# Patient Record
Sex: FEMALE | Race: WHITE | NOT HISPANIC OR LATINO | Employment: OTHER | URBAN - METROPOLITAN AREA
[De-identification: names, ages, dates, MRNs, and addresses within clinical notes are randomized per-mention and may not be internally consistent; named-entity substitution may affect disease eponyms.]

---

## 2017-01-10 ENCOUNTER — TELEPHONE (OUTPATIENT)
Dept: GASTROENTEROLOGY | Facility: CLINIC | Age: 77
End: 2017-01-10

## 2017-01-10 NOTE — TELEPHONE ENCOUNTER
----- Message from Misty Schmitt MA sent at 1/5/2017  4:35 PM CST -----  Contact: Pt# [phone]      ----- Message -----     From: Miladis Ledezma     Sent: 1/5/2017   3:53 PM       To: Jarek Barajas Staff    Pt states she was calling for test results

## 2017-01-11 ENCOUNTER — OFFICE VISIT (OUTPATIENT)
Dept: PSYCHIATRY | Facility: CLINIC | Age: 77
End: 2017-01-11
Payer: MEDICARE

## 2017-01-11 DIAGNOSIS — F43.29 ADJUSTMENT DISORDER WITH MIXED EMOTIONAL FEATURES: Primary | ICD-10-CM

## 2017-01-11 PROCEDURE — 90834 PSYTX W PT 45 MINUTES: CPT | Mod: S$GLB,,, | Performed by: SOCIAL WORKER
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
## 2017-01-13 ENCOUNTER — TELEPHONE (OUTPATIENT)
Dept: GASTROENTEROLOGY | Facility: CLINIC | Age: 77
End: 2017-01-13

## 2017-01-17 ENCOUNTER — PATIENT MESSAGE (OUTPATIENT)
Dept: GASTROENTEROLOGY | Facility: CLINIC | Age: 77
End: 2017-01-17

## 2017-01-18 NOTE — TELEPHONE ENCOUNTER
She needs to be sent a certified letter informing her of results, RX etc (see previous message from me in 12/2016). She needs a clinic visit with me first available. Please schedule and mail to her as well. Also mail her the info I previously sent to her about microscopic colitis..     Thanks,  Tiff, NP

## 2017-01-25 ENCOUNTER — OFFICE VISIT (OUTPATIENT)
Dept: GASTROENTEROLOGY | Facility: CLINIC | Age: 77
End: 2017-01-25
Payer: MEDICARE

## 2017-01-25 ENCOUNTER — PATIENT MESSAGE (OUTPATIENT)
Dept: GASTROENTEROLOGY | Facility: CLINIC | Age: 77
End: 2017-01-25

## 2017-01-25 VITALS
DIASTOLIC BLOOD PRESSURE: 79 MMHG | BODY MASS INDEX: 26.93 KG/M2 | HEIGHT: 66 IN | HEART RATE: 104 BPM | WEIGHT: 167.56 LBS | SYSTOLIC BLOOD PRESSURE: 141 MMHG

## 2017-01-25 DIAGNOSIS — K52.832 LYMPHOCYTIC COLITIS: Primary | ICD-10-CM

## 2017-01-25 PROCEDURE — 3077F SYST BP >= 140 MM HG: CPT | Mod: S$GLB,,, | Performed by: NURSE PRACTITIONER

## 2017-01-25 PROCEDURE — 1160F RVW MEDS BY RX/DR IN RCRD: CPT | Mod: S$GLB,,, | Performed by: NURSE PRACTITIONER

## 2017-01-25 PROCEDURE — 1157F ADVNC CARE PLAN IN RCRD: CPT | Mod: S$GLB,,, | Performed by: NURSE PRACTITIONER

## 2017-01-25 PROCEDURE — 1126F AMNT PAIN NOTED NONE PRSNT: CPT | Mod: S$GLB,,, | Performed by: NURSE PRACTITIONER

## 2017-01-25 PROCEDURE — 99214 OFFICE O/P EST MOD 30 MIN: CPT | Mod: S$GLB,,, | Performed by: NURSE PRACTITIONER

## 2017-01-25 PROCEDURE — 3078F DIAST BP <80 MM HG: CPT | Mod: S$GLB,,, | Performed by: NURSE PRACTITIONER

## 2017-01-25 PROCEDURE — 1159F MED LIST DOCD IN RCRD: CPT | Mod: S$GLB,,, | Performed by: NURSE PRACTITIONER

## 2017-01-25 PROCEDURE — 99999 PR PBB SHADOW E&M-EST. PATIENT-LVL III: CPT | Mod: PBBFAC,,, | Performed by: NURSE PRACTITIONER

## 2017-01-25 RX ORDER — BUDESONIDE 3 MG/1
9 CAPSULE, COATED PELLETS ORAL DAILY
Qty: 90 CAPSULE | Refills: 1 | Status: SHIPPED | OUTPATIENT
Start: 2017-01-25 | End: 2018-05-11

## 2017-01-25 NOTE — PROGRESS NOTES
[facility] Note    Reason for Visit:  The encounter diagnosis was Lymphocytic colitis.    PCP: Moshe ORIELLY MD: No referring provider defined for this encounter.    HPI:  This is a 76 y.o. female here for f/u evaluation of diarrhea and GERD.  I saw Ms. Haile on 8/17/2016 and on 10/28/2016 for a hx of hx of intermittent diarrhea made worse with anxiety/stress over the years. Colonoscopy w/ random bx in 12/2016 revealed lymphocytic colitis. Attempts were made to contact the pt regarding the results, but apparently pt did not receive any of the messages. A letter was also sent out to her home with the information, but pt has yet to receive it. She states she was having phone problems for a while. Budesonide was sent to her pharmacy for treatment, but pt did not pick it up d/t she was not aware. She is currently having 3-4 loose to watery BM/day. There are some episodes of nocturnal diarrhea and some episodes of incontinence. She reports taking metamucil wafers once daily. She has not stopped drinking diet sodas as previously recomended. She drinks about 2 diet sodas per day. She reports being treated for depression/anxiety per psych and feels some improvement in her mood.     Abdominal pain - denies  Reflux + hx GERD. Has been well controled with pantoprazole once daily.  Dysphagia/odynophagia - denies  GI bleeding -denies hematochezia, hematemesis, melena, BRBPR, black/tarry stools, and coffee ground emesis  NSAID usage - 81 mg ASA daily; hx of daily Celebrex use years ago.    ROS:  Constitutional: No fevers, no chills, No unintentional weight loss, + fatigue,   ENT: No allergies  CV: No chest pain, no palpitations, no perif. edema, no sob on exertion  Pulm: No cough, No shortness of breath, no wheezes, no sputum  Ophtho: No vision changes  GI: see HPI; also no nausea, no vomiting, no change in appetite  Derm: No rash  Heme: No lymphadenopathy, No bruising  MSK: + arthritis, no muscle  pain, no muscle weakness  : No dysuria, No hematuria  Endo: No hot or cold intolerance  Neuro: No syncope, No seizure,     Medical History:  has a past medical history of Allergy; Anemia; Anxiety; Arthritis; Breast disorder; Cataract; CKD (chronic kidney disease) stage 3, GFR 30-59 ml/min; Clotting disorder; Degenerative disc disease; Depression; Fibromyalgia; GERD (gastroesophageal reflux disease); Hyperlipidemia; Hypertension; TRU (obstructive sleep apnea); RLS (restless legs syndrome); and S/P knee replacement (1/13/2014).    Surgical History:  has a past surgical history that includes Tonsillectomy; Appendectomy; Rectal surgery; Cholecystectomy; Knee surgery (Bilateral); Hysterectomy (1977); Breast surgery; Breast lumpectomy; Eye surgery (Bilateral); Joint replacement; and Colonoscopy (N/A, 12/2/2016).    Family History: family history includes Alcohol abuse in her son; Breast cancer in her paternal aunt and paternal grandmother; Cancer in her paternal grandmother; Cancer (age of onset: 40) in her cousin; Dementia in her mother; Heart disease in her father; Hypertension in her son; No Known Problems in her daughter; Ovarian cancer in her cousin and paternal grandmother. There is no history of Skin cancer, Melanoma, Colon cancer, Esophageal cancer, Stomach cancer, Irritable bowel syndrome, Crohn's disease, Ulcerative colitis, or Celiac disease..     Social History:  reports that she has never smoked. She has never used smokeless tobacco. She reports that she drinks alcohol. She reports that she does not use illicit drugs.    Review of patient's allergies indicates:   Allergen Reactions    Demerol [meperidine]        Current Outpatient Prescriptions   Medication Sig    aspirin (ECOTRIN) 81 MG EC tablet Take 81 mg by mouth every evening.     buPROPion (WELLBUTRIN SR) 150 MG TBSR 12 hr tablet Take 1 tablet (150 mg total) by mouth 2 (two) times daily. Please contact PCP for refills    clonazePAM (KLONOPIN) 1 MG  "tablet Take 1 tablet (1 mg total) by mouth 3 (three) times daily.    duloxetine (CYMBALTA) 60 MG capsule Take 1 capsule (60 mg total) by mouth 2 (two) times daily.    HORIZANT 600 mg TbSR Take 600 mg by mouth every evening.    hydrochlorothiazide (HYDRODIURIL) 25 MG tablet Take 1 tablet (25 mg total) by mouth once daily.    losartan (COZAAR) 50 MG tablet Take 1 tablet (50 mg total) by mouth before breakfast.    melatonin 1 mg Tab Take 5 mg by mouth.     MV-MN/FA/VIT K/LYCOP/LUT/ZEAXA (OCUVITE EYE + MULTI ORAL) Take by mouth.    pantoprazole (PROTONIX) 40 MG tablet Take 1 tablet (40 mg total) by mouth once daily.    pravastatin (PRAVACHOL) 40 MG tablet Take 1 tablet (40 mg total) by mouth every evening.    vitamin D 1000 units Tab Take 185 mg by mouth once daily.    budesonide (ENTOCORT EC) 3 mg capsule Take 3 capsules (9 mg total) by mouth once daily.     No current facility-administered medications for this visit.        Objective Findings:    Vital Signs:  Visit Vitals    BP (!) 141/79    Pulse 104    Ht 5' 6" (1.676 m)    Wt 76 kg (167 lb 8.8 oz)    BMI 27.04 kg/m2     Body mass index is 27.04 kg/(m^2).    Physical Exam:  General Appearance: Well appearing in no acute distress  Head:   Normocephalic, without obvious abnormality  Eyes:    No scleral icterus, EOMI  ENT: Neck supple, Lips, mucosa, and tongue normal; teeth and gums normal  Extremities: 2+ radial pulses, no clubbing, cyanosis or edema  Skin: No rash to exposed areas  Neurologic: A&Ox4      Labs:  Lab Results   Component Value Date    WBC 8.84 10/28/2016    HGB 12.3 10/28/2016    HCT 37.9 10/28/2016     10/28/2016    CHOL 210 (H) 07/27/2016    TRIG 123 07/27/2016    HDL 63 07/27/2016    ALT 11 06/28/2016    AST 19 06/28/2016     10/28/2016    K 3.2 (L) 10/28/2016     10/28/2016    CREATININE 1.1 10/28/2016    BUN 23 10/28/2016    CO2 27 10/28/2016    TSH 1.243 06/28/2016    INR 1.8 (A) 01/30/2014    HGBA1C 5.5 " 08/21/2014         Endoscopy:    12/2016 colonoscopy Dr. Cope- normal appearance. Path- lymphocytic colitis.  2/25/2014 EGD Dr. Cope-WNL  1/29/2010 Colonoscopy Dr. Engel - 6 mm hyperplastic polyp.    Assessment:  1. Lymphocytic colitis           Recommendations:  1. Lymphocytic colitis-results/diagnosis discussed. Written information provided. RX 9 mg budesonide once daily x 4 weeks. Will adjust therapy at f/u pending pt status.    F/u in one month    Visit time: 30 minutes with greater than 50% of the time spent in face to face pt  discussing the aforementioned diagnoses, recommendations, test results, and answering pt questions.    Order summary:  Orders Placed This Encounter    budesonide (ENTOCORT EC) 3 mg capsule         Thank you so much for allowing me to participate in the care of Tere Moreno, APRN, FNP-C

## 2017-01-25 NOTE — PATIENT INSTRUCTIONS
Microscopic colitis  Definition:  Collagenous ([id number]) colitis and lymphocytic colitis are inflammatory conditions of the colon that cause persistent watery diarrhea. Some researchers believe that collagenous colitis and lymphocytic colitis are different phases of the same condition rather than separate conditions.    Both collagenous colitis and lymphocytic colitis are sometimes referred to collectively as microscopic colitis -- because the diagnosis is confirmed by examining a sample of tissue using a microscope.    Treatment for collagenous colitis and lymphocytic colitis often begins with lifestyle changes. In many cases, adjusting your diet will be enough to resolve your symptoms. If not, your doctor can suggest a number of effective medications for collagenous colitis and lymphocytic colitis. In rare cases, surgery is necessary.    Signs and symptoms of collagenous colitis and lymphocytic colitis include:  Chronic, watery diarrhea  Abdominal pain or cramps  Weight loss  Nausea  Fecal incontinence  When to see a doctor   If you have watery diarrhea that lasts more than a few days, contact your doctor so that the condition can be diagnosed and properly treated.    Causes:  * If you have never been checked for celiac disease it is important to be checked  It's not clear what causes collagenous colitis and lymphocytic colitis. The inflammation affects the colon differently in the two types of colitis:    Collagenous colitis causes the layer of connective tissue (collagen) in the colon to thicken.  Lymphocytic colitis causes increased levels of a specialized type of white blood cell (lymphocyte) in the colon.  Theories about causes:   Doctors aren't sure what causes collagenous colitis and lymphocytic colitis, but researchers theorize that causes could include:  Bacteria that produce toxins that irritate the lining of the colon.  Viruses that trigger inflammation.  Immune system problems, such as  autoimmune disorders that occur when the body's immune system attacks healthy tissues. People with collagenous colitis or lymphocytic colitis sometimes also have an autoimmune disorder, such as celiac disease, rheumatoid arthritis or scleroderma.    Medications linked to these conditions include:  Clozapine (Clozaril)  Entacapone (Comtan)  Esomeprazole (Nexium)  Flutamide  Lansoprazole (Prevacid)  Nonsteroidal anti-inflammatory drugs (NSAIDs), such as aspirin and ibuprofen (Advil, Motrin, others)  Omeprazole (Prilosec)  Ranitidine (Zantac)  Sertraline (Zoloft)  Simvastatin (Zocor)  Ticlopidine (Ticlid)  It's not clear why some people who use these medications develop collagenous colitis or lymphocytic colitis, while others don't.    What you can do in the meantime:  While you're waiting for your appointment, you may find some relief from persistent diarrhea by making changes to your diet. Try to:  Eat low fat foods  Avoid dairy products  Not drink caffeine or alcohol  Choose bland foods and avoid spicy foods    Treatment:  If your signs and symptoms persist, your doctor may recommend medications, such as:    Anti-diarrhea drugs. Medications that control diarrhea include loperamide (Imodium A-D), bismuth subsalicylate (Pepto-Bismol) and a combination of the drugs diphenoxylate and atropine (Lomotil).  Drugs that block bile acids. The drug cholestyramine (Questran) may help treat lymphocytic colitis by absorbing bile acids that can contribute to diarrhea.  Steroid medications to block inflammation. A corticosteroid drug, such as budesonide (Entocort) or prednisone, can help control inflammation in your colon.  Anti-inflammatory drugs. Other drugs used to control colon inflammation include mesalamine (Asacol, Pentasa, others) and sulfasalazine (Azulfidine).  Drugs that suppress the immune system. Immunosuppressive drugs that can help reduce inflammation in the colon include methotrexate (Rheumatrex) and azathioprine  (Azasan, Imuran).  Surgery to remove a portion of your colon   When the symptoms of collagenous colitis and lymphocytic colitis are severe, and medications aren't effective, your doctor may recommend surgery to remove all or part of your colon in a procedure called a colectomy. Surgery is rare for these conditions.    Changes to your diet may help relieve diarrhea that you experience with collagenous colitis and lymphocytic colitis. Try to:    Drink plenty of fluids. Water is best, but fluids with added sodium and potassium (electrolytes) may be beneficial as well. Try drinking broth or watered-down fruit juice. Avoid beverages that are high in sugar or contain alcohol or caffeine, such as coffee, tea and leona, which may aggravate your symptoms.  Choose soft, easy-to-digest foods. These include applesauce, bananas and rice. Avoid high-fiber foods such as beans, nuts and vegetables. If you feel like your symptoms are improving, slowly add high-fiber foods back to your diet.  Try eating several small meals, rather than a few large meals. Space meals throughout the day instead of eating two or three large ones.  Avoid irritating foods. Stay away from spicy, fatty or fried foods and any other foods that make your symptoms worse.

## 2017-01-26 DIAGNOSIS — K52.832 LYMPHOCYTIC COLITIS: Primary | ICD-10-CM

## 2017-02-08 ENCOUNTER — OFFICE VISIT (OUTPATIENT)
Dept: PSYCHIATRY | Facility: CLINIC | Age: 77
End: 2017-02-08
Payer: MEDICARE

## 2017-02-08 DIAGNOSIS — F43.23 ADJUSTMENT DISORDER WITH MIXED ANXIETY AND DEPRESSED MOOD: Primary | ICD-10-CM

## 2017-02-08 PROCEDURE — 90834 PSYTX W PT 45 MINUTES: CPT | Mod: S$GLB,,, | Performed by: SOCIAL WORKER

## 2017-02-08 PROCEDURE — 99499 UNLISTED E&M SERVICE: CPT | Mod: S$GLB,,, | Performed by: SOCIAL WORKER

## 2017-02-08 NOTE — PROGRESS NOTES
Individual Psychotherapy (PhD/LCSW)    2/8/2017    Site:  [facility]         Therapeutic Intervention: Met with patient.  Outpatient - Insight oriented psychotherapy 45 min - CPT code 99694 and Outpatient - Behavior modifying psychotherapy 45 min - CPT code 08127    Chief complaint/reason for encounter: depression and anxiety     Interval history and content of current session:    Marni sent her a letter (response to pt letter).  Pt could not find it to bring it with her.  Daughter changed about 2 years ago.  Last argument triggered by pt asking during visit how are you doing?   The interaction seems plagued with distortions.   Pt is now concern about her relation with granddaughter as daughter requested pt keep her distance from her.   Pt has decided to respect daughter's wishes and not push the relation and let her daughter make the moves.     Pt has more motivation.  Started [employer] classes.          Treatment plan:  · Target symptoms: depression, anxiety   · Why chosen therapy is appropriate versus another modality: relevant to diagnosis  · Outcome monitoring methods: self-report, observation  · Therapeutic intervention type: insight oriented psychotherapy, behavior modifying psychotherapy    Risk parameters:  Patient reports no suicidal ideation  Patient reports no homicidal ideation  Patient reports no self-injurious behavior  Patient reports no violent behavior    Verbal deficits: None    Patient's response to intervention:  The patient's response to intervention is accepting.    Progress toward goals and other mental status changes:  The patient's progress toward goals is good.    Diagnosis:     ICD-10-CM ICD-9-CM   1. Adjustment disorder with mixed anxiety and depressed mood F43.23 309.28       Plan:  individual psychotherapy    Return to clinic: 1 month    Length of Service (minutes): 45

## 2017-03-15 DIAGNOSIS — K21.00 GASTROESOPHAGEAL REFLUX DISEASE WITH ESOPHAGITIS: ICD-10-CM

## 2017-03-15 DIAGNOSIS — I15.0 RENOVASCULAR HYPERTENSION: ICD-10-CM

## 2017-03-15 RX ORDER — HYDROCHLOROTHIAZIDE 25 MG/1
25 TABLET ORAL DAILY
Qty: 90 TABLET | Refills: 3 | Status: SHIPPED | OUTPATIENT
Start: 2017-03-15 | End: 2017-09-06 | Stop reason: SDUPTHER

## 2017-03-15 RX ORDER — PRAVASTATIN SODIUM 40 MG/1
40 TABLET ORAL NIGHTLY
Qty: 90 TABLET | Refills: 3 | Status: SHIPPED | OUTPATIENT
Start: 2017-03-15 | End: 2017-11-13 | Stop reason: SDUPTHER

## 2017-03-15 RX ORDER — PANTOPRAZOLE SODIUM 40 MG/1
40 TABLET, DELAYED RELEASE ORAL DAILY
Qty: 90 TABLET | Refills: 3 | Status: SHIPPED | OUTPATIENT
Start: 2017-03-15 | End: 2017-10-09 | Stop reason: SDUPTHER

## 2017-03-15 RX ORDER — LOSARTAN POTASSIUM 50 MG/1
50 TABLET ORAL
Qty: 90 TABLET | Refills: 3 | Status: SHIPPED | OUTPATIENT
Start: 2017-03-15 | End: 2017-10-10 | Stop reason: SDUPTHER

## 2017-03-15 NOTE — TELEPHONE ENCOUNTER
----- Message from Shannon Rosales sent at 3/15/2017 11:48 AM CDT -----  Contact: Self/ [phone]   Pt want to speak with someone in the office about a refill on her medication. Pt stated she need to speak with someone in the office about her appt for today. Please call and advise       Thank you

## 2017-03-24 ENCOUNTER — OFFICE VISIT (OUTPATIENT)
Dept: INTERNAL MEDICINE | Facility: CLINIC | Age: 77
End: 2017-03-24
Payer: MEDICARE

## 2017-03-24 VITALS
WEIGHT: 169.06 LBS | DIASTOLIC BLOOD PRESSURE: 72 MMHG | SYSTOLIC BLOOD PRESSURE: 130 MMHG | HEART RATE: 62 BPM | HEIGHT: 66 IN | BODY MASS INDEX: 27.17 KG/M2

## 2017-03-24 DIAGNOSIS — E78.5 HYPERLIPIDEMIA, UNSPECIFIED HYPERLIPIDEMIA TYPE: ICD-10-CM

## 2017-03-24 DIAGNOSIS — K52.832 LYMPHOCYTIC COLITIS: Primary | ICD-10-CM

## 2017-03-24 DIAGNOSIS — G47.33 OSA ON CPAP: ICD-10-CM

## 2017-03-24 DIAGNOSIS — I10 ESSENTIAL HYPERTENSION: ICD-10-CM

## 2017-03-24 DIAGNOSIS — E21.3 HYPERPARATHYROIDISM: ICD-10-CM

## 2017-03-24 DIAGNOSIS — G47.00 INSOMNIA, UNSPECIFIED TYPE: ICD-10-CM

## 2017-03-24 PROCEDURE — 1160F RVW MEDS BY RX/DR IN RCRD: CPT | Mod: S$GLB,,, | Performed by: INTERNAL MEDICINE

## 2017-03-24 PROCEDURE — 1126F AMNT PAIN NOTED NONE PRSNT: CPT | Mod: S$GLB,,, | Performed by: INTERNAL MEDICINE

## 2017-03-24 PROCEDURE — 3078F DIAST BP <80 MM HG: CPT | Mod: S$GLB,,, | Performed by: INTERNAL MEDICINE

## 2017-03-24 PROCEDURE — 1159F MED LIST DOCD IN RCRD: CPT | Mod: S$GLB,,, | Performed by: INTERNAL MEDICINE

## 2017-03-24 PROCEDURE — 3075F SYST BP GE 130 - 139MM HG: CPT | Mod: S$GLB,,, | Performed by: INTERNAL MEDICINE

## 2017-03-24 PROCEDURE — 99999 PR PBB SHADOW E&M-EST. PATIENT-LVL III: CPT | Mod: PBBFAC,,, | Performed by: INTERNAL MEDICINE

## 2017-03-24 PROCEDURE — 1157F ADVNC CARE PLAN IN RCRD: CPT | Mod: S$GLB,,, | Performed by: INTERNAL MEDICINE

## 2017-03-24 PROCEDURE — 99213 OFFICE O/P EST LOW 20 MIN: CPT | Mod: S$GLB,,, | Performed by: INTERNAL MEDICINE

## 2017-03-24 NOTE — PATIENT INSTRUCTIONS
Okay to stop clonazepam at night. If there are any problems after stopping please notify the office.

## 2017-03-24 NOTE — PROGRESS NOTES
"Subjective:       Patient ID: Tere Haile is a 77 y.o. female.    Chief Complaint: Follow-up    HPI    Last visit with me 10/2016. Since then seen by Gastroenterology, Nephrology, Internal Medicine, Psychiatry. Had endoscopy. Upcoming appointment with lab and Nephrology.    Pt did not start any of the meds from Gastroenterology due to expense. Didn't see Gastroenterology about this since. Reports prep from colonoscopy much better this time.    No recent CPAP titration. Reports "I'm having an awful time with sleeping". Some days will sleep all day. Using Klonopin 1/2 tab every other day at night.    Wellbutrin is helping with her mood a lot.    Review of Systems    As per HPI    Objective:      Physical Exam   Constitutional: No distress.    woman whose Body mass index is 27.29 kg/(m^2).    Skin: Skin is warm and dry.   No nodules along right ribcage and flank.   Psychiatric: She has a normal mood and affect. Her behavior is normal.   Nursing note and vitals reviewed.      Vitals:    03/24/17 0911   BP: 130/72   BP Location: Right arm   Patient Position: Sitting   BP Method: Manual   Pulse: 62   Weight: 76.7 kg (169 lb 1.5 oz)   Height: 5' 6" (1.676 m)     Body mass index is 27.29 kg/(m^2).    RESULTS: Reviewed labs from last 12 months    Assessment:       1. Lymphocytic colitis    2. Hyperparathyroidism    3. TRU on CPAP    4. Insomnia, unspecified type    5. Hyperlipidemia, unspecified hyperlipidemia type    6. Essential hypertension        Plan:   Tere was seen today for follow-up.    Diagnoses and all orders for this visit:    Lymphocytic colitis:  Didn't start meds from Gastroenterology due to expense; follow up with Gastroenterology for further input and determination if other treatment is required.    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    TRU on CPAP:  Needs titration, refer to Sleep clinic.  -     " Ambulatory consult for Sleep Study    Insomnia, unspecified type:  improved generally but still with some sleep disturbances occasionally, refer to Sleep Med, try stopping Klonopin now that she has weaned to 1/2 tab every other day for 3 weeks.  -     Ambulatory consult for Sleep Study    Hyperlipidemia, unspecified hyperlipidemia type:  Prior diagnosis, well controlled on current management. No changes at this time, will continue to monitor.   -     Lipid panel; Future    Essential hypertension:  Prior diagnosis, well controlled on current management. No changes at this time, will continue to monitor.   -     Basic metabolic panel; Future    Return in about 7 months (around 10/24/2017) for EPP annual exam, fasting labs 1 week prior.  Moshe Sandra MD  Internal Medicine    Portions of this note were completed using [employer] dictation software. Please excuse typographical or syntax errors.

## 2017-03-24 NOTE — MR AVS SNAPSHOT
[facility]  [address]  [facility] [address]  Phone: [phone]  Fax: [phone]                  Tere Haile   3/24/2017 9:20 AM   Office Visit    Description:  Female : 1940   Provider:  Moshe Sandra MD   Department:  [facility]           Reason for Visit     Follow-up           Diagnoses this Visit        Comments    Lymphocytic colitis    -  Primary     Hyperparathyroidism         TRU on CPAP         Insomnia, unspecified type         Hyperlipidemia, unspecified hyperlipidemia type         Essential hypertension                To Do List           Future Appointments        Provider Department Dept Phone    3/28/2017 1:30 PM Katherine Moreno NP [facility] - Gastroenterology [phone]    2017 9:00 AM LAB, APPOINTMENT [facility] [phone]    2017 9:10 AM LAB, SPECIMEN [facility] [phone]    2017 1:00 PM Kassandra Silva MD [facility] - Nephrology [phone]    2017 3:00 PM Julissa Lindsay NP [facility] [phone]      Goals (5 Years of Data)     None      Follow-Up and Disposition     Return in about 7 months (around 10/24/2017) for EPP annual exam, fasting labs 1 week prior.    Follow-up and Disposition History      [facility]     [facility] -  Assistance  Registered nurses in the [facility] provide clinical advisement, health education, appointment booking, and other advisory services.  Call for this free service at [phone].             Medications           Message regarding Medications     Verify the changes and/or additions to your medication regime listed below are the same as discussed with your clinician today.  If any of these changes or additions are incorrect, please notify your healthcare provider.             Verify that the below list of medications is an  "accurate representation of the medications you are currently taking.  If none reported, the list may be blank. If incorrect, please contact your healthcare provider. Carry this list with you in case of emergency.           Current Medications     aspirin (ECOTRIN) 81 MG EC tablet Take 81 mg by mouth every evening.     buPROPion (WELLBUTRIN SR) 150 MG TBSR 12 hr tablet Take 1 tablet (150 mg total) by mouth 2 (two) times daily. Please contact PCP for refills    clonazePAM (KLONOPIN) 1 MG tablet Take 1 tablet (1 mg total) by mouth 3 (three) times daily.    duloxetine (CYMBALTA) 60 MG capsule Take 1 capsule (60 mg total) by mouth 2 (two) times daily.    HORIZANT 600 mg TbSR Take 600 mg by mouth every evening.    hydrochlorothiazide (HYDRODIURIL) 25 MG tablet Take 1 tablet (25 mg total) by mouth once daily.    losartan (COZAAR) 50 MG tablet Take 1 tablet (50 mg total) by mouth before breakfast.    melatonin 1 mg Tab Take 10 mg by mouth.     MV-MN/FA/VIT K/LYCOP/LUT/ZEAXA (OCUVITE EYE + MULTI ORAL) Take by mouth.    pantoprazole (PROTONIX) 40 MG tablet Take 1 tablet (40 mg total) by mouth once daily.    pravastatin (PRAVACHOL) 40 MG tablet Take 1 tablet (40 mg total) by mouth every evening.    vitamin D 1000 units Tab Take 185 mg by mouth once daily.    bismuth subsalicylate (BISMUTH) 262 mg Tab Take 3 tablets by mouth 3 (three) times daily.    budesonide (ENTOCORT EC) 3 mg capsule Take 3 capsules (9 mg total) by mouth once daily.           Clinical Reference Information           Your Vitals Were     BP Pulse Height Weight BMI    130/72 (BP Location: Right arm, Patient Position: Sitting, BP Method: Manual) 62 5' 6" (1.676 m) 76.7 kg (169 lb 1.5 oz) 27.29 kg/m2      Blood Pressure          Most Recent Value    BP  130/72      Allergies as of 3/24/2017     Demerol [Meperidine]      Immunizations Administered on Date of Encounter - 3/24/2017     None      Orders Placed During Today's Visit      Normal Orders This Visit    " Ambulatory consult for Sleep Study     Future Labs/Procedures Expected by Expires    Basic metabolic panel  3/24/2017 3/24/2018    Lipid panel  3/24/2017 5/23/2018      Instructions    Okay to stop clonazepam at night. If there are any problems after stopping please notify the office.       Language Assistance Services     ATTENTION: Language assistance services are available, free of charge. Please call [phone].      ATENCIÓN: Si habla español, tiene a robins disposición servicios gratuitos de asistencia lingüística. Llame al [phone].     CHÚ Ý: N?u b?n nói Ti?ng Vi?t, có các d?ch v? h? tr? ngôn ng? mi?n phí dành cho b?n. G?i s? [phone].         [facility] complies with applicable Federal civil rights laws and does not discriminate on the basis of race, color, national origin, age, disability, or sex.

## 2017-04-05 DIAGNOSIS — Z13.31 POSITIVE DEPRESSION SCREENING: ICD-10-CM

## 2017-04-05 DIAGNOSIS — F41.9 ANXIETY: ICD-10-CM

## 2017-04-05 RX ORDER — BUPROPION HYDROCHLORIDE 150 MG/1
TABLET, EXTENDED RELEASE ORAL
Qty: 60 TABLET | Refills: 3 | Status: CANCELLED | OUTPATIENT
Start: 2017-04-05

## 2017-04-11 DIAGNOSIS — F41.9 ANXIETY: ICD-10-CM

## 2017-04-11 DIAGNOSIS — Z13.31 POSITIVE DEPRESSION SCREENING: ICD-10-CM

## 2017-04-11 NOTE — TELEPHONE ENCOUNTER
----- Message from Haylie Colunga MA sent at 4/11/2017  2:57 PM CDT -----  Contact: self - [phone]   Type: Rx    Name of medication(s): buPROPion (WELLBUTRIN SR) 150 MG TBSR 12 hr tablet    Is this a refill? New rx? Refill     Who prescribed medication?    Pharmacy Name, Phone, & Location: [facility] [address] [facility] [place], [address]    Comments: Please call. Thanks!

## 2017-04-12 RX ORDER — BUPROPION HYDROCHLORIDE 150 MG/1
150 TABLET, EXTENDED RELEASE ORAL 2 TIMES DAILY
Qty: 180 TABLET | Refills: 3 | Status: SHIPPED | OUTPATIENT
Start: 2017-04-12 | End: 2017-11-13 | Stop reason: SDUPTHER

## 2017-05-11 ENCOUNTER — OFFICE VISIT (OUTPATIENT)
Dept: SLEEP MEDICINE | Facility: CLINIC | Age: 77
End: 2017-05-11
Payer: MEDICARE

## 2017-05-11 VITALS
BODY MASS INDEX: 26.93 KG/M2 | HEART RATE: 88 BPM | HEIGHT: 66 IN | WEIGHT: 167.56 LBS | SYSTOLIC BLOOD PRESSURE: 120 MMHG | DIASTOLIC BLOOD PRESSURE: 70 MMHG

## 2017-05-11 DIAGNOSIS — G47.00 INSOMNIA, UNSPECIFIED TYPE: Primary | ICD-10-CM

## 2017-05-11 DIAGNOSIS — G25.81 RLS (RESTLESS LEGS SYNDROME): ICD-10-CM

## 2017-05-11 DIAGNOSIS — G47.33 OBSTRUCTIVE SLEEP APNEA: ICD-10-CM

## 2017-05-11 PROCEDURE — 99999 PR PBB SHADOW E&M-EST. PATIENT-LVL IV: CPT | Mod: PBBFAC,,, | Performed by: NURSE PRACTITIONER

## 2017-05-11 PROCEDURE — 1126F AMNT PAIN NOTED NONE PRSNT: CPT | Mod: S$GLB,,, | Performed by: NURSE PRACTITIONER

## 2017-05-11 PROCEDURE — 3074F SYST BP LT 130 MM HG: CPT | Mod: S$GLB,,, | Performed by: NURSE PRACTITIONER

## 2017-05-11 PROCEDURE — 1159F MED LIST DOCD IN RCRD: CPT | Mod: S$GLB,,, | Performed by: NURSE PRACTITIONER

## 2017-05-11 PROCEDURE — 1160F RVW MEDS BY RX/DR IN RCRD: CPT | Mod: S$GLB,,, | Performed by: NURSE PRACTITIONER

## 2017-05-11 PROCEDURE — 99205 OFFICE O/P NEW HI 60 MIN: CPT | Mod: S$GLB,,, | Performed by: NURSE PRACTITIONER

## 2017-05-11 PROCEDURE — 3078F DIAST BP <80 MM HG: CPT | Mod: S$GLB,,, | Performed by: NURSE PRACTITIONER

## 2017-05-11 NOTE — MR AVS SNAPSHOT
[facility]  [address]  [facility] [address]  Phone: [phone]                  Tere Haile   2017 3:00 PM   Office Visit    Description:  Female : 1940   Provider:  Julissa Lindsay NP   Department:  [facility]           Reason for Visit     Sleep Apnea     Insomnia           Diagnoses this Visit        Comments    Insomnia, unspecified type    -  Primary     Obstructive sleep apnea         RLS (restless legs syndrome)                To Do List           Goals (5 Years of Data)     None      Follow-Up and Disposition     Return in about 2 months (around 2017) for 2p TRU/insomnia.      [facility]     [facility] On [facility] Nurse Care Line -  Assistance  Unless otherwise directed by your provider, please contact [facility], our nurse care line that is available for  assistance.     Registered nurses in the [facility] Center provide: appointment scheduling, clinical advisement, health education, and other advisory services.  Call: [phone] (toll free)               Medications           Message regarding Medications     Verify the changes and/or additions to your medication regime listed below are the same as discussed with your clinician today.  If any of these changes or additions are incorrect, please notify your healthcare provider.        STOP taking these medications     clonazePAM (KLONOPIN) 1 MG tablet Take 1 tablet (1 mg total) by mouth 3 (three) times daily.           Verify that the below list of medications is an accurate representation of the medications you are currently taking.  If none reported, the list may be blank. If incorrect, please contact your healthcare provider. Carry this list with you in case of emergency.           Current Medications     aspirin (ECOTRIN) 81 MG EC tablet Take 81 mg by mouth every evening.     bismuth subsalicylate (BISMUTH) 262 mg Tab Take 3 tablets by mouth 3  "(three) times daily.    budesonide (ENTOCORT EC) 3 mg capsule Take 3 capsules (9 mg total) by mouth once daily.    buPROPion (WELLBUTRIN SR) 150 MG TBSR 12 hr tablet Take 1 tablet (150 mg total) by mouth 2 (two) times daily.    duloxetine (CYMBALTA) 60 MG capsule Take 1 capsule (60 mg total) by mouth 2 (two) times daily.    HORIZANT 600 mg TbSR Take 600 mg by mouth every evening.    hydrochlorothiazide (HYDRODIURIL) 25 MG tablet Take 1 tablet (25 mg total) by mouth once daily.    losartan (COZAAR) 50 MG tablet Take 1 tablet (50 mg total) by mouth before breakfast.    melatonin 1 mg Tab Take 10 mg by mouth.     MV-MN/FA/VIT K/LYCOP/LUT/ZEAXA (OCUVITE EYE + MULTI ORAL) Take by mouth.    pantoprazole (PROTONIX) 40 MG tablet Take 1 tablet (40 mg total) by mouth once daily.    pravastatin (PRAVACHOL) 40 MG tablet Take 1 tablet (40 mg total) by mouth every evening.    vitamin D 1000 units Tab Take 185 mg by mouth once daily.           Clinical Reference Information           Your Vitals Were     BP Pulse Height Weight BMI    120/70 88 5' 6" (1.676 m) 76 kg (167 lb 8.8 oz) 27.04 kg/m2      Blood Pressure          Most Recent Value    BP  120/70      Allergies as of 5/11/2017     Demerol [Meperidine]      Immunizations Administered on Date of Encounter - 5/11/2017     None      Language Assistance Services     ATTENTION: Language assistance services are available, free of charge. Please call [phone].      ATENCIÓN: Si habla avelinaañol, tiene a robins disposición servicios gratuitos de asistencia lingüística. Llame al [phone].     [facility] Ý: N?u b?n nói Ti?ng Vi?t, có các d?ch v? h? tr? ngôn ng? mi?n phí dành cho b?n. G?i s? [phone].         [facility] complies with applicable Federal civil rights laws and does not discriminate on the basis of race, color, national origin, age, disability, or sex.        "

## 2017-05-11 NOTE — LETTER
May 12, 2017      Moshe Sandra MD  [address]  [facility] [address]           [facility]  [address]  [facility] [address]  Phone: [phone]          Patient: Tere Haile   MR Number: [id number]   YOB: 1940   Date of Visit: 5/11/2017       Dear Dr. Moshe Sandra:    Thank you for referring Tere Haile to me for evaluation. Attached you will find relevant portions of my assessment and plan of care.    If you have questions, please do not hesitate to call me. I look forward to following Tere Haile along with you.    Sincerely,    Julissa Lindsay, NP    Enclosure  CC:  No Recipients    If you would like to receive this communication electronically, please contact [email or web address] or [phone] to request more information on [facility] access.    For providers and/or their staff who would like to refer a patient to [facility], please contact us through our one-stop-shop provider referral line, [facility], at [phone].    If you feel you have received this communication in error or would no longer like to receive these types of communications, please e-mail [email or web address]

## 2017-05-11 NOTE — PROGRESS NOTES
"Tere Haile  was seen as a new patient, referred by Dr. Moshe Sandra , for the evaluation of obstructive sleep apnea and insomnia.     CHIEF COMPLAINT: Can't sleep at night and sleep all day    HISTORY OF PRESENT ILLNESS: Tere Haile a 77 y.o. female presents for the management of obstructive sleep apnea and evaluation insomnia. She has always had a sleep problem "since I was a baby my mom told me I had my days and nights mixed up". She has used [facility] aide in past and would sleep 11p-0730. Since retiring ~ 9 yrs ago, no set schedule. She and  will watch tv in den until 2-3am at times. Once asleep, she wakes up once to use bathroom and can easily go back to bed. She can sleep for hours in bed, wake up late afternoon. Can't sleep at night. Symptoms worse fall '16 when she wasn't speaking to her daughter but that has improved. Has recently resumed using cpap therapy. Avoids caffeiene. Sometimes can take 2-3h to fall asleep, otherwise she will be up for the day and go watch tv. Feels tired during those next days but can't sleep.     + restless legs   Hx MERA/Fe intake  Tried ineffective gabapentin and requip. Current taking Horizant 600mg but in am, symptoms qhs well controlled  Tried: benadryl, melatonin 10mg    PSG: No outside records available but her DME will fax them  Interrogatoin- forgot machine in car, using [employer] travis. Resumed consistent use recently/nightly. Sleeps mostly uninterrupted with mask on, no snoring, unknown pressure or severity of her TRU. She is using So[employer] machine.     Tapered off Klonopin 1mg qhs ~ 4mos ago due to feeling drunk in am    On todays [place] Sleepiness Scale the patient scores a 0/24.     BT:varies  SL: "sometimes I don't fall asleep"  Disruptions: 1x  WT: varies, out of bed late afternoon  Sleep quality: 0/5 quality,  unrefreshing     FAMILY HISTORY: No known sleep disorders.     SOCIAL HISTORY: . Retired 8yo ago. No " "CAFFEINE    REVIEW OF SYSTEMS:  Sleep related symptoms as per HPI; denies sinus congestion; +oral drying, denies dyspnea; Denies palpitations; + acid reflux; +arthritic pain. Denies polyuria; Denies headaches;Denies mood disturbance.  Otherwise, a balance of 10 systems reviewed is negative        PHYSICAL EXAM:   /70  Pulse 88  Ht 5' 6" (1.676 m)  Wt 76 kg (167 lb 8.8 oz)  BMI 27.04 kg/m2  GENERAL: W/N, W/D body habitus, well groomed   HEENT: Conjunctivae are non-erythematous; Pupils equal, round, and reactive to light; Nose is symmetrical  SKIN: On face and neck: No abrasions, no rashes, no lesions.   RESPIRATORY: Normal chest expansion and non-labored breathing at rest.   CARDIOVASCULAR: Normal S1, S2.  EXTREMITIES: No edema. No clubbing. No cyanosis. Station normal. Gait normal.   NEURO/PSYCH: Oriented to time, place and person. Normal attention span and concentration. Affect is full. Mood is normal.       ASSESSMENT:   1. Insomnia NEC. Multi-factorial - excess time in bed, poor sleep hygiene, pain syndrome, and likely paradoxical insomnia play a role.    2. TRU, previously diagnosed ~ 2008. Remains adherent with cpap ? Pressure qhs with less disrupted sleep and snoring. No outside studies available for review today. She has medical co-morbidities of hypertension, fibromyalgia,  which can be worsened by TRU. This warrants further investigation for possible obstructive sleep apnea.    3. RLS, stable on Horizant           PLAN:    Discussed Behavioral Modification:  1. Implement stimulus control: [place] bedroom for sleep only. Leave bedroom when frustrated from not sleeping. Engage in relaxation before returning.Engage in activities during the day. Avoid time in bed >8-9hrs. Establish some sleep-wake routine.   2. Avoid clock watching  3. Avoid thinking/worrying about sleep when trying to fall asleep  4. Continue to limit caffeinated products  5. Make sure the bedroom is dark, quiet and " cool  6. Attitudes toward sleep were discussed. Merits/components of CBT-I discussed  7. Sleep Hygiene handout was given and discussed    PLAN:   1. Request outiside sleep studies from DME. Continue prescribed cpap. Bring machine for interrogation next visit, have DME fax 30-d compliance report   2. Discussed etiology of TRU and potential ramifications of untreated TRU, including stroke, heart disease, HTN.    3. The patient was advised to abstain from driving should she feel sleepy or drowsy.   4. Long discussion about cbt-i and sleep routine importance. Trial natural Alteril and consider 0.3-1mg melatonin 4-5hr before anticipated bedtime. Consider 12-8-9am. Continue Horizant but take as prescribed 5-6pm. Consider avoiding cymbalta and wellbutrin doses in evening time, rather full dose am or second dose 5-6pm.

## 2017-05-12 NOTE — PATIENT INSTRUCTIONS
Treating Insomnia  Good sleeping habits are a key part of treatment. If needed, some medications may help you sleep better at first. Making healthy lifestyle changes and learning to relax can improve your sleep. Treating insomnia takes commitment, but trust that your efforts will pay off. Talk to your health care provider before taking any medication.    Healthy lifestyle  Your lifestyle affects your health and your sleep. Here are some healthy habits:  · Keep a regular sleep schedule. Go to bed and get up at the same time each day.  · Exercise regularly. It may help you reduce stress. Avoid strenuous exercise for 2 to 4 hours before bedtime.  · Avoid or limit naps, especially in the late afternoon.  · Use your bed only for sleep and sex.  · Dont spend too much time in bed trying to fall asleep. If you cant fall asleep, get up and do something (no electronics) until you become tired and drowsy.  · Avoid or limit caffeine and nicotine for up to 6 hours before bedtime. They can keep you awake at night.   · Also avoid alcohol for at least 4 to 6 hours before bedtime. It may help you fall asleep at first, but you will have more awakenings throughout the night, and your sleep will not be restful.  Before bedtime  To sleep better every night, try these tips:  · Have a bedtime routine to let your body and mind know when its time to sleep.  · Think of going to bed as relaxing and enjoyable. Sleep will come sooner.  · If your worries dont let you sleep, write them down in a diary. Then close it, and go to bed.  · Make sure the room is not too hot or too cold. If its not dark enough, an eye mask can help. If its noisy, try using earplugs.  Learn to relax  Stress, anxiety, and body tension may keep you awake at night. To unwind before bedtime, try a warm bath, meditation, or yoga. Also, try the following:  · Deep breathing. Sit or lie back in a chair. Take a slow, deep breath. Hold it for 5 counts. Then breathe out  slowly through your mouth. Keep doing this until you feel relaxed.  · Progressive muscle relaxation. Tense and then relax the muscles in your body as you breathe deeply. Start with your feet and work up your body to your neck and face.  Date Last Reviewed: 7/18/2015  © 6722-8731 [employer]. [address] [place] [address], [place] [address]. All rights reserved. This information is not intended as a substitute for professional medical care. Always follow your healthcare professional's instructions.

## 2017-05-16 ENCOUNTER — TELEPHONE (OUTPATIENT)
Dept: SLEEP MEDICINE | Facility: CLINIC | Age: 77
End: 2017-05-16

## 2017-05-16 NOTE — TELEPHONE ENCOUNTER
rec'd 30d and 90d compliance report revealing 30d 33% and 90d 13% days>4h. CPAP 8.5cm. AHI 1.9 with use. avg use when adherent 5:07hrs.

## 2017-07-11 ENCOUNTER — OFFICE VISIT (OUTPATIENT)
Dept: NEUROLOGY | Facility: CLINIC | Age: 77
End: 2017-07-11
Payer: MEDICARE

## 2017-07-11 VITALS
DIASTOLIC BLOOD PRESSURE: 60 MMHG | HEART RATE: 68 BPM | HEIGHT: 66 IN | SYSTOLIC BLOOD PRESSURE: 110 MMHG | BODY MASS INDEX: 25.26 KG/M2 | WEIGHT: 157.19 LBS

## 2017-07-11 DIAGNOSIS — G47.33 OBSTRUCTIVE SLEEP APNEA: ICD-10-CM

## 2017-07-11 DIAGNOSIS — G25.81 RLS (RESTLESS LEGS SYNDROME): ICD-10-CM

## 2017-07-11 DIAGNOSIS — G47.00 INSOMNIA, UNSPECIFIED TYPE: Primary | ICD-10-CM

## 2017-07-11 PROCEDURE — 1159F MED LIST DOCD IN RCRD: CPT | Mod: S$GLB,,, | Performed by: NURSE PRACTITIONER

## 2017-07-11 PROCEDURE — 99214 OFFICE O/P EST MOD 30 MIN: CPT | Mod: S$GLB,,, | Performed by: NURSE PRACTITIONER

## 2017-07-11 PROCEDURE — 99999 PR PBB SHADOW E&M-EST. PATIENT-LVL IV: CPT | Mod: PBBFAC,,, | Performed by: NURSE PRACTITIONER

## 2017-07-11 PROCEDURE — 99499 UNLISTED E&M SERVICE: CPT | Mod: S$GLB,,, | Performed by: NURSE PRACTITIONER

## 2017-07-11 PROCEDURE — 1126F AMNT PAIN NOTED NONE PRSNT: CPT | Mod: S$GLB,,, | Performed by: NURSE PRACTITIONER

## 2017-07-11 RX ORDER — DULOXETIN HYDROCHLORIDE 20 MG/1
20 CAPSULE, DELAYED RELEASE ORAL 2 TIMES DAILY
COMMUNITY
End: 2017-10-10 | Stop reason: SDUPTHER

## 2017-07-11 RX ORDER — TRAZODONE HYDROCHLORIDE 100 MG/1
100 TABLET ORAL NIGHTLY
Qty: 30 TABLET | Refills: 2 | Status: SHIPPED | OUTPATIENT
Start: 2017-07-11 | End: 2017-08-10

## 2017-07-11 NOTE — PATIENT INSTRUCTIONS
Blue light blocker glasses at [facility]  Bring machine there and have them send me 30d compliance report and have them check manometer, ensure blowing correct pressure    Communicate [facility] if need be, otherwise 3-mos f/u

## 2017-07-11 NOTE — PROGRESS NOTES
"Tere Haile  was seen as a f/u today for the mgt of obstructive sleep apnea and insomnia.     Since last seen, she continues to use cpap. Did not bring machine today. Uses nightly, except when unable to sleep entirely, which may happen every 10d or so. Typically going to bed now 11p and if unable to sleep will read outside of bedroom. No tv on anymore! Alteril was ineffective, also taking 10mg melatonin ~ 10p not helpful. [facility] is her DME. ESS=1. Taking horizant now afternoon which completely stops any RLS symptoms at bedtime "dont' have anymore". 2-3x/week hard to fall asleep w/i 30min. She has lost 10#.     HISTORY:  5/11/17  CHIEF COMPLAINT: Can't sleep at night and sleep all day    HISTORY OF PRESENT ILLNESS: Tere Haile a 77 y.o. female presents for the management of obstructive sleep apnea and evaluation insomnia. She has always had a sleep problem "since I was a baby my mom told me I had my days and nights mixed up". She has used [facility] aide in past and would sleep 11p-0730. Since retiring ~ 9 yrs ago, no set schedule. She and  will watch tv in den until 2-3am at times. Once asleep, she wakes up once to use bathroom and can easily go back to bed. She can sleep for hours in bed, wake up late afternoon. Can't sleep at night. Symptoms worse fall '16 when she wasn't speaking to her daughter but that has improved. Has recently resumed using cpap therapy. Avoids caffeiene. Sometimes can take 2-3h to fall asleep, otherwise she will be up for the day and go watch tv. Feels tired during those next days but can't sleep. ESS=0    + restless legs   Hx MERA/Fe intake  Tried ineffective gabapentin and requip. Current taking Horizant 600mg but in am, symptoms qhs well controlled  Tried: benadryl, melatonin 10mg    PSG: No outside records available but her DME will fax them  Interrogatoin- forgot machine in car, using [employer] travis. Resumed consistent use recently/nightly. Sleeps mostly " "uninterrupted with mask on, no snoring, unknown pressure or severity of her TRU. She is using [employer] machine.     Tapered off Klonopin 1mg qhs ~ 4mos ago due to feeling drunk in am    BT:varies  SL: "sometimes I don't fall asleep"  Disruptions: 1x  WT: varies, out of bed late afternoon  Sleep quality: 0/5 quality,  unrefreshing     FAMILY HISTORY: No known sleep disorders.   SOCIAL HISTORY: . Retired 10yo ago. No CAFFEINE    REVIEW OF SYSTEMS:  Sleep related symptoms as per HPI; denies sinus congestion;  + acid reflux; +arthritic pain. Otherwise, a balance of 10 systems reviewed is negative        PHYSICAL EXAM:   /60   Pulse 68   Ht 5' 6" (1.676 m)   Wt 71.3 kg (157 lb 3 oz)   BMI 25.37 kg/m²   GENERAL: W/N, W/D body habitus, well groomed       ASSESSMENT:   1. Insomnia NEC. Multi-factorial - excess time in bed, poor sleep hygiene, pain syndrome, and likely paradoxical insomnia play a role.    2. TRU, previously diagnosed ~ 2008. Remains adherent with cpap ? Pressure qhs with less disrupted sleep and snoring. No outside studies available for review today. She has medical co-morbidities of hypertension, fibromyalgia,  which can be worsened by TRU. This warrants continued treatment.    3. RLS, stable on Horizant           PLAN:    Discussed continued Behavioral Modification:  1. Implement stimulus control: [place] bedroom for sleep only. Leave bedroom when frustrated from not sleeping. Engage in relaxation before returning.Engage in activities during the day. Avoid time in bed >8-9hrs. Continue regular sleep and wake time 11-8-9am  2. Avoid clock watching  3. Avoid thinking/worrying about sleep when trying to fall asleep  4. Continue to limit caffeinated products  5. Make sure the bedroom is dark, quiet and cool  6. Attitudes toward sleep were discussed. Merits/components of CBT-I discussed      PLAN:   1. Request outiside sleep studies, she will obtain and fax.  Continue prescribed cpap. Bring " machine for interrogation to DME and have them check pressure and fax 30-d compliance report   2. Discussed etiology of TRU and potential ramifications of untreated TRU, including stroke, heart disease, HTN.    3. The patient was advised to abstain from driving should she feel sleepy or drowsy.   4. Begin  0.3-1mg melatonin 4-5hr before anticipated bedtime. Trazadone 100mg qhs. Continue Horizant as prescribed. Consider avoiding cymbalta in evening time, rather full dose am  5. RTC 3-mos

## 2017-07-12 ENCOUNTER — OFFICE VISIT (OUTPATIENT)
Dept: INTERNAL MEDICINE | Facility: CLINIC | Age: 77
End: 2017-07-12
Payer: MEDICARE

## 2017-07-12 VITALS
BODY MASS INDEX: 25.5 KG/M2 | HEIGHT: 66 IN | SYSTOLIC BLOOD PRESSURE: 115 MMHG | HEART RATE: 65 BPM | WEIGHT: 158.69 LBS | DIASTOLIC BLOOD PRESSURE: 80 MMHG

## 2017-07-12 DIAGNOSIS — N18.30 CHRONIC KIDNEY DISEASE, STAGE III (MODERATE): ICD-10-CM

## 2017-07-12 DIAGNOSIS — R22.9 SWELLING OF SKIN: Primary | ICD-10-CM

## 2017-07-12 DIAGNOSIS — Z12.31 ENCOUNTER FOR SCREENING MAMMOGRAM FOR BREAST CANCER: ICD-10-CM

## 2017-07-12 PROCEDURE — 99999 PR PBB SHADOW E&M-EST. PATIENT-LVL III: CPT | Mod: PBBFAC,,, | Performed by: INTERNAL MEDICINE

## 2017-07-12 PROCEDURE — 1125F AMNT PAIN NOTED PAIN PRSNT: CPT | Mod: S$GLB,,, | Performed by: INTERNAL MEDICINE

## 2017-07-12 PROCEDURE — 99499 UNLISTED E&M SERVICE: CPT | Mod: S$GLB,,, | Performed by: INTERNAL MEDICINE

## 2017-07-12 PROCEDURE — 99213 OFFICE O/P EST LOW 20 MIN: CPT | Mod: S$GLB,,, | Performed by: INTERNAL MEDICINE

## 2017-07-12 PROCEDURE — 1159F MED LIST DOCD IN RCRD: CPT | Mod: S$GLB,,, | Performed by: INTERNAL MEDICINE

## 2017-07-12 NOTE — PROGRESS NOTES
"Subjective:       Patient ID: Tere Haile is a 77 y.o. female.    Chief Complaint: Follow-up (pt states she may have blood clot on side of left eye/ stiffness.)    [facility]    Last visit with me 03/2017. Since then seen by Sleep Med and Neurology.  About 4 weeks ago the patient was hit in the left side of the head with a falling shower door.  There was significant ecchymoses and swelling, but this has started to resolve.  She has noted however a small nodule remaining in the left temple area.  She denies any pain, but there is some mild soreness to palpation when she is pressing on the area.  Her vision is normal, and she has no increase in pain or problem with chewing.  She denies any rash or any drainage from the area.    Review of Systems    As per [facility]    Objective:      Physical Exam   Constitutional: She is oriented to person, place, and time. No distress.    woman whose Body mass index is 25.61 kg/m².    Neurological: She is alert and oriented to person, place, and time.   Skin: Skin is warm and dry. No rash noted.   There is movable soft tissue nodule in L temple near eye approx 1 cm in size without overlying skin change or tenderness to palpation    Nursing note and vitals reviewed.      Vitals:    07/12/17 1607   BP: 115/80   BP Location: Right arm   Patient Position: Sitting   BP Method: Manual   Pulse: 65   Weight: 72 kg (158 lb 11.2 oz)   Height: 5' 6" (1.676 m)     Body mass index is 25.61 kg/m².    RESULTS: Reviewed labs from last 12 months    Assessment:       1. Swelling of skin    2. Chronic kidney disease, stage III (moderate)    3. Encounter for screening mammogram for breast cancer        Plan:   Tere was seen today for follow-up.    Diagnoses and all orders for this visit:    Swelling of skin:  Most likely residual inflammation from bruise.  Slowly improving, try warm compresses 2 times a day to see if this facilitates resolution.    Chronic kidney disease, stage III " (moderate):  Patient interested in making dietary changes that would limit progression of kidney disease.  I will refer her to our health  for nutrition counseling.  Patient may need to pay out of pocket for nutritionist if she needs further details.    Encounter for screening mammogram for breast cancer: Patient elects to continue with continued screening mammography.    -     Mammo Digital Screening Bilat with CAD; Future    Return in about 4 months (around 11/12/2017) for EPP annual exam, fasting labs 1 week prior.  Moshe Sandra MD  Internal Medicine    Portions of this note were completed using Dragon medical dictation software. Please excuse typographical or syntax errors.

## 2017-07-21 ENCOUNTER — TELEPHONE (OUTPATIENT)
Dept: INTERNAL MEDICINE | Facility: CLINIC | Age: 77
End: 2017-07-21

## 2017-07-21 NOTE — TELEPHONE ENCOUNTER
..Called patient left message, intro self, referred by Dr. Sandra.   Request call back at direct number [phone].  Elif Xie RN HC

## 2017-07-21 NOTE — TELEPHONE ENCOUNTER
----- Message from Moshe Sandra MD sent at 7/12/2017  4:43 PM CDT -----  Regarding: Please contact for nutrition counseling  Candido Asencio,    Please contact this patient to set up an appointment to meet for Health Coaching.     The patient was actually interested in meeting with a nutritionist for any recommendations to help limit progression of kidney disease.  I informed her that since the insurance doesn't pay for it, I recommend she sit with you first to see if there are any other general nutrition recommendations.  If you do know of any diet changes that help limit kidney disease, please relay to the patient.    Please let me know if there are other questions. Thanks!    Moshe

## 2017-07-26 ENCOUNTER — TELEPHONE (OUTPATIENT)
Dept: SLEEP MEDICINE | Facility: CLINIC | Age: 77
End: 2017-07-26

## 2017-07-26 DIAGNOSIS — G47.33 OBSTRUCTIVE SLEEP APNEA: Primary | ICD-10-CM

## 2017-07-26 NOTE — TELEPHONE ENCOUNTER
----- Message from Noemi Sanders sent at 7/25/2017  4:08 PM CDT -----  Contact: MARTINEZ CAMPBELL [[id number]]  x_  1st Request  _  2nd Request  _  3rd Request        Who: MARTINEZ CAMPBELL [[id number]]    Why: Patient states she would like a prescription for a portable CPAP machine [facility]ed to [facility] at [phone] contact [phone] . Patient states she is at the facility now.    What Number to Call Back: [phone]    When to Expect a call back: (Before the end of the day)   -- if call after 3:00 call back will be tomorrow.

## 2017-07-27 ENCOUNTER — OFFICE VISIT (OUTPATIENT)
Dept: URGENT CARE | Facility: CLINIC | Age: 77
End: 2017-07-27
Payer: MEDICARE

## 2017-07-27 VITALS
RESPIRATION RATE: 12 BRPM | HEART RATE: 97 BPM | BODY MASS INDEX: 26.36 KG/M2 | DIASTOLIC BLOOD PRESSURE: 70 MMHG | TEMPERATURE: 99 F | SYSTOLIC BLOOD PRESSURE: 111 MMHG | WEIGHT: 164 LBS | OXYGEN SATURATION: 96 % | HEIGHT: 66 IN

## 2017-07-27 DIAGNOSIS — S00.12XA CONTUSION OF LEFT PERIOCULAR REGION, INITIAL ENCOUNTER: Primary | ICD-10-CM

## 2017-07-27 PROCEDURE — 1159F MED LIST DOCD IN RCRD: CPT | Mod: S$GLB,,, | Performed by: NURSE PRACTITIONER

## 2017-07-27 PROCEDURE — 99202 OFFICE O/P NEW SF 15 MIN: CPT | Mod: S$GLB,,, | Performed by: NURSE PRACTITIONER

## 2017-07-27 NOTE — PATIENT INSTRUCTIONS
Head Injury (Adult)    You have a head injury. It does not appear serious at this time. But symptoms of a more serious problem, such as a mild brain injury (concussion) or bruising or bleeding in the brain, may appear later. For this reason, you or someone caring for you will need to watch for the symptoms listed below. Once youre home, also be sure to follow any care instructions youre given.  Home care  Watch for the following symptoms  Seek emergency medical care if you have any of these symptoms over the next hours to days:   · Headache  · Nausea or vomiting  · Dizziness  · Sensitivity to light or noise  · Unusual sleepiness or grogginess  · Trouble falling asleep  · Personality changes  · Vision changes  · Memory loss  · Confusion  · Trouble walking or clumsiness  · Loss of consciousness (even for a short time)  · Inability to be awakened  · Stiff neck  · Weakness or numbness in any part of the body  · Seizures  General care  · If you were prescribed medicines for pain, use them as directed. Note: Dont take other medicines for pain without talking to your provider first.  · To help reduce swelling and pain, apply a cold source to the injured area for up to 20 minutes at a time. Do this as often as directed. Use a cold pack or bag of ice wrapped in a thin towel. Never apply a cold source directly to the skin.  · If you have cuts or scrapes as a result of your head injury, care for them as directed.  · For the next 24 hours (or longer, if instructed):  ¨ Dont drink alcohol or use sedatives or other medicines that make you sleepy.  ¨ Dont drive or operate machinery.  ¨ Dont do anything strenuous, such as heavy lifting or straining.  ¨ Limit tasks that require concentration. This includes reading, using a smartphone or computer, watching TV, and playing video games.  ¨ Dont return to sports or other activities that could result in another head injury.  Follow-up care  Follow up with your healthcare  provider, or as directed. If imaging tests were done, they will be reviewed by a doctor. You will be told the results and any new findings that may affect your care.  When to seek medical advice  Call your healthcare provider right away if any of these occur:  · Pain doesnt get better or worsens  · New or increased swelling or bruising  · Fever of 100.4°F (38°C) or higher, or as directed by your provider  · Increased redness, warmth, drainage, or bleeding from the injured area  · Fluid drainage or bleeding from the nose or ears  · Any depression or bony abnormality in the injured area  Date Last Reviewed: 9/26/2015 © 2000-2016 [employer]. [address] [place] [address], [place] [address]. All rights reserved. This information is not intended as a substitute for professional medical care. Always follow your healthcare professional's instructions.      Please return here or go to the Emergency Department for any concerns or worsening of condition.  If you were prescribed antibiotics, please take them to completion.  If you were prescribed a narcotic medication, do not drive or operate heavy equipment or machinery while taking these medications.  Please follow up with your primary care doctor or specialist as needed.    If you  smoke, please stop smoking.

## 2017-09-05 ENCOUNTER — OFFICE VISIT (OUTPATIENT)
Dept: URGENT CARE | Facility: CLINIC | Age: 77
End: 2017-09-05
Payer: MEDICARE

## 2017-09-05 VITALS
TEMPERATURE: 99 F | RESPIRATION RATE: 16 BRPM | OXYGEN SATURATION: 96 % | DIASTOLIC BLOOD PRESSURE: 66 MMHG | SYSTOLIC BLOOD PRESSURE: 101 MMHG | HEART RATE: 79 BPM | BODY MASS INDEX: 24.17 KG/M2 | WEIGHT: 154 LBS | HEIGHT: 67 IN

## 2017-09-05 DIAGNOSIS — M46.1 SACROILIITIS: Primary | ICD-10-CM

## 2017-09-05 PROCEDURE — 99213 OFFICE O/P EST LOW 20 MIN: CPT | Mod: S$GLB,,, | Performed by: INTERNAL MEDICINE

## 2017-09-05 PROCEDURE — 99499 UNLISTED E&M SERVICE: CPT | Mod: S$GLB,,, | Performed by: INTERNAL MEDICINE

## 2017-09-05 PROCEDURE — 3078F DIAST BP <80 MM HG: CPT | Mod: S$GLB,,, | Performed by: INTERNAL MEDICINE

## 2017-09-05 PROCEDURE — 3074F SYST BP LT 130 MM HG: CPT | Mod: S$GLB,,, | Performed by: INTERNAL MEDICINE

## 2017-09-05 PROCEDURE — 3008F BODY MASS INDEX DOCD: CPT | Mod: S$GLB,,, | Performed by: INTERNAL MEDICINE

## 2017-09-05 PROCEDURE — 1159F MED LIST DOCD IN RCRD: CPT | Mod: S$GLB,,, | Performed by: INTERNAL MEDICINE

## 2017-09-05 RX ORDER — METHYLPREDNISOLONE 4 MG/1
TABLET ORAL
Qty: 1 PACKAGE | Refills: 0 | Status: SHIPPED | OUTPATIENT
Start: 2017-09-05 | End: 2017-11-13

## 2017-09-05 NOTE — PROGRESS NOTES
"Subjective:       Patient ID: Tere Haile is a 77 y.o. female.    Vitals:  height is 5' 7" (1.702 m) and weight is 69.9 kg (154 lb). Her temperature is 98.6 °F (37 °C). Her blood pressure is 101/66 and her pulse is 79. Her respiration is 16 and oxygen saturation is 96%.     Chief Complaint: Back Pain (lower back pain)    Pt states that her lower right back/hip is hurting. Pt states that she thinks it is the sciatic nerve      Back Pain   This is a new problem. The current episode started in the past 7 days. The problem occurs constantly. The problem has been gradually worsening since onset. The pain is present in the sacro-iliac. The quality of the pain is described as shooting. The pain is at a severity of 8/10. The pain is moderate. The symptoms are aggravated by bending and twisting. Pertinent negatives include no abdominal pain, chest pain, fever or headaches. She has tried nothing for the symptoms.     Review of Systems   Constitution: Negative for chills and fever.   HENT: Negative for sore throat.    Eyes: Negative for blurred vision.   Cardiovascular: Negative for chest pain.   Respiratory: Negative for shortness of breath.    Skin: Negative for rash.   Musculoskeletal: Positive for back pain and joint pain.   Gastrointestinal: Negative for abdominal pain, diarrhea, nausea and vomiting.   Neurological: Negative for headaches.   Psychiatric/Behavioral: The patient is not nervous/anxious.        Objective:      Physical Exam   Musculoskeletal:   R sacroiliac tenderness       Assessment:       1. Sacroiliitis        Plan:         Sacroiliitis  -     methylPREDNISolone (MEDROL DOSEPACK) 4 mg tablet; use as directed  Dispense: 1 Package; Refill: 0          "

## 2017-09-05 NOTE — PROGRESS NOTES
"Subjective:       Patient ID: Tere Haile is a 77 y.o. female.    Vitals:  height is 5' 7" (1.702 m) and weight is 69.9 kg (154 lb). Her temperature is 98.6 °F (37 °C). Her blood pressure is 101/66 and her pulse is 79. Her respiration is 16 and oxygen saturation is 96%.     Chief Complaint: Back Pain (lower back pain)    HPI  ROS    Objective:      Physical Exam    Assessment:       No diagnosis found.    Plan:         There are no diagnoses linked to this encounter.  ***    "

## 2017-09-06 ENCOUNTER — OFFICE VISIT (OUTPATIENT)
Dept: NEPHROLOGY | Facility: CLINIC | Age: 77
End: 2017-09-06
Payer: MEDICARE

## 2017-09-06 ENCOUNTER — LAB VISIT (OUTPATIENT)
Dept: LAB | Facility: HOSPITAL | Age: 77
End: 2017-09-06
Payer: MEDICARE

## 2017-09-06 VITALS
HEART RATE: 78 BPM | OXYGEN SATURATION: 98 % | SYSTOLIC BLOOD PRESSURE: 120 MMHG | BODY MASS INDEX: 24.88 KG/M2 | WEIGHT: 158.5 LBS | HEIGHT: 67 IN | DIASTOLIC BLOOD PRESSURE: 60 MMHG

## 2017-09-06 DIAGNOSIS — I10 ESSENTIAL HYPERTENSION: ICD-10-CM

## 2017-09-06 DIAGNOSIS — Z86.2 H/O: IRON DEFICIENCY ANEMIA: ICD-10-CM

## 2017-09-06 DIAGNOSIS — N18.31 CKD STAGE G3A/A1, GFR 45-59 AND ALBUMIN CREATININE RATIO <30 MG/G: Primary | ICD-10-CM

## 2017-09-06 DIAGNOSIS — N18.31 CKD STAGE G3A/A1, GFR 45-59 AND ALBUMIN CREATININE RATIO <30 MG/G: ICD-10-CM

## 2017-09-06 LAB
BACTERIA #/AREA URNS AUTO: ABNORMAL /HPF
BILIRUB UR QL STRIP: NEGATIVE
CLARITY UR REFRACT.AUTO: CLEAR
COLOR UR AUTO: YELLOW
CREAT UR-MCNC: 67 MG/DL
GLUCOSE UR QL STRIP: NEGATIVE
HGB UR QL STRIP: NEGATIVE
HYALINE CASTS UR QL AUTO: 3 /LPF
KETONES UR QL STRIP: NEGATIVE
LEUKOCYTE ESTERASE UR QL STRIP: ABNORMAL
MICROSCOPIC COMMENT: ABNORMAL
NITRITE UR QL STRIP: NEGATIVE
NON-SQ EPI CELLS #/AREA URNS AUTO: <1 /HPF
PH UR STRIP: 6 [PH] (ref 5–8)
PROT UR QL STRIP: NEGATIVE
PROT UR-MCNC: <7 MG/DL
PROT/CREAT RATIO, UR: NORMAL
RBC #/AREA URNS AUTO: 1 /HPF (ref 0–4)
SP GR UR STRIP: 1.01 (ref 1–1.03)
SQUAMOUS #/AREA URNS AUTO: 2 /HPF
URN SPEC COLLECT METH UR: ABNORMAL
UROBILINOGEN UR STRIP-ACNC: NEGATIVE EU/DL
WBC #/AREA URNS AUTO: 2 /HPF (ref 0–5)

## 2017-09-06 PROCEDURE — 3074F SYST BP LT 130 MM HG: CPT | Mod: GC,S$GLB,, | Performed by: INTERNAL MEDICINE

## 2017-09-06 PROCEDURE — 1159F MED LIST DOCD IN RCRD: CPT | Mod: GC,S$GLB,, | Performed by: INTERNAL MEDICINE

## 2017-09-06 PROCEDURE — 99999 PR PBB SHADOW E&M-EST. PATIENT-LVL IV: CPT | Mod: PBBFAC,GC,, | Performed by: INTERNAL MEDICINE

## 2017-09-06 PROCEDURE — 1126F AMNT PAIN NOTED NONE PRSNT: CPT | Mod: GC,S$GLB,, | Performed by: INTERNAL MEDICINE

## 2017-09-06 PROCEDURE — 81001 URINALYSIS AUTO W/SCOPE: CPT

## 2017-09-06 PROCEDURE — 3078F DIAST BP <80 MM HG: CPT | Mod: GC,S$GLB,, | Performed by: INTERNAL MEDICINE

## 2017-09-06 PROCEDURE — 99213 OFFICE O/P EST LOW 20 MIN: CPT | Mod: GC,S$GLB,, | Performed by: INTERNAL MEDICINE

## 2017-09-06 PROCEDURE — 82570 ASSAY OF URINE CREATININE: CPT

## 2017-09-06 PROCEDURE — 3008F BODY MASS INDEX DOCD: CPT | Mod: GC,S$GLB,, | Performed by: INTERNAL MEDICINE

## 2017-09-06 RX ORDER — HYDROCHLOROTHIAZIDE 12.5 MG/1
12.5 TABLET ORAL DAILY
Qty: 90 TABLET | Refills: 3 | Status: SHIPPED | OUTPATIENT
Start: 2017-09-06 | End: 2017-11-20 | Stop reason: SDUPTHER

## 2017-09-06 RX ORDER — TRAZODONE HYDROCHLORIDE 100 MG/1
TABLET ORAL
COMMUNITY
Start: 2017-08-13 | End: 2017-10-11 | Stop reason: SDUPTHER

## 2017-09-06 NOTE — PROGRESS NOTES
Subjective:       Patient ID: Tere Haile is a 77 y.o. White female who presents for follow-up evaluation of Chronic Kidney Disease    HPI   Ms. Haile is a 77 y.o. WF with renovascular hypertension since 2005, TRU on CPAP, OA s/p bilateral knee replacements, DLD, and CKD stage 3 who presents today for follow-up. CKD is suspected to be 2/2 HTN and chronic NSAID use. No history of kidney stones, no family history of kidney disease. She also has a h/o IBS. She was  10/1/16.     Interval Hx:  Doing well today. Had a few falls since our last visit. No major injuries. Also dealing with sciatic nerve pain.   HTN: reports good control at home. /66. Asked if this was too low. Reports intermittent dizziness that occurs when standing/ambulating but not immediately upon standing.   My Vitals 9/6/2017 9/5/2017 7/27/2017 7/12/2017 7/11/2017   Blood Pressure 120/60 101/66 111/70 115/80 110/60   CKD: recently started on a course of steroids for sciatica. Asked about safety of eating beans and diet cola.         Current Outpatient Prescriptions:     aspirin (ECOTRIN) 81 MG EC tablet, Take 81 mg by mouth every evening. , Disp: , Rfl:     budesonide (ENTOCORT EC) 3 mg capsule, Take 3 capsules (9 mg total) by mouth once daily., Disp: 90 capsule, Rfl: 1    buPROPion (WELLBUTRIN SR) 150 MG TBSR 12 hr tablet, Take 1 tablet (150 mg total) by mouth 2 (two) times daily., Disp: 180 tablet, Rfl: 3    duloxetine (CYMBALTA) 20 MG capsule, Take 20 mg by mouth 2 (two) times daily., Disp: , Rfl:     HORIZANT 600 mg TbSR, Take 600 mg by mouth every evening., Disp: 90 tablet, Rfl: 3    hydrochlorothiazide (HYDRODIURIL) 25 MG tablet, Take 1 tablet (25 mg total) by mouth once daily., Disp: 90 tablet, Rfl: 3    losartan (COZAAR) 50 MG tablet, Take 1 tablet (50 mg total) by mouth before breakfast., Disp: 90 tablet, Rfl: 3    melatonin 1 mg Tab, Take 10 mg by mouth. , Disp: , Rfl:     methylPREDNISolone (MEDROL  "DOSEPACK) 4 mg tablet, use as directed, Disp: 1 Package, Rfl: 0    MV-MN/FA/VIT K/LYCOP/LUT/ZEAXA (OCUVITE EYE + MULTI ORAL), Take by mouth., Disp: , Rfl:     pantoprazole (PROTONIX) 40 MG tablet, Take 1 tablet (40 mg total) by mouth once daily., Disp: 90 tablet, Rfl: 3    pravastatin (PRAVACHOL) 40 MG tablet, Take 1 tablet (40 mg total) by mouth every evening., Disp: 90 tablet, Rfl: 3    trazodone (DESYREL) 100 MG tablet, , Disp: , Rfl:     vitamin D 1000 units Tab, Take 185 mg by mouth once daily., Disp: , Rfl:       Review of Systems    Const: no fevers or chills. See HPI  CV: no chest pain, no LE edema  Resp: no SOB or cough  : no dysuria or hematuria  GI: Colonoscopy revealed lymphocytic colitis; was prescribed medication to treat but too expensive. IBS symptoms improved.   Skin: no new skin rashes or lesions  Msk: see HPI      Objective:      Physical Exam    Blood pressure 120/60, pulse 78, height 5' 7" (1.702 m), weight 71.9 kg (158 lb 8.2 oz), SpO2 98 %.  LV 11/3/16: Blood pressure 120/80, height 5' 6" (1.676 m), weight 77.5 kg (170 lb 13.7 oz).  Gen: pleasant elderly female in NAD  Eyes: EOMI, clear conjunctivae  HENT: NC/AT. MMM  CV: RRR. No peripheral edema  Resp: CTAB, no crackles. Normal effort  Skin: warm, normal turgor      Data:  Renal US 8/11/16: normal except for subcentimeter upper pole cyst in right kidney.       Assessment:       1. CKD stage G3a/A1, GFR 45-59 and albumin creatinine ratio <30 mg/g    2. H/O: iron deficiency anemia    3. Essential hypertension        Plan:     1. CKD: stage III 2/2 longstanding HTN/nephrosclerosis and prior chronic NSAID use. JAYA/SPEP 2009 normal. Baseline sCr 1.0-1.1. Renal US 8/2016 unremarkable except for subcentimeter cyst in R kidney.   Repeat labs today.   Informed patient if labs remain stable, she does not need to follow a renal diet at this time.      Lab Results   Component Value Date    CREATININE 1.1 10/28/2016     Protein Creatinine Ratios: " negative; no proteinuria. Repeat today.     Prot/Creat Ratio, Ur   Date Value Ref Range Status   10/28/2016 0.08 0.00 - 0.20 Final   07/27/2016 Unable to calculate 0.00 - 0.20 Final   02/24/2016 Unable to calculate 0.00 - 0.20 Final       Acid-Base: repeat today.   Lab Results   Component Value Date     10/28/2016    K 3.2 (L) 10/28/2016    CO2 27 10/28/2016     2. HTN: Blood pressures at goal. Borderline low. Some dizziness and frequent falls. Will decrease HCTZ to 12.5mg daily.     3. Renal osteodystrophy: last PTH normal. Calcium, phos, and vitamin D normal. Continue vitamin D supplementation. Repeating labs today.     Lab Results   Component Value Date    PTH 60.0 07/27/2016    CALCIUM 9.2 10/28/2016    PHOS 3.4 10/28/2016   vitamin D 7/2016: 31    4. Anemia: hgb at goal. Last iron and Tsat normal. Discontinued iron supplementation at last visit. Will repeat iron studies today.   Lab Results   Component Value Date    HGB 12.3 10/28/2016        5. DM:  Last HbA1C normal. No h/o DM.   Lab Results   Component Value Date    HGBA1C 5.5 08/21/2014       6. Lipid management: continue pravastatin 40mg daily.   Lab Results   Component Value Date    LDLCALC 122.4 07/27/2016       Labs today.  If stable, RTC 1 year.       Kassandra Smith, PGY-5  Nephrology Fellow  [facility]  Pager: [phone]

## 2017-09-07 ENCOUNTER — LAB VISIT (OUTPATIENT)
Dept: LAB | Facility: HOSPITAL | Age: 77
End: 2017-09-07
Payer: MEDICARE

## 2017-09-07 DIAGNOSIS — N18.31 CKD STAGE G3A/A1, GFR 45-59 AND ALBUMIN CREATININE RATIO <30 MG/G: ICD-10-CM

## 2017-09-07 DIAGNOSIS — Z86.2 H/O: IRON DEFICIENCY ANEMIA: ICD-10-CM

## 2017-09-07 LAB
25(OH)D3+25(OH)D2 SERPL-MCNC: 33 NG/ML
ALBUMIN SERPL BCP-MCNC: 3.2 G/DL
ANION GAP SERPL CALC-SCNC: 6 MMOL/L
BASOPHILS # BLD AUTO: 0.03 K/UL
BASOPHILS NFR BLD: 0.5 %
BUN SERPL-MCNC: 11 MG/DL
CALCIUM SERPL-MCNC: 9.5 MG/DL
CHLORIDE SERPL-SCNC: 102 MMOL/L
CO2 SERPL-SCNC: 33 MMOL/L
CREAT SERPL-MCNC: 1 MG/DL
DIFFERENTIAL METHOD: ABNORMAL
EOSINOPHIL # BLD AUTO: 0.2 K/UL
EOSINOPHIL NFR BLD: 2.6 %
ERYTHROCYTE [DISTWIDTH] IN BLOOD BY AUTOMATED COUNT: 13.7 %
EST. GFR  (AFRICAN AMERICAN): >60 ML/MIN/1.73 M^2
EST. GFR  (NON AFRICAN AMERICAN): 53.7 ML/MIN/1.73 M^2
FERRITIN SERPL-MCNC: 55 NG/ML
GLUCOSE SERPL-MCNC: 78 MG/DL
HCT VFR BLD AUTO: 34.1 %
HGB BLD-MCNC: 11.1 G/DL
IRON SERPL-MCNC: 59 UG/DL
LYMPHOCYTES # BLD AUTO: 1.3 K/UL
LYMPHOCYTES NFR BLD: 21.7 %
MCH RBC QN AUTO: 29.6 PG
MCHC RBC AUTO-ENTMCNC: 32.6 G/DL
MCV RBC AUTO: 91 FL
MONOCYTES # BLD AUTO: 0.7 K/UL
MONOCYTES NFR BLD: 10.8 %
NEUTROPHILS # BLD AUTO: 3.9 K/UL
NEUTROPHILS NFR BLD: 64.2 %
PHOSPHATE SERPL-MCNC: 2.7 MG/DL
PLATELET # BLD AUTO: 270 K/UL
PMV BLD AUTO: 9.7 FL
POTASSIUM SERPL-SCNC: 4 MMOL/L
RBC # BLD AUTO: 3.75 M/UL
SATURATED IRON: 18 %
SODIUM SERPL-SCNC: 141 MMOL/L
TOTAL IRON BINDING CAPACITY: 323 UG/DL
TRANSFERRIN SERPL-MCNC: 218 MG/DL
WBC # BLD AUTO: 6.12 K/UL

## 2017-09-07 PROCEDURE — 82728 ASSAY OF FERRITIN: CPT

## 2017-09-07 PROCEDURE — 85025 COMPLETE CBC W/AUTO DIFF WBC: CPT

## 2017-09-07 PROCEDURE — 36415 COLL VENOUS BLD VENIPUNCTURE: CPT

## 2017-09-07 PROCEDURE — 80069 RENAL FUNCTION PANEL: CPT

## 2017-09-07 PROCEDURE — 82306 VITAMIN D 25 HYDROXY: CPT

## 2017-09-07 PROCEDURE — 83540 ASSAY OF IRON: CPT

## 2017-09-08 ENCOUNTER — PATIENT MESSAGE (OUTPATIENT)
Dept: NEPHROLOGY | Facility: HOSPITAL | Age: 77
End: 2017-09-08

## 2017-09-11 NOTE — PROGRESS NOTES
[facility] [place] [facility]    The note from the fellow/resident was reviewed. I have personally interviewed and examined the patient. There were no additional findings with regards to the history or physical exam.    I agree with the assessment and plan of  Dr. Kassandra Smith

## 2017-10-09 DIAGNOSIS — K21.00 GASTROESOPHAGEAL REFLUX DISEASE WITH ESOPHAGITIS: ICD-10-CM

## 2017-10-09 RX ORDER — PANTOPRAZOLE SODIUM 40 MG/1
40 TABLET, DELAYED RELEASE ORAL DAILY
Qty: 90 TABLET | Refills: 3 | Status: SHIPPED | OUTPATIENT
Start: 2017-10-09 | End: 2017-11-13 | Stop reason: SDUPTHER

## 2017-10-09 NOTE — TELEPHONE ENCOUNTER
----- Message from Katie Brewer sent at 10/6/2017  3:45 PM CDT -----  Contact: self/[phone]  Pt called in regards to getting a Rx refill for duloxetine (CYMBALTA) 20 MG capsule/ pantoprazole (PROTONIX) 40 MG tablet added to the fist message with the other Rx.      [facility]  Please advise

## 2017-10-09 NOTE — TELEPHONE ENCOUNTER
----- Message from Sheryl Peters sent at 10/6/2017  3:27 PM CDT -----  Contact: self/[phone]  Patient called in regards needing a refill to go to [facility].  # [phone]  losartan (COZAAR) 50 MG tablet, pantoprazole (PROTONIX) 40 MG tablet. Please call and advise.       Thank you!!!

## 2017-10-09 NOTE — TELEPHONE ENCOUNTER
----- Message from Sheryl Peters sent at 10/6/2017  3:27 PM CDT -----  Contact: self/[phone]  Patient called in regards needing a refill to go to [facility].  # [phone]  losartan (COZAAR) 50 MG tablet, pantoprazole (PROTONIX) 40 MG tablet. Please call and advise.       Thank you!!!

## 2017-10-10 DIAGNOSIS — I15.0 RENOVASCULAR HYPERTENSION: ICD-10-CM

## 2017-10-10 RX ORDER — DULOXETIN HYDROCHLORIDE 20 MG/1
20 CAPSULE, DELAYED RELEASE ORAL 2 TIMES DAILY
Qty: 180 CAPSULE | Refills: 3 | Status: SHIPPED | OUTPATIENT
Start: 2017-10-10 | End: 2017-11-13 | Stop reason: SDUPTHER

## 2017-10-10 RX ORDER — LOSARTAN POTASSIUM 50 MG/1
50 TABLET ORAL
Qty: 90 TABLET | Refills: 3 | Status: SHIPPED | OUTPATIENT
Start: 2017-10-10 | End: 2017-11-13 | Stop reason: SDUPTHER

## 2017-10-10 NOTE — TELEPHONE ENCOUNTER
----- Message from Rochelle Emerson sent at 10/10/2017  1:51 PM CDT -----  Contact: Patient [phone]  Prescription Request:     Name of medication:   duloxetine (CYMBALTA) 20 MG capsule  losartan (COZAAR) 50 MG tablet    Reason for request: Refill    Pharmacy: [place] [id number] [facility] [address]    Please advise.    Thank You

## 2017-10-11 RX ORDER — TRAZODONE HYDROCHLORIDE 100 MG/1
TABLET ORAL
Qty: 30 TABLET | Refills: 3 | Status: SHIPPED | OUTPATIENT
Start: 2017-10-11 | End: 2017-11-13 | Stop reason: SDUPTHER

## 2017-10-20 ENCOUNTER — OFFICE VISIT (OUTPATIENT)
Dept: URGENT CARE | Facility: CLINIC | Age: 77
End: 2017-10-20
Payer: MEDICARE

## 2017-10-20 VITALS
SYSTOLIC BLOOD PRESSURE: 126 MMHG | TEMPERATURE: 99 F | OXYGEN SATURATION: 98 % | HEART RATE: 86 BPM | DIASTOLIC BLOOD PRESSURE: 67 MMHG

## 2017-10-20 DIAGNOSIS — M53.87 SCIATICA ASSOCIATED WITH DISORDER OF LUMBOSACRAL SPINE: Primary | ICD-10-CM

## 2017-10-20 PROCEDURE — 99214 OFFICE O/P EST MOD 30 MIN: CPT | Mod: S$GLB,,, | Performed by: EMERGENCY MEDICINE

## 2017-10-20 NOTE — PROGRESS NOTES
Subjective:       Patient ID: Tere Haile is a 77 y.o. female.    Vitals:  temperature is 98.9 °F (37.2 °C). Her blood pressure is 126/67 and her pulse is 86. Her oxygen saturation is 98%.     Chief Complaint: Back Pain    Pt reports she was here a few weeks ago and was prescribed steroids.  Pt reports her pain is better than her last visit, but thought she should follow up for continued pain.      Back Pain   This is a new problem. The current episode started more than 1 month ago. The problem occurs intermittently. The problem has been gradually improving since onset. The pain is present in the lumbar spine. The pain is at a severity of 6/10. The pain is moderate. Exacerbated by: movement. Associated symptoms include numbness. Pertinent negatives include no abdominal pain, bladder incontinence, bowel incontinence or dysuria. Risk factors include menopause. She has tried nothing for the symptoms. The treatment provided no relief.     Review of Systems   Constitution: Positive for malaise/fatigue.   Skin: Negative for rash.   Musculoskeletal: Positive for back pain, joint pain and muscle cramps. Negative for muscle weakness and stiffness.   Gastrointestinal: Negative for abdominal pain and bowel incontinence.   Genitourinary: Negative for bladder incontinence, dysuria, hematuria and urgency.   Neurological: Positive for numbness. Negative for disturbances in coordination.       Objective:      Physical Exam   Constitutional: She is oriented to person, place, and time. Vital signs are normal. She appears well-developed and well-nourished. She is cooperative.  Non-toxic appearance. She does not have a sickly appearance. She does not appear ill. No distress.   HENT:   Head: Normocephalic and atraumatic.   Right Ear: External ear normal.   Left Ear: External ear normal.   Nose: Nose normal.   Eyes: Conjunctivae and lids are normal.   Neck: Normal range of motion and full passive range of motion without pain.  Neck supple.   Cardiovascular: Normal rate, regular rhythm and normal heart sounds.    Pulmonary/Chest: Effort normal and breath sounds normal. No respiratory distress. She has no decreased breath sounds. She has no wheezes. She has no rhonchi. She has no rales. She exhibits no tenderness.   Abdominal: Normal appearance.   Musculoskeletal: Normal range of motion. She exhibits no edema or tenderness.   Neurological: She is alert and oriented to person, place, and time. She has normal strength. She displays no atrophy and no tremor. She exhibits normal muscle tone. She displays no seizure activity. Coordination and gait normal.   Pt ambulating in clinic without assistance. Pt has steady gait.   Skin: Skin is warm. Capillary refill takes less than 2 seconds.   Psychiatric: She has a normal mood and affect. Her speech is normal and behavior is normal.   Nursing note and vitals reviewed.      Assessment:       1. Sciatica associated with disorder of lumbosacral spine        Plan:         Sciatica associated with disorder of lumbosacral spine  -     Ambulatory referral to Orthopedics      Pt instructed to follow up with ortho.  Ortho referral put in the system.

## 2017-10-20 NOTE — PATIENT INSTRUCTIONS
A referral has been made for you to [facility]. You will be contacted in the next couple of weeks for an appointment.    Please drink plenty of fluids.  Please get plenty of rest.    Please go to orthopedist or go to the Emergency Department for any concerns or worsening of condition.    Rest, ice, compression and elevation to the affected joint or limb as needed.  Please follow up with your primary care doctor or specialist as needed.    If you  smoke, please stop smoking.    Sciatica    Sciatica is a condition that causes pain in the lower back that spreads down into the buttock, hip, and leg. Sometimes the leg pain can happen without any back pain. Sciatica happens when a spinal nerve is irritated or has pressure put on it as comes out of the spinal canal in the lower back. This most often happens when a bulge or rupture of a nearby spinal disk presses on the nerve. Sciatica can also be caused by a narrowing of the spinal canal (spinal stenosis) or spasm of the muscle in the buttocks that the sciatic nerve passes through (pyriform muscle). Sciatica is also called lumbar radiculopathy.  Sciatica may begin after a sudden twisting or bending force, such as in a car accident. Or it can happen after a simple awkward movement. In either case, muscle spasm often also happens. Muscle spasm makes the pain worse.  A healthcare provider makes a diagnosis of sciatica from your symptoms and a physical exam. Unless you had an injury from a car accident or fall, you usually wont have X-rays taken at this time. This is because the nerves and disks in your back cant be seen on an X-ray. If the provider sees signs of a compressed nerve, you will need to schedule an MRI scan as an outpatient. Signs of a compressed nerve include loss of strength in a leg.  Most sciatica gets better with medicine, exercise, and physical therapy. If your symptoms continue after at least 3 months of medical treatment, you may need surgery or  injections to your lower back.  Home care  Follow these tips when caring for yourself at home:  · You may need to stay in bed the first few days. But as soon as possible, begin sitting up or walking. This will help you avoid problems that come from staying in bed for long periods.  · When in bed, try to find a position that is comfortable. A firm mattress is best. Try lying flat on your back with pillows under your knees. You can also try lying on your side with your knees bent up toward your chest and a pillow between your knees.  · Avoid sitting for long periods. This puts more stress on your lower back than standing or walking.  · Use heat from a hot shower, hot bath, or heating pad to help ease pain. Massage can also help. You can also try using an ice pack. You can make your own ice pack by putting ice cubes in a plastic bag. Wrap the bag in a thin towel. Try both heat and cold to see which works best. Use the method that feels best for 20 minutes several times a day.  · You may use acetaminophen or ibuprofen to ease pain, unless another pain medicine was prescribed. Note: If you have chronic liver or kidney disease, talk with your healthcare provider before taking these medicines. Also talk with your provider if youve had a stomach ulcer or gastrointestinal bleeding.  · Use safe lifting methods. Dont lift anything heavier than 15 pounds until all of the pain is gone.  Follow-up care  Follow up with your healthcare provider, or as advised. You may need physical therapy or additional tests.  If X-rays were taken, a radiologist will look at them. You will be told of any new findings that may affect your care.  When to seek medical advice  Call your healthcare provider right away if any of these occur:  · Pain gets worse even after taking prescribed medicine  · Weakness or numbness in 1 or both legs or hips  · Numbness in your groin or genital area  · You cant control your bowel or bladder  · Fever  · Redness  or swelling over your back or spine   Date Last Reviewed: 8/1/2016  © 2165-0000 [employer]. [address] [place] [address], [place] [address]. All rights reserved. This information is not intended as a substitute for professional medical care. Always follow your healthcare professional's instructions.

## 2017-11-06 DIAGNOSIS — M54.50 LUMBAR SPINE PAIN: Primary | ICD-10-CM

## 2017-11-08 ENCOUNTER — LAB VISIT (OUTPATIENT)
Dept: LAB | Facility: HOSPITAL | Age: 77
End: 2017-11-08
Attending: INTERNAL MEDICINE
Payer: MEDICARE

## 2017-11-08 DIAGNOSIS — E78.5 HYPERLIPIDEMIA, UNSPECIFIED HYPERLIPIDEMIA TYPE: ICD-10-CM

## 2017-11-08 DIAGNOSIS — I10 ESSENTIAL HYPERTENSION: ICD-10-CM

## 2017-11-08 LAB
ANION GAP SERPL CALC-SCNC: 8 MMOL/L
BUN SERPL-MCNC: 17 MG/DL
CALCIUM SERPL-MCNC: 9.6 MG/DL
CHLORIDE SERPL-SCNC: 103 MMOL/L
CHOLEST SERPL-MCNC: 210 MG/DL
CHOLEST/HDLC SERPL: 3 {RATIO}
CO2 SERPL-SCNC: 31 MMOL/L
CREAT SERPL-MCNC: 1 MG/DL
EST. GFR  (AFRICAN AMERICAN): >60 ML/MIN/1.73 M^2
EST. GFR  (NON AFRICAN AMERICAN): 54 ML/MIN/1.73 M^2
GLUCOSE SERPL-MCNC: 105 MG/DL
HDLC SERPL-MCNC: 71 MG/DL
HDLC SERPL: 33.8 %
LDLC SERPL CALC-MCNC: 116.6 MG/DL
NONHDLC SERPL-MCNC: 139 MG/DL
POTASSIUM SERPL-SCNC: 3.7 MMOL/L
SODIUM SERPL-SCNC: 142 MMOL/L
TRIGL SERPL-MCNC: 112 MG/DL

## 2017-11-08 PROCEDURE — 36415 COLL VENOUS BLD VENIPUNCTURE: CPT

## 2017-11-08 PROCEDURE — 80061 LIPID PANEL: CPT

## 2017-11-08 PROCEDURE — 80048 BASIC METABOLIC PNL TOTAL CA: CPT

## 2017-11-13 ENCOUNTER — OFFICE VISIT (OUTPATIENT)
Dept: INTERNAL MEDICINE | Facility: CLINIC | Age: 77
End: 2017-11-13
Payer: MEDICARE

## 2017-11-13 VITALS
WEIGHT: 149.94 LBS | SYSTOLIC BLOOD PRESSURE: 123 MMHG | OXYGEN SATURATION: 98 % | HEART RATE: 84 BPM | HEIGHT: 67 IN | DIASTOLIC BLOOD PRESSURE: 84 MMHG | BODY MASS INDEX: 23.53 KG/M2

## 2017-11-13 VITALS
OXYGEN SATURATION: 95 % | WEIGHT: 149.69 LBS | DIASTOLIC BLOOD PRESSURE: 78 MMHG | TEMPERATURE: 98 F | BODY MASS INDEX: 24.06 KG/M2 | HEIGHT: 66 IN | HEART RATE: 75 BPM | SYSTOLIC BLOOD PRESSURE: 130 MMHG

## 2017-11-13 DIAGNOSIS — K21.00 GASTROESOPHAGEAL REFLUX DISEASE WITH ESOPHAGITIS: ICD-10-CM

## 2017-11-13 DIAGNOSIS — N18.30 CKD (CHRONIC KIDNEY DISEASE) STAGE 3, GFR 30-59 ML/MIN: ICD-10-CM

## 2017-11-13 DIAGNOSIS — E78.5 HYPERLIPIDEMIA, UNSPECIFIED HYPERLIPIDEMIA TYPE: ICD-10-CM

## 2017-11-13 DIAGNOSIS — F41.9 ANXIETY: ICD-10-CM

## 2017-11-13 DIAGNOSIS — M51.37 DEGENERATION OF LUMBAR OR LUMBOSACRAL INTERVERTEBRAL DISC: ICD-10-CM

## 2017-11-13 DIAGNOSIS — M79.7 FIBROMYALGIA: ICD-10-CM

## 2017-11-13 DIAGNOSIS — I10 ESSENTIAL HYPERTENSION: ICD-10-CM

## 2017-11-13 DIAGNOSIS — Z00.00 ANNUAL PHYSICAL EXAM: Primary | ICD-10-CM

## 2017-11-13 DIAGNOSIS — I15.0 RENOVASCULAR HYPERTENSION: ICD-10-CM

## 2017-11-13 DIAGNOSIS — R26.9 ABNORMALITY OF GAIT AND MOBILITY: ICD-10-CM

## 2017-11-13 DIAGNOSIS — G47.33 OSA ON CPAP: ICD-10-CM

## 2017-11-13 DIAGNOSIS — Z00.00 ENCOUNTER FOR PREVENTIVE HEALTH EXAMINATION: Primary | ICD-10-CM

## 2017-11-13 DIAGNOSIS — M15.9 PRIMARY OSTEOARTHRITIS INVOLVING MULTIPLE JOINTS: ICD-10-CM

## 2017-11-13 DIAGNOSIS — F33.0 MILD EPISODE OF RECURRENT MAJOR DEPRESSIVE DISORDER: ICD-10-CM

## 2017-11-13 DIAGNOSIS — D64.9 NORMOCHROMIC ANEMIA: ICD-10-CM

## 2017-11-13 PROCEDURE — 99499 UNLISTED E&M SERVICE: CPT | Mod: S$GLB,,, | Performed by: NURSE PRACTITIONER

## 2017-11-13 PROCEDURE — G0439 PPPS, SUBSEQ VISIT: HCPCS | Mod: S$GLB,,, | Performed by: NURSE PRACTITIONER

## 2017-11-13 PROCEDURE — 99999 PR PBB SHADOW E&M-EST. PATIENT-LVL V: CPT | Mod: PBBFAC,,, | Performed by: NURSE PRACTITIONER

## 2017-11-13 PROCEDURE — 99499 UNLISTED E&M SERVICE: CPT | Mod: S$GLB,,, | Performed by: INTERNAL MEDICINE

## 2017-11-13 PROCEDURE — 99397 PER PM REEVAL EST PAT 65+ YR: CPT | Mod: S$GLB,,, | Performed by: INTERNAL MEDICINE

## 2017-11-13 PROCEDURE — 99999 PR PBB SHADOW E&M-EST. PATIENT-LVL III: CPT | Mod: PBBFAC,,, | Performed by: INTERNAL MEDICINE

## 2017-11-13 RX ORDER — PANTOPRAZOLE SODIUM 40 MG/1
40 TABLET, DELAYED RELEASE ORAL DAILY
Qty: 90 TABLET | Refills: 3 | Status: SHIPPED | OUTPATIENT
Start: 2017-11-13 | End: 2017-11-17 | Stop reason: SDUPTHER

## 2017-11-13 RX ORDER — ACETAMINOPHEN 500 MG
1 TABLET ORAL DAILY
COMMUNITY
End: 2020-04-09 | Stop reason: SDUPTHER

## 2017-11-13 RX ORDER — PRAVASTATIN SODIUM 40 MG/1
40 TABLET ORAL NIGHTLY
Qty: 90 TABLET | Refills: 3 | Status: SHIPPED | OUTPATIENT
Start: 2017-11-13 | End: 2017-11-20 | Stop reason: SDUPTHER

## 2017-11-13 RX ORDER — TRAZODONE HYDROCHLORIDE 100 MG/1
TABLET ORAL
Qty: 90 TABLET | Refills: 3 | Status: SHIPPED | OUTPATIENT
Start: 2017-11-13 | End: 2017-11-20 | Stop reason: SDUPTHER

## 2017-11-13 RX ORDER — DULOXETIN HYDROCHLORIDE 20 MG/1
20 CAPSULE, DELAYED RELEASE ORAL 2 TIMES DAILY
Qty: 180 CAPSULE | Refills: 3 | Status: SHIPPED | OUTPATIENT
Start: 2017-11-13 | End: 2017-11-20 | Stop reason: SDUPTHER

## 2017-11-13 RX ORDER — BUPROPION HYDROCHLORIDE 150 MG/1
150 TABLET, EXTENDED RELEASE ORAL 2 TIMES DAILY
Qty: 180 TABLET | Refills: 3 | Status: SHIPPED | OUTPATIENT
Start: 2017-11-13 | End: 2017-11-20 | Stop reason: SDUPTHER

## 2017-11-13 RX ORDER — LOSARTAN POTASSIUM 50 MG/1
50 TABLET ORAL
Qty: 90 TABLET | Refills: 3 | Status: SHIPPED | OUTPATIENT
Start: 2017-11-13 | End: 2017-11-20 | Stop reason: SDUPTHER

## 2017-11-13 NOTE — PATIENT INSTRUCTIONS
Counseling and Referral of Other Preventative  (Italic type indicates deductible and co-insurance are waived)    Patient Name: Tere Haile  Today's Date: 11/13/2017      SERVICE LIMITATIONS RECOMMENDATION    Vaccines    · Pneumococcal (once after 65)    · Influenza (annually)    · Hepatitis B (if medium/high risk)    · Prevnar 13      Hepatitis B medium/high risk factors:       - End-stage renal disease       - Hemophiliacs who received Factor VII or         IX concentrates       - Clients of institutions for the mentally             retarded       - Persons who live in the same house as          a HepB carrier       - Homosexual men       - Illicit injectable drug abusers     Pneumococcal: Done, no repeat necessary     Influenza: Done, repeat in one year     Hepatitis B: N/A     Prevnar 13: Done, no repeat necessary    Mammogram (biennial age 50-74)  Annually (age 40 or over)  N/A    Pap (up to age 70 and after 70 if unknown history or abnormal study last 10 years)    N/A     The USPSTF recommends against screening for cervical cancer in women who have had a hysterectomy with removal of the cervix and who do not have a history of a high-grade precancerous lesion (cervical intraepithelial neoplasia [SHIMA] grade 2 or 3) or cervical cancer.     Colorectal cancer screening (to age 75)    · Fecal occult blood test (annual)  · Flexible sigmoidoscopy (5y)  · Screening colonoscopy (10y)  · Barium enema   N/A    Diabetes self-management training (no USPSTF recommendations)  Requires referral by treating physician for patient with diabetes or renal disease. 10 hours of initial DSMT sessions of no less than 30 minutes each in a continuous 12-month period. 2 hours of follow-up DSMT in subsequent years.  Recommended to patient, declined    Bone mass measurements (age 65 & older, biennial)  Requires diagnosis related to osteoporosis or estrogen deficiency. Biennial benefit unless patient has history of long-term  glucocorticoid  Last done 6/8/15, recommend to repeat every 3  years    Glaucoma screening (no USPSTF recommendation)  Diabetes mellitus, family history   , age 50 or over    American, age 65 or over  Recommend follow up with eye care professional regularly    Medical nutrition therapy for diabetes or renal disease (no recommended schedule)  Requires referral by treating physician for patient with diabetes or renal disease or kidney transplant within the past 3 years.  Can be provided in same year as diabetes self-management training (DSMT), and CMS recommends medical nutrition therapy take place after DSMT. Up to 3 hours for initial year and 2 hours in subsequent years.  Ordered, please schedule    Cardiovascular screening blood tests (every 5 years)  · Fasting lipid panel  Order as a panel if possible  Done this year, repeat every year    Diabetes screening tests (at least every 3 years, Medicare covers annually or at 6-month intervals for prediabetic patients)  · Fasting blood sugar (FBS) or glucose tolerance test (GTT)  Patient must be diagnosed with one of the following:       - Hypertension       - Dyslipidemia       - Obesity (BMI 30kg/m2)       - Previous elevated impaired FBS or GTT       ... or any two of the following:       - Overweight (BMI 25 but <30)       - Family history of diabetes       - Age 65 or older       - History of gestational diabetes or birth of baby weighing more than 9 pounds  Last done 8/21/14, recommend to repeat every 3-5  years    HIV screening (annually for increased risk patients)  · HIV-1 and HIV-2 by EIA, or ARMANDO, rapid antibody test or oral mucosa transudate  Patients must be at increased risk for HIV infection per USPSTF guidelines or pregnant. Tests covered annually for patient at increased risk or as requested by the patient. Pregnant patients may receive up to 3 tests during pregnancy.  Risks discussed, screening is not recommended    Smoking  cessation counseling (up to 8 sessions per year)  Patients must be asymptomatic of tobacco-related conditions to receive as a preventative service.  Non-smoker    Subsequent annual wellness visit  At least 12 months since last AWV  Return in one year     The following information is provided to all patients.  This information is to help you find resources for any of the problems found today that may be affecting your health:                Living healthy guide: www.[facility].[place].[facility]      Understanding Diabetes: www.diabetes.org      Eating healthy: www.cdc.gov/healthyweight      [facility] home safety checklist: www.cdc.gov/steadi/patient.html      Agency on Aging: www.goea.[place].[facility]      Alcoholics anonymous (AA): www.aa.org      Physical Activity: www.wes.nih.gov/to1cqik      Tobacco use: www.quitwithusla.org

## 2017-11-13 NOTE — PROGRESS NOTES
"Tere Haile presented for a  Medicare AWV and comprehensive Health Risk Assessment today. The following components were reviewed and updated:    · Medical history  · Family History  · Social history  · Allergies and Current Medications  · Health Risk Assessment  · Health Maintenance  · Care Team     ** See Completed Assessments for Annual Wellness Visit within the encounter summary.**       The following assessments were completed:  · Living Situation  · CAGE  · Depression Screening  · Timed Get Up and Go  · Whisper Test  · Cognitive Function Screening  · Nutrition Screening  · ADL Screening  · PAQ Screening    Vitals:    11/13/17 1047   BP: 130/78   Pulse: 75   Temp: 98.3 °F (36.8 °C)   SpO2: 95%   Weight: 67.9 kg (149 lb 11.1 oz)   Height: 5' 6" (1.676 m)     Body mass index is 24.16 kg/m².  Physical Exam   Constitutional: She is oriented to person, place, and time. She appears well-developed and well-nourished. No distress.   HENT:   Head: Atraumatic.   Right Ear: Tympanic membrane normal.   Left Ear: Tympanic membrane normal.   Eyes: No scleral icterus.   Neck: Normal range of motion.   Cardiovascular: Normal rate, regular rhythm and normal heart sounds.    Pulmonary/Chest: Effort normal and breath sounds normal. No respiratory distress.   Abdominal: Soft. Bowel sounds are normal. She exhibits no distension. There is no tenderness.   Musculoskeletal: She exhibits no edema.   Neurological: She is alert and oriented to person, place, and time.   Skin: Skin is warm and dry. No rash noted. She is not diaphoretic.   Psychiatric: She has a normal mood and affect. Her behavior is normal.   Nursing note and vitals reviewed.        Diagnoses and health risks identified today and associated recommendations/orders:    1. Encounter for preventive health examination  - Screenings performed, as noted above. Personal preventative testing needs reviewed.     2. CKD (chronic kidney disease) stage 3, GFR 30-59 ml/min  - " Stable, followed by nephrology  - Ambulatory consult to Nutrition Services    3. Mild episode of recurrent major depressive disorder  - Controlled on wellbutrin and cymbalta, evaluated in psych and followed by PCP    4. Anxiety  - Controlled on wellbutrin and cymbalta, followed by PCP    5. Essential hypertension  - Controlled, followed by PCP    6. Hyperlipidemia, unspecified hyperlipidemia type  - Controlled, followed by PCP    7. Fibromyalgia  - Controlled on cymbalta, followed by PCP    8. Primary osteoarthritis involving multiple joints  - Controlled with intermittent bouts of pain, followed by PCP    9. TRU on CPAP  - Controlled, followed by sleep medicine      Provided Tree with a 5-10 year written screening schedule and personal prevention plan. Recommendations were developed using the USPSTF age appropriate recommendations. Education, counseling, and referrals were provided as needed. After Visit Summary printed and given to patient which includes a list of additional screenings\tests needed.    Return in about 1 year (around 11/13/2018) for your next annual wellness visit.    Yelena Mcclellan NP

## 2017-11-13 NOTE — PROGRESS NOTES
Subjective:       Patient ID: Tere Haile is a 77 y.o. female.    Chief Complaint: Annual Exam    HPI    Last visit with me 07/2017. Since then seen by Urgent Care, Nephrology. Upcoming appointment with Orthopedics. Still with problems getting to sleep, when does fall asleep sleeps 13-14 hrs and then has trouble getting of bad. Mood has been stable. Trazodone seems to be helping but the patient isn't sure.    Every so often has pain in left arm in shoulder and upper arm. Feels like muscle soreness. Has upcoming appointment with Orthopedics for sciatica. Has dx of osteoarthritis in shoulder, been to Physical Therapy in past. Hurting prior to back pain, steroids didn't seem to help.    Cymbalta inadvertently decreased from 60 mg BID down to 20 mg BID.    occasionally with loss of balance, only a few seconds. Not very frequent, maybe less than once a month.    Reviewed PMH, PSH, SH, FH, allergies, and medications.     Review of Systems   All other systems reviewed and are negative.      Objective:      Physical Exam   Constitutional: She is oriented to person, place, and time. No distress.    woman whose Body mass index is 23.48 kg/m².    HENT:   Head: Atraumatic.   Right Ear: Tympanic membrane normal.   Left Ear: Tympanic membrane normal.   Mouth/Throat: Oropharynx is clear and moist. No oropharyngeal exudate.   Eyes: Pupils are equal, round, and reactive to light. Right eye exhibits no discharge. Left eye exhibits no discharge.   Neck: Normal range of motion. No thyromegaly present.   Cardiovascular: Normal rate, regular rhythm and normal heart sounds.    Pulmonary/Chest: Effort normal and breath sounds normal. No stridor. She has no wheezes. She has no rales.   Abdominal: Soft. She exhibits no distension and no mass. There is no tenderness. There is no guarding.   Musculoskeletal: She exhibits no edema or tenderness.   Lymphadenopathy:     She has no cervical adenopathy.   Neurological: She is  "alert and oriented to person, place, and time.   Skin: Skin is warm and dry. No rash noted.   Psychiatric: She has a normal mood and affect. Her behavior is normal.   Nursing note and vitals reviewed.      Vitals:    11/13/17 0904   BP: 123/84   BP Location: Right arm   Patient Position: Sitting   Pulse: 84   SpO2: 98%   Weight: 68 kg (149 lb 14.6 oz)   Height: 5' 7" (1.702 m)     Body mass index is 23.48 kg/m².    RESULTS: Reviewed labs from last 12 months    Assessment:       1. Annual physical exam    2. Anxiety    3. Degeneration of lumbar or lumbosacral intervertebral disc    4. Fibromyalgia    5. TRU on CPAP    6. Normochromic anemia    7. Abnormality of gait and mobility     8. Hyperlipidemia, unspecified hyperlipidemia type    9. Positive depression screening    10. Renovascular hypertension    11. Gastroesophageal reflux disease with esophagitis        Plan:   Tere was seen today for annual exam.    Diagnoses and all orders for this visit:    Annual physical exam:  Age-appropriate health screening reviewed, indicated tests ordered. Discussed mammogram, after discussion the patient defers further screening mammogram at this time, if symptoms develop involving the breast please notify the office. Pt told to start over-the-counter vitamin D since postmenopausal, and start exercise regularly with walking for overall health.    Anxiety:  Mood is doing well on current regimen, no changes.  -     buPROPion (WELLBUTRIN SR) 150 MG TBSR 12 hr tablet; Take 1 tablet (150 mg total) by mouth 2 (two) times daily.    Degeneration of lumbar or lumbosacral intervertebral disc:  Upcoming appointment with Orthopedics for evaluation.    Fibromyalgia:  Cymbalta dose inadvertently decreased, the patient hasn't noticed any change in symptoms. Continue 20 mg two times a day for now.    TRU on CPAP:  continue follow up with Sleep medicine.    Normochromic anemia:  Iron levels within normal limits, will continue to monitor with " blood work.  -     CBC Without Differential; Future  -     Iron and TIBC; Future  -     Ferritin; Future    Abnormality of gait and mobility:  occasionally feels off balance, very sporadic. possibly due to sleeping problems. Check b12 levels as well.  -     Vitamin B12; Future    Hyperlipidemia, unspecified hyperlipidemia type:  Stable, continue regular meds.    Renovascular hypertension  -     losartan (COZAAR) 50 MG tablet; Take 1 tablet (50 mg total) by mouth before breakfast.    Gastroesophageal reflux disease with esophagitis  -     pantoprazole (PROTONIX) 40 MG tablet; Take 1 tablet (40 mg total) by mouth once daily.    Other orders  -     DULoxetine (CYMBALTA) 20 MG capsule; Take 1 capsule (20 mg total) by mouth 2 (two) times daily.  -     pravastatin (PRAVACHOL) 40 MG tablet; Take 1 tablet (40 mg total) by mouth every evening.  -     traZODone (DESYREL) 100 MG tablet; TAKE 1 TABLET (100 MG TOTAL) BY MOUTH EVERY EVENING.      Return in about 4 months (around 3/13/2018) for fasting labs 1 week prior.  Moshe Sandra MD  Internal Medicine    Portions of this note were completed using Dragon medical dictation software. Please excuse typographical or syntax errors.

## 2017-11-13 NOTE — PATIENT INSTRUCTIONS
Please start a daily Vitamin D3 supplement called cholecalciferol, taking 2000 (two thousand) units (IU) once a day. This can be found over-the-counter.

## 2017-11-17 DIAGNOSIS — K21.00 GASTROESOPHAGEAL REFLUX DISEASE WITH ESOPHAGITIS: ICD-10-CM

## 2017-11-17 NOTE — TELEPHONE ENCOUNTER
"----- Message from Yordy Rodrigez sent at 11/16/2017  3:33 PM CST -----  Contact: self   Patient would like to get medical advice.  Symptoms (please be specific):  Shoulder pain   How long has patient had these symptoms:  Couple of weeks   Pharmacy name and phone #: [facility] [id number]  [phone]  Any drug allergies:  Demerol  Comments: Pt would like to speak to nurse regarding symptoms   Patient would like [facility] be her primary     RX request - refill or new RX.  Is this a refill or new RX:  Refill   RX name and strength: pantoprazole (PROTONIX) 40 MG tablet  Directions: Take 1 tablet (40 mg total) by mouth once daily.  Is this a 30 day or 90 day RX:  90  Pharmacy name and phone # (DON'T enter "on file" or "in chart"): [facility]   Comments:          "

## 2017-11-19 RX ORDER — PANTOPRAZOLE SODIUM 40 MG/1
40 TABLET, DELAYED RELEASE ORAL DAILY
Qty: 90 TABLET | Refills: 3 | Status: SHIPPED | OUTPATIENT
Start: 2017-11-19 | End: 2018-12-17 | Stop reason: SDUPTHER

## 2017-11-20 DIAGNOSIS — M79.7 FIBROMYALGIA: ICD-10-CM

## 2017-11-20 DIAGNOSIS — G25.81 RESTLESS LEG SYNDROME: ICD-10-CM

## 2017-11-20 DIAGNOSIS — I15.0 RENOVASCULAR HYPERTENSION: ICD-10-CM

## 2017-11-20 DIAGNOSIS — I10 ESSENTIAL HYPERTENSION: ICD-10-CM

## 2017-11-20 DIAGNOSIS — N18.31 CKD STAGE G3A/A1, GFR 45-59 AND ALBUMIN CREATININE RATIO <30 MG/G: ICD-10-CM

## 2017-11-20 DIAGNOSIS — E78.5 HYPERLIPIDEMIA, UNSPECIFIED HYPERLIPIDEMIA TYPE: ICD-10-CM

## 2017-11-20 DIAGNOSIS — F41.9 ANXIETY: ICD-10-CM

## 2017-11-20 NOTE — TELEPHONE ENCOUNTER
----- Message from Surjit Rhodes sent at 11/20/2017 12:03 PM CST -----  Contact: self/ [phone] cell  The pt is calling to speak with someone in the office to discuss her request to have all the medications that were sent in to [facility] re-sent to the [facility] pharmacy along with a few others that were at the [facility].  The medications are hydrochlorothiazide (HYDRODIURIL) 12.5 MG Tab, budesonide (ENTOCORT EC) 3 mg capsule, traZODone (DESYREL) 100 MG tablet, pravastatin (PRAVACHOL) 40 MG tablet, DULoxetine (CYMBALTA) 20 MG capsule, buPROPion (WELLBUTRIN SR) 150 MG TBSR 12 hr tablet, losartan (COZAAR) 50 MG tablet and HORIZANT 600 mg TbSR.   She would like to speak with someone in the office when the medications have been sent.  Please call and advise.    Thank you

## 2017-11-21 ENCOUNTER — PATIENT MESSAGE (OUTPATIENT)
Dept: INTERNAL MEDICINE | Facility: CLINIC | Age: 77
End: 2017-11-21

## 2017-11-22 ENCOUNTER — TELEPHONE (OUTPATIENT)
Dept: INTERNAL MEDICINE | Facility: CLINIC | Age: 77
End: 2017-11-22

## 2017-11-22 RX ORDER — TRAZODONE HYDROCHLORIDE 100 MG/1
TABLET ORAL
Qty: 90 TABLET | Refills: 3 | Status: SHIPPED | OUTPATIENT
Start: 2017-11-22 | End: 2018-05-11

## 2017-11-22 RX ORDER — BUPROPION HYDROCHLORIDE 150 MG/1
150 TABLET, EXTENDED RELEASE ORAL 2 TIMES DAILY
Qty: 180 TABLET | Refills: 3 | Status: SHIPPED | OUTPATIENT
Start: 2017-11-22 | End: 2018-12-17 | Stop reason: SDUPTHER

## 2017-11-22 RX ORDER — PRAVASTATIN SODIUM 40 MG/1
40 TABLET ORAL NIGHTLY
Qty: 90 TABLET | Refills: 3 | Status: SHIPPED | OUTPATIENT
Start: 2017-11-22 | End: 2018-12-17 | Stop reason: SDUPTHER

## 2017-11-22 RX ORDER — DULOXETIN HYDROCHLORIDE 20 MG/1
20 CAPSULE, DELAYED RELEASE ORAL 2 TIMES DAILY
Qty: 180 CAPSULE | Refills: 3 | Status: SHIPPED | OUTPATIENT
Start: 2017-11-22 | End: 2018-12-17 | Stop reason: SDUPTHER

## 2017-11-22 RX ORDER — GABAPENTIN ENACARBIL 600 MG/1
600 TABLET, EXTENDED RELEASE ORAL NIGHTLY
Qty: 90 TABLET | Refills: 3 | Status: SHIPPED | OUTPATIENT
Start: 2017-11-22 | End: 2018-12-17 | Stop reason: SDUPTHER

## 2017-11-22 RX ORDER — HYDROCHLOROTHIAZIDE 12.5 MG/1
12.5 TABLET ORAL DAILY
Qty: 90 TABLET | Refills: 3 | Status: SHIPPED | OUTPATIENT
Start: 2017-11-22 | End: 2018-12-17 | Stop reason: SDUPTHER

## 2017-11-22 RX ORDER — LOSARTAN POTASSIUM 50 MG/1
50 TABLET ORAL
Qty: 90 TABLET | Refills: 3 | Status: SHIPPED | OUTPATIENT
Start: 2017-11-22 | End: 2018-12-17 | Stop reason: SDUPTHER

## 2017-11-22 NOTE — TELEPHONE ENCOUNTER
----- Message from Shannon Rosales sent at 11/22/2017  9:15 AM CST -----  Contact: Self/ [phone]   Pt want to know if the doctor will send the Rx to the pharmacy. Please call and advise     Thank you

## 2018-03-14 ENCOUNTER — TELEPHONE (OUTPATIENT)
Dept: INTERNAL MEDICINE | Facility: CLINIC | Age: 78
End: 2018-03-14

## 2018-03-14 NOTE — TELEPHONE ENCOUNTER
tammy and spoke to pt advised her to keep schd appt and that if something comes sooner that we would call her  Put her on the wait list also

## 2018-03-14 NOTE — TELEPHONE ENCOUNTER
----- Message from Marniifeanyi Almaguer sent at 3/13/2018  3:56 PM CDT -----  Contact: Patient [phone]  Sooner appointment than the  can schedule.  Did you offer to schedule the next available appointment and put the patient on the wait list?:    When is the first available appointment: 05/11/2018  What is the nature of the appointment: follow up    Please call and advise.    Thank You

## 2018-04-16 ENCOUNTER — NUTRITION (OUTPATIENT)
Dept: NUTRITION | Facility: CLINIC | Age: 78
End: 2018-04-16
Payer: MEDICARE

## 2018-04-16 VITALS — WEIGHT: 146.81 LBS | HEIGHT: 66 IN | BODY MASS INDEX: 23.59 KG/M2

## 2018-04-16 DIAGNOSIS — Z71.3 DIETARY COUNSELING: ICD-10-CM

## 2018-04-16 DIAGNOSIS — N18.30 STAGE 3 CHRONIC KIDNEY DISEASE: Primary | ICD-10-CM

## 2018-04-16 PROCEDURE — 99999 PR PBB SHADOW E&M-EST. PATIENT-LVL III: CPT | Mod: PBBFAC,,,

## 2018-04-16 PROCEDURE — 97802 MEDICAL NUTRITION INDIV IN: CPT | Mod: S$GLB,,, | Performed by: DIETITIAN, REGISTERED

## 2018-04-16 NOTE — PROGRESS NOTES
"Referring Physician:Yelena Mcclellan NP     Reason for visit:  Chief Complaint   Patient presents with    Chronic Kidney Disease    Nutrition Counseling        :1940     Allergies Reviewed  Meds Reviewed    Anthropometrics  Weight:66.6 kg (146 lb 13.2 oz)  Height:5' 6" (1.676 m)  BMI:Body mass index is 23.7 kg/m².   IBW:   59.0 kg    Meds:  Outpatient Medications Prior to Visit   Medication Sig Dispense Refill    aspirin (ECOTRIN) 81 MG EC tablet Take 81 mg by mouth every evening.       budesonide (ENTOCORT EC) 3 mg capsule Take 3 capsules (9 mg total) by mouth once daily. 90 capsule 1    buPROPion (WELLBUTRIN SR) 150 MG TBSR 12 hr tablet Take 1 tablet (150 mg total) by mouth 2 (two) times daily. 180 tablet 3    cholecalciferol, vitamin D3, 2,000 unit Cap Take 1 capsule by mouth once daily.      DULoxetine (CYMBALTA) 20 MG capsule Take 1 capsule (20 mg total) by mouth 2 (two) times daily. 180 capsule 3    HORIZANT 600 mg TbSR Take 600 mg by mouth every evening. 90 tablet 3    hydroCHLOROthiazide (HYDRODIURIL) 12.5 MG Tab Take 1 tablet (12.5 mg total) by mouth once daily. 90 tablet 3    losartan (COZAAR) 50 MG tablet Take 1 tablet (50 mg total) by mouth before breakfast. 90 tablet 3    melatonin 1 mg Tab Take 10 mg by mouth.       MV-MN/FA/VIT K/LYCOP/LUT/ZEAXA (OCUVITE EYE + MULTI ORAL) Take by mouth.      pantoprazole (PROTONIX) 40 MG tablet Take 1 tablet (40 mg total) by mouth once daily. 90 tablet 3    pravastatin (PRAVACHOL) 40 MG tablet Take 1 tablet (40 mg total) by mouth every evening. 90 tablet 3    traZODone (DESYREL) 100 MG tablet TAKE 1 TABLET (100 MG TOTAL) BY MOUTH EVERY EVENING. 90 tablet 3     No facility-administered medications prior to visit.          Labs:   17  eGFR  54.0   Cr  1.0    K+  3.7    Estimated Nutrition Needs:   1998 Kcals/day( 30 kcal/kg),    53 g protein( 0.8 g/kg)     Diet Hx:   Pt & her  present for encounter.  Pt has researched renal diet " extensively, and seems familiar with low potassium/high potassium foods ie potatoes, peanut butter, dairy products, whole wheat products.  States she was advised she had CKD III several years ago, but never given any formal instructions/dietary restrictions.  Voiced concern with developing kidney failure requiring dialysis and is asking for renal diet information.  We discussed available labs from 11/08/17, and that it doesn't seem necessary for her to follow strict low potassium renal diet at this time.  I advised her to ask her PCP at scheduled May 11, 2018 appointment for recommendations and to follow-up with me if necessary.  Pt states she has trouble with insomnia, so stays awake all night and sleeps through the morning.  This morning had [employer] biscuit and coffee with 3 creamers for brunch.  [facility], so dinner items vary.  Last night:  Carrot souffle, walnut cake, Diet Coke was what patient remembered eating for dinner.      Assessment:   Pt attentive and had notebook with list of questions and foods she states she has been avoiding.  Discussed low sodium diet for kidney disease, and importance of adequate hydration.  Pt verbalized understanding of information.    Nutrition Diagnosis:   None at this time.    Recommendations:  2 gram sodium diet.  Handouts provided & reviewed:  Low Sodium Nutrition Therapy; Acceptable Salt-free Seasoning Blends; Sodium-free Flavoring Tips; Reading a Food Label.      Consultation Time:35 minutes.    Follow Up:  Pt provided with dietitian contact number and advised to call with questions or make future appointment if further intervention needed after   May 11, 2018 OV with Dr Sandra.

## 2018-04-24 ENCOUNTER — OFFICE VISIT (OUTPATIENT)
Dept: SLEEP MEDICINE | Facility: CLINIC | Age: 78
End: 2018-04-24
Payer: MEDICARE

## 2018-04-24 VITALS
SYSTOLIC BLOOD PRESSURE: 100 MMHG | HEIGHT: 66 IN | BODY MASS INDEX: 23.42 KG/M2 | HEART RATE: 68 BPM | WEIGHT: 145.75 LBS | DIASTOLIC BLOOD PRESSURE: 60 MMHG

## 2018-04-24 DIAGNOSIS — G47.33 OSA ON CPAP: ICD-10-CM

## 2018-04-24 DIAGNOSIS — G25.81 RLS (RESTLESS LEGS SYNDROME): ICD-10-CM

## 2018-04-24 DIAGNOSIS — G47.00 INSOMNIA, UNSPECIFIED TYPE: Primary | ICD-10-CM

## 2018-04-24 PROCEDURE — 99499 UNLISTED E&M SERVICE: CPT | Mod: S$PBB,,, | Performed by: NURSE PRACTITIONER

## 2018-04-24 PROCEDURE — 3074F SYST BP LT 130 MM HG: CPT | Mod: CPTII,S$GLB,, | Performed by: NURSE PRACTITIONER

## 2018-04-24 PROCEDURE — 3078F DIAST BP <80 MM HG: CPT | Mod: CPTII,S$GLB,, | Performed by: NURSE PRACTITIONER

## 2018-04-24 PROCEDURE — 99214 OFFICE O/P EST MOD 30 MIN: CPT | Mod: S$GLB,,, | Performed by: NURSE PRACTITIONER

## 2018-04-24 PROCEDURE — 99999 PR PBB SHADOW E&M-EST. PATIENT-LVL III: CPT | Mod: PBBFAC,,, | Performed by: NURSE PRACTITIONER

## 2018-04-24 RX ORDER — ZALEPLON 10 MG/1
10 CAPSULE ORAL NIGHTLY
Qty: 30 CAPSULE | Refills: 0 | Status: SHIPPED | OUTPATIENT
Start: 2018-04-24 | End: 2018-05-21 | Stop reason: SDUPTHER

## 2018-04-24 NOTE — PROGRESS NOTES
"Tere Haile  was seen as a f/u today for the mgt of obstructive sleep apnea and insomnia.     Since last seen, she continues to use cpap. Did not bring machine today. Uses nightly. Continues to take ER horizant now afternoon which completely stops any RLS symptoms at bedtime "dont' have anymore". Depressed about her sleep. She has lost 12#. Slept all day Easter (missed dinner with fly) b/c had'nt slept well nigght before. +depressed. Daughter takes ambien and has amnesic recall.     HISTORY:  5/11/17  CHIEF COMPLAINT: Can't sleep at night and sleep all day    HISTORY OF PRESENT ILLNESS: Tere Haile a 78 y.o. female presents for the management of obstructive sleep apnea and evaluation insomnia. She has always had a sleep problem "since I was a baby my mom told me I had my days and nights mixed up". She has used [facility] aide in past and would sleep 11p-0730. Since retiring ~ 9 yrs ago, no set schedule. She and  will watch tv in den until 2-3am at times. Once asleep, she wakes up once to use bathroom and can easily go back to bed. She can sleep for hours in bed, wake up late afternoon. Can't sleep at night. Symptoms worse fall '16 when she wasn't speaking to her daughter but that has improved. Has recently resumed using cpap therapy. Avoids caffeiene. Sometimes can take 2-3h to fall asleep, otherwise she will be up for the day and go watch tv. Feels tired during those next days but can't sleep. ESS=0    + restless legs   Hx MERA/Fe intake  Tried ineffective gabapentin and requip. Current taking Horizant 600mg but in am, symptoms qhs well controlled  Tried: benadryl, melatonin 10mg    PSG: No outside records available but her DME will fax them  Interrogatoin- forgot machine in car, using rice fx travis. Resumed consistent use recently/nightly. Sleeps mostly uninterrupted with mask on, no snoring, unknown pressure or severity of her TRU. She is using [employer] machine.     Tapered off " "Klonopin 1mg qhs ~ 4mos ago due to feeling drunk in am    BT:varies  SL: "sometimes I don't fall asleep"  Disruptions: 1x  WT: varies, out of bed late afternoon  Sleep quality: 0/5 quality,  unrefreshing     FAMILY HISTORY: No known sleep disorders.   SOCIAL HISTORY: . Retired 8yo ago. No CAFFEINE    REVIEW OF SYSTEMS:  Sleep related symptoms as per HPI; +acid reflux; +arthritic pain. Otherwise, a balance of 10 systems reviewed is negative        PHYSICAL EXAM:   /60   Pulse 68   Ht 5' 6" (1.676 m)   Wt 66.1 kg (145 lb 11.6 oz)   BMI 23.52 kg/m²   GENERAL: W/N, W/D body habitus, well groomed       ASSESSMENT:   1. Insomnia NEC. Multi-factorial - excess time in bed, poor sleep hygiene, pain syndrome, and likely paradoxical insomnia play a role.    2. TRU, previously diagnosed ~ 2008. Remains adherent with cpap ? Pressure qhs with less disrupted sleep and snoring. No outside studies available for review today. 4/24/18: Remains adherent with cpap 8cm. No machine today for interrogation. Gettting regular supplies from [facility]. Uses nightly.   She has medical co-morbidities of hypertension, fibromyalgia,  which can be worsened by TRU. This warrants continued treatment.    3. RLS, stable on Horizant           PLAN:    Discussed continued Behavioral Modification:  1. Implement stimulus control: [place] bedroom for sleep only. Leave bedroom when frustrated from not sleeping. Engage in relaxation before returning.Engage in activities during the day. Avoid time in bed >8-9hrs. Continue regular sleep and wake time 12-8-9am  2. Avoid clock watching  3. Avoid thinking/worrying about sleep when trying to fall asleep  4. Continue to limit caffeinated products  5. Make sure the bedroom is dark, quiet and cool  6. Attitudes toward sleep were discussed. Merits/components of CBT-I discussed (shit-I or sleepio.com)  7. Preparing for bed, relaxation. Positive thinking, progressive muscle relaxation  8. Trial if approved " Sonata 10mg qhs        1.continue cpap 8cm, bring machine 2-mos f/u visit, for interrogation.    2. Discussed etiology of TRU and potential ramifications of untreated TRU, including stroke, heart disease, HTN.    3. The patient was advised to abstain from driving should she feel sleepy or drowsy.   4. Begin  0.3-1mg melatonin 4-5hr before anticipated bedtime. Stop trazadone if not effective or consider 150mg dose also(mood/sleep). Continue Horizant as prescribed. Consider avoiding cymbalta in evening time, rather full dose am  5. RTC 2-mos

## 2018-05-01 ENCOUNTER — TELEPHONE (OUTPATIENT)
Dept: INTERNAL MEDICINE | Facility: CLINIC | Age: 78
End: 2018-05-01

## 2018-05-01 NOTE — TELEPHONE ENCOUNTER
Cover my meds completed in the computer but after filling out the information, I had to print the [employer] PA Form. This form was placed on the physician's desk.

## 2018-05-01 NOTE — TELEPHONE ENCOUNTER
"----- Message from Ebonie Hunter sent at 5/1/2018  2:37 PM CDT -----      ----- Message -----  From: Rochelle Emerson  Sent: 5/1/2018   2:19 PM  To: Boaz Clay    Prior Authorization Needed    Rx: HORIZANT 600 mg TbSR    To submit the Pa:    1: Go to " key.[email or web address] " and click "Enter a Key"    2. Enter the patient's last name and date of birth and the key.      KEY: [id number]    3. Complete the forms and click "send to Plan"    Please notify pharmacy when prior authorization has been approved.    Thank You    "

## 2018-05-07 ENCOUNTER — PES CALL (OUTPATIENT)
Dept: ADMINISTRATIVE | Facility: CLINIC | Age: 78
End: 2018-05-07

## 2018-05-08 ENCOUNTER — LAB VISIT (OUTPATIENT)
Dept: LAB | Facility: HOSPITAL | Age: 78
End: 2018-05-08
Attending: INTERNAL MEDICINE
Payer: MEDICARE

## 2018-05-08 DIAGNOSIS — R26.9 ABNORMALITY OF GAIT AND MOBILITY: ICD-10-CM

## 2018-05-08 DIAGNOSIS — D64.9 NORMOCHROMIC ANEMIA: ICD-10-CM

## 2018-05-08 LAB
ERYTHROCYTE [DISTWIDTH] IN BLOOD BY AUTOMATED COUNT: 13.9 %
FERRITIN SERPL-MCNC: 49 NG/ML
HCT VFR BLD AUTO: 36.6 %
HGB BLD-MCNC: 11.7 G/DL
IRON SERPL-MCNC: 63 UG/DL
MCH RBC QN AUTO: 29.3 PG
MCHC RBC AUTO-ENTMCNC: 32 G/DL
MCV RBC AUTO: 92 FL
PLATELET # BLD AUTO: 284 K/UL
PMV BLD AUTO: 10.2 FL
RBC # BLD AUTO: 3.99 M/UL
SATURATED IRON: 18 %
TOTAL IRON BINDING CAPACITY: 354 UG/DL
TRANSFERRIN SERPL-MCNC: 239 MG/DL
VIT B12 SERPL-MCNC: 306 PG/ML
WBC # BLD AUTO: 6.67 K/UL

## 2018-05-08 PROCEDURE — 83540 ASSAY OF IRON: CPT

## 2018-05-08 PROCEDURE — 82607 VITAMIN B-12: CPT

## 2018-05-08 PROCEDURE — 85027 COMPLETE CBC AUTOMATED: CPT

## 2018-05-08 PROCEDURE — 36415 COLL VENOUS BLD VENIPUNCTURE: CPT

## 2018-05-08 PROCEDURE — 82728 ASSAY OF FERRITIN: CPT

## 2018-05-11 ENCOUNTER — OFFICE VISIT (OUTPATIENT)
Dept: INTERNAL MEDICINE | Facility: CLINIC | Age: 78
End: 2018-05-11
Payer: MEDICARE

## 2018-05-11 VITALS
HEIGHT: 66 IN | WEIGHT: 145.75 LBS | BODY MASS INDEX: 23.42 KG/M2 | SYSTOLIC BLOOD PRESSURE: 126 MMHG | DIASTOLIC BLOOD PRESSURE: 60 MMHG | HEART RATE: 84 BPM

## 2018-05-11 DIAGNOSIS — E21.3 HYPERPARATHYROIDISM: ICD-10-CM

## 2018-05-11 DIAGNOSIS — F33.2 SEVERE EPISODE OF RECURRENT MAJOR DEPRESSIVE DISORDER, WITHOUT PSYCHOTIC FEATURES: Primary | ICD-10-CM

## 2018-05-11 DIAGNOSIS — Z63.79 STRESS DUE TO ILLNESS OF FAMILY MEMBER: ICD-10-CM

## 2018-05-11 PROCEDURE — 99499 UNLISTED E&M SERVICE: CPT | Mod: S$GLB,,, | Performed by: INTERNAL MEDICINE

## 2018-05-11 PROCEDURE — 3074F SYST BP LT 130 MM HG: CPT | Mod: CPTII,S$GLB,, | Performed by: INTERNAL MEDICINE

## 2018-05-11 PROCEDURE — 3078F DIAST BP <80 MM HG: CPT | Mod: CPTII,S$GLB,, | Performed by: INTERNAL MEDICINE

## 2018-05-11 PROCEDURE — 99214 OFFICE O/P EST MOD 30 MIN: CPT | Mod: S$GLB,,, | Performed by: INTERNAL MEDICINE

## 2018-05-11 PROCEDURE — 99999 PR PBB SHADOW E&M-EST. PATIENT-LVL III: CPT | Mod: PBBFAC,,, | Performed by: INTERNAL MEDICINE

## 2018-05-11 RX ORDER — BUDESONIDE 3 MG/1
9 CAPSULE, COATED PELLETS ORAL DAILY
COMMUNITY
End: 2019-05-06

## 2018-05-11 NOTE — PROGRESS NOTES
"Subjective:       Patient ID: Tere Haile is a 78 y.o. female.    Chief Complaint: Follow-up (6 months (since last visit))    HPI    Last visit with me 11/2017, since then seen by Sleep Med. Upcoming appointment with Internal Medicine and Sleep Med.     Grandson committed suicide recently at age 27. Dtr also not speaking to her much.  also recently dx with dementia. Saw counselor in past but wasn't helpful.    Review of Systems   Constitutional: Negative for activity change and unexpected weight change.   HENT: Negative for hearing loss, rhinorrhea and trouble swallowing.    Eyes: Negative for discharge and visual disturbance.   Respiratory: Negative for chest tightness and wheezing.    Cardiovascular: Negative for chest pain and palpitations.   Gastrointestinal: Negative for blood in stool, constipation, diarrhea and vomiting.   Endocrine: Negative for polydipsia and polyuria.   Genitourinary: Negative for difficulty urinating, dysuria, hematuria and menstrual problem.   Musculoskeletal: Positive for arthralgias. Negative for joint swelling and neck pain.   Neurological: Negative for weakness and headaches.   Psychiatric/Behavioral: Positive for dysphoric mood. Negative for confusion.       Objective:      Physical Exam   Constitutional: She is oriented to person, place, and time. No distress.    woman whose Body mass index is 23.52 kg/m².    Neurological: She is alert and oriented to person, place, and time.   Psychiatric: Her speech is normal and behavior is normal. Her affect is not blunt and not labile. She exhibits a depressed mood (tearful).   Thought content involving "loneliness", is not in regular contact with her children and her  was dx with dementia.   Nursing note and vitals reviewed.      Vitals:    05/11/18 1321   BP: 126/60   BP Location: Right arm   Patient Position: Sitting   BP Method: Small (Manual)   Pulse: 84   Weight: 66.1 kg (145 lb 11.6 oz)   Height: 5' 6" " (1.676 m)     Body mass index is 23.52 kg/m².    RESULTS: Reviewed labs from last 12 months    Assessment:       1. Severe episode of recurrent major depressive disorder, without psychotic features    2. Hyperparathyroidism    3. Stress due to illness of family member        Plan:   Tere was seen today for follow-up.    Diagnoses and all orders for this visit:    Severe episode of recurrent major depressive disorder, without psychotic features:  Prior diagnosis of depression, however symptoms are worse, precipitated by suicide of grandson, less communication from her daughter, and recent diagnosis of dementia in her .  Continue treatment with bupropion and duloxetine.  Follow up with counselor, provided the number for the counseling department at [facility].  Also refer to case management to see about coordinating care for herself and her .    Hyperparathyroidism:  Prior diagnosis, well controlled on current management, continuing follow-up with Nephrology for ongoing evaluation.    Stress due to illness of family member:   was recently diagnosed with dementia, we will refer to case management to see if there are available community resources to help her in caring for him.  -     Ambulatory referral to Outpatient Case Management    follow up with me in 4-6 weeks  Moshe Sandra MD  Internal Medicine    Portions of this note were completed using medical dictation software. Please excuse typographical or syntax errors that were missed on review.      I have spent 25 minutes in the care of this patient with >50% in counseling and coordination of care.

## 2018-05-11 NOTE — PATIENT INSTRUCTIONS
Please call the Mental Health Department at [phone] to schedule an appointment with a counselor. The provider you saw in the past was Mr Lundy.    Please start a daily Vitamin D3 supplement called cholecalciferol, taking 2000 (two thousand) units (IU) once a day. This can be found over-the-counter.

## 2018-05-14 ENCOUNTER — OUTPATIENT CASE MANAGEMENT (OUTPATIENT)
Dept: ADMINISTRATIVE | Facility: OTHER | Age: 78
End: 2018-05-14

## 2018-05-14 NOTE — PROGRESS NOTES
Thank you for the referral.  Patient has been assigned to Munira Ibarra LMSW for low risk screening for Outpatient Case Management.     Reason for referral:  Patient having depression, worsened by recent suicide of grandson, also  was dx with dementia. Please help connect with mental health and counseling resources, as well as any assitance for caregivers for family have dementia.    Please contact [facility] at [phone] with any questions.    Thank you,    Annel Cortes LCSW, CCM

## 2018-05-14 NOTE — PROGRESS NOTES
Attempt #:  1  This LMSW attempted to reach patient/caregiver to provide resource and patient reports she is currently at a doctor's appointment with her . Patient is requesting call back for later this afternoon. LMSW will attempt to contact patient at requested time today.

## 2018-05-14 NOTE — PROGRESS NOTES
Attempt #:  2  This LMSW attempted to reach patient/caregiver again to provide resource and left a message requesting a return call.

## 2018-05-15 ENCOUNTER — OUTPATIENT CASE MANAGEMENT (OUTPATIENT)
Dept: ADMINISTRATIVE | Facility: OTHER | Age: 78
End: 2018-05-15

## 2018-05-15 NOTE — PROGRESS NOTES
"This LMSW received a referral on the above patient.   Reason for referral: "Patient having depression; Please help connect with mental health and counseling resources, as well as any assitance for caregivers for family have dementia"  Name of the community resource that was provided: [employer] Application, [employer], Pharmacy Patient Assistance Team, [facility] Department, Crisis Hotlines, Mental Health Resources, Adult Grief Groups, Caregiver Support Group, Senior Resource Guide  Resource given to: Patient via [employer] Mail    LMSW completed assessment with patient. Patient reports living with her spouse and reports being independent with ADLs. Patient report does not use any assistive devices to ambulate. Patient reports that her  is in early stages of dementia and reports her two children do not live nearby. Patient reports having no support from family at this time. Patient reports that her  does have a son nearby however, she states her 's son is usually busy with work to provide support. Patient reports having difficulty affording her Horizant medication. Patient reports having to pay 650 every three months to refill her medication (referral submitted to Patient Pharmacy Assistance Team). Patient reports possibly needing transportation options if when she is no longer able to drives. LMSW informed patient about [employer] benefit and offered to provide her with application for [employer] and patient accepted resources. LMSW discussed patient's need for support services including mental health counseling and grief support. Patient reports she was suppose to schedule appointment with [facility] de[facility] yesterday however she forgot. [facility] [place] with psych contact [phone] and encouraged her to schedule an appointment. Patient reports that she will call [facility] psych to make appointment after this phone call. LMSW also " provided patient with resources for caregiver support group and resource guide for obtaining in home support. No other needs identified by patient at this time. Referral source notified.

## 2018-05-20 PROBLEM — H35.3131 EARLY DRY STAGE NONEXUDATIVE AGE-RELATED MACULAR DEGENERATION OF BOTH EYES: Status: ACTIVE | Noted: 2018-05-20

## 2018-05-21 DIAGNOSIS — G47.00 INSOMNIA, UNSPECIFIED TYPE: ICD-10-CM

## 2018-05-21 RX ORDER — ZALEPLON 10 MG/1
CAPSULE ORAL
Qty: 30 CAPSULE | Refills: 0 | Status: SHIPPED | OUTPATIENT
Start: 2018-05-21 | End: 2019-02-05

## 2018-05-22 ENCOUNTER — TELEPHONE (OUTPATIENT)
Dept: PHARMACY | Facility: CLINIC | Age: 78
End: 2018-05-22

## 2018-05-24 PROBLEM — F43.23 ADJUSTMENT DISORDER WITH MIXED ANXIETY AND DEPRESSED MOOD: Status: ACTIVE | Noted: 2018-05-24

## 2018-07-09 ENCOUNTER — TELEPHONE (OUTPATIENT)
Dept: INTERNAL MEDICINE | Facility: CLINIC | Age: 78
End: 2018-07-09

## 2018-07-09 DIAGNOSIS — R42 VERTIGO: Primary | ICD-10-CM

## 2018-07-09 RX ORDER — MECLIZINE HYDROCHLORIDE 25 MG/1
25 TABLET ORAL 3 TIMES DAILY PRN
Qty: 30 TABLET | Refills: 0 | Status: SHIPPED | OUTPATIENT
Start: 2018-07-09 | End: 2019-05-06

## 2018-07-09 NOTE — TELEPHONE ENCOUNTER
Pt states she has been experiencing vertigo since Friday. She states that she can hardly get out of the bed. Pt denies any other symptoms. Pt also states that she is unable to come in for a UC appt, She has no way to make it to an appt this week She is requesting something be sent in to pharmacy.     [facility] - [place], [place] - [address] [phone] (Phone)     Please advise

## 2018-07-09 NOTE — TELEPHONE ENCOUNTER
----- Message from Rochelle Emerson sent at 7/9/2018  8:22 AM CDT -----  Contact: pt [phone]      ----- Message -----  From: Marilyn Huston  Sent: 7/9/2018   8:18 AM  To: [facility] Staff    Pt has been expericing bad vetrigo over the weekend and would like to know if something can be called into her pharmacy for her. Please advise.       [facility] [address] [phone] (Phone)  [phone] (Fax)

## 2018-07-10 NOTE — TELEPHONE ENCOUNTER
I have sent a prescription for meclizine to her pharmacy, to be used three times a day as needed. If symptoms persist despite the medication she will need to be evaluated.

## 2018-11-29 ENCOUNTER — OFFICE VISIT (OUTPATIENT)
Dept: NEPHROLOGY | Facility: CLINIC | Age: 78
End: 2018-11-29
Payer: MEDICARE

## 2018-11-29 VITALS
SYSTOLIC BLOOD PRESSURE: 148 MMHG | OXYGEN SATURATION: 98 % | DIASTOLIC BLOOD PRESSURE: 80 MMHG | BODY MASS INDEX: 24.43 KG/M2 | HEART RATE: 81 BPM | HEIGHT: 66 IN | WEIGHT: 152 LBS

## 2018-11-29 DIAGNOSIS — I10 ESSENTIAL HYPERTENSION: ICD-10-CM

## 2018-11-29 DIAGNOSIS — N18.30 ANEMIA DUE TO STAGE 3 CHRONIC KIDNEY DISEASE: ICD-10-CM

## 2018-11-29 DIAGNOSIS — N18.31 CKD STAGE G3A/A1, GFR 45-59 AND ALBUMIN CREATININE RATIO <30 MG/G: Primary | ICD-10-CM

## 2018-11-29 DIAGNOSIS — E78.5 HYPERLIPIDEMIA, UNSPECIFIED HYPERLIPIDEMIA TYPE: ICD-10-CM

## 2018-11-29 DIAGNOSIS — D63.1 ANEMIA DUE TO STAGE 3 CHRONIC KIDNEY DISEASE: ICD-10-CM

## 2018-11-29 PROCEDURE — 99999 PR PBB SHADOW E&M-EST. PATIENT-LVL V: CPT | Mod: PBBFAC,HCNC,GC, | Performed by: GENERAL PRACTICE

## 2018-11-29 PROCEDURE — 3079F DIAST BP 80-89 MM HG: CPT | Mod: CPTII,HCNC,GC,S$GLB | Performed by: GENERAL PRACTICE

## 2018-11-29 PROCEDURE — 1101F PT FALLS ASSESS-DOCD LE1/YR: CPT | Mod: CPTII,HCNC,GC,S$GLB | Performed by: GENERAL PRACTICE

## 2018-11-29 PROCEDURE — 99213 OFFICE O/P EST LOW 20 MIN: CPT | Mod: HCNC,GC,S$GLB, | Performed by: GENERAL PRACTICE

## 2018-11-29 PROCEDURE — 3077F SYST BP >= 140 MM HG: CPT | Mod: CPTII,HCNC,GC,S$GLB | Performed by: GENERAL PRACTICE

## 2018-11-29 RX ORDER — PANTOPRAZOLE SODIUM 40 MG/1
TABLET, DELAYED RELEASE ORAL
COMMUNITY
End: 2018-12-19 | Stop reason: SDUPTHER

## 2018-11-29 RX ORDER — HYDROCHLOROTHIAZIDE 25 MG/1
TABLET ORAL
COMMUNITY
End: 2018-12-19 | Stop reason: SDUPTHER

## 2018-11-29 RX ORDER — ACETAMINOPHEN AND CODEINE PHOSPHATE 300; 30 MG/1; MG/1
TABLET ORAL
COMMUNITY
End: 2019-04-05

## 2018-11-29 RX ORDER — PRAVASTATIN SODIUM 40 MG/1
TABLET ORAL
COMMUNITY
End: 2018-12-19 | Stop reason: SDUPTHER

## 2018-11-29 RX ORDER — LOSARTAN POTASSIUM 50 MG/1
TABLET ORAL
COMMUNITY
End: 2018-12-19 | Stop reason: SDUPTHER

## 2018-11-29 NOTE — PROGRESS NOTES
Nephrology Clinic Progress Note    Patient ID: Tere Haile is a 78 y.o. White female who presents for follow-up visit for her Chronic Kidney Disease stage G3b/A1.    HPI:  Tere Haile is a 78 y.o. White female with renovascular hypertension since 2005, TUR on CPAP, OA s/p bilateral knee replacements, DLD, and CKD stage 3 who presents today for follow-up. CKD is suspected to be 2/2 HTN and chronic NSAID use. No history of kidney stones, no family history of kidney disease. She also has a h/o IBS. She was  10/1/16.      Interval Hx:  Refers voiding well since last visit, no leg swelling, no SOB, no recent falls.  Has had no flank pain or noticed any changes in urine color.  HTN: reports good control.      The patient denies taking NSAIDs or new antibiotics, recreational drugs, recent episode of dehydration, diarrhea, nausea or vomiting, acute illness, hospitalization or exposure to IV radiocontrast.    Past Medical History:   Diagnosis Date    Allergy     Anemia     Anxiety     Arthritis     Back pain     Breast disorder     Tumor in rt breast (benign)    Cataract     removed from both eyes    CKD (chronic kidney disease) stage 3, GFR 30-59 ml/min     Clotting disorder     when knee was replaced    Colitis     Degenerative disc disease     Depression     Fibromyalgia     GERD (gastroesophageal reflux disease)     Hyperlipidemia     Hypertension     TRU (obstructive sleep apnea)     RLS (restless legs syndrome)     S/P knee replacement 1/13/2014       Family History   Problem Relation Age of Onset    Dementia Mother     Heart disease Father     Restless legs syndrome Daughter     Thyroid disease Daughter     Hypertension Son     Alcohol abuse Son     Ovarian cancer Paternal Grandmother     Breast cancer Paternal Grandmother     Cancer Paternal Grandmother         ovarian    Ovarian cancer Cousin     Cancer Cousin 40        colon ca    Breast cancer Paternal  Aunt     Skin cancer Neg Hx     Melanoma Neg Hx     Colon cancer Neg Hx     Esophageal cancer Neg Hx     Stomach cancer Neg Hx     Irritable bowel syndrome Neg Hx     Crohn's disease Neg Hx     Ulcerative colitis Neg Hx     Celiac disease Neg Hx        Past Surgical History:   Procedure Laterality Date    APPENDECTOMY      ARTHROPLASTY, KNEE, TOTAL Left 1/7/2014    Performed by Chaz Valdes MD at [facility] OR 2ND FLR    BREAST LUMPECTOMY      right side    BREAST SURGERY      CHOLECYSTECTOMY      COLONOSCOPY N/A 12/2/2016    Procedure: COLONOSCOPY;  Surgeon: Ori Cope MD;  Location: [facility] (4TH FLR);  Service: Endoscopy;  Laterality: N/A;  PM prep    COLONOSCOPY N/A 12/2/2016    Performed by Ori Cope MD at [facility] (4TH FLR)    EGD (ESOPHAGOGASTRODUODENOSCOPY) N/A 2/25/2014    Performed by Ori [facility]ope MD at [facility] (4TH FLR)    EYE SURGERY Bilateral     cataract    HYSTERECTOMY  1977    uterine pain    JOINT REPLACEMENT      knees replaced bilaterally    KNEE SURGERY Bilateral     RECTAL SURGERY      rectocele    TONSILLECTOMY           Current Outpatient Medications:     acetaminophen-codeine 300-30mg (TYLENOL #3) 300-30 mg Tab, acetaminophen 300 mg-codeine 30 mg tablet, Disp: , Rfl:     aspirin (ECOTRIN) 81 MG EC tablet, Take 81 mg by mouth every evening. , Disp: , Rfl:     budesonide (ENTOCORT EC) 3 mg capsule, Take 9 mg by mouth once daily., Disp: , Rfl:     DULoxetine (CYMBALTA) 20 MG capsule, Take 1 capsule (20 mg total) by mouth 2 (two) times daily., Disp: 180 capsule, Rfl: 3    FLUZONE HIGH-DOSE 2018-19, PF, 180 mcg/0.5 mL vaccine, ADM 0.5ML IM UTD, Disp: , Rfl: 0    HORIZANT 600 mg TbSR, Take 600 mg by mouth every evening., Disp: 90 tablet, Rfl: 3    losartan (COZAAR) 50 MG tablet, Take 1 tablet (50 mg total) by mouth before breakfast., Disp: 90 tablet, Rfl: 3    losartan (COZAAR) 50 MG tablet, losartan 50 mg tablet, Disp: , Rfl:     pantoprazole (PROTONIX) 40 MG tablet,  Take 1 tablet (40 mg total) by mouth once daily., Disp: 90 tablet, Rfl: 3    pantoprazole (PROTONIX) 40 MG tablet, pantoprazole 40 mg tablet,delayed release, Disp: , Rfl:     pravastatin (PRAVACHOL) 40 MG tablet, Take 1 tablet (40 mg total) by mouth every evening., Disp: 90 tablet, Rfl: 3    pravastatin (PRAVACHOL) 40 MG tablet, pravastatin 40 mg tablet, Disp: , Rfl:     buPROPion (WELLBUTRIN SR) 150 MG TBSR 12 hr tablet, Take 1 tablet (150 mg total) by mouth 2 (two) times daily., Disp: 180 tablet, Rfl: 3    cholecalciferol, vitamin D3, 2,000 unit Cap, Take 1 capsule by mouth once daily., Disp: , Rfl:     hydroCHLOROthiazide (HYDRODIURIL) 12.5 MG Tab, Take 1 tablet (12.5 mg total) by mouth once daily., Disp: 90 tablet, Rfl: 3    hydroCHLOROthiazide (HYDRODIURIL) 25 MG tablet, hydrochlorothiazide 25 mg tablet, Disp: , Rfl:     meclizine (ANTIVERT) 25 mg tablet, Take 1 tablet (25 mg total) by mouth 3 (three) times daily as needed., Disp: 30 tablet, Rfl: 0    zaleplon (SONATA) 10 MG capsule, TAKE ONE CAPSULE BY MOUTH EVERY EVENING, Disp: 30 capsule, Rfl: 0    Patient's medical, family, surgical, and medication hx reviewed.    Review of Systems    Constitutional: Negative for chills, diaphoresis, fever and weight loss.   HENT: Negative for nosebleeds and tinnitus.    Eyes: Negative for blurred vision, double vision and photophobia.   Respiratory: Negative for cough and shortness of breath.    Cardiovascular: . Negative for chest pain, palpitations, swelling orthopnea and PND.   Gastrointestinal: Negative for abdominal pain, diarrhea, constipation nausea and vomiting.   Genitourinary: Negative for dysuria, flank pain, frequency, hematuria and urgency.   Musculoskeletal: Negative for back pain, falls, joint pain, myalgias and neck pain.   Skin: Negative.    Neurological: Negative for dizziness, tingling, tremors, sensory change, speech change, focal weakness, seizures, loss of consciousness, weakness and  headaches.   Endo/Heme/Allergies: Negative for environmental allergies and polydipsia. Does not bruise/bleed easily.   Psychiatric/Behavioral: Negative for depression, hallucinations, memory loss, substance abuse and suicidal ideas. The patient is not nervous/anxious and does not have insomnia.        Objective:     Wt Readings from Last 3 Encounters:   11/29/18 68.9 kg (152 lb)   05/11/18 66.1 kg (145 lb 11.6 oz)   04/24/18 66.1 kg (145 lb 11.6 oz)     Temp Readings from Last 3 Encounters:   11/13/17 98.3 °F (36.8 °C)   10/20/17 98.9 °F (37.2 °C)   09/05/17 98.6 °F (37 °C)     BP Readings from Last 3 Encounters:   11/29/18 (!) 148/80   05/11/18 126/60   04/24/18 100/60     Pulse Readings from Last 3 Encounters:   11/29/18 81   05/11/18 84   04/24/18 68       Physical Exam    Constitutional:  well-developed and well-nourished. No distress.   HENT:   Head: Normocephalic and atraumatic.   Neck: Normal range of motion. Neck supple.   Cardiovascular: Normal rate, regular rhythm, normal heart sounds and intact distal pulses.  Exam reveals no gallop and no friction rub.    No murmur heard.  Pulmonary/Chest: Effort normal and breath sounds normal. No respiratory distress. no wheezes, no rales, no tenderness.   Abdominal: Soft. Bowel sounds are normal, no distension. There is no tenderness. There is no rebound and no guarding.   Musculoskeletal: Normal range of motion. No edema or deformity.   Neurological: Awake alert and oriented x 4. Motor strength preserved 5/5. DTR symmetrical.  Skin: Skin is warm and dry. No rash noted. She is not diaphoretic. No erythema. No pallor.       Assessment:         ICD-10-CM ICD-9-CM    1. CKD stage G3a/A1, GFR 45-59 and albumin creatinine ratio <30 mg/g N18.3 585.3 CBC auto differential      Renal function panel      Urinalysis      Protein / creatinine ratio, urine      PTH, intact      Iron and TIBC      Ferritin   2. Essential hypertension I10 401.9    3. Hyperlipidemia, unspecified  hyperlipidemia type E78.5 272.4    4. Anemia due to stage 3 chronic kidney disease N18.3 285.21 Iron and TIBC    D63.1 585.3 Ferritin        Plan:   1. CKD stage G3b/A1 (GFR=54, Creat 1.0)   CKD: stage III 2/2 longstanding HTN/nephrosclerosis and prior chronic NSAID use.   JAYA/SPEP 2009 normal.   Renal US 8/2016 unremarkable except for subcentimeter cyst in R kidney.   Will order labs and follow their results.  If results are as baseline, will continue to follow her yearly.     Baseline Creatine: 1.0-1.1  Lab Results   Component Value Date    CREATININE 1.0 11/08/2017     Urine Protein:   Prot/Creat Ratio, Ur   Date Value Ref Range Status   09/06/2017 Unable to calculate 0.00 - 0.20 Final   10/28/2016 0.08 0.00 - 0.20 Final   07/27/2016 Unable to calculate 0.00 - 0.20 Final     Acid-Base:   Lab Results   Component Value Date     11/08/2017    K 3.7 11/08/2017    CO2 31 (H) 11/08/2017     2. HTN: Blood pressures   Patient's prior to today were adequate and within normal limits.  Patient refers has had a stressed day today.  Will continue to monitor in subsequent visits.    3. CKD-MBD:    Last PTH normal. Calcium, phos, and vitamin D normal. Continue vitamin D supplementation.   Will repeating labs and follow up results.     Lab Results   Component Value Date    PTH 60.0 07/27/2016    CALCIUM 9.6 11/08/2017    PHOS 2.7 09/07/2017     4. Anemia:   Hgb at goal. Last iron and Tsat normal. Discontinued iron supplementation at last visit.   Will continue to monitor with labs and subsequent visits.    Lab Results   Component Value Date    HGB 11.7 (L) 05/08/2018     Lab Results   Component Value Date    IRON 63 05/08/2018    TIBC 354 05/08/2018    FERRITIN 49 05/08/2018     5. Lipid management:   Patient on statin therapy.    Lab Results   Component Value Date    LDLCALC 116.6 11/08/2017     Follow up in with labs and Urine, and if ok RTC in 1 year.

## 2018-11-29 NOTE — PATIENT INSTRUCTIONS
Patient oriented about her chronic kidney status, thus far stable.  Will order labs today and follow the results.  If labs within normal limits, will continue to follow-up yearly.  IF any abnormality observed, will schedule an earlier appointment.    Ordered Renal Ultrasound for follow-up.    Continue to follow a low sodium diet and daily exercise at least 30 mins continuous.

## 2018-12-17 DIAGNOSIS — N18.31 CKD STAGE G3A/A1, GFR 45-59 AND ALBUMIN CREATININE RATIO <30 MG/G: ICD-10-CM

## 2018-12-17 DIAGNOSIS — G25.81 RESTLESS LEG SYNDROME: ICD-10-CM

## 2018-12-17 DIAGNOSIS — M79.7 FIBROMYALGIA: ICD-10-CM

## 2018-12-17 DIAGNOSIS — I10 ESSENTIAL HYPERTENSION: ICD-10-CM

## 2018-12-17 DIAGNOSIS — I15.0 RENOVASCULAR HYPERTENSION: ICD-10-CM

## 2018-12-17 DIAGNOSIS — K21.00 GASTROESOPHAGEAL REFLUX DISEASE WITH ESOPHAGITIS: ICD-10-CM

## 2018-12-17 DIAGNOSIS — E78.5 HYPERLIPIDEMIA, UNSPECIFIED HYPERLIPIDEMIA TYPE: ICD-10-CM

## 2018-12-17 DIAGNOSIS — F41.9 ANXIETY: ICD-10-CM

## 2018-12-19 RX ORDER — PRAVASTATIN SODIUM 40 MG/1
TABLET ORAL
Qty: 90 TABLET | Refills: 3 | Status: SHIPPED | OUTPATIENT
Start: 2018-12-19 | End: 2020-04-09 | Stop reason: SDUPTHER

## 2018-12-19 RX ORDER — LOSARTAN POTASSIUM 50 MG/1
TABLET ORAL
Qty: 90 TABLET | Refills: 3 | Status: SHIPPED | OUTPATIENT
Start: 2018-12-19 | End: 2019-12-26

## 2018-12-19 RX ORDER — HYDROCHLOROTHIAZIDE 12.5 MG/1
CAPSULE ORAL
Qty: 90 CAPSULE | Refills: 3 | Status: SHIPPED | OUTPATIENT
Start: 2018-12-19 | End: 2019-07-09

## 2018-12-19 RX ORDER — GABAPENTIN ENACARBIL 600 MG/1
TABLET, EXTENDED RELEASE ORAL
Qty: 90 TABLET | Refills: 3 | Status: SHIPPED | OUTPATIENT
Start: 2018-12-19 | End: 2020-02-28

## 2018-12-19 RX ORDER — BUPROPION HYDROCHLORIDE 150 MG/1
TABLET, EXTENDED RELEASE ORAL
Qty: 180 TABLET | Refills: 3 | Status: SHIPPED | OUTPATIENT
Start: 2018-12-19 | End: 2019-12-27 | Stop reason: SDUPTHER

## 2018-12-19 RX ORDER — PANTOPRAZOLE SODIUM 40 MG/1
TABLET, DELAYED RELEASE ORAL
Qty: 90 TABLET | Refills: 3 | Status: SHIPPED | OUTPATIENT
Start: 2018-12-19 | End: 2019-02-28

## 2018-12-19 RX ORDER — DULOXETIN HYDROCHLORIDE 20 MG/1
CAPSULE, DELAYED RELEASE ORAL
Qty: 180 CAPSULE | Refills: 3 | Status: SHIPPED | OUTPATIENT
Start: 2018-12-19 | End: 2019-12-26

## 2019-01-02 ENCOUNTER — TELEPHONE (OUTPATIENT)
Dept: INTERNAL MEDICINE | Facility: CLINIC | Age: 79
End: 2019-01-02

## 2019-01-02 DIAGNOSIS — M79.7 FIBROMYALGIA: ICD-10-CM

## 2019-01-02 DIAGNOSIS — G25.81 RESTLESS LEG SYNDROME: ICD-10-CM

## 2019-01-02 RX ORDER — GABAPENTIN ENACARBIL 600 MG/1
TABLET, EXTENDED RELEASE ORAL
Qty: 90 TABLET | Refills: 3 | OUTPATIENT
Start: 2019-01-02

## 2019-01-02 RX ORDER — DULOXETIN HYDROCHLORIDE 20 MG/1
CAPSULE, DELAYED RELEASE ORAL
Qty: 180 CAPSULE | Refills: 3 | OUTPATIENT
Start: 2019-01-02

## 2019-01-02 NOTE — TELEPHONE ENCOUNTER
Refill orders refused, these medications were already refilled 2 weeks ago for 90 days with refills.  please notify the patient. If new medication orders are needed please have the patient recontact the office.

## 2019-01-05 DIAGNOSIS — G25.81 RESTLESS LEG SYNDROME: ICD-10-CM

## 2019-01-07 RX ORDER — GABAPENTIN ENACARBIL 600 MG/1
TABLET, EXTENDED RELEASE ORAL
Qty: 90 TABLET | Refills: 3 | OUTPATIENT
Start: 2019-01-07

## 2019-01-22 ENCOUNTER — PATIENT MESSAGE (OUTPATIENT)
Dept: NEPHROLOGY | Facility: CLINIC | Age: 79
End: 2019-01-22

## 2019-01-22 DIAGNOSIS — M79.7 FIBROMYALGIA: ICD-10-CM

## 2019-01-23 RX ORDER — DULOXETIN HYDROCHLORIDE 20 MG/1
CAPSULE, DELAYED RELEASE ORAL
Qty: 180 CAPSULE | Refills: 3 | OUTPATIENT
Start: 2019-01-23

## 2019-01-28 ENCOUNTER — PES CALL (OUTPATIENT)
Dept: ADMINISTRATIVE | Facility: CLINIC | Age: 79
End: 2019-01-28

## 2019-02-04 PROBLEM — M53.87 SCIATICA ASSOCIATED WITH DISORDER OF LUMBOSACRAL SPINE: Status: RESOLVED | Noted: 2017-10-20 | Resolved: 2019-02-04

## 2019-02-05 ENCOUNTER — OFFICE VISIT (OUTPATIENT)
Dept: INTERNAL MEDICINE | Facility: CLINIC | Age: 79
End: 2019-02-05
Payer: MEDICARE

## 2019-02-05 VITALS
DIASTOLIC BLOOD PRESSURE: 80 MMHG | HEART RATE: 88 BPM | SYSTOLIC BLOOD PRESSURE: 138 MMHG | HEIGHT: 65 IN | OXYGEN SATURATION: 97 % | BODY MASS INDEX: 25.66 KG/M2 | WEIGHT: 154 LBS | RESPIRATION RATE: 15 BRPM

## 2019-02-05 DIAGNOSIS — E21.3 HYPERPARATHYROIDISM: ICD-10-CM

## 2019-02-05 DIAGNOSIS — M51.37 DEGENERATION OF LUMBAR OR LUMBOSACRAL INTERVERTEBRAL DISC: ICD-10-CM

## 2019-02-05 DIAGNOSIS — Z00.00 ENCOUNTER FOR PREVENTIVE HEALTH EXAMINATION: Primary | ICD-10-CM

## 2019-02-05 DIAGNOSIS — Z12.39 SCREENING FOR BREAST CANCER: ICD-10-CM

## 2019-02-05 DIAGNOSIS — M79.7 FIBROMYALGIA: ICD-10-CM

## 2019-02-05 DIAGNOSIS — H02.9: ICD-10-CM

## 2019-02-05 DIAGNOSIS — M46.1 SACROILIITIS: ICD-10-CM

## 2019-02-05 DIAGNOSIS — E78.5 HYPERLIPIDEMIA, UNSPECIFIED HYPERLIPIDEMIA TYPE: ICD-10-CM

## 2019-02-05 DIAGNOSIS — N18.31 CKD STAGE G3A/A1, GFR 45-59 AND ALBUMIN CREATININE RATIO <30 MG/G: ICD-10-CM

## 2019-02-05 DIAGNOSIS — Z13.820 SCREENING FOR OSTEOPOROSIS: ICD-10-CM

## 2019-02-05 DIAGNOSIS — I10 ESSENTIAL HYPERTENSION: ICD-10-CM

## 2019-02-05 DIAGNOSIS — H35.3131 EARLY DRY STAGE NONEXUDATIVE AGE-RELATED MACULAR DEGENERATION OF BOTH EYES: ICD-10-CM

## 2019-02-05 DIAGNOSIS — G25.81 RLS (RESTLESS LEGS SYNDROME): ICD-10-CM

## 2019-02-05 DIAGNOSIS — G47.33 OSA ON CPAP: ICD-10-CM

## 2019-02-05 DIAGNOSIS — Z78.0 MENOPAUSE: ICD-10-CM

## 2019-02-05 DIAGNOSIS — M43.10 ACQUIRED SPONDYLOLISTHESIS: ICD-10-CM

## 2019-02-05 DIAGNOSIS — M15.9 PRIMARY OSTEOARTHRITIS INVOLVING MULTIPLE JOINTS: ICD-10-CM

## 2019-02-05 DIAGNOSIS — M85.9 DISORDER OF BONE DENSITY AND STRUCTURE, UNSPECIFIED: ICD-10-CM

## 2019-02-05 DIAGNOSIS — M47.819 SPONDYLOSIS WITHOUT MYELOPATHY: ICD-10-CM

## 2019-02-05 DIAGNOSIS — F41.9 ANXIETY: ICD-10-CM

## 2019-02-05 DIAGNOSIS — F43.23 ADJUSTMENT DISORDER WITH MIXED ANXIETY AND DEPRESSED MOOD: ICD-10-CM

## 2019-02-05 DIAGNOSIS — F33.2 SEVERE EPISODE OF RECURRENT MAJOR DEPRESSIVE DISORDER, WITHOUT PSYCHOTIC FEATURES: ICD-10-CM

## 2019-02-05 DIAGNOSIS — E55.9 VITAMIN D DEFICIENCY: ICD-10-CM

## 2019-02-05 DIAGNOSIS — Z80.3 FAMILY HISTORY OF BREAST CANCER: ICD-10-CM

## 2019-02-05 DIAGNOSIS — M48.061 SPINAL STENOSIS, LUMBAR REGION, WITHOUT NEUROGENIC CLAUDICATION: ICD-10-CM

## 2019-02-05 DIAGNOSIS — R29.890 LOSS OF HEIGHT: ICD-10-CM

## 2019-02-05 PROCEDURE — G0439 PPPS, SUBSEQ VISIT: HCPCS | Mod: HCNC,S$GLB,, | Performed by: NURSE PRACTITIONER

## 2019-02-05 PROCEDURE — 3075F PR MOST RECENT SYSTOLIC BLOOD PRESS GE 130-139MM HG: ICD-10-PCS | Mod: HCNC,CPTII,S$GLB, | Performed by: NURSE PRACTITIONER

## 2019-02-05 PROCEDURE — G0439 PR MEDICARE ANNUAL WELLNESS SUBSEQUENT VISIT: ICD-10-PCS | Mod: HCNC,S$GLB,, | Performed by: NURSE PRACTITIONER

## 2019-02-05 PROCEDURE — 99499 UNLISTED E&M SERVICE: CPT | Mod: S$GLB,,, | Performed by: NURSE PRACTITIONER

## 2019-02-05 PROCEDURE — 3079F PR MOST RECENT DIASTOLIC BLOOD PRESSURE 80-89 MM HG: ICD-10-PCS | Mod: HCNC,CPTII,S$GLB, | Performed by: NURSE PRACTITIONER

## 2019-02-05 PROCEDURE — 99999 PR PBB SHADOW E&M-EST. PATIENT-LVL V: CPT | Mod: PBBFAC,HCNC,, | Performed by: NURSE PRACTITIONER

## 2019-02-05 PROCEDURE — 3079F DIAST BP 80-89 MM HG: CPT | Mod: HCNC,CPTII,S$GLB, | Performed by: NURSE PRACTITIONER

## 2019-02-05 PROCEDURE — 99999 PR PBB SHADOW E&M-EST. PATIENT-LVL V: ICD-10-PCS | Mod: PBBFAC,HCNC,, | Performed by: NURSE PRACTITIONER

## 2019-02-05 PROCEDURE — 99499 RISK ADDL DX/OHS AUDIT: ICD-10-PCS | Mod: S$GLB,,, | Performed by: NURSE PRACTITIONER

## 2019-02-05 PROCEDURE — 3075F SYST BP GE 130 - 139MM HG: CPT | Mod: HCNC,CPTII,S$GLB, | Performed by: NURSE PRACTITIONER

## 2019-02-05 NOTE — PROGRESS NOTES
"Tere Velasco presented for a  Medicare AWV and comprehensive Health Risk Assessment today. The following components were reviewed and updated:    · Medical history  · Family History  · Social history- - she is a care giver to - alzheimers  · Allergies and Current Medications  · Health Risk Assessment  · Health Maintenance  · Care Team koko de dios- eye    ** See Completed Assessments for Annual Wellness Visit within the encounter summary.**       The following assessments were completed:  · Living Situation  · CAGE  · Depression Screening  · Timed Get Up and Go  · Whisper Test  · Cognitive Function Screening  · Nutrition Screening  · ADL Screening  · PAQ Screening    Vitals:    02/05/19 1415   BP: 138/80   Pulse: 88   Resp: 15   SpO2: 97%   Weight: 69.9 kg (154 lb)   Height: 5' 5" (1.651 m)     Body mass index is 25.63 kg/m².  Physical Exam   Constitutional: She is oriented to person, place, and time. She appears well-developed and well-nourished.   HENT:   Head: Normocephalic and atraumatic.   Right Ear: External ear normal.   Left Ear: External ear normal.   Mouth/Throat: No oropharyngeal exudate.   Eyes:   Right inner canthus with edematous tissue vs lesion, slight erythema   Cardiovascular: Normal rate, regular rhythm and normal heart sounds.   No murmur heard.  Pulmonary/Chest: Effort normal and breath sounds normal. No respiratory distress. She has no wheezes. She has no rales.   Abdominal: Soft. Bowel sounds are normal. She exhibits no distension. There is no tenderness.   Musculoskeletal: Normal range of motion. She exhibits no edema, tenderness or deformity.   Neurological: She is alert and oriented to person, place, and time. No cranial nerve deficit.   Skin: Skin is warm and dry.   Psychiatric: She has a normal mood and affect. Her behavior is normal. Judgment and thought content normal.   Nursing note and vitals reviewed.        Diagnoses and health risks identified today and associated " recommendations/orders:    1. RLS (restless legs syndrome)  Stable-followed by PCP, sleep medicine    2. TRU on CPAP  Stable-followed by PCP, sleep medicine    3. Essential hypertension  Stable-followed by PCP    4. Anxiety  Stable-followed by Psych    5. Severe episode of recurrent major depressive disorder, without psychotic features  Stable-followed by Psych    6. Fibromyalgia  Stable-followed by PCP    7. Spinal stenosis, lumbar region, without neurogenic claudication  Stable-followed by PCP    8. Acquired spondylolisthesis  Stable-followed by PCP    9. Degeneration of lumbar or lumbosacral intervertebral disc  Stable-followed by PCP    10. Spondylosis without myelopathy  Stable-followed by PCP    11. Hyperlipidemia, unspecified hyperlipidemia type  Stable-followed by PCP  - Lipid panel; Future    12. Primary osteoarthritis involving multiple joints  Stable-followed by PCP    13. CKD stage G3a/A1, GFR 45-59 and albumin creatinine ratio <30 mg/g  Stable-followed by nephrology    14. Hyperparathyroidism  Stable-followed by nephrology    15. Vitamin D deficiency  Stable-followed by PCP  - DXA Bone Density Spine And Hip; Future    16. Early dry stage nonexudative age-related macular degeneration of both eyes  Stable-followed by external opth    17. Adjustment disorder with mixed anxiety and depressed mood  Stable-followed by Psych    18. Screening for breast cancer  Stable-followed by PCP  - Mammo Digital Screening Bilat w/ Jose; Future    19. Family history of breast cancer  Stable-followed by PCP  - Mammo Digital Screening Bilat w/ Jose; Future    20. Loss of height  Stable-followed by PCP  - DXA Bone Density Spine And Hip; Future    21. Disorder of bone density and structure, unspecified  Stable-followed by PCP  - DXA Bone Density Spine And Hip; Future    22. Screening for osteoporosis  Stable-followed by PCP  - DXA Bone Density Spine And Hip; Future    23. Menopause  Stable-followed by PCP  - DXA Bone Density  Spine And Hip; Future    24. Sacroiliitis  Stable-followed by PCP    25. Encounter for preventive health examination  Assessments completed. Preventative health recommendations reviewed.       26. Lesion of canthus  Stable-followed by opth- new problem  - Ambulatory Referral to Ophthalmology      Provided Tere with a 5-10 year written screening schedule and personal prevention plan. Recommendations were developed using the USPSTF age appropriate recommendations. Education, counseling, and referrals were provided as needed. After Visit Summary printed and given to patient which includes a list of additional screenings\tests needed.Fall prevention. Discussed care giver stress & effects on care giver-- she is full time care giver to -she will scheduled overdue tests, nephrology F/U. Avoids anti-inflam RX. SHe is seeking new PCP in [place] due to location. DXA, mammogram, lipids.    Follow-up in about 1 year (around 2/5/2020) for HRA.    Princess Carroll NP

## 2019-02-05 NOTE — PATIENT INSTRUCTIONS
Counseling and Referral of Other Preventative  (Italic type indicates deductible and co-insurance are waived)    Patient Name: Tere Velasco  Today's Date: 2/5/2019    Health Maintenance       Date Due Completion Date    Lipid Panel Ordered   11/8/2017    TETANUS VACCINE 09/04/2023 9/4/2013        Orders Placed This Encounter   Procedures    Mammo Digital Screening Bilat w/ Jose    DXA Bone Density Spine And Hip    Lipid panel     The following information is provided to all patients.  This information is to help you find resources for any of the problems found today that may be affecting your health:                Living healthy guide: www.[facility].[place].[facility]      Understanding Diabetes: www.diabetes.org      Eating healthy: www.cdc.gov/healthyweight      [facility] home safety checklist: www.cdc.gov/steadi/patient.html      Agency on Aging: www.goea.[place].[facility]      Alcoholics anonymous (AA): www.aa.org      Physical Activity: www.wes.nih.gov/ta0lcxc      Tobacco use: www.quitwithusla.org     Exercises to Prevent Falls  Certain types of exercises may help make you less likely to fall. Try the ones below. Or do other exercises that your health care provider suggests. Depending on your health, you may need to start slowly. Don't let that stop you. Even small amounts of exercise can help you. Be sure to talk to your health care provider before starting any exercise program.       Improve balance  Many types of exercise can help improve balance. Ronn chi and yoga are good examples. Here's another one to try. You can do it anytime and almost anywhere.  · Stand next to a counter or solid support.  · Push yourself up onto your tiptoes.  · Hold for 5 seconds. If you start to lose your balance, hold on to the counter.  · Rest and repeat 5 times. Work up to holding for 20 to 30 seconds, if you can. Increase flexibility  Being more flexible makes it easier for you to move around safely. Try exercises like the seated hamstring  "stretch.  · Sit in a chair and put one foot on a stool.  · Straighten your leg and reach with both hands down either side of your leg. Reach as far down your leg as you can.  · Hold for about 20 seconds.  · Go back to the starting position. Then repeat 5 times. Switch legs. Build strength  "Resistance" exercises help build strength. You can do them without equipment. Or you can use weights, elastic bands, or special machines. One such exercise is called the biceps curl. You can hold a 1-pound weight or even a can of soup. Do this exercise at least 3 times a week. Strive for every day.  · Sit up straight in a chair.  · Keep your elbow close to your body and your wrist straight.  · Bend your arm, moving your hand up to your shoulder. Then slowly lower your arm.  · Repeat 5 times. Switch to the other arm.   Build your staying power  Aerobic exercises make your heart and lungs stronger so you can keep moving longer. Walking and swimming are two of the best types of exercises you can do. Using a stationary bike is great, too. Find an aerobic exercise that you enjoy. Start slowly and build up. Even 5 minutes is helpful. Aim for a goal of 30 minutes, at least 3 times a week. You don't have to do 30 minutes in 1 session. Break it up and walk a little throughout the day.  More helpful tips  · Start easy. Slowly work up to doing more.  · Talk with your health care provider about the best exercises for you.  · Call senior centers or health clubs about exercise programs.  · If needed, have a family member watch you walk every so often to check your stability.  · Exercise with a friend. Choose an activity you both enjoy.  · Consider michelle chi or yoga to strengthen your balance.  · Try exercises that you can do anytime, anywhere. Here are 2 examples. Have someone with you when you first try these:  ¨ Practice walking by placing 1 foot right in front of the other.  ¨ Stand up and sit down 10 times. Repeat this throughout the day. " "  Date Last Reviewed: 6/13/2015  © 4603-3360 [employer]. [address] [place] [address], [place] [address]. All rights reserved. This information is not intended as a substitute for professional medical care. Always follow your healthcare professional's instructions.        Exercises to Prevent Falls  Certain types of exercises may help make you less likely to fall. Try the ones below. Or do other exercises that your health care provider suggests. Depending on your health, you may need to start slowly. Don't let that stop you. Even small amounts of exercise can help you. Be sure to talk to your health care provider before starting any exercise program.       Improve balance  Many types of exercise can help improve balance. Ronn chi and yoga are good examples. Here's another one to try. You can do it anytime and almost anywhere.  · Stand next to a counter or solid support.  · Push yourself up onto your tiptoes.  · Hold for 5 seconds. If you start to lose your balance, hold on to the counter.  · Rest and repeat 5 times. Work up to holding for 20 to 30 seconds, if you can. Increase flexibility  Being more flexible makes it easier for you to move around safely. Try exercises like the seated hamstring stretch.  · Sit in a chair and put one foot on a stool.  · Straighten your leg and reach with both hands down either side of your leg. Reach as far down your leg as you can.  · Hold for about 20 seconds.  · Go back to the starting position. Then repeat 5 times. Switch legs. Build strength  "Resistance" exercises help build strength. You can do them without equipment. Or you can use weights, elastic bands, or special machines. One such exercise is called the biceps curl. You can hold a 1-pound weight or even a can of soup. Do this exercise at least 3 times a week. Strive for every day.  · Sit up straight in a chair.  · Keep your elbow close to your body and your wrist straight.  · Bend your arm, moving your hand up to " your shoulder. Then slowly lower your arm.  · Repeat 5 times. Switch to the other arm.   Build your staying power  Aerobic exercises make your heart and lungs stronger so you can keep moving longer. Walking and swimming are two of the best types of exercises you can do. Using a stationary bike is great, too. Find an aerobic exercise that you enjoy. Start slowly and build up. Even 5 minutes is helpful. Aim for a goal of 30 minutes, at least 3 times a week. You don't have to do 30 minutes in 1 session. Break it up and walk a little throughout the day.  More helpful tips  · Start easy. Slowly work up to doing more.  · Talk with your health care provider about the best exercises for you.  · Call senior centers or health clubs about exercise programs.  · If needed, have a family member watch you walk every so often to check your stability.  · Exercise with a friend. Choose an activity you both enjoy.  · Consider michelle chi or yoga to strengthen your balance.  · Try exercises that you can do anytime, anywhere. Here are 2 examples. Have someone with you when you first try these:  ¨ Practice walking by placing 1 foot right in front of the other.  ¨ Stand up and sit down 10 times. Repeat this throughout the day.   Date Last Reviewed: 6/13/2015  © 9859-4799 [employer]. [address] [place] [address], [place] [address]. All rights reserved. This information is not intended as a substitute for professional medical care. Always follow your healthcare professional's instructions.        Exercises to Prevent Falls  Certain types of exercises may help make you less likely to fall. Try the ones below. Or do other exercises that your health care provider suggests. Depending on your health, you may need to start slowly. Don't let that stop you. Even small amounts of exercise can help you. Be sure to talk to your health care provider before starting any exercise program.       Improve balance  Many types of exercise can help  "improve balance. Ronn chi and yoga are good examples. Here's another one to try. You can do it anytime and almost anywhere.  · Stand next to a counter or solid support.  · Push yourself up onto your tiptoes.  · Hold for 5 seconds. If you start to lose your balance, hold on to the counter.  · Rest and repeat 5 times. Work up to holding for 20 to 30 seconds, if you can. Increase flexibility  Being more flexible makes it easier for you to move around safely. Try exercises like the seated hamstring stretch.  · Sit in a chair and put one foot on a stool.  · Straighten your leg and reach with both hands down either side of your leg. Reach as far down your leg as you can.  · Hold for about 20 seconds.  · Go back to the starting position. Then repeat 5 times. Switch legs. Build strength  "Resistance" exercises help build strength. You can do them without equipment. Or you can use weights, elastic bands, or special machines. One such exercise is called the biceps curl. You can hold a 1-pound weight or even a can of soup. Do this exercise at least 3 times a week. Strive for every day.  · Sit up straight in a chair.  · Keep your elbow close to your body and your wrist straight.  · Bend your arm, moving your hand up to your shoulder. Then slowly lower your arm.  · Repeat 5 times. Switch to the other arm.   Build your staying power  Aerobic exercises make your heart and lungs stronger so you can keep moving longer. Walking and swimming are two of the best types of exercises you can do. Using a stationary bike is great, too. Find an aerobic exercise that you enjoy. Start slowly and build up. Even 5 minutes is helpful. Aim for a goal of 30 minutes, at least 3 times a week. You don't have to do 30 minutes in 1 session. Break it up and walk a little throughout the day.  More helpful tips  · Start easy. Slowly work up to doing more.  · Talk with your health care provider about the best exercises for you.  · Call senior centers or " health clubs about exercise programs.  · If needed, have a family member watch you walk every so often to check your stability.  · Exercise with a friend. Choose an activity you both enjoy.  · Consider michelle chi or yoga to strengthen your balance.  · Try exercises that you can do anytime, anywhere. Here are 2 examples. Have someone with you when you first try these:  ¨ Practice walking by placing 1 foot right in front of the other.  ¨ Stand up and sit down 10 times. Repeat this throughout the day.   Date Last Reviewed: 6/13/2015  © 0693-0424 [employer]. [address] [place] [address]. All rights reserved. This information is not intended as a substitute for professional medical care. Always follow your healthcare professional's instructions.

## 2019-02-05 NOTE — Clinical Note
Primary Care Providers:Moshe Sandra MD, MD (General)Your patient was seen today for a HRA visit. Gap(s) in care (HEDIS gaps) have been identified during this visit that require additional testing and possible follow up.Orders Placed This Encounter    Mammo Digital Screening Bilat w/ Jose        Standing Status: Future        Standing Expiration Date: 4/5/2020        Order Specific Question: May the Radiologist modify the order per protocol to meet the clinical needs of the patient?        Answer: Yes    DXA Bone Density Spine And Hip        Standing Status: Future        Standing Expiration Date: 2/5/2022        Order Specific Question: May the Radiologist modify the order per protocol to meet the clinical needs of the patient?        Answer: Yes    Lipid panel        Standing Status: Future        Standing Expiration Date: 4/5/2020    Ambulatory Referral to Ophthalmology        Referral Priority:Routine        Referral Type:Consultation        Referral Reason:Spe

## 2019-02-07 ENCOUNTER — HOSPITAL ENCOUNTER (OUTPATIENT)
Dept: RADIOLOGY | Facility: HOSPITAL | Age: 79
Discharge: HOME | End: 2019-02-07
Attending: GENERAL PRACTICE
Payer: MEDICARE

## 2019-02-07 DIAGNOSIS — N18.31 CKD STAGE G3A/A1, GFR 45-59 AND ALBUMIN CREATININE RATIO <30 MG/G: ICD-10-CM

## 2019-02-07 PROCEDURE — 76770 US EXAM ABDO BACK WALL COMP: CPT | Mod: 26,HCNC,, | Performed by: RADIOLOGY

## 2019-02-07 PROCEDURE — 76770 US EXAM ABDO BACK WALL COMP: CPT | Mod: TC,HCNC

## 2019-02-07 PROCEDURE — 76770 US RETROPERITONEAL COMPLETE: ICD-10-PCS | Mod: 26,HCNC,, | Performed by: RADIOLOGY

## 2019-02-21 ENCOUNTER — HOSPITAL ENCOUNTER (OUTPATIENT)
Dept: RADIOLOGY | Facility: HOSPITAL | Age: 79
Discharge: HOME | End: 2019-02-21
Attending: NURSE PRACTITIONER
Payer: MEDICARE

## 2019-02-21 DIAGNOSIS — Z12.39 SCREENING FOR BREAST CANCER: ICD-10-CM

## 2019-02-21 DIAGNOSIS — Z80.3 FAMILY HISTORY OF BREAST CANCER: ICD-10-CM

## 2019-02-21 PROCEDURE — 77063 BREAST TOMOSYNTHESIS BI: CPT | Mod: 26,HCNC,, | Performed by: RADIOLOGY

## 2019-02-21 PROCEDURE — 77067 MAMMO DIGITAL SCREENING BILAT WITH TOMOSYNTHESIS_CAD: ICD-10-PCS | Mod: 26,HCNC,, | Performed by: RADIOLOGY

## 2019-02-21 PROCEDURE — 77067 SCR MAMMO BI INCL CAD: CPT | Mod: TC,HCNC,PO

## 2019-02-21 PROCEDURE — 77063 MAMMO DIGITAL SCREENING BILAT WITH TOMOSYNTHESIS_CAD: ICD-10-PCS | Mod: 26,HCNC,, | Performed by: RADIOLOGY

## 2019-02-21 PROCEDURE — 77067 SCR MAMMO BI INCL CAD: CPT | Mod: 26,HCNC,, | Performed by: RADIOLOGY

## 2019-02-28 ENCOUNTER — OFFICE VISIT (OUTPATIENT)
Dept: INTERNAL MEDICINE | Facility: CLINIC | Age: 79
End: 2019-02-28
Payer: MEDICARE

## 2019-02-28 VITALS
BODY MASS INDEX: 25.22 KG/M2 | WEIGHT: 156.94 LBS | HEIGHT: 66 IN | HEART RATE: 73 BPM | TEMPERATURE: 99 F | OXYGEN SATURATION: 97 % | RESPIRATION RATE: 18 BRPM | DIASTOLIC BLOOD PRESSURE: 68 MMHG | SYSTOLIC BLOOD PRESSURE: 112 MMHG

## 2019-02-28 DIAGNOSIS — M41.9 SCOLIOSIS OF LUMBAR SPINE, UNSPECIFIED SCOLIOSIS TYPE: ICD-10-CM

## 2019-02-28 DIAGNOSIS — G47.00 INSOMNIA, UNSPECIFIED TYPE: ICD-10-CM

## 2019-02-28 DIAGNOSIS — K52.832 LYMPHOCYTIC COLITIS: ICD-10-CM

## 2019-02-28 DIAGNOSIS — K21.9 GASTROESOPHAGEAL REFLUX DISEASE, ESOPHAGITIS PRESENCE NOT SPECIFIED: ICD-10-CM

## 2019-02-28 DIAGNOSIS — Z00.00 ENCOUNTER FOR MEDICAL EXAMINATION TO ESTABLISH CARE: Primary | ICD-10-CM

## 2019-02-28 PROCEDURE — 3078F PR MOST RECENT DIASTOLIC BLOOD PRESSURE < 80 MM HG: ICD-10-PCS | Mod: HCNC,CPTII,S$GLB, | Performed by: HOSPITALIST

## 2019-02-28 PROCEDURE — 3078F DIAST BP <80 MM HG: CPT | Mod: HCNC,CPTII,S$GLB, | Performed by: HOSPITALIST

## 2019-02-28 PROCEDURE — 99999 PR PBB SHADOW E&M-EST. PATIENT-LVL IV: ICD-10-PCS | Mod: PBBFAC,HCNC,, | Performed by: HOSPITALIST

## 2019-02-28 PROCEDURE — 3074F PR MOST RECENT SYSTOLIC BLOOD PRESSURE < 130 MM HG: ICD-10-PCS | Mod: HCNC,CPTII,S$GLB, | Performed by: HOSPITALIST

## 2019-02-28 PROCEDURE — 99214 OFFICE O/P EST MOD 30 MIN: CPT | Mod: HCNC,S$GLB,, | Performed by: HOSPITALIST

## 2019-02-28 PROCEDURE — 1101F PR PT FALLS ASSESS DOC 0-1 FALLS W/OUT INJ PAST YR: ICD-10-PCS | Mod: HCNC,CPTII,S$GLB, | Performed by: HOSPITALIST

## 2019-02-28 PROCEDURE — 1101F PT FALLS ASSESS-DOCD LE1/YR: CPT | Mod: HCNC,CPTII,S$GLB, | Performed by: HOSPITALIST

## 2019-02-28 PROCEDURE — 99214 PR OFFICE/OUTPT VISIT, EST, LEVL IV, 30-39 MIN: ICD-10-PCS | Mod: HCNC,S$GLB,, | Performed by: HOSPITALIST

## 2019-02-28 PROCEDURE — 99999 PR PBB SHADOW E&M-EST. PATIENT-LVL IV: CPT | Mod: PBBFAC,HCNC,, | Performed by: HOSPITALIST

## 2019-02-28 PROCEDURE — 3074F SYST BP LT 130 MM HG: CPT | Mod: HCNC,CPTII,S$GLB, | Performed by: HOSPITALIST

## 2019-02-28 RX ORDER — ESZOPICLONE 1 MG/1
1 TABLET, FILM COATED ORAL NIGHTLY PRN
Qty: 30 TABLET | Refills: 3 | Status: SHIPPED | OUTPATIENT
Start: 2019-02-28 | End: 2019-03-30

## 2019-02-28 RX ORDER — FAMOTIDINE 20 MG/1
20 TABLET, FILM COATED ORAL DAILY
Qty: 90 TABLET | Refills: 3 | Status: SHIPPED | OUTPATIENT
Start: 2019-02-28 | End: 2020-07-08 | Stop reason: SDUPTHER

## 2019-03-01 NOTE — PROGRESS NOTES
"Subjective:     @Patient ID: Tere Velasco is a 79 y.o. female.    Chief Complaint: Establish Care    HPI  80 yo F presents to establish care. Pt is new to me. Reports [place] [facility] She reports that she has been having difficulty falling asleep. Reports she has tried many OTC agents to no relief. Has had insomnia for a long time she reports. Also requests a back brace for scoliosis.     Review of Systems   Constitutional: Negative for chills and fever.   HENT: Negative for congestion and sore throat.    Eyes: Negative for pain and visual disturbance.   Respiratory: Negative for cough and shortness of breath.    Cardiovascular: Negative for chest pain and leg swelling.   Gastrointestinal: Negative for abdominal pain, nausea and vomiting.   Endocrine: Negative for polydipsia and polyuria.   Genitourinary: Negative for difficulty urinating and dysuria.   Musculoskeletal: Negative for arthralgias and back pain.   Skin: Negative for rash and wound.   Neurological: Negative for dizziness, weakness and headaches.   Psychiatric/Behavioral: Positive for sleep disturbance. Negative for agitation and confusion.     Past medical history, surgical history, and family medical history reviewed and updated as appropriate.    Medications and allergies reviewed.     Objective:     Vitals:    02/28/19 1426   BP: 112/68   BP Location: Right arm   Patient Position: Sitting   BP Method: Medium (Manual)   Pulse: 73   Resp: 18   Temp: 98.8 °F (37.1 °C)   SpO2: 97%   Weight: 71.2 kg (156 lb 15.5 oz)   Height: 5' 6" (1.676 m)     Body mass index is 25.34 kg/m².  Physical Exam   Constitutional: She is oriented to person, place, and time. She appears well-developed and well-nourished. No distress.   HENT:   Head: Normocephalic and atraumatic.   Mouth/Throat: Oropharynx is clear and moist. No oropharyngeal exudate.   Eyes: Conjunctivae are normal. Pupils are equal, round, and reactive to light. Right " eye exhibits no discharge. Left eye exhibits no discharge.   Neck: Normal range of motion. Neck supple.   Cardiovascular: Normal rate, regular rhythm and intact distal pulses. Exam reveals no friction rub.   Pulmonary/Chest: Effort normal and breath sounds normal.   Abdominal: Soft. Bowel sounds are normal. She exhibits no distension. There is no tenderness. There is no guarding.   Musculoskeletal: Normal range of motion. She exhibits no edema.   Lymphadenopathy:     She has no cervical adenopathy.   Neurological: She is alert and oriented to person, place, and time.   Skin: Skin is warm and dry.   Psychiatric: She has a normal mood and affect. Her behavior is normal.   Vitals reviewed.      Lab Results   Component Value Date    WBC 7.82 02/21/2019    HGB 10.6 (L) 02/21/2019    HCT 34.1 (L) 02/21/2019     02/21/2019    CHOL 192 02/21/2019    TRIG 84 02/21/2019    HDL 73 02/21/2019    ALT 11 06/28/2016    AST 19 06/28/2016     02/21/2019    K 3.7 02/21/2019     02/21/2019    CREATININE 1.2 02/21/2019    BUN 17 02/21/2019    CO2 25 02/21/2019    TSH 1.243 06/28/2016    INR 1.8 (A) 01/30/2014    HGBA1C 5.5 08/21/2014       Assessment:     1. Encounter for medical examination to establish care    2. Lymphocytic colitis    3. Insomnia, unspecified type    4. Scoliosis of lumbar spine, unspecified scoliosis type    5. Gastroesophageal reflux disease, esophagitis presence not specified      Plan:   Tere was seen today for establish care.    Diagnoses and all orders for this visit:    Encounter for medical examination to establish care      - Reviewed recent labs with pt showing renal function appears stable. Also reviewed xray of spine a few years ago showing lumbar scoliosis     Lymphocytic colitis  - Pt reports intermittent diarrhea. Never follow-up with GI. Not sure if she is to remain on entocort medication as last GI visit showed only to be on it for 4 weeks until seen for f/u. Will refer back to  GI for evaluation  -     Ambulatory Referral to Gastroenterology    Insomnia, unspecified type  -     eszopiclone (LUNESTA) 1 MG Tab; Take 1 tablet (1 mg total) by mouth nightly as needed (insomnia). Pt to notify MD if she has any unusual sx with medication    Scoliosis of lumbar spine, unspecified scoliosis type  -     Back/Cervical Brace For Home Use    Gastroesophageal reflux disease, esophagitis presence not specified  - D/c PPI due to ckd  -     famotidine (PEPCID) 20 MG tablet; Take 1 tablet (20 mg total) by mouth once daily.          Follow-up in about 6 months (around 8/28/2019).    Rosy Santos MD  Internal Medicine    2/28/2019

## 2019-03-07 ENCOUNTER — TELEPHONE (OUTPATIENT)
Dept: INTERNAL MEDICINE | Facility: CLINIC | Age: 79
End: 2019-03-07

## 2019-03-07 NOTE — TELEPHONE ENCOUNTER
Spoke to Felicia and informed her that I only received the forms today. We will try to fax back today or tomorrow.

## 2019-03-07 NOTE — TELEPHONE ENCOUNTER
----- Message from Katie Brewer sent at 3/7/2019  2:34 PM CST -----  Contact: [facility]/bradly/[phone]  Rep called in regards to checking the status of a detail written order was faxed to the office on march 2-19. It was in regards to a back brace for the pt.       Please advise

## 2019-03-13 ENCOUNTER — OFFICE VISIT (OUTPATIENT)
Dept: GASTROENTEROLOGY | Facility: CLINIC | Age: 79
End: 2019-03-13
Payer: MEDICARE

## 2019-03-13 VITALS
HEIGHT: 66 IN | BODY MASS INDEX: 24.75 KG/M2 | WEIGHT: 154 LBS | SYSTOLIC BLOOD PRESSURE: 122 MMHG | DIASTOLIC BLOOD PRESSURE: 75 MMHG | HEART RATE: 81 BPM

## 2019-03-13 DIAGNOSIS — K58.0 IRRITABLE BOWEL SYNDROME WITH DIARRHEA: Primary | ICD-10-CM

## 2019-03-13 DIAGNOSIS — R19.6 HALITOSIS: ICD-10-CM

## 2019-03-13 PROCEDURE — 3074F PR MOST RECENT SYSTOLIC BLOOD PRESSURE < 130 MM HG: ICD-10-PCS | Mod: HCNC,CPTII,S$GLB, | Performed by: PHYSICIAN ASSISTANT

## 2019-03-13 PROCEDURE — 99999 PR PBB SHADOW E&M-EST. PATIENT-LVL IV: CPT | Mod: PBBFAC,HCNC,, | Performed by: PHYSICIAN ASSISTANT

## 2019-03-13 PROCEDURE — 1100F PTFALLS ASSESS-DOCD GE2>/YR: CPT | Mod: HCNC,CPTII,S$GLB, | Performed by: PHYSICIAN ASSISTANT

## 2019-03-13 PROCEDURE — 1100F PR PT FALLS ASSESS DOC 2+ FALLS/FALL W/INJURY/YR: ICD-10-PCS | Mod: HCNC,CPTII,S$GLB, | Performed by: PHYSICIAN ASSISTANT

## 2019-03-13 PROCEDURE — 3288F FALL RISK ASSESSMENT DOCD: CPT | Mod: HCNC,CPTII,S$GLB, | Performed by: PHYSICIAN ASSISTANT

## 2019-03-13 PROCEDURE — 99499 UNLISTED E&M SERVICE: CPT | Mod: HCNC,S$GLB,, | Performed by: PHYSICIAN ASSISTANT

## 2019-03-13 PROCEDURE — 3074F SYST BP LT 130 MM HG: CPT | Mod: HCNC,CPTII,S$GLB, | Performed by: PHYSICIAN ASSISTANT

## 2019-03-13 PROCEDURE — 99213 PR OFFICE/OUTPT VISIT, EST, LEVL III, 20-29 MIN: ICD-10-PCS | Mod: HCNC,S$GLB,, | Performed by: PHYSICIAN ASSISTANT

## 2019-03-13 PROCEDURE — 99999 PR PBB SHADOW E&M-EST. PATIENT-LVL IV: ICD-10-PCS | Mod: PBBFAC,HCNC,, | Performed by: PHYSICIAN ASSISTANT

## 2019-03-13 PROCEDURE — 3288F PR FALLS RISK ASSESSMENT DOCUMENTED: ICD-10-PCS | Mod: HCNC,CPTII,S$GLB, | Performed by: PHYSICIAN ASSISTANT

## 2019-03-13 PROCEDURE — 3078F DIAST BP <80 MM HG: CPT | Mod: HCNC,CPTII,S$GLB, | Performed by: PHYSICIAN ASSISTANT

## 2019-03-13 PROCEDURE — 99499 RISK ADDL DX/OHS AUDIT: ICD-10-PCS | Mod: HCNC,S$GLB,, | Performed by: PHYSICIAN ASSISTANT

## 2019-03-13 PROCEDURE — 3078F PR MOST RECENT DIASTOLIC BLOOD PRESSURE < 80 MM HG: ICD-10-PCS | Mod: HCNC,CPTII,S$GLB, | Performed by: PHYSICIAN ASSISTANT

## 2019-03-13 PROCEDURE — 99213 OFFICE O/P EST LOW 20 MIN: CPT | Mod: HCNC,S$GLB,, | Performed by: PHYSICIAN ASSISTANT

## 2019-03-13 NOTE — PROGRESS NOTES
[facility] Note    Reason for Visit:  The primary encounter diagnosis was Irritable bowel syndrome with diarrhea. A diagnosis of Halitosis was also pertinent to this visit.    PCP: Rosy OREILLY MD: No referring provider defined for this encounter.    HPI:  This is a 79 y.o. female referred by her PCP to follow up on her diarrhea  She has a hx of lymphocyctic colitis seen on a 12/2016 colonoscopy   Budesonide too expensive,  pepto was advised    Today she reports having intermittent diarrhea    will have 1 watery stool 3-4 times a month  Recent incontinece episode  Stress will promote a BM    recently diagnosed with alzhemer's    2017 visit notes   Changed from protonicx to pepcid    I saw Ms. Haile on 8/17/2016 and on 10/28/2016 for a hx of hx of intermittent diarrhea made worse with anxiety/stress over the years. Colonoscopy w/ random bx in 12/2016 revealed lymphocytic colitis. Attempts were made to contact the pt regarding the results, but apparently pt did not receive any of the messages. A letter was also sent out to her home with the information, but pt has yet to receive it. She states she was having phone problems for a while. Budesonide was sent to her pharmacy for treatment, but pt did not pick it up d/t she was not aware. She is currently having 3-4 loose to watery BM/day. There are some episodes of nocturnal diarrhea and some episodes of incontinence. She reports taking metamucil wafers once daily. She has not stopped drinking diet sodas as previously recomended. She drinks about 2 diet sodas per day. She reports being treated for depression/anxiety per psych and feels some improvement in her mood.     Abdominal pain - denies  Reflux + hx GERD. Has been well controled with pantoprazole once daily.  Dysphagia/odynophagia - denies  GI bleeding -denies hematochezia, hematemesis, melena, BRBPR, black/tarry stools, and coffee ground emesis  NSAID usage - 81 mg ASA  daily; hx of daily Celebrex use years ago.    ROS:  Constitutional: No fevers, no chills, No unintentional weight loss, + fatigue,   ENT: No allergies  CV: No chest pain, no palpitations, no perif. edema, no sob on exertion  Pulm: No cough, No shortness of breath, no wheezes, no sputum  Ophtho: No vision changes  GI: see HPI; also no nausea, no vomiting, no change in appetite  Derm: No rash  Heme: No lymphadenopathy, No bruising  MSK: + arthritis, no muscle pain, no muscle weakness  : No dysuria, No hematuria  Endo: No hot or cold intolerance  Neuro: No syncope, No seizure,     Medical History:  has a past medical history of Allergy, Anemia, Anxiety, Arthritis, Back pain, Breast disorder, Cataract, CKD (chronic kidney disease) stage 3, GFR 30-59 ml/min, Clotting disorder, Colitis, Degenerative disc disease, Depression, Fibromyalgia, GERD (gastroesophageal reflux disease), Hyperlipidemia, Hypertension, Irritable bowel syndrome, TRU (obstructive sleep apnea), RLS (restless legs syndrome), and S/P knee replacement (1/13/2014).    Surgical History:  has a past surgical history that includes Tonsillectomy; Appendectomy; Rectal surgery; Cholecystectomy; Knee surgery (Bilateral); Hysterectomy (1977); Breast surgery; Breast lumpectomy; Eye surgery (Bilateral); Joint replacement; Colonoscopy (N/A, 12/2/2016); and Breast biopsy.    Family History: family history includes Alcohol abuse in her son; Breast cancer in her paternal aunt and paternal grandmother; Cancer in her paternal grandmother; Cancer (age of onset: 40) in her cousin; Dementia in her mother; Heart disease in her father; Hypertension in her son; Ovarian cancer in her cousin and paternal grandmother; Restless legs syndrome in her daughter; Thyroid disease in her daughter..     Social History:  reports that  has never smoked. she has never used smokeless tobacco. She reports that she drinks alcohol. She reports that she does not use drugs.    Review of patient's  "allergies indicates:   Allergen Reactions    Demerol [meperidine]        Current Outpatient Medications   Medication Sig    acetaminophen-codeine 300-30mg (TYLENOL #3) 300-30 mg Tab acetaminophen 300 mg-codeine 30 mg tablet    aspirin (ECOTRIN) 81 MG EC tablet Take 81 mg by mouth every evening.     budesonide (ENTOCORT EC) 3 mg capsule Take 9 mg by mouth once daily.    buPROPion (WELLBUTRIN SR) 150 MG TBSR 12 hr tablet TAKE ONE TABLET BY MOUTH TWICE A DAY    cholecalciferol, vitamin D3, 2,000 unit Cap Take 1 capsule by mouth once daily.    DULoxetine (CYMBALTA) 20 MG capsule TAKE ONE CAPSULE BY MOUTH TWICE A DAY    eszopiclone (LUNESTA) 1 MG Tab Take 1 tablet (1 mg total) by mouth nightly as needed (insomnia).    famotidine (PEPCID) 20 MG tablet Take 1 tablet (20 mg total) by mouth once daily.    HORIZANT 600 mg TbSR TAKE ONE TABLET BY MOUTH EVERY EVENING    hydroCHLOROthiazide (MICROZIDE) 12.5 mg capsule TAKE ONE CAPSULE BY MOUTH EVERY DAY    losartan (COZAAR) 50 MG tablet TAKE ONE TABLET BY MOUTH DAILY BEFORE BREAKFAST    meclizine (ANTIVERT) 25 mg tablet Take 1 tablet (25 mg total) by mouth 3 (three) times daily as needed.    pravastatin (PRAVACHOL) 40 MG tablet TAKE ONE TABLET BY MOUTH EVERY EVENING    vit A/vit C/vit E/zinc/copper (ICAPS AREDS ORAL) Take by mouth.     No current facility-administered medications for this visit.        Objective Findings:    Vital Signs:  /75   Pulse 81   Ht 5' 6" (1.676 m)   Wt 69.9 kg (154 lb)   BMI 24.86 kg/m²   Body mass index is 24.86 kg/m².    Physical Exam:  General Appearance: Well appearing in no acute distress  Head:   Normocephalic, without obvious abnormality  Eyes:    No scleral icterus  ENT: Neck supple, Lips, mucosa, and tongue normal  Abdomen: soft, NT, BS x 4  Extremities: no  edema  Skin: No rash to exposed areas  Neurologic: A&Ox4      Labs:  Lab Results   Component Value Date    WBC 7.82 02/21/2019    HGB 10.6 (L) 02/21/2019    HCT " 34.1 (L) 02/21/2019     02/21/2019    CHOL 192 02/21/2019    TRIG 84 02/21/2019    HDL 73 02/21/2019    ALT 11 06/28/2016    AST 19 06/28/2016     02/21/2019    K 3.7 02/21/2019     02/21/2019    CREATININE 1.2 02/21/2019    BUN 17 02/21/2019    CO2 25 02/21/2019    TSH 1.243 06/28/2016    INR 1.8 (A) 01/30/2014    HGBA1C 5.5 08/21/2014         Endoscopy:    12/2016 colonoscopy Dr. Cope- normal appearance. Path- lymphocytic colitis.  2/25/2014 EGD Dr. Cope-WNL  1/29/2010 Colonoscopy Dr. Engel - 6 mm hyperplastic polyp.    Assessment:  1. Irritable bowel syndrome with diarrhea    2. Halitosis       78yo F with hx of lymphocytic colitis presents with 1 watery stool 3-4 times a month. Sx are not suggestive of lymphocytic colitis, which seems to have resolved. Sx are more suggestive of IBS-D    Recommendations:  1. Pt reports halitosis and as told it may be related to tonsil tissue. Will refer to ENT to further eval      2. Avoid IBS food triggers, limit stress.    Order summary:  Orders Placed This Encounter    Ambulatory Referral to ENT         Thank you so much for allowing me to participate in the care of Tere Bustillos PA-C

## 2019-04-05 ENCOUNTER — OFFICE VISIT (OUTPATIENT)
Dept: URGENT CARE | Facility: CLINIC | Age: 79
End: 2019-04-05
Payer: MEDICARE

## 2019-04-05 VITALS
SYSTOLIC BLOOD PRESSURE: 150 MMHG | WEIGHT: 150 LBS | RESPIRATION RATE: 19 BRPM | OXYGEN SATURATION: 98 % | BODY MASS INDEX: 24.11 KG/M2 | HEART RATE: 84 BPM | DIASTOLIC BLOOD PRESSURE: 76 MMHG | HEIGHT: 66 IN | TEMPERATURE: 100 F
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
## 2019-04-05 DIAGNOSIS — M25.511 ACUTE PAIN OF RIGHT SHOULDER: ICD-10-CM

## 2019-04-05 DIAGNOSIS — S46.001A INJURY OF RIGHT ROTATOR CUFF, INITIAL ENCOUNTER: Primary | ICD-10-CM

## 2019-04-05 DIAGNOSIS — M89.8X1 PAIN OF RIGHT SCAPULA: ICD-10-CM

## 2019-04-05 PROCEDURE — 3077F PR MOST RECENT SYSTOLIC BLOOD PRESSURE >= 140 MM HG: ICD-10-PCS | Mod: CPTII,S$GLB,, | Performed by: NURSE PRACTITIONER

## 2019-04-05 PROCEDURE — 3077F SYST BP >= 140 MM HG: CPT | Mod: CPTII,S$GLB,, | Performed by: NURSE PRACTITIONER

## 2019-04-05 PROCEDURE — 73030 X-RAY EXAM OF SHOULDER: CPT | Mod: FY,RT,S$GLB, | Performed by: RADIOLOGY

## 2019-04-05 PROCEDURE — 3078F PR MOST RECENT DIASTOLIC BLOOD PRESSURE < 80 MM HG: ICD-10-PCS | Mod: CPTII,S$GLB,, | Performed by: NURSE PRACTITIONER

## 2019-04-05 PROCEDURE — 99214 OFFICE O/P EST MOD 30 MIN: CPT | Mod: S$GLB,,, | Performed by: NURSE PRACTITIONER

## 2019-04-05 PROCEDURE — 3078F DIAST BP <80 MM HG: CPT | Mod: CPTII,S$GLB,, | Performed by: NURSE PRACTITIONER

## 2019-04-05 PROCEDURE — 1100F PTFALLS ASSESS-DOCD GE2>/YR: CPT | Mod: CPTII,S$GLB,, | Performed by: NURSE PRACTITIONER

## 2019-04-05 PROCEDURE — 99214 PR OFFICE/OUTPT VISIT, EST, LEVL IV, 30-39 MIN: ICD-10-PCS | Mod: S$GLB,,, | Performed by: NURSE PRACTITIONER

## 2019-04-05 PROCEDURE — 1100F PR PT FALLS ASSESS DOC 2+ FALLS/FALL W/INJURY/YR: ICD-10-PCS | Mod: CPTII,S$GLB,, | Performed by: NURSE PRACTITIONER

## 2019-04-05 PROCEDURE — 3288F FALL RISK ASSESSMENT DOCD: CPT | Mod: CPTII,S$GLB,, | Performed by: NURSE PRACTITIONER

## 2019-04-05 PROCEDURE — 73030 XR SHOULDER COMPLETE 2 OR MORE VIEWS RIGHT: ICD-10-PCS | Mod: FY,RT,S$GLB, | Performed by: RADIOLOGY

## 2019-04-05 PROCEDURE — 3288F PR FALLS RISK ASSESSMENT DOCUMENTED: ICD-10-PCS | Mod: CPTII,S$GLB,, | Performed by: NURSE PRACTITIONER

## 2019-04-05 RX ORDER — ACETAMINOPHEN AND CODEINE PHOSPHATE 300; 30 MG/1; MG/1
1 TABLET ORAL EVERY 6 HOURS PRN
Qty: 12 TABLET | Refills: 0 | Status: SHIPPED | OUTPATIENT
Start: 2019-04-05 | End: 2019-04-15

## 2019-04-05 NOTE — PATIENT INSTRUCTIONS
FOLLOW-UP WITH ORTHO -[id number] SCHEDULE APPOINTMENT.    TYLENOL FOR PAIN, RESERVE PAIN MEDICATION UNLESS TYLENOL DOES NOT HELP    If you were prescribed a narcotic medication, do not drive or operate heavy equipment or machinery while taking these medications.  You must understand that you've received an Urgent Care treatment only and that you may be released before all your medical problems are known or treated. You, the patient, will arrange for follow up care as instructed.  If your condition worsens we recommend that you receive another evaluation at the emergency room immediately or contact your primary medical clinics after hours call service to discuss your concerns.  Please return here or go to the Emergency Department for any concerns or worsening of condition.      Understanding a Rotator Cuff Tendon Tear    The rotator cuff is a group of 4 muscles and their tendons in the shoulder. The muscles are located in the front, back, and top of the shoulder joint. They each have a strong band of tissue (tendon) that attaches to the top of the upper arm bone. This helps keep the arm bone firmly in place in the socket of the shoulder joint. The muscles and tendons of the rotator cuff also help the shoulder joint with certain movements. These include reaching the arm over the head and rotating the arm.  Any one of the rotator cuff tendons can fray or tear from causes such as injury and overuse. A tear may be partial, with some of the tendon still intact. Or it may be a complete tear, with the tendon fully torn. Both types can cause pain and weakness, and limit arm and shoulder movement. A rotator cuff tear often needs treatment to heal properly.  Causes of a rotator cuff tendon tear  Causes can include:  · Wear and tear of the tendons from aging or normal use over time  · Overuse of the tendons from sports or work activities, especially those that involve repeated overhead movements  · Injury to the tendons  from a fall or other accident  Symptoms of a rotator cuff tendon tear  Some people with a rotator cuff tendon tear have few or no symptoms. Others may have symptoms that range from mild to severe. Possible symptoms include:  · Pain in your shoulder, which may be worse with overhead movements or at night from lying on the affected side  · Weakness in your arm and shoulder  · Trouble lifting your arm up or rotating your arm  · Clicking or crackling sounds when moving or using your arm and shoulder  Treating a rotator cuff tendon tear  Treatment for a rotator cuff tendon tear depends on several factors. These include the severity of the tear and your symptoms. Options may include:  · Resting your arm and shoulder. This involves limiting certain movements, such as reaching above your head or lifting your arm up. These can slow healing and worsen symptoms. You may also need to avoid certain sports and types of work for a time.  · Cold packs or heat packs. These help reduce pain and swelling.  · Prescription or over-the-counter pain medicines. These help reduce pain and swelling.  · Injections of medicine into your shoulder. These help relieve pain and swelling for a time.  · Physical therapy and exercises.  These help improve strength, flexibility, and range of motion in your arm and shoulder.   · Surgery. You may need surgery if your tendon is completely torn or if other treatments dont relieve your symptoms. Different options are available. In many cases, the damaged tendon is repaired. It is then reattached to your arm bone.  Possible complications  · If a partial tear isnt given time to heal, it may get larger or tear completely. You may then need more treatment.  · Even with treatment, a partial or complete tear may sometimes have trouble healing. The problem may become long-term (chronic). This can cause ongoing pain, weakness, and limited movement of your arm and shoulder.     When to call your healthcare  provider  Call your healthcare provider right away if you have any of these:  · Fever of 100.4°F (38°C) or higher, or as directed  · Symptoms that dont get better with treatment, or get worse  · After surgery, symptoms at the incision sites such as redness, warmth, swelling, bleeding, or drainage  · New symptoms   Date Last Reviewed: 3/10/2016  © 2131-4453 [employer]. [address] [place] [address], [place] [address]. All rights reserved. This information is not intended as a substitute for professional medical care. Always follow your healthcare professional's instructions.

## 2019-04-05 NOTE — PROGRESS NOTES
"Subjective:       Patient ID: Tere Velasco is a 79 y.o. female.    Vitals:  height is 5' 6" (1.676 m) and weight is 68 kg (150 lb). Her oral temperature is 100.1 °F (37.8 °C). Her blood pressure is 150/76 (abnormal) and her pulse is 84. Her respiration is 19 and oxygen saturation is 98%.     Chief Complaint: Fall      79-year-old female presents today with complaints of right shoulder and scapular pain post fall.  She states she fell into a side table.  Denies any hitting head or LOC.  Has tried Voltaren gel and Tylenol without any relief of symptoms.    Fall   The accident occurred 3 to 5 days ago. The fall occurred while walking. She landed on grass (side table). There was no blood loss. The point of impact was the right shoulder and right hip. The pain is present in the right shoulder and right hip. The pain is at a severity of 8/10. The pain is severe. The symptoms are aggravated by standing, sitting and movement. Pertinent negatives include no abdominal pain, bowel incontinence, fever, headaches, hearing loss, hematuria, loss of consciousness, nausea, numbness, tingling, visual change or vomiting. She has tried heat and acetaminophen (voltaren Gel) for the symptoms. The treatment provided mild relief.       Constitution: Negative for fatigue and fever.   HENT: Negative for facial swelling and facial trauma.    Neck: Negative for neck stiffness.   Cardiovascular: Negative for chest trauma.   Eyes: Negative for eye trauma, double vision and blurred vision.   Gastrointestinal: Negative for abdominal trauma, abdominal pain, nausea, vomiting, rectal bleeding and bowel incontinence.   Genitourinary: Negative for hematuria, missed menses, genital trauma and pelvic pain.   Musculoskeletal: Negative for pain, trauma, joint swelling and abnormal ROM of joint.   Skin: Negative for color change, wound, abrasion, laceration and bruising.   Neurological: Negative for dizziness, history of vertigo, light-headedness, " coordination disturbances, headaches, altered mental status, loss of consciousness and numbness.   Hematologic/Lymphatic: Negative for history of bleeding disorder.   Psychiatric/Behavioral: Negative for altered mental status.       Objective:      Physical Exam   Constitutional: She is oriented to person, place, and time. She appears well-developed and well-nourished. She is cooperative.  Non-toxic appearance. She does not appear ill. No distress.   HENT:   Head: Normocephalic and atraumatic. Head is without abrasion, without contusion and without laceration.   Right Ear: Hearing, tympanic membrane, external ear and ear canal normal. No hemotympanum.   Left Ear: Hearing, tympanic membrane, external ear and ear canal normal. No hemotympanum.   Nose: Nose normal. No mucosal edema, rhinorrhea or nasal deformity. No epistaxis. Right sinus exhibits no maxillary sinus tenderness and no frontal sinus tenderness. Left sinus exhibits no maxillary sinus tenderness and no frontal sinus tenderness.   Mouth/Throat: Uvula is midline, oropharynx is clear and moist and mucous membranes are normal. No trismus in the jaw. Normal dentition. No uvula swelling. No posterior oropharyngeal erythema.   Eyes: Pupils are equal, round, and reactive to light. Conjunctivae, EOM and lids are normal. Right eye exhibits no discharge. Left eye exhibits no discharge. No scleral icterus.   Sclera clear bilat   Neck: Trachea normal, normal range of motion, full passive range of motion without pain and phonation normal. Neck supple. No spinous process tenderness and no muscular tenderness present. No neck rigidity. No tracheal deviation present.   Cardiovascular: Normal rate, regular rhythm, normal heart sounds, intact distal pulses and normal pulses.   Pulmonary/Chest: Effort normal and breath sounds normal. No respiratory distress.   Abdominal: Soft. Normal appearance and bowel sounds are normal. She exhibits no distension, no pulsatile midline mass  and no mass. There is no tenderness.   Musculoskeletal: She exhibits no edema or deformity.        Right shoulder: She exhibits decreased range of motion (DISCOMFORT WITH ABDUCTION), tenderness, bony tenderness (JUST OVER SCAPULA) and pain. She exhibits no swelling, no effusion, no crepitus, no deformity, no laceration, no spasm, normal pulse and normal strength.        Right elbow: Normal.       Right wrist: Normal.        Lumbar back: Normal.        Arms:  There is no swelling. NVIT/sensation intact.    Neurological: She is alert and oriented to person, place, and time. She has normal strength. No cranial nerve deficit or sensory deficit. She exhibits normal muscle tone. She displays no seizure activity. Coordination normal. GCS eye subscore is 4. GCS verbal subscore is 5. GCS motor subscore is 6.   Skin: Skin is warm, dry and intact. Capillary refill takes less than 2 seconds. Bruising noted. No abrasion, no burn, no ecchymosis and no laceration noted. She is not diaphoretic. No pallor.        Psychiatric: She has a normal mood and affect. Her speech is normal and behavior is normal. Judgment and thought content normal. Cognition and memory are normal.   Nursing note and vitals reviewed.      X-RAY OF RIGHT SHOULDER: Mild DJD.  The humeral head is slightly superiorly positioned in relationship to the glenoid fossa suggesting rotator cuff injury.  No fracture or dislocation.  No bone destruction identified  MRI would be helpful for further evaluation  Assessment:       1. Injury of right rotator cuff, initial encounter    2. Acute pain of right shoulder    3. Fall, initial encounter    4. Pain of right scapula        Plan:         Injury of right rotator cuff, initial encounter  -     SLING FOR HOME USE  -     Ambulatory referral to Orthopedics    Acute pain of right shoulder  -     Cancel: XR SHOULDER TRAUMA 3 VIEW RIGHT; Future; Expected date: 04/05/2019    Fall, initial encounter    Pain of right scapula  -      Cancel: XR SHOULDER TRAUMA 3 VIEW RIGHT; Future; Expected date: 04/05/2019    Other orders  -     acetaminophen-codeine 300-30mg (TYLENOL #3) 300-30 mg Tab; Take 1 tablet by mouth every 6 (six) hours as needed.  Dispense: 12 tablet; Refill: 0            Patient Instructions       FOLLOW-UP WITH ORTHO -[phone] SCHEDULE APPOINTMENT.    TYLENOL FOR PAIN, RESERVE PAIN MEDICATION UNLESS TYLENOL DOES NOT HELP    If you were prescribed a narcotic medication, do not drive or operate heavy equipment or machinery while taking these medications.  You must understand that you've received an Urgent Care treatment only and that you may be released before all your medical problems are known or treated. You, the patient, will arrange for follow up care as instructed.  If your condition worsens we recommend that you receive another evaluation at the emergency room immediately or contact your primary medical clinics after hours call service to discuss your concerns.  Please return here or go to the Emergency Department for any concerns or worsening of condition.      Understanding a Rotator Cuff Tendon Tear    The rotator cuff is a group of 4 muscles and their tendons in the shoulder. The muscles are located in the front, back, and top of the shoulder joint. They each have a strong band of tissue (tendon) that attaches to the top of the upper arm bone. This helps keep the arm bone firmly in place in the socket of the shoulder joint. The muscles and tendons of the rotator cuff also help the shoulder joint with certain movements. These include reaching the arm over the head and rotating the arm.  Any one of the rotator cuff tendons can fray or tear from causes such as injury and overuse. A tear may be partial, with some of the tendon still intact. Or it may be a complete tear, with the tendon fully torn. Both types can cause pain and weakness, and limit arm and shoulder movement. A rotator cuff tear often needs treatment to heal  properly.  Causes of a rotator cuff tendon tear  Causes can include:  · Wear and tear of the tendons from aging or normal use over time  · Overuse of the tendons from sports or work activities, especially those that involve repeated overhead movements  · Injury to the tendons from a fall or other accident  Symptoms of a rotator cuff tendon tear  Some people with a rotator cuff tendon tear have few or no symptoms. Others may have symptoms that range from mild to severe. Possible symptoms include:  · Pain in your shoulder, which may be worse with overhead movements or at night from lying on the affected side  · Weakness in your arm and shoulder  · Trouble lifting your arm up or rotating your arm  · Clicking or crackling sounds when moving or using your arm and shoulder  Treating a rotator cuff tendon tear  Treatment for a rotator cuff tendon tear depends on several factors. These include the severity of the tear and your symptoms. Options may include:  · Resting your arm and shoulder. This involves limiting certain movements, such as reaching above your head or lifting your arm up. These can slow healing and worsen symptoms. You may also need to avoid certain sports and types of work for a time.  · Cold packs or heat packs. These help reduce pain and swelling.  · Prescription or over-the-counter pain medicines. These help reduce pain and swelling.  · Injections of medicine into your shoulder. These help relieve pain and swelling for a time.  · Physical therapy and exercises.  These help improve strength, flexibility, and range of motion in your arm and shoulder.   · Surgery. You may need surgery if your tendon is completely torn or if other treatments dont relieve your symptoms. Different options are available. In many cases, the damaged tendon is repaired. It is then reattached to your arm bone.  Possible complications  · If a partial tear isnt given time to heal, it may get larger or tear completely. You may then  need more treatment.  · Even with treatment, a partial or complete tear may sometimes have trouble healing. The problem may become long-term (chronic). This can cause ongoing pain, weakness, and limited movement of your arm and shoulder.     When to call your healthcare provider  Call your healthcare provider right away if you have any of these:  · Fever of 100.4°F (38°C) or higher, or as directed  · Symptoms that dont get better with treatment, or get worse  · After surgery, symptoms at the incision sites such as redness, warmth, swelling, bleeding, or drainage  · New symptoms   Date Last Reviewed: 3/10/2016  © 4064-0971 [employer]. [address] [place] [address], [place] [address]. All rights reserved. This information is not intended as a substitute for professional medical care. Always follow your healthcare professional's instructions.

## 2019-05-06 ENCOUNTER — OFFICE VISIT (OUTPATIENT)
Dept: OTOLARYNGOLOGY | Facility: CLINIC | Age: 79
End: 2019-05-06
Payer: MEDICARE

## 2019-05-06 VITALS
SYSTOLIC BLOOD PRESSURE: 128 MMHG | HEART RATE: 79 BPM | WEIGHT: 154.13 LBS | DIASTOLIC BLOOD PRESSURE: 78 MMHG | HEIGHT: 66 IN | BODY MASS INDEX: 24.77 KG/M2

## 2019-05-06 DIAGNOSIS — R19.6 HALITOSIS: Primary | ICD-10-CM

## 2019-05-06 PROCEDURE — 1101F PT FALLS ASSESS-DOCD LE1/YR: CPT | Mod: HCNC,CPTII,S$GLB, | Performed by: OTOLARYNGOLOGY

## 2019-05-06 PROCEDURE — 99999 PR PBB SHADOW E&M-EST. PATIENT-LVL III: CPT | Mod: PBBFAC,HCNC,, | Performed by: OTOLARYNGOLOGY

## 2019-05-06 PROCEDURE — 99203 PR OFFICE/OUTPT VISIT, NEW, LEVL III, 30-44 MIN: ICD-10-PCS | Mod: HCNC,S$GLB,, | Performed by: OTOLARYNGOLOGY

## 2019-05-06 PROCEDURE — 1101F PR PT FALLS ASSESS DOC 0-1 FALLS W/OUT INJ PAST YR: ICD-10-PCS | Mod: HCNC,CPTII,S$GLB, | Performed by: OTOLARYNGOLOGY

## 2019-05-06 PROCEDURE — 3078F PR MOST RECENT DIASTOLIC BLOOD PRESSURE < 80 MM HG: ICD-10-PCS | Mod: HCNC,CPTII,S$GLB, | Performed by: OTOLARYNGOLOGY

## 2019-05-06 PROCEDURE — 99999 PR PBB SHADOW E&M-EST. PATIENT-LVL III: ICD-10-PCS | Mod: PBBFAC,HCNC,, | Performed by: OTOLARYNGOLOGY

## 2019-05-06 PROCEDURE — 3074F SYST BP LT 130 MM HG: CPT | Mod: HCNC,CPTII,S$GLB, | Performed by: OTOLARYNGOLOGY

## 2019-05-06 PROCEDURE — 99203 OFFICE O/P NEW LOW 30 MIN: CPT | Mod: HCNC,S$GLB,, | Performed by: OTOLARYNGOLOGY

## 2019-05-06 PROCEDURE — 3078F DIAST BP <80 MM HG: CPT | Mod: HCNC,CPTII,S$GLB, | Performed by: OTOLARYNGOLOGY

## 2019-05-06 PROCEDURE — 3074F PR MOST RECENT SYSTOLIC BLOOD PRESSURE < 130 MM HG: ICD-10-PCS | Mod: HCNC,CPTII,S$GLB, | Performed by: OTOLARYNGOLOGY

## 2019-05-06 NOTE — LETTER
May 6, 2019      Hipolito Bustillos PA-C  [address]  [facility] [address]           [facility] Otorhinol[facility]  [address]  [facility] [address]  Phone: [phone]  Fax: [phone]          Patient: Tere Velasco   MR Number: [id number]   YOB: 1940   Date of Visit: 5/6/2019       Dear Hipolito Bustillos:    Thank you for referring Tere Velasco to me for evaluation. Attached you will find relevant portions of my assessment and plan of care.    If you have questions, please do not hesitate to call me. I look forward to following Tere Velasco along with you.    Sincerely,    [place] LORRAINE Pizano III, MD    Enclosure  CC:  No Recipients    If you would like to receive this communication electronically, please contact [email or web address] or [phone] to request more information on [employer] [facility] access.    For providers and/or their staff who would like to refer a patient to [facility], please contact us through our one-stop-shop provider referral line, [facility] [place], at [phone].    If you feel you have received this communication in error or would no longer like to receive these types of communications, please e-mail [email or web address]

## 2019-07-09 DIAGNOSIS — I10 ESSENTIAL HYPERTENSION: ICD-10-CM

## 2019-07-09 DIAGNOSIS — N18.31 CKD STAGE G3A/A1, GFR 45-59 AND ALBUMIN CREATININE RATIO <30 MG/G: ICD-10-CM

## 2019-07-09 RX ORDER — HYDROCHLOROTHIAZIDE 12.5 MG/1
CAPSULE ORAL
Qty: 90 CAPSULE | Refills: 3 | Status: SHIPPED | OUTPATIENT
Start: 2019-07-09 | End: 2020-04-09 | Stop reason: SDUPTHER

## 2019-08-14 ENCOUNTER — TELEPHONE (OUTPATIENT)
Dept: NEPHROLOGY | Facility: CLINIC | Age: 79
End: 2019-08-14

## 2019-08-14 NOTE — TELEPHONE ENCOUNTER
----- Message from Darci Ni MD sent at 8/13/2019  5:12 PM CDT -----  Contact: Pt. [phone]  Have her scheduled for January 2020 please with new set of labs (Renal function panel, U/A, urine protein/creat ratio, PTH)    No rush to see this patient.   Thank you.    ----- Message -----  From: Hui Ibarra LPN  Sent: 8/13/2019   1:25 PM  To: Darci Ni MD    When would you like to fit pt in due to no appts are available thru Dec   ----- Message -----  From: Hannah Paris  Sent: 8/13/2019   1:11 PM  To: Nnamdi Bejarano Staff    Needs Advice    Reason for call: The patient would like to speak to someone regarding to someone regarding scheduling her recall appointment. Please contact the patient to discuss further.          Communication Preference: PHONE     Additional Information:      Informed pt of above message per Dr. pina

## 2019-09-05 ENCOUNTER — TELEPHONE (OUTPATIENT)
Dept: INTERNAL MEDICINE | Facility: CLINIC | Age: 79
End: 2019-09-05

## 2019-09-05 NOTE — TELEPHONE ENCOUNTER
----- Message from Ioana De Guzman sent at 9/5/2019  4:06 PM CDT -----  Contact: self [phone]  Type: Returning a call    Who left a message? Mariza    When did the practice call? today    Comments: please return her call

## 2019-09-09 ENCOUNTER — OFFICE VISIT (OUTPATIENT)
Dept: INTERNAL MEDICINE | Facility: CLINIC | Age: 79
End: 2019-09-09
Payer: MEDICARE

## 2019-09-09 VITALS
RESPIRATION RATE: 16 BRPM | TEMPERATURE: 98 F | HEIGHT: 66 IN | BODY MASS INDEX: 23.63 KG/M2 | SYSTOLIC BLOOD PRESSURE: 118 MMHG | HEART RATE: 80 BPM | WEIGHT: 147 LBS | DIASTOLIC BLOOD PRESSURE: 80 MMHG

## 2019-09-09 DIAGNOSIS — G47.00 INSOMNIA, UNSPECIFIED TYPE: Primary | ICD-10-CM

## 2019-09-09 PROCEDURE — 3079F PR MOST RECENT DIASTOLIC BLOOD PRESSURE 80-89 MM HG: ICD-10-PCS | Mod: HCNC,CPTII,S$GLB, | Performed by: INTERNAL MEDICINE

## 2019-09-09 PROCEDURE — 99213 OFFICE O/P EST LOW 20 MIN: CPT | Mod: HCNC,S$GLB,, | Performed by: INTERNAL MEDICINE

## 2019-09-09 PROCEDURE — 3074F SYST BP LT 130 MM HG: CPT | Mod: HCNC,CPTII,S$GLB, | Performed by: INTERNAL MEDICINE

## 2019-09-09 PROCEDURE — 1101F PR PT FALLS ASSESS DOC 0-1 FALLS W/OUT INJ PAST YR: ICD-10-PCS | Mod: HCNC,CPTII,S$GLB, | Performed by: INTERNAL MEDICINE

## 2019-09-09 PROCEDURE — 99999 PR PBB SHADOW E&M-EST. PATIENT-LVL III: ICD-10-PCS | Mod: PBBFAC,HCNC,, | Performed by: INTERNAL MEDICINE

## 2019-09-09 PROCEDURE — 99999 PR PBB SHADOW E&M-EST. PATIENT-LVL III: CPT | Mod: PBBFAC,HCNC,, | Performed by: INTERNAL MEDICINE

## 2019-09-09 PROCEDURE — 3079F DIAST BP 80-89 MM HG: CPT | Mod: HCNC,CPTII,S$GLB, | Performed by: INTERNAL MEDICINE

## 2019-09-09 PROCEDURE — 99213 PR OFFICE/OUTPT VISIT, EST, LEVL III, 20-29 MIN: ICD-10-PCS | Mod: HCNC,S$GLB,, | Performed by: INTERNAL MEDICINE

## 2019-09-09 PROCEDURE — 1101F PT FALLS ASSESS-DOCD LE1/YR: CPT | Mod: HCNC,CPTII,S$GLB, | Performed by: INTERNAL MEDICINE

## 2019-09-09 PROCEDURE — 3074F PR MOST RECENT SYSTOLIC BLOOD PRESSURE < 130 MM HG: ICD-10-PCS | Mod: HCNC,CPTII,S$GLB, | Performed by: INTERNAL MEDICINE

## 2019-09-09 RX ORDER — ESZOPICLONE 1 MG/1
1 TABLET, FILM COATED ORAL NIGHTLY
Qty: 30 TABLET | Refills: 3 | Status: SHIPPED | OUTPATIENT
Start: 2019-09-09 | End: 2020-01-10

## 2019-09-09 NOTE — PROGRESS NOTES
Subjective:       Patient ID: Tere Velasco is a 79 y.o. female.    Chief Complaint: Medication Refill (last seen 2/2019 dr phipps.  sleep meds needs refills.  lunesta, given before but i don't see. )    HPI   Pt here requesting refills of her Lunesta which has been controlling her symptoms without any SE's.    Review of Systems   Constitutional: Negative for activity change, appetite change, chills, diaphoresis, fatigue, fever and unexpected weight change.   HENT: Negative for postnasal drip, rhinorrhea, sinus pressure, sneezing, sore throat, trouble swallowing and voice change.    Respiratory: Negative for cough, shortness of breath and wheezing.    Cardiovascular: Negative for chest pain, palpitations and leg swelling.   Gastrointestinal: Negative for abdominal pain, blood in stool, constipation, diarrhea, nausea and vomiting.   Genitourinary: Negative for dysuria.   Musculoskeletal: Negative for arthralgias and myalgias.   Skin: Negative for rash and wound.   Allergic/Immunologic: Negative for environmental allergies and food allergies.   Hematological: Negative for adenopathy. Does not bruise/bleed easily.   Psychiatric/Behavioral: Positive for sleep disturbance. Negative for self-injury and suicidal ideas.       Objective:      Physical Exam   Constitutional: She is oriented to person, place, and time. She appears well-developed and well-nourished. No distress.   HENT:   Head: Normocephalic and atraumatic.   Eyes: Pupils are equal, round, and reactive to light. Conjunctivae and EOM are normal. Right eye exhibits no discharge. Left eye exhibits no discharge. No scleral icterus.   Neck: Neck supple. No JVD present.   Cardiovascular: Normal rate, regular rhythm, normal heart sounds and intact distal pulses.   Pulmonary/Chest: Effort normal and breath sounds normal. No respiratory distress. She has no wheezes. She has no rales.   Musculoskeletal: She exhibits no edema.   Lymphadenopathy:     She has no  cervical adenopathy.   Neurological: She is alert and oriented to person, place, and time.   Skin: Skin is warm and dry. No rash noted. She is not diaphoretic. No pallor.       Assessment:       1. Insomnia, unspecified type        Plan:    1. Stable, refill Lunesta 1 mg qHS

## 2019-12-26 DIAGNOSIS — I15.0 RENOVASCULAR HYPERTENSION: ICD-10-CM

## 2019-12-26 DIAGNOSIS — M79.7 FIBROMYALGIA: ICD-10-CM

## 2019-12-26 RX ORDER — DULOXETIN HYDROCHLORIDE 20 MG/1
CAPSULE, DELAYED RELEASE ORAL
Qty: 180 CAPSULE | Refills: 1 | Status: SHIPPED | OUTPATIENT
Start: 2019-12-26 | End: 2020-07-09

## 2019-12-26 RX ORDER — LOSARTAN POTASSIUM 50 MG/1
TABLET ORAL
Qty: 90 TABLET | Refills: 1 | Status: SHIPPED | OUTPATIENT
Start: 2019-12-26 | End: 2020-04-09 | Stop reason: SDUPTHER

## 2019-12-26 NOTE — PROGRESS NOTES
Refill Routing Note     Medication(s) are not appropriate for processing by [facility]:    Non Participating Provider in Refill Center     Appointments  past 12m or future 3m with PCP    Date Provider   Last Visit   5/11/2018 Moshe Sandra MD   Next Visit   Visit date not found Moshe Sandra MD           Automatic Epic Protocol Generated Data:    Requested Prescriptions   Pending Prescriptions Disp Refills    losartan (COZAAR) 50 MG tablet [Pharmacy Med Name: LOSARTAN POTASSIUM 50MG TABS] 90 tablet 3     Sig: TAKE ONE TABLET BY MOUTH EVERY DAY BEFORE BREAKFAST       Cardiovascular:  Angiotensin Receptor Blockers Passed - 12/26/2019  1:39 PM        Passed - Patient is at least 18 years old        Passed - Last BP in normal range within 360 days     BP Readings from Last 3 Encounters:   09/09/19 118/80   05/06/19 128/78   04/05/19 (!) 150/76              Passed - Office visit in past 12 months or future 90 days     Recent Outpatient Visits            3 months ago Insomnia, unspecified type    [facility]                                                                                                                                                      Moshe Calderon DO    7 months ago Halitosis    [facility] [place] LORRAINE Pizano III, MD    8 months ago Injury of right rotator cuff, initial encounter    [facility] Annalise Knott NP    9 months ago Irritable bowel syndrome with diarrhea    [facility] Gastroenter[facility] Hipolito Bustillos PA-C    10 months ago Encounter for medical examination to establish care    [facility]                                                                                                                                                      Rosy Santos MD          Future Appointments              In 1 week MD [facility] - Nephrology, [facility]                 Passed - Cr is 1.4 or below and within 360 days     Creatinine   Date Value Ref Range Status   02/21/2019 1.2 0.5 - 1.4 mg/dL Final   11/08/2017 1.0 0.5 - 1.4 mg/dL Final   09/07/2017 1.0 0.5 - 1.4 mg/dL Final              Passed - K in normal range and within 360 days     Potassium   Date Value Ref Range Status   02/21/2019 3.7 3.5 - 5.1 mmol/L Final   11/08/2017 3.7 3.5 - 5.1 mmol/L Final   09/07/2017 4.0 3.5 - 5.1 mmol/L Final              Passed - eGFR within 360 days     eGFR if non    Date Value Ref Range Status   02/21/2019 42.5 (A) >60 mL/min/1.73 m^2 Final     Comment:     Calculation used to obtain the estimated glomerular filtration  rate (eGFR) is the CKD-EPI equation.      11/08/2017 54 (A) >60 mL/min/1.73 m^2 Final     Comment:     Calculation used to obtain the estimated glomerular filtration  rate (eGFR) is the CKD-EPI equation.      09/07/2017 53.7 (A) >60 mL/min/1.73 m^2 Final     Comment:     Calculation used to obtain the estimated glomerular filtration  rate (eGFR) is the CKD-EPI equation. Since race is unknown   in our information system, the eGFR values for   -American and Non--American patients are given   for each creatinine result.       eGFR if    Date Value Ref Range Status   02/21/2019 49.0 (A) >60 mL/min/1.73 m^2 Final   11/08/2017 >60 >60 mL/min/1.73 m^2 Final   09/07/2017 >60.0 >60 mL/min/1.73 m^2 Final             DULoxetine (CYMBALTA) 20 MG capsule [Pharmacy Med Name: DULOXETINE HCL 20MG CPEP] 180 capsule 3     Sig: TAKE ONE CAPSULE BY MOUTH TWICE A DAY       Psychiatry: Antidepressants - SNRI Passed - 12/26/2019  1:39 PM        Passed - Patient is at least 18 years old        Passed - Last BP in normal range within 360 days     BP Readings from Last 3 Encounters:   09/09/19 118/80   05/06/19 128/78   04/05/19 (!) 150/76              Passed - Office visit in past 6 months or future 90 days     Recent Outpatient Visits             3 months ago Insomnia, unspecified type    [facility]                                                                                                                                                      Moshe Calderon DO    7 months ago Halitosis    [facility]hinol[facility]ology [place] LORRAINE Pizano III, MD    8 months ago Injury of right rotator cuff, initial encounter    [facility] [place] Annalsie Knott NP    9 months ago Irritable bowel syndrome with diarrhea    [facility] Hipolito Bustillos PA-C    10 months ago Encounter for medical examination to establish care    [facility]                                                                                                                                                      Rosy Santos MD          Future Appointments              In 1 week MD [facility] - Nephrology, [facility]                Passed - Cr is 1.4 or below and within 360 days     Creatinine   Date Value Ref Range Status   02/21/2019 1.2 0.5 - 1.4 mg/dL Final   11/08/2017 1.0 0.5 - 1.4 mg/dL Final   09/07/2017 1.0 0.5 - 1.4 mg/dL Final              Passed - eGFR within 360 days     eGFR if non    Date Value Ref Range Status   02/21/2019 42.5 (A) >60 mL/min/1.73 m^2 Final     Comment:     Calculation used to obtain the estimated glomerular filtration  rate (eGFR) is the CKD-EPI equation.      11/08/2017 54 (A) >60 mL/min/1.73 m^2 Final     Comment:     Calculation used to obtain the estimated glomerular filtration  rate (eGFR) is the CKD-EPI equation.      09/07/2017 53.7 (A) >60 mL/min/1.73 m^2 Final     Comment:     Calculation used to obtain the estimated glomerular filtration  rate (eGFR) is the CKD-EPI equation. Since race is unknown   in our information system, the eGFR values for   -American and Non--American patients are given   for each creatinine result.        eGFR if    Date Value Ref Range Status   02/21/2019 49.0 (A) >60 mL/min/1.73 m^2 Final   11/08/2017 >60 >60 mL/min/1.73 m^2 Final   09/07/2017 >60.0 >60 mL/min/1.73 m^2 Final

## 2019-12-27 DIAGNOSIS — F41.9 ANXIETY: ICD-10-CM

## 2019-12-30 RX ORDER — BUPROPION HYDROCHLORIDE 150 MG/1
150 TABLET, EXTENDED RELEASE ORAL 2 TIMES DAILY
Qty: 180 TABLET | Refills: 3 | Status: SHIPPED | OUTPATIENT
Start: 2019-12-30 | End: 2021-04-01

## 2019-12-31 ENCOUNTER — TELEPHONE (OUTPATIENT)
Dept: NEPHROLOGY | Facility: CLINIC | Age: 79
End: 2019-12-31

## 2020-01-10 RX ORDER — ESZOPICLONE 1 MG/1
TABLET, FILM COATED ORAL
Qty: 30 TABLET | Refills: 3 | Status: SHIPPED | OUTPATIENT
Start: 2020-01-10 | End: 2020-02-28 | Stop reason: SDUPTHER

## 2020-01-30 ENCOUNTER — PES CALL (OUTPATIENT)
Dept: ADMINISTRATIVE | Facility: CLINIC | Age: 80
End: 2020-01-30

## 2020-02-22 DIAGNOSIS — G25.81 RESTLESS LEG SYNDROME: ICD-10-CM

## 2020-02-26 ENCOUNTER — TELEPHONE (OUTPATIENT)
Dept: INTERNAL MEDICINE | Facility: CLINIC | Age: 80
End: 2020-02-26

## 2020-02-27 ENCOUNTER — OFFICE VISIT (OUTPATIENT)
Dept: INTERNAL MEDICINE | Facility: CLINIC | Age: 80
End: 2020-02-27
Payer: MEDICARE

## 2020-02-27 VITALS
RESPIRATION RATE: 15 BRPM | BODY MASS INDEX: 24.24 KG/M2 | DIASTOLIC BLOOD PRESSURE: 71 MMHG | OXYGEN SATURATION: 99 % | HEART RATE: 68 BPM | SYSTOLIC BLOOD PRESSURE: 127 MMHG | WEIGHT: 150.81 LBS | HEIGHT: 66 IN

## 2020-02-27 DIAGNOSIS — R26.9 ABNORMALITY OF GAIT AND MOBILITY: ICD-10-CM

## 2020-02-27 DIAGNOSIS — F43.23 ADJUSTMENT DISORDER WITH MIXED ANXIETY AND DEPRESSED MOOD: ICD-10-CM

## 2020-02-27 DIAGNOSIS — M46.1 SACROILIITIS: ICD-10-CM

## 2020-02-27 DIAGNOSIS — Z12.31 ENCOUNTER FOR SCREENING MAMMOGRAM FOR MALIGNANT NEOPLASM OF BREAST: ICD-10-CM

## 2020-02-27 DIAGNOSIS — M15.9 PRIMARY OSTEOARTHRITIS INVOLVING MULTIPLE JOINTS: ICD-10-CM

## 2020-02-27 DIAGNOSIS — Z00.00 ENCOUNTER FOR PREVENTIVE HEALTH EXAMINATION: Primary | ICD-10-CM

## 2020-02-27 DIAGNOSIS — E55.9 VITAMIN D DEFICIENCY: ICD-10-CM

## 2020-02-27 DIAGNOSIS — D64.9 ANEMIA, UNSPECIFIED TYPE: ICD-10-CM

## 2020-02-27 DIAGNOSIS — M47.819 SPONDYLOSIS WITHOUT MYELOPATHY: ICD-10-CM

## 2020-02-27 DIAGNOSIS — R35.0 URINARY FREQUENCY: ICD-10-CM

## 2020-02-27 DIAGNOSIS — I10 ESSENTIAL HYPERTENSION: ICD-10-CM

## 2020-02-27 DIAGNOSIS — Z74.09 OTHER REDUCED MOBILITY: ICD-10-CM

## 2020-02-27 DIAGNOSIS — R53.83 FATIGUE, UNSPECIFIED TYPE: ICD-10-CM

## 2020-02-27 DIAGNOSIS — R63.5 WEIGHT GAIN: ICD-10-CM

## 2020-02-27 DIAGNOSIS — Z12.39 SCREENING FOR BREAST CANCER: ICD-10-CM

## 2020-02-27 DIAGNOSIS — Z63.6 CAREGIVER STRESS: ICD-10-CM

## 2020-02-27 DIAGNOSIS — R32 URINARY INCONTINENCE, UNSPECIFIED TYPE: ICD-10-CM

## 2020-02-27 DIAGNOSIS — M51.37 DEGENERATION OF LUMBAR OR LUMBOSACRAL INTERVERTEBRAL DISC: ICD-10-CM

## 2020-02-27 DIAGNOSIS — H35.3131 EARLY DRY STAGE NONEXUDATIVE AGE-RELATED MACULAR DEGENERATION OF BOTH EYES: ICD-10-CM

## 2020-02-27 DIAGNOSIS — M48.061 SPINAL STENOSIS, LUMBAR REGION, WITHOUT NEUROGENIC CLAUDICATION: ICD-10-CM

## 2020-02-27 DIAGNOSIS — Z13.31 POSITIVE DEPRESSION SCREENING: ICD-10-CM

## 2020-02-27 DIAGNOSIS — M79.7 FIBROMYALGIA: ICD-10-CM

## 2020-02-27 DIAGNOSIS — G25.81 RLS (RESTLESS LEGS SYNDROME): ICD-10-CM

## 2020-02-27 DIAGNOSIS — M43.10 ACQUIRED SPONDYLOLISTHESIS: ICD-10-CM

## 2020-02-27 DIAGNOSIS — F41.9 ANXIETY: ICD-10-CM

## 2020-02-27 DIAGNOSIS — F33.2 SEVERE EPISODE OF RECURRENT MAJOR DEPRESSIVE DISORDER, WITHOUT PSYCHOTIC FEATURES: ICD-10-CM

## 2020-02-27 DIAGNOSIS — N18.31 CKD STAGE G3A/A1, GFR 45-59 AND ALBUMIN CREATININE RATIO <30 MG/G: ICD-10-CM

## 2020-02-27 DIAGNOSIS — E78.5 HYPERLIPIDEMIA, UNSPECIFIED HYPERLIPIDEMIA TYPE: ICD-10-CM

## 2020-02-27 DIAGNOSIS — E21.3 HYPERPARATHYROIDISM: ICD-10-CM

## 2020-02-27 DIAGNOSIS — G47.33 OSA ON CPAP: ICD-10-CM

## 2020-02-27 PROCEDURE — 99499 RISK ADDL DX/OHS AUDIT: ICD-10-PCS | Mod: HCNC,S$GLB,, | Performed by: NURSE PRACTITIONER

## 2020-02-27 PROCEDURE — G0439 PPPS, SUBSEQ VISIT: HCPCS | Mod: HCNC,S$GLB,, | Performed by: NURSE PRACTITIONER

## 2020-02-27 PROCEDURE — 3074F SYST BP LT 130 MM HG: CPT | Mod: HCNC,CPTII,S$GLB, | Performed by: NURSE PRACTITIONER

## 2020-02-27 PROCEDURE — 99999 PR PBB SHADOW E&M-EST. PATIENT-LVL V: CPT | Mod: PBBFAC,HCNC,, | Performed by: NURSE PRACTITIONER

## 2020-02-27 PROCEDURE — 99499 UNLISTED E&M SERVICE: CPT | Mod: HCNC,S$GLB,, | Performed by: NURSE PRACTITIONER

## 2020-02-27 PROCEDURE — 3074F PR MOST RECENT SYSTOLIC BLOOD PRESSURE < 130 MM HG: ICD-10-PCS | Mod: HCNC,CPTII,S$GLB, | Performed by: NURSE PRACTITIONER

## 2020-02-27 PROCEDURE — G0439 PR MEDICARE ANNUAL WELLNESS SUBSEQUENT VISIT: ICD-10-PCS | Mod: HCNC,S$GLB,, | Performed by: NURSE PRACTITIONER

## 2020-02-27 PROCEDURE — 3078F PR MOST RECENT DIASTOLIC BLOOD PRESSURE < 80 MM HG: ICD-10-PCS | Mod: HCNC,CPTII,S$GLB, | Performed by: NURSE PRACTITIONER

## 2020-02-27 PROCEDURE — 99999 PR PBB SHADOW E&M-EST. PATIENT-LVL V: ICD-10-PCS | Mod: PBBFAC,HCNC,, | Performed by: NURSE PRACTITIONER

## 2020-02-27 PROCEDURE — 3078F DIAST BP <80 MM HG: CPT | Mod: HCNC,CPTII,S$GLB, | Performed by: NURSE PRACTITIONER

## 2020-02-27 SDOH — SOCIAL DETERMINANTS OF HEALTH (SDOH): DEPENDENT RELATIVE NEEDING CARE AT HOME: Z63.6

## 2020-02-27 NOTE — PATIENT INSTRUCTIONS
Counseling and Referral of Other Preventative  (Italic type indicates deductible and co-insurance are waived)    Patient Name: Tere Velasco  Today's Date: 2/27/2020    Health Maintenance       Date Due Completion Date    Lipid Panel Scheduled   2/21/2019    Shingles Vaccine (1 of 2) cdc handout given   ---    TETANUS VACCINE    mammogram     Bone density scan    Annual eye - due-external eye clinic       09/04/2023 2/21/2020-due now    2/21/2021 9/4/2013 2/21/2019        Orders Placed This Encounter   Procedures    CBC auto differential    Comprehensive metabolic panel    Lipid panel    TSH    Urinalysis    Urinalysis Microscopic     The following information is provided to all patients.  This information is to help you find resources for any of the problems found today that may be affecting your health:                Living healthy guide: www.[facility].[place].gov      Understanding Diabetes: www.diabetes.org      Eating healthy: www.cdc.gov/healthyweight      [facility] home safety checklist: www.cdc.gov/steadi/patient.html      Agency on Aging: www.goea.[place].[facility]      Alcoholics anonymous (AA): www.aa.org      Physical Activity: www.wes.nih.gov/pk9ipti      Tobacco use: www.quitwithusla.org

## 2020-02-27 NOTE — PROGRESS NOTES
"Tere Velasco presented for a  Medicare AWV and comprehensive Health Risk Assessment today. The following components were reviewed and updated:    · Medical history  · Family History  · Social history  · Allergies and Current Medications  · Health Risk Assessment  · Health Maintenance  · Care Team     ** See Completed Assessments for Annual Wellness Visit within the encounter summary.**       The following assessments were completed:  · Living Situation  · CAGE  · Depression Screening  · Timed Get Up and Go  · Whisper Test  · Cognitive Function Screening  · Nutrition Screening  · ADL Screening  · PAQ Screening    Vitals:    02/27/20 1404   BP: 127/71   BP Location: Right arm   Patient Position: Sitting   BP Method: Medium (Manual)   Pulse: 68   Resp: 15   SpO2: 99%   Weight: 68.4 kg (150 lb 12.7 oz)   Height: 5' 6" (1.676 m)     Body mass index is 24.34 kg/m².  Physical Exam   Constitutional: She is oriented to person, place, and time. She appears well-developed and well-nourished.   HENT:   Head: Normocephalic.   Right Ear: External ear normal.   Left Ear: External ear normal.   Mouth/Throat: No oropharyngeal exudate.   Cardiovascular: Normal rate, regular rhythm and normal heart sounds.   No murmur heard.  Pulmonary/Chest: Effort normal and breath sounds normal.   Abdominal: Soft. Bowel sounds are normal. She exhibits no distension.   Musculoskeletal: Normal range of motion. She exhibits no edema.   Neurological: She is alert and oriented to person, place, and time. She exhibits normal muscle tone.   Psychiatric: She has a normal mood and affect. Her behavior is normal. Judgment and thought content normal.   Positive depression screening   Nursing note and vitals reviewed.        Lab Results   Component Value Date    HGBA1C 5.5 08/21/2014    CHOL 192 02/21/2019    HDL 73 02/21/2019    LDLCALC 102.2 02/21/2019    TRIG 84 02/21/2019    CHOLHDL 38.0 02/21/2019      Results for orders placed in visit on 02/21/19   DXA " Bone Density Spine And Hip    Narrative EXAMINATION:  DEXA BONE DENSITY SPINE HIP    CLINICAL HISTORY:  Vitamin D deficiency, [id number] y/o female with no history of fractures.  She had a hysterectomy at 35 y/o and menopausal symptoms at 37 y/o.  Pt's Mother had a hip fracture.  Pt is taking 2,000 units of Vit D supplements.  She does not exercise or smoke.    TECHNIQUE:  DXA specification: Snaptrip [id number].    Bone Mineral Density scanning was performed over the hip and lumbar spine. Review of the images confirms satisfactory positioning and technique.    COMPARISON:  Comparison study done on 06/08/2015. Lumbar spine BMD 1.369 g/cm2 and the T-score 2.9.  The Total Hip BMD 1.069 g/cm2 and the T-score 1.0.    FINDINGS:  Lumbar Spine: Lumbar bone mineral density L1-L4 is 1.441g/cm2, which is a T-score of 3.6. The Z-score is 6.3.    Total Hip: Total hip bone mineral density is 1.046g/cm2.  The T-score is 0.9, and the Z-score is 3.0.    Femoral neck: Bone mineral density is 0.899g/cm2 and the T-score is 0.5 and the Z-score is 2.8 g/cm2.    There is a 12% risk of a major osteoporotic fracture and a 4.1% risk of hip fracture in the next 10 years (FRAX).    Unable to compare to prior study done at a different location.      Impression Elevated BMD of the lumbar spine and hip.  FRAX calculations suggest increased risk of fracture.    RECOMMENDATIONS of [facility]:    1.  Calcium 6815-8876 mg daily and vitamin D 800-1000 units daily, adequate exercise.    2.  Consider addressing all modifiable risks of fracture in this patient whose FRAX calculations suggest an increased risk of fracture in the next 10 years.    3.  Repeat BMD in 2 years    EXPLANATION OF RESULTS:    The T-score compares these results to the average bone density of a 20 year-old of the same gender. The Z-score compares this result to the average bone density to people of the same age, gender, and  race. The amounts indicate the number of standard deviations above or below the mean.    * Osteoporosis is generally defined as having a T-score between less than or equal to -2.5.    * Osteopenia is generally defined as having a T-score between -1.0 and -2.5.    * The normal range is generally defined as having a t-score greater than or equal to -1.0.    * Calculated FRAX scores for fracture risk prediction may not be accurate in the setting of certain clinical factors such as pharmacologic therapy for osteoporosis, prior fragility fractures, high dose glucocorticoid use etc.      Electronically signed by: Latoya Jerez MD  Date:    02/27/2019  Time:    12:45     Results for orders placed during the hospital encounter of 06/24/16   Mammo Digital Screening Bilat with CAD    Narrative The present examination has been compared to prior imaging studies.    BILATERAL MAMMOGRAM FINDINGS:  The breasts are almost entirely fat.    No significant abnormalities are seen.  There are no significant changes from  the prior study.    These images  were processed to produce digital images analyzed for potential  abnormalities.      Impression There is no mammographic evidence of malignancy.    Mammogram in 1 year is recommended.    ACR BI-RADS Category 1: Negative      LUCILA FLETCHER, ROSMERY  06/24/2016     Diagnoses and health risks identified today and associated recommendations/orders:    1. RLS (restless legs syndrome)  Stable-followed by PCP    2. TRU on CPAP  Stable-followed by PCP    3. Essential hypertension  Stable-followed by PCP  - Urinalysis; Future  - Urinalysis Microscopic; Future    4. Anxiety  Stable-followed by PCP    5. Severe episode of recurrent major depressive disorder, without psychotic features  Stable-followed by PCP    6. Fibromyalgia  Stable-followed by PCP    7. Spinal stenosis, lumbar region, without neurogenic claudication  Stable-followed by PCP    8. Acquired  spondylolisthesis  Stable-followed by PCP    9. Degeneration of lumbar or lumbosacral intervertebral disc  Stable-followed by PCP    10. Spondylosis without myelopathy  Stable-followed by PCP    11. Hyperlipidemia, unspecified hyperlipidemia type  Stable-followed by PCP  - Lipid panel; Future    12. Primary osteoarthritis involving multiple joints  Stable-followed by PCP    13. CKD stage G3a/A1, GFR 45-59 and albumin creatinine ratio <30 mg/g  Stable-followed by PCP, nephrology  - Comprehensive metabolic panel; Future  - Urinalysis; Future  - Urinalysis Microscopic; Future    14. Hyperparathyroidism  Stable-followed by PCP, nephrology    15. Vitamin D deficiency  Stable-followed by PCP    16. Early dry stage nonexudative age-related macular degeneration of both eyes  Stable-followed by ophth    17. Adjustment disorder with mixed anxiety and depressed mood  Stable-followed by PCP    18. Sacroiliitis  Stable-followed by PCP    19. Fatigue, unspecified type  Stable-followed by PCP  - CBC auto differential; Future  - Comprehensive metabolic panel; Future  - TSH; Future    20. Anemia, unspecified type  Stable-followed by PCP  - CBC auto differential; Future    21. Weight gain  Stable-followed by PCP  - TSH; Future    22. Urinary frequency  Stable-followed by PCP  - Urinalysis; Future  - Urinalysis Microscopic; Future    23. Urinary incontinence, unspecified type  Stable-followed by PCP  - Urinalysis; Future  - Urinalysis Microscopic; Future    24. Positive depression screening  Stable-followed by PCP    25. Abnormality of gait and mobility  Stable-followed by PCP    26. Other reduced mobility  Stable-followed by PCP    27. Encounter for preventive health examination  Here for Health Risk Assessment/Annual Wellness Visit.  Health maintenance reviewed and updated.     28. Screening for breast cancer  Stable-followed by PCP  - Mammo Digital Screening Bilat w/ Jose; Future    29. Encounter for screening mammogram for  malignant neoplasm of breast   Stable-followed by PCP  - Mammo Digital Screening Bilat w/ Jose; Future    30. Caregiver stress  Stable-followed by PCP      Provided Tere with a 5-10 year written screening schedule and personal prevention plan. Recommendations were developed using the USPSTF age appropriate recommendations. Education, counseling, and referrals were provided as needed. After Visit Summary printed and given to patient which includes a list of additional screenings\tests needed. Acknowledges both family stress & care giver stress-  with alzheimers in group home- + depression screening- cries easily during interview- denies suicide & homicide thoughts. Referred to PCP or another professional for further evaluation- she had been followed by counselor & will decide on what plan would help her the most. Encouraged to start exercising routinely, walking etc. Fall prevention. Annual PCP & lab appts scheduled w a PCP. Follow by external eye clinic.     Follow up in about 1 year (around 2/27/2021) for HRA.    Princess Carroll NP I offered to discuss end of life issues, including information on how to make advance directives that the patient could use to name someone who would make medical decisions on their behalf if they became too ill to make themselves.    ___Patient declined  _X_Patient is interested, I provided paper work and offered to discuss.I offered to discuss end of life issues, including information on how to make advance directives that the patient could use to name someone who would make medical decisions on their behalf if they became too ill to make themselves.

## 2020-02-28 ENCOUNTER — TELEPHONE (OUTPATIENT)
Dept: INTERNAL MEDICINE | Facility: CLINIC | Age: 80
End: 2020-02-28

## 2020-02-28 DIAGNOSIS — G47.00 INSOMNIA, UNSPECIFIED TYPE: Primary | ICD-10-CM

## 2020-02-28 RX ORDER — ESZOPICLONE 1 MG/1
1 TABLET, FILM COATED ORAL NIGHTLY
Qty: 30 TABLET | Refills: 0 | Status: SHIPPED | OUTPATIENT
Start: 2020-02-28 | End: 2020-04-15 | Stop reason: SDUPTHER

## 2020-02-28 RX ORDER — GABAPENTIN ENACARBIL 600 MG/1
TABLET, EXTENDED RELEASE ORAL
Qty: 90 TABLET | Refills: 0 | Status: SHIPPED | OUTPATIENT
Start: 2020-02-28 | End: 2020-04-09 | Stop reason: SDUPTHER

## 2020-02-28 NOTE — TELEPHONE ENCOUNTER
----- Message from Jory Ku sent at 2/28/2020  3:17 PM CST -----  Contact: [phone]  Type: Rx    Name of medication(s): eszopiclone (LUNESTA) 1 MG Tab    Is this a refill? New rx? refill    Who prescribed medication?  Dr. Santos    Pharmacy Name, Phone, & Location:  [facility] - [address]    Comments:   Patient stated she lost the medication and its to soon, but she only need a temporary supply.  Please advise, thank you.

## 2020-02-28 NOTE — TELEPHONE ENCOUNTER
----- Message from Eloise Gomez sent at 2/28/2020  3:01 PM CST -----  Contact: self    Requesting an RX refill or new RX.  Is this a refill or new RX:  Refill   RX name and strength: eszopiclone (LUNESTA) 1 MG Tab  Directions (copy/paste from chart):    Is this a 30 day or 90 day RX:    Local pharmacy or mail order pharmacy:  [facility] - [address] [phone] (Phone)  [phone] (Fax)  Pharmacy name and phone # (copy/paste from chart):     Comments:  Patient states she lost her medication

## 2020-02-28 NOTE — TELEPHONE ENCOUNTER
----- Message from Marni Almaguer sent at 2/28/2020  4:06 PM CST -----  Contact: [facility] - [phone] (Phone)  Needs documentation stating that its ol to fill 12 days early for eszopiclone (LUNESTA) 1 MG Tab    Please call and advise.    Thank You

## 2020-03-11 ENCOUNTER — HOSPITAL ENCOUNTER (OUTPATIENT)
Dept: RADIOLOGY | Facility: HOSPITAL | Age: 80
Discharge: HOME | End: 2020-03-11
Attending: NURSE PRACTITIONER
Payer: MEDICARE

## 2020-03-11 DIAGNOSIS — Z12.31 ENCOUNTER FOR SCREENING MAMMOGRAM FOR MALIGNANT NEOPLASM OF BREAST: ICD-10-CM

## 2020-03-11 DIAGNOSIS — Z12.39 SCREENING FOR BREAST CANCER: ICD-10-CM

## 2020-03-11 PROCEDURE — 77067 SCR MAMMO BI INCL CAD: CPT | Mod: 26,HCNC,, | Performed by: RADIOLOGY

## 2020-03-11 PROCEDURE — 77063 MAMMO DIGITAL SCREENING BILAT WITH TOMOSYNTHESIS_CAD: ICD-10-PCS | Mod: 26,HCNC,, | Performed by: RADIOLOGY

## 2020-03-11 PROCEDURE — 77067 SCR MAMMO BI INCL CAD: CPT | Mod: TC,HCNC,PO

## 2020-03-11 PROCEDURE — 77067 MAMMO DIGITAL SCREENING BILAT WITH TOMOSYNTHESIS_CAD: ICD-10-PCS | Mod: 26,HCNC,, | Performed by: RADIOLOGY

## 2020-03-11 PROCEDURE — 77063 BREAST TOMOSYNTHESIS BI: CPT | Mod: 26,HCNC,, | Performed by: RADIOLOGY

## 2020-04-01 ENCOUNTER — TELEPHONE (OUTPATIENT)
Dept: NEPHROLOGY | Facility: CLINIC | Age: 80
End: 2020-04-01

## 2020-04-02 ENCOUNTER — TELEPHONE (OUTPATIENT)
Dept: NEPHROLOGY | Facility: CLINIC | Age: 80
End: 2020-04-02

## 2020-04-09 ENCOUNTER — OFFICE VISIT (OUTPATIENT)
Dept: NEPHROLOGY | Facility: CLINIC | Age: 80
End: 2020-04-09
Payer: MEDICARE

## 2020-04-09 DIAGNOSIS — I10 ESSENTIAL HYPERTENSION: ICD-10-CM

## 2020-04-09 DIAGNOSIS — N18.31 CKD STAGE G3A/A1, GFR 45-59 AND ALBUMIN CREATININE RATIO <30 MG/G: Primary | ICD-10-CM

## 2020-04-09 DIAGNOSIS — I15.0 RENOVASCULAR HYPERTENSION: ICD-10-CM

## 2020-04-09 DIAGNOSIS — G25.81 RESTLESS LEG SYNDROME: ICD-10-CM

## 2020-04-09 DIAGNOSIS — E78.5 HYPERLIPIDEMIA, UNSPECIFIED HYPERLIPIDEMIA TYPE: ICD-10-CM

## 2020-04-09 PROCEDURE — 99443 PR PHYSICIAN TELEPHONE EVALUATION 21-30 MIN: CPT | Mod: 95,HCNC,S$GLB, | Performed by: INTERNAL MEDICINE

## 2020-04-09 PROCEDURE — 99999 PR PBB SHADOW E&M-EST. PATIENT-LVL II: CPT | Mod: PBBFAC,HCNC,GC, | Performed by: GENERAL PRACTICE

## 2020-04-09 PROCEDURE — 99999 PR PBB SHADOW E&M-EST. PATIENT-LVL II: ICD-10-PCS | Mod: PBBFAC,HCNC,GC, | Performed by: GENERAL PRACTICE

## 2020-04-09 PROCEDURE — 99443 PR PHYSICIAN TELEPHONE EVALUATION 21-30 MIN: ICD-10-PCS | Mod: 95,HCNC,S$GLB, | Performed by: INTERNAL MEDICINE

## 2020-04-09 RX ORDER — ASPIRIN 81 MG/1
81 TABLET ORAL NIGHTLY
Qty: 90 TABLET | Refills: 1 | Status: SHIPPED | OUTPATIENT
Start: 2020-04-09

## 2020-04-09 RX ORDER — HYDROCHLOROTHIAZIDE 12.5 MG/1
12.5 CAPSULE ORAL DAILY
Qty: 90 CAPSULE | Refills: 3 | Status: SHIPPED | OUTPATIENT
Start: 2020-04-09 | End: 2020-07-08

## 2020-04-09 RX ORDER — PRAVASTATIN SODIUM 40 MG/1
40 TABLET ORAL NIGHTLY
Qty: 90 TABLET | Refills: 1 | Status: SHIPPED | OUTPATIENT
Start: 2020-04-09 | End: 2021-05-24 | Stop reason: SDUPTHER

## 2020-04-09 RX ORDER — ACETAMINOPHEN 500 MG
1 TABLET ORAL DAILY
Qty: 90 CAPSULE | Refills: 1 | Status: SHIPPED | OUTPATIENT
Start: 2020-04-09 | End: 2020-07-08

## 2020-04-09 RX ORDER — LOSARTAN POTASSIUM 50 MG/1
50 TABLET ORAL DAILY
Qty: 90 TABLET | Refills: 1 | Status: SHIPPED | OUTPATIENT
Start: 2020-04-09 | End: 2021-04-01

## 2020-04-09 NOTE — PROGRESS NOTES
Nephrology Clinic Phone Call Progress Note (COVID Precautions)    Patient ID: Tere Velasco is a 80 y.o. White female who presents for follow-up visit for her Chronic Kidney Disease stage G3b/A1.    HPI:  Tere Velasco is a 80 y.o. White female with renovascular hypertension since 2005, TRU on CPAP, OA s/p bilateral knee replacements, DLD, and CKD stage 3 who presents today for follow-up. CKD is suspected to be 2/2 HTN and chronic NSAID use. No history of kidney stones, no family history of kidney disease. She also has a h/o IBS. She was  10/1/16.      Interval Hx 4/9/2020:  Refers voiding well since last visit, no leg swelling, no SOB, no recent falls.  Has had no flank pain or noticed any changes in urine color.  Referred has not been drinking as much water as before and is conscious she has to be drinking more water.   HTN: reports good control.    Social: Patient referred that his  passed away recently (was on a Nursing Home resident, and one of residents had tested + for COVID).  Her  was not tested for COVID but did have fever.  Patient interacted with  lst time on March 31, and  passed away on April 3rd.  She feels otherwise fine and has self quarantined for the next 2 weeks (has not had any fever, cough, SOB).        The patient denies taking NSAIDs or new antibiotics, recreational drugs, recent episode of dehydration, diarrhea, nausea or vomiting, acute illness, hospitalization or exposure to IV radiocontrast.    Past Medical History:   Diagnosis Date    Allergy     Anemia     Anxiety     Arthritis     Back pain     Breast disorder     Tumor in rt breast (benign)    Cataract     removed from both eyes    CKD (chronic kidney disease) stage 3, GFR 30-59 ml/min     Clotting disorder     when knee was replaced    Colitis     Degenerative disc disease     Depression     Fibromyalgia     GERD (gastroesophageal reflux disease)     Hyperlipidemia      Hypertension     Irritable bowel syndrome     TRU (obstructive sleep apnea)     RLS (restless legs syndrome)     S/P knee replacement 1/13/2014       Family History   Problem Relation Age of Onset    Dementia Mother         age 92    Heart disease Father         age 58    Restless legs syndrome Daughter     Thyroid disease Daughter     Hypertension Son     Alcohol abuse Son     Ovarian cancer Paternal Grandmother     Breast cancer Paternal Grandmother     Cancer Paternal Grandmother         ovarian    Ovarian cancer Cousin     Cancer Cousin 40        colon ca    Breast cancer Paternal Aunt     Skin cancer Neg Hx     Melanoma Neg Hx     Colon cancer Neg Hx     Esophageal cancer Neg Hx     Stomach cancer Neg Hx     Irritable bowel syndrome Neg Hx     Crohn's disease Neg Hx     Ulcerative colitis Neg Hx     Celiac disease Neg Hx        Past Surgical History:   Procedure Laterality Date    APPENDECTOMY      BREAST BIOPSY      BREAST LUMPECTOMY      right side    BREAST SURGERY      CHOLECYSTECTOMY      COLONOSCOPY N/A 12/2/2016    Procedure: COLONOSCOPY;  Surgeon: Ori Cope MD;  Location: [address]);  Service: Endoscopy;  Laterality: N/A;  PM prep    EYE SURGERY Bilateral     cataract    HYSTERECTOMY  1977    uterine pain    JOINT REPLACEMENT      knees replaced bilaterally    KNEE SURGERY Bilateral     RECTAL SURGERY      rectocele    TONSILLECTOMY           Current Outpatient Medications:     aspirin (ECOTRIN) 81 MG EC tablet, Take 81 mg by mouth every evening. , Disp: , Rfl:     buPROPion (WELLBUTRIN SR) 150 MG TBSR 12 hr tablet, Take 1 tablet (150 mg total) by mouth 2 (two) times daily., Disp: 180 tablet, Rfl: 3    cholecalciferol, vitamin D3, 2,000 unit Cap, Take 1 capsule by mouth once daily., Disp: , Rfl:     DULoxetine (CYMBALTA) 20 MG capsule, TAKE ONE CAPSULE BY MOUTH TWICE A DAY, Disp: 180 capsule, Rfl: 1    eszopiclone (LUNESTA) 1 MG Tab, Take 1 tablet (1  mg total) by mouth nightly., Disp: 30 tablet, Rfl: 0    famotidine (PEPCID) 20 MG tablet, Take 1 tablet (20 mg total) by mouth once daily., Disp: 90 tablet, Rfl: 3    HORIZANT 600 mg TbSR, TAKE ONE TABLET BY MOUTH EVERY EVENING, Disp: 90 tablet, Rfl: 0    hydroCHLOROthiazide (MICROZIDE) 12.5 mg capsule, TAKE ONE CAPSULE BY MOUTH EVERY DAY, Disp: 90 capsule, Rfl: 3    losartan (COZAAR) 50 MG tablet, TAKE ONE TABLET BY MOUTH EVERY DAY BEFORE BREAKFAST, Disp: 90 tablet, Rfl: 1    pravastatin (PRAVACHOL) 40 MG tablet, TAKE ONE TABLET BY MOUTH EVERY EVENING, Disp: 90 tablet, Rfl: 3    vit A/vit C/vit E/zinc/copper (ICAPS AREDS ORAL), Take by mouth., Disp: , Rfl:     Patient's medical, family, surgical, and medication hx reviewed.    Review of Systems    Constitutional: Negative for chills, diaphoresis, fever and weight loss.   HENT: Negative for nosebleeds and tinnitus.    Eyes: Negative for blurred vision, double vision and photophobia.   Respiratory: Negative for cough and shortness of breath.    Cardiovascular: . Negative for chest pain, palpitations, swelling orthopnea and PND.   Gastrointestinal: Negative for abdominal pain, diarrhea, constipation nausea and vomiting.   Genitourinary: Negative for dysuria, flank pain, frequency, hematuria and urgency.   Musculoskeletal: Negative for back pain, falls, joint pain, myalgias and neck pain.   Skin: Negative.    Neurological: Negative for dizziness, tingling, tremors, sensory change, speech change, focal weakness, seizures, loss of consciousness, weakness and headaches.   Endo/Heme/Allergies: Negative for environmental allergies and polydipsia. Does not bruise/bleed easily.   Psychiatric/Behavioral: Negative for depression, hallucinations, memory loss, substance abuse and suicidal ideas. The patient is not nervous/anxious and does not have insomnia.        Assessment:         ICD-10-CM ICD-9-CM    1. CKD stage G3a/A1, GFR 45-59 and albumin creatinine ratio <30 mg/g  N18.3 585.3    2. Essential hypertension I10 401.9         Plan:   1. CKD stage G3b/A1 (GFR=54, Creat 1.0)   CKD: stage III 2/2 longstanding HTN/nephrosclerosis and prior chronic NSAID use.   JAYA/SPEP 2009 normal.   Renal US 8/2016 unremarkable except for subcentimeter cyst in R kidney.   Patient's serum creatinine stable, oriented patient about drinking more water.  Patient's blood pressures at home have been stable.  Refilled medications.  Will follow-up in 8-12 months.     Baseline Creatine: 1.0-1.1  Lab Results   Component Value Date    CREATININE 1.3 03/11/2020     Urine Protein:   Prot/Creat Ratio, Ur   Date Value Ref Range Status   02/21/2019 0.08 0.00 - 0.20 Final   09/06/2017 Unable to calculate 0.00 - 0.20 Final   10/28/2016 0.08 0.00 - 0.20 Final     Acid-Base:   Lab Results   Component Value Date     03/11/2020    K 4.6 03/11/2020    CO2 27 03/11/2020     2. HTN: Blood pressures   Patient's prior to today were adequate and within normal limits.  Will continue to monitor in subsequent visits.    3. CKD-MBD:    Last PTH normal. Calcium, phos, and vitamin D normal. Continue vitamin D supplementation.   Will repeating labs and follow up results.     Lab Results   Component Value Date    .0 (H) 02/21/2019    CALCIUM 9.5 03/11/2020    PHOS 3.3 02/21/2019     4. Anemia:   Hgb at goal. Last iron and Tsat normal. Discontinued iron supplementation at last visit.   Will continue to monitor with labs and subsequent visits.    Lab Results   Component Value Date    HGB 11.7 (L) 03/11/2020     Lab Results   Component Value Date    IRON 51 02/21/2019    TIBC 407 02/21/2019    FERRITIN 34 02/21/2019     5. Lipid management:   Patient on statin therapy.    Lab Results   Component Value Date    LDLCALC 121.8 03/11/2020     Follow up in with labs and Urine, and if ok RTC in 1 year.  Phone call made from extension [id number] at 2:44 pm until 3:07pm (Total of 23 mins)    Wayne Fernandez,  MD  Nephrology  [facility]

## 2020-04-13 NOTE — PROGRESS NOTES
[facility]    The note from the fellow/resident was reviewed and the plan discussed with the fellow.   I agree with the assessment and plan of  Dr. Darci Paz

## 2020-04-15 DIAGNOSIS — G47.00 INSOMNIA, UNSPECIFIED TYPE: ICD-10-CM

## 2020-04-16 RX ORDER — ESZOPICLONE 1 MG/1
1 TABLET, FILM COATED ORAL NIGHTLY
Qty: 30 TABLET | Refills: 0 | Status: SHIPPED | OUTPATIENT
Start: 2020-04-16 | End: 2020-05-20 | Stop reason: SDUPTHER

## 2020-05-20 DIAGNOSIS — G47.00 INSOMNIA, UNSPECIFIED TYPE: ICD-10-CM

## 2020-05-20 RX ORDER — ESZOPICLONE 1 MG/1
1 TABLET, FILM COATED ORAL NIGHTLY PRN
Qty: 30 TABLET | Refills: 2 | Status: SHIPPED | OUTPATIENT
Start: 2020-05-20 | End: 2020-05-21

## 2020-05-21 RX ORDER — ESZOPICLONE 1 MG/1
1 TABLET, FILM COATED ORAL NIGHTLY PRN
Qty: 30 TABLET | Refills: 2 | Status: SHIPPED | OUTPATIENT
Start: 2020-05-21 | End: 2020-05-25

## 2020-05-25 ENCOUNTER — TELEPHONE (OUTPATIENT)
Dept: INTERNAL MEDICINE | Facility: CLINIC | Age: 80
End: 2020-05-25

## 2020-05-26 NOTE — TELEPHONE ENCOUNTER
I spoke to the patient and informed her of the medication change. The patient verbalized understanding.

## 2020-06-01 ENCOUNTER — OFFICE VISIT (OUTPATIENT)
Dept: URGENT CARE | Facility: CLINIC | Age: 80
End: 2020-06-01
Payer: MEDICARE

## 2020-06-01 ENCOUNTER — OFFICE VISIT (OUTPATIENT)
Dept: INTERNAL MEDICINE | Facility: CLINIC | Age: 80
End: 2020-06-01
Payer: MEDICARE

## 2020-06-01 ENCOUNTER — LAB VISIT (OUTPATIENT)
Dept: LAB | Facility: HOSPITAL | Age: 80
End: 2020-06-01
Attending: HOSPITALIST
Payer: MEDICARE

## 2020-06-01 VITALS
SYSTOLIC BLOOD PRESSURE: 120 MMHG | BODY MASS INDEX: 23.84 KG/M2 | DIASTOLIC BLOOD PRESSURE: 74 MMHG | HEIGHT: 66 IN | TEMPERATURE: 97 F | WEIGHT: 148.38 LBS | RESPIRATION RATE: 17 BRPM

## 2020-06-01 VITALS
BODY MASS INDEX: 23.63 KG/M2 | RESPIRATION RATE: 20 BRPM | WEIGHT: 147 LBS | HEIGHT: 66 IN | OXYGEN SATURATION: 98 % | DIASTOLIC BLOOD PRESSURE: 76 MMHG | SYSTOLIC BLOOD PRESSURE: 125 MMHG | TEMPERATURE: 97 F | HEART RATE: 85 BPM

## 2020-06-01 DIAGNOSIS — I10 ESSENTIAL HYPERTENSION: ICD-10-CM

## 2020-06-01 DIAGNOSIS — Z82.3 FAMILY HISTORY OF STROKE: ICD-10-CM

## 2020-06-01 DIAGNOSIS — R41.0 CONFUSION: ICD-10-CM

## 2020-06-01 DIAGNOSIS — E78.5 HYPERLIPIDEMIA, UNSPECIFIED HYPERLIPIDEMIA TYPE: ICD-10-CM

## 2020-06-01 DIAGNOSIS — R41.3 MEMORY LOSS: ICD-10-CM

## 2020-06-01 DIAGNOSIS — R41.3 MEMORY LOSS: Primary | ICD-10-CM

## 2020-06-01 LAB
BASOPHILS # BLD AUTO: 0.04 K/UL (ref 0–0.2)
BASOPHILS NFR BLD: 0.5 % (ref 0–1.9)
DIFFERENTIAL METHOD: ABNORMAL
EOSINOPHIL # BLD AUTO: 0.1 K/UL (ref 0–0.5)
EOSINOPHIL NFR BLD: 0.6 % (ref 0–8)
ERYTHROCYTE [DISTWIDTH] IN BLOOD BY AUTOMATED COUNT: 13.6 % (ref 11.5–14.5)
HCT VFR BLD AUTO: 37.6 % (ref 37–48.5)
HGB BLD-MCNC: 11.4 G/DL (ref 12–16)
IMM GRANULOCYTES # BLD AUTO: 0.03 K/UL (ref 0–0.04)
IMM GRANULOCYTES NFR BLD AUTO: 0.4 % (ref 0–0.5)
LYMPHOCYTES # BLD AUTO: 1.5 K/UL (ref 1–4.8)
LYMPHOCYTES NFR BLD: 18.1 % (ref 18–48)
MCH RBC QN AUTO: 30.1 PG (ref 27–31)
MCHC RBC AUTO-ENTMCNC: 30.3 G/DL (ref 32–36)
MCV RBC AUTO: 99 FL (ref 82–98)
MONOCYTES # BLD AUTO: 0.6 K/UL (ref 0.3–1)
MONOCYTES NFR BLD: 6.8 % (ref 4–15)
NEUTROPHILS # BLD AUTO: 6.2 K/UL (ref 1.8–7.7)
NEUTROPHILS NFR BLD: 73.6 % (ref 38–73)
NRBC BLD-RTO: 0 /100 WBC
PLATELET # BLD AUTO: 272 K/UL (ref 150–350)
PMV BLD AUTO: 10.9 FL (ref 9.2–12.9)
RBC # BLD AUTO: 3.79 M/UL (ref 4–5.4)
WBC # BLD AUTO: 8.47 K/UL (ref 3.9–12.7)

## 2020-06-01 PROCEDURE — 85025 COMPLETE CBC W/AUTO DIFF WBC: CPT | Mod: HCNC

## 2020-06-01 PROCEDURE — 99999 PR PBB SHADOW E&M-EST. PATIENT-LVL III: ICD-10-PCS | Mod: PBBFAC,HCNC,, | Performed by: NURSE PRACTITIONER

## 2020-06-01 PROCEDURE — 36415 COLL VENOUS BLD VENIPUNCTURE: CPT | Mod: HCNC,PO

## 2020-06-01 PROCEDURE — 99214 PR OFFICE/OUTPT VISIT, EST, LEVL IV, 30-39 MIN: ICD-10-PCS | Mod: HCNC,S$GLB,, | Performed by: NURSE PRACTITIONER

## 2020-06-01 PROCEDURE — 99214 OFFICE O/P EST MOD 30 MIN: CPT | Mod: S$GLB,,, | Performed by: NURSE PRACTITIONER

## 2020-06-01 PROCEDURE — 99214 PR OFFICE/OUTPT VISIT, EST, LEVL IV, 30-39 MIN: ICD-10-PCS | Mod: S$GLB,,, | Performed by: NURSE PRACTITIONER

## 2020-06-01 PROCEDURE — 99214 OFFICE O/P EST MOD 30 MIN: CPT | Mod: HCNC,S$GLB,, | Performed by: NURSE PRACTITIONER

## 2020-06-01 PROCEDURE — 99999 PR PBB SHADOW E&M-EST. PATIENT-LVL III: CPT | Mod: PBBFAC,HCNC,, | Performed by: NURSE PRACTITIONER

## 2020-06-01 PROCEDURE — 80053 COMPREHEN METABOLIC PANEL: CPT | Mod: HCNC

## 2020-06-01 NOTE — PATIENT INSTRUCTIONS
Please follow up with Primary Care Provider as soon as possible!!    If you were prescribed a narcotic or controlled medication, do not drive or operate heavy equipment or machinery while taking these medications.  You must understand that you've received an Urgent Care treatment only and that you may be released before all your medical problems are known or treated. You, the patient, will arrange for follow up care as instructed.  Follow up with your PCP or specialty clinic as directed within 2-5 days if not improved or as needed.  You can call [phone] to schedule an appointment with the appropriate provider.  If your condition worsens we recommend that you receive another evaluation at the emergency room immediately or contact your primary medical clinics after hours call service to discuss your concerns.  Please return here or go to the Emergency Department for any concerns or worsening of condition.      Confusion  Confusion is a change in a persons ability to think clearly. There may be trouble recognizing familiar people and places or knowing what day it is. Memory, judgment, and decision-making may also be affected. In severe cases, the person may have limited or no response to being spoken to.  Confusion is usually a sign of an underlying problem. It may occur suddenly. Or it may develop gradually over time. Causes of confusion include brain injury, medicines, alcohol, withdrawal from certain medicines or illegal drugs, and infection. Heart attack and stroke may cause it. Confusion can also be a sign of dementia or a mental illness.  Treatment will depend on the cause of the problem. If the issue is a medicine, stopping the medicine may help. The healthcare provider will perform an evaluation and certain tests may be done. Follow up with the healthcare provider for the results.  Home care  · Be sure someone is with the confused person at all times. He or she should not be left alone or  unsupervised.  · Tell the healthcare provider about all medicines that the person takes. These include prescription, over-the-counter, herbs, and supplements.  · Dehydration can increase confusion. Ask the healthcare provider how much fluid the person should be drinking. Offer liquids and ensure that they are taken.  · Keep all medicines in a secure place under the caregivers control. To prevent overdose, a confused person should take medicines only under the supervision of a caregiver.  · To help a person with confusion:  ¨ Establish a daily routine. Change can be a source of stress for someone with confusion. Make and keep a time schedule for common tasks such as bathing, dressing, taking medicines, meals, going for walks, shopping, naps and bed time.  ¨ Speak slowly and clearly with a gentle tone of voice. Use short simple words and sentences. Ask one question at a time. Do not interrupt, criticize or argue. Be calm and supportive. Use friendly facial expressions. Use pointing and touching to help communicate. If there has been loss of long-term memory, do not ask questions about past events. This would only cause frustration for the person.  ¨ Use lists, signs, family photos, clocks and calendars as memory aids. Label cabinets and drawers. Try to distract, not confront, the patient. When he/she becomes frustrated or upset, redirect his/her attention to eating or some other activity of interest.  ¨ If this proves to be due to a permanent condition, talk to the healthcare provider or a  about getting a Power of  for healthcare and for financial decisions. It is best to do this while the person can still sign legal documents and make his or her own decisions. Otherwise, a court order will be required.  Follow-up care  Follow up with the person's healthcare provider or as advised for further testing or changes in medical care.  When to seek medical advice  Call the healthcare provider for any of the  following:  · Frequent falling  · Refusal to eat or drink  · Violent behavior or behavior too difficult to manage at home  · New hallucinations or delusions  · Increased drowsiness  · Complaints of headache or numbness or weakness of the face, arm or leg  · Nausea or vomiting  · Slurred speech or trouble speaking, walking, or seeing  · Fainting spell, dizziness or seizure  · Unexplained fever over 100.4º F (38.0º C) or as directed by the healthcare provider  Date Last Reviewed: 8/23/2015 © 2000-2017 [employer] [address] [place] [id number]. All rights reserved. This information is not intended as a substitute for professional medical care. Always follow your healthcare professional's instructions.

## 2020-06-01 NOTE — PROGRESS NOTES
[facility] Note    Chief Complaint      Chief Complaint   Patient presents with    Follow-up     Urgent care      History of Present Illness      Tere Velasco is a 80 y.o. female patient of Dr. Art who is new to me and presents today for urgent care follow-up from this morning.  Patient states was talking with a friend on the phone and did not remember something happened to 3 days ago got very worried so went to urgent care.  Patient states that she has a family history of stroke, her mother  of Alzheimer's.  Patient denies trouble speaking, trouble finding her words, or not understanding what her friend was staying to her, visual changes, weakness, shortness of breath, nausea    Problem List Items Addressed This Visit        Cardiac/Vascular    Essential hypertension    Relevant Orders    CT Head Without Contrast    CBC auto differential    Comprehensive metabolic panel    Hyperlipidemia    Relevant Orders    CT Head Without Contrast    CBC auto differential    Comprehensive metabolic panel      Other Visit Diagnoses     Memory loss    -  Primary    Relevant Orders    CT Head Without Contrast    CBC auto differential    Comprehensive metabolic panel    Confusion        Relevant Orders    CT Head Without Contrast    CBC auto differential    Comprehensive metabolic panel    Family history of stroke        Relevant Orders    CT Head Without Contrast    CBC auto differential    Comprehensive metabolic panel          Health Maintenance   Topic Date Due    Lipid Panel  2021    TETANUS VACCINE  2023    Pneumococcal Vaccine (65+ High/Highest Risk)  Completed    Mammogram  Discontinued    DEXA SCAN  Discontinued       Past Medical History:   Diagnosis Date    Allergy     Anemia     Anxiety     Arthritis     Back pain     Breast disorder     Tumor in rt breast (benign)    Cataract     removed from both eyes    CKD (chronic kidney disease) stage 3, GFR 30-59 ml/min      Clotting disorder     when knee was replaced    Colitis     Degenerative disc disease     Depression     Fibromyalgia     GERD (gastroesophageal reflux disease)     Hyperlipidemia     Hypertension     Irritable bowel syndrome     TRU (obstructive sleep apnea)     RLS (restless legs syndrome)     S/P knee replacement 1/13/2014       Past Surgical History:   Procedure Laterality Date    APPENDECTOMY      BREAST BIOPSY      BREAST LUMPECTOMY      right side    BREAST SURGERY      CHOLECYSTECTOMY      COLONOSCOPY N/A 12/2/2016    Procedure: COLONOSCOPY;  Surgeon: Ori Cope MD;  Location: [address];  Service: Endoscopy;  Laterality: N/A;  PM prep    EYE SURGERY Bilateral     cataract    HYSTERECTOMY  1977    uterine pain    JOINT REPLACEMENT      knees replaced bilaterally    KNEE SURGERY Bilateral     RECTAL SURGERY      rectocele    TONSILLECTOMY         family history includes Alcohol abuse in her son; Breast cancer in her paternal aunt and paternal grandmother; Cancer in her paternal grandmother; Cancer (age of onset: 40) in her cousin; Dementia in her mother; Heart disease in her father; Hypertension in her son; Ovarian cancer in her cousin and paternal grandmother; Restless legs syndrome in her daughter; Thyroid disease in her daughter.    Social History     Tobacco Use    Smoking status: Never Smoker    Smokeless tobacco: Never Used   Substance Use Topics    Alcohol use: Yes     Comment: socially, rarely    Drug use: No     Types: Oxycodone       Review of Systems   Constitutional: Negative for chills and fever.   Eyes: Negative for blurred vision.   Respiratory: Negative for shortness of breath.    Cardiovascular: Negative for chest pain.   Gastrointestinal: Negative for nausea.   Neurological: Negative for tremors, sensory change, speech change, loss of consciousness, weakness and headaches.   Psychiatric/Behavioral: Positive for memory loss.        Outpatient Encounter  "Medications as of 6/1/2020   Medication Sig Dispense Refill    aspirin (ECOTRIN) 81 MG EC tablet Take 1 tablet (81 mg total) by mouth every evening. 90 tablet 1    buPROPion (WELLBUTRIN SR) 150 MG TBSR 12 hr tablet Take 1 tablet (150 mg total) by mouth 2 (two) times daily. 180 tablet 3    cholecalciferol, vitamin D3, (VITAMIN D3) 50 mcg (2,000 unit) Cap Take 1 capsule (2,000 Units total) by mouth once daily. 90 capsule 1    DULoxetine (CYMBALTA) 20 MG capsule TAKE ONE CAPSULE BY MOUTH TWICE A  capsule 1    gabapentin enacarbil (HORIZANT) 600 mg TbSR Take 1 tablet by mouth every evening. 90 tablet 1    hydroCHLOROthiazide (MICROZIDE) 12.5 mg capsule Take 1 capsule (12.5 mg total) by mouth once daily. 90 capsule 3    losartan (COZAAR) 50 MG tablet Take 1 tablet (50 mg total) by mouth once daily. 90 tablet 1    pravastatin (PRAVACHOL) 40 MG tablet Take 1 tablet (40 mg total) by mouth every evening. 90 tablet 1    suvorexant (BELSOMRA) 5 mg Tab Take 5 mg by mouth nightly as needed (insomnia). 30 tablet 3    vit A/vit C/vit E/zinc/copper (ICAPS AREDS ORAL) Take by mouth.      famotidine (PEPCID) 20 MG tablet Take 1 tablet (20 mg total) by mouth once daily. 90 tablet 3     No facility-administered encounter medications on file as of 6/1/2020.         Review of patient's allergies indicates:   Allergen Reactions    Demerol [meperidine]        Physical Exam      Vital Signs  Temp: 97.4 °F (36.3 °C)  Temp src: Oral  Resp: 17  BP: 120/74  BP Location: Right arm  Patient Position: Sitting  Pain Score: 0-No pain  Height and Weight  Height: 5' 6" (167.6 cm)  Weight: 67.3 kg (148 lb 5.9 oz)  BSA (Calculated - sq m): 1.77 sq meters  BMI (Calculated): 24  Weight in (lb) to have BMI = 25: 154.6]    Physical Exam   Constitutional: She is oriented to person, place, and time. She appears well-developed and well-nourished.   HENT:   Head: Normocephalic and atraumatic.   Right Ear: External ear normal.   Left Ear: " External ear normal.   Symmetrical smile noted, tongue midline   Eyes: Pupils are equal, round, and reactive to light. Conjunctivae and EOM are normal.   Neck: Normal range of motion. Neck supple. No tracheal deviation present.   Cardiovascular: Normal rate, regular rhythm and normal heart sounds.   No murmur heard.  Pulmonary/Chest: Effort normal and breath sounds normal. She exhibits no tenderness.   Abdominal: Soft. There is no tenderness. There is no guarding.   Musculoskeletal: Normal range of motion.   Equal  arm symmetrical with raise   Lymphadenopathy:     She has no cervical adenopathy.   Neurological: She is alert and oriented to person, place, and time.   Speech clear, patient speaking normally with normal conversation, can remember the situation this morning, no more memory loss since, affect normal   Skin: Skin is warm and dry.   Psychiatric: She has a normal mood and affect. Her behavior is normal. Judgment and thought content normal.   Nursing note and vitals reviewed.       Laboratory:  CBC:  Recent Labs   Lab Result Units 03/11/20  1241   WBC K/uL 8.27   RBC M/uL 3.98*   Hemoglobin g/dL 11.7*   Hematocrit % 39.6   Platelets K/uL 279   Mean Corpuscular Volume fL 100*   Mean Corpuscular Hemoglobin pg 29.4   Mean Corpuscular Hemoglobin Conc g/dL 29.5*     CMP:  Recent Labs   Lab Result Units 03/11/20  1241   Glucose mg/dL 90   Calcium mg/dL 9.5   Albumin g/dL 3.5   Total Protein g/dL 6.9   Sodium mmol/L 141   Potassium mmol/L 4.6   CO2 mmol/L 27   Chloride mmol/L 104   BUN, Bld mg/dL 21   Alkaline Phosphatase U/L 57   ALT U/L 9*   AST U/L 15   Total Bilirubin mg/dL 0.4     URINALYSIS:  Recent Labs   Lab Result Units 03/11/20  1247   Color, UA  Yellow   Specific Gravity, UA  1.015   pH, UA  6.0   Protein, UA  Negative   Bacteria /hpf Rare   Nitrite, UA  Negative   Leukocytes, UA  1+*      LIPIDS:  Recent Labs   Lab Result Units 03/11/20  1241   TSH uIU/mL 1.329   HDL mg/dL 70   Cholesterol mg/dL  215*   Triglycerides mg/dL 116   LDL Cholesterol mg/dL 121.8   Hdl/Cholesterol Ratio % 32.6   Non-HDL Cholesterol mg/dL 145   Total Cholesterol/HDL Ratio  3.1     TSH:  Recent Labs   Lab Result Units 03/11/20  1241   TSH uIU/mL 1.329     A1C:  No results for input(s): HGBA1C in the last 2160 hours.      Assessment/Plan     Tere Velasco is a 80 y.o.female with:    1. Memory loss  - CT Head Without Contrast; Future  - CBC auto differential; Future  - Comprehensive metabolic panel; Future    2. Confusion  - CT Head Without Contrast; Future  - CBC auto differential; Future  - Comprehensive metabolic panel; Future    3. Essential hypertension  - CT Head Without Contrast; Future  - CBC auto differential; Future  - Comprehensive metabolic panel; Future    4. Hyperlipidemia, unspecified hyperlipidemia type  - CT Head Without Contrast; Future  - CBC auto differential; Future  - Comprehensive metabolic panel; Future    5. Family history of stroke  - CT Head Without Contrast; Future  - CBC auto differential; Future  - Comprehensive metabolic panel; Future      -Continue current medications and maintain follow up with specialists.  Return to clinic as needed for any concerns       Beth Reece NP-C  [facility]

## 2020-06-01 NOTE — PROGRESS NOTES
"Subjective:       Patient ID: Tere Velasco is a 80 y.o. female.    Vitals:  height is 5' 6" (1.676 m) and weight is 66.7 kg (147 lb). Her tympanic temperature is 97.4 °F (36.3 °C). Her blood pressure is 125/76 and her pulse is 85. Her respiration is 20 and oxygen saturation is 98%.     Chief Complaint: Memory Loss    Patient stated she was on the phone this morning with friend and can't remember anything they were talking about. Patient she felt like her memory was hazy. When asking the patient when she talked to friend was a little delayed on answering.    Other   This is a new problem. The current episode started today. The problem has been waxing and waning. Pertinent negatives include no abdominal pain, anorexia, arthralgias, change in bowel habit, chest pain, chills, congestion, coughing, diaphoresis, fatigue, fever, headaches, joint swelling, myalgias, nausea, neck pain, numbness, rash, sore throat, swollen glands, urinary symptoms, vertigo, visual change, vomiting or weakness. Nothing aggravates the symptoms.       Constitution: Negative for chills, sweating, fatigue and fever.   HENT: Negative for congestion and sore throat.    Neck: Negative for neck pain and painful lymph nodes.   Cardiovascular: Negative for chest pain and leg swelling.   Eyes: Negative for double vision and blurred vision.   Respiratory: Negative for cough and shortness of breath.    Gastrointestinal: Negative for abdominal pain, nausea, vomiting and diarrhea.   Genitourinary: Negative for dysuria, frequency, urgency and history of kidney stones.   Musculoskeletal: Negative for joint pain, joint swelling, muscle cramps and muscle ache.   Skin: Negative for color change, pale, rash and bruising.   Allergic/Immunologic: Negative for seasonal allergies.   Neurological: Negative for dizziness, history of vertigo, light-headedness, passing out, headaches and numbness.   Hematologic/Lymphatic: Negative for swollen lymph nodes. "   Psychiatric/Behavioral: Negative for nervous/anxious, sleep disturbance and depression. The patient is not nervous/anxious.        Objective:      Physical Exam   Constitutional: She is oriented to person, place, and time. Vital signs are normal. She appears well-developed and well-nourished. She is active and cooperative.  Non-toxic appearance. She does not have a sickly appearance. She does not appear ill. No distress.   HENT:   Head: Normocephalic and atraumatic.   Right Ear: Hearing, tympanic membrane, abnromal external ear and ear canal normal.   Left Ear: Hearing, tympanic membrane, abnormal external ear and ear canal normal.   Nose: Nose abnormal. No mucosal edema, rhinorrhea or nasal deformity. No epistaxis. Right sinus exhibits no maxillary sinus tenderness and no frontal sinus tenderness. Left sinus exhibits no maxillary sinus tenderness and no frontal sinus tenderness.   Mouth/Throat: Uvula is midline, oropharynx is clear and moist and mucous membranes are normal. No trismus in the jaw. Normal dentition. No uvula swelling. No posterior oropharyngeal erythema.   Eyes: Pupils are equal, round, and reactive to light. Conjunctivae, EOM and lids are normal. No scleral icterus.   Neck: Trachea normal, normal range of motion, full passive range of motion without pain and phonation normal. Neck supple. No neck rigidity.   Cardiovascular: Normal rate, regular rhythm, normal heart sounds, intact distal pulses and normal pulses.   Pulses:       Radial pulses are 2+ on the right side, and 2+ on the left side.   Pulmonary/Chest: Effort normal and breath sounds normal. No respiratory distress.   Abdominal: Soft. Normal appearance and bowel sounds are normal. She exhibits no distension. There is no tenderness.   Musculoskeletal: Normal range of motion. She exhibits no edema or deformity.   Neurological: She is alert and oriented to person, place, and time. She has normal strength. She is not disoriented. No cranial  "nerve deficit or sensory deficit. She exhibits normal muscle tone. She displays a negative Romberg sign. Coordination and gait normal.   Clear speech  Equal strength  Steady Gait     Skin: Skin is warm, dry, intact, not diaphoretic and not pale. Capillary refill takes less than 2 seconds.   Psychiatric: She has a normal mood and affect. Her speech is normal and behavior is normal. Judgment and thought content normal. Cognition and memory are normal.   Nursing note and vitals reviewed.        Assessment:       1. Memory loss        Plan:       80 yr old female presenting to the Urgent Care for being forgetful during a conversation with a friend yesterday. Patient stated when she couldn't recall things she had done on the phone it caused her to get anxious. Patient denies any memory loss or symptoms at this time. Patient states " I feel fine now." Patient denies any CP, SOB, headache, lightheadedness at this time. No neuro deficit noted. Patient will f/u with PCP for further evaluation today. Patient oriented x 4 prior to discharge. ER precautions discussed. Patient verbalized understanding.     Memory loss      Patient Instructions   Please follow up with Primary Care Provider as soon as possible!!    If you were prescribed a narcotic or controlled medication, do not drive or operate heavy equipment or machinery while taking these medications.  You must understand that you've received an Urgent Care treatment only and that you may be released before all your medical problems are known or treated. You, the patient, will arrange for follow up care as instructed.  Follow up with your PCP or specialty clinic as directed within 2-5 days if not improved or as needed.  You can call [phone] to schedule an appointment with the appropriate provider.  If your condition worsens we recommend that you receive another evaluation at the emergency room immediately or contact your primary medical clinics after hours call " service to discuss your concerns.  Please return here or go to the Emergency Department for any concerns or worsening of condition.      Confusion  Confusion is a change in a persons ability to think clearly. There may be trouble recognizing familiar people and places or knowing what day it is. Memory, judgment, and decision-making may also be affected. In severe cases, the person may have limited or no response to being spoken to.  Confusion is usually a sign of an underlying problem. It may occur suddenly. Or it may develop gradually over time. Causes of confusion include brain injury, medicines, alcohol, withdrawal from certain medicines or illegal drugs, and infection. Heart attack and stroke may cause it. Confusion can also be a sign of dementia or a mental illness.  Treatment will depend on the cause of the problem. If the issue is a medicine, stopping the medicine may help. The healthcare provider will perform an evaluation and certain tests may be done. Follow up with the healthcare provider for the results.  Home care  · Be sure someone is with the confused person at all times. He or she should not be left alone or unsupervised.  · Tell the healthcare provider about all medicines that the person takes. These include prescription, over-the-counter, herbs, and supplements.  · Dehydration can increase confusion. Ask the healthcare provider how much fluid the person should be drinking. Offer liquids and ensure that they are taken.  · Keep all medicines in a secure place under the caregivers control. To prevent overdose, a confused person should take medicines only under the supervision of a caregiver.  · To help a person with confusion:  ¨ Establish a daily routine. Change can be a source of stress for someone with confusion. Make and keep a time schedule for common tasks such as bathing, dressing, taking medicines, meals, going for walks, shopping, naps and bed time.  ¨ Speak slowly and clearly with a gentle  tone of voice. Use short simple words and sentences. Ask one question at a time. Do not interrupt, criticize or argue. Be calm and supportive. Use friendly facial expressions. Use pointing and touching to help communicate. If there has been loss of long-term memory, do not ask questions about past events. This would only cause frustration for the person.  ¨ Use lists, signs, family photos, clocks and calendars as memory aids. Label cabinets and drawers. Try to distract, not confront, the patient. When he/she becomes frustrated or upset, redirect his/her attention to eating or some other activity of interest.  ¨ If this proves to be due to a permanent condition, talk to the healthcare provider or a  about getting a Power of  for healthcare and for financial decisions. It is best to do this while the person can still sign legal documents and make his or her own decisions. Otherwise, a court order will be required.  Follow-up care  Follow up with the person's healthcare provider or as advised for further testing or changes in medical care.  When to seek medical advice  Call the healthcare provider for any of the following:  · Frequent falling  · Refusal to eat or drink  · Violent behavior or behavior too difficult to manage at home  · New hallucinations or delusions  · Increased drowsiness  · Complaints of headache or numbness or weakness of the face, arm or leg  · Nausea or vomiting  · Slurred speech or trouble speaking, walking, or seeing  · Fainting spell, dizziness or seizure  · Unexplained fever over 100.4º F (38.0º C) or as directed by the healthcare provider  Date Last Reviewed: 8/23/2015 © 2000-2017 [employer]. [address] [place] [address], [place] [address]. All rights reserved. This information is not intended as a substitute for professional medical care. Always follow your healthcare professional's instructions.

## 2020-06-02 LAB
ALBUMIN SERPL BCP-MCNC: 3.6 G/DL (ref 3.5–5.2)
ALP SERPL-CCNC: 52 U/L (ref 55–135)
ALT SERPL W/O P-5'-P-CCNC: 12 U/L (ref 10–44)
ANION GAP SERPL CALC-SCNC: 9 MMOL/L (ref 8–16)
AST SERPL-CCNC: 15 U/L (ref 10–40)
BILIRUB SERPL-MCNC: 0.2 MG/DL (ref 0.1–1)
BUN SERPL-MCNC: 18 MG/DL (ref 8–23)
CALCIUM SERPL-MCNC: 9 MG/DL (ref 8.7–10.5)
CHLORIDE SERPL-SCNC: 109 MMOL/L (ref 95–110)
CO2 SERPL-SCNC: 22 MMOL/L (ref 23–29)
CREAT SERPL-MCNC: 1.2 MG/DL (ref 0.5–1.4)
EST. GFR  (AFRICAN AMERICAN): 48.6 ML/MIN/1.73 M^2
EST. GFR  (NON AFRICAN AMERICAN): 42.2 ML/MIN/1.73 M^2
GLUCOSE SERPL-MCNC: 98 MG/DL (ref 70–110)
POTASSIUM SERPL-SCNC: 3.6 MMOL/L (ref 3.5–5.1)
PROT SERPL-MCNC: 6.5 G/DL (ref 6–8.4)
SODIUM SERPL-SCNC: 140 MMOL/L (ref 136–145)

## 2020-06-03 ENCOUNTER — PATIENT MESSAGE (OUTPATIENT)
Dept: INTERNAL MEDICINE | Facility: CLINIC | Age: 80
End: 2020-06-03

## 2020-06-04 ENCOUNTER — HOSPITAL ENCOUNTER (OUTPATIENT)
Dept: RADIOLOGY | Facility: HOSPITAL | Age: 80
Discharge: HOME | End: 2020-06-04
Attending: NURSE PRACTITIONER
Payer: MEDICARE

## 2020-06-04 DIAGNOSIS — R41.0 CONFUSION: ICD-10-CM

## 2020-06-04 DIAGNOSIS — Z82.3 FAMILY HISTORY OF STROKE: ICD-10-CM

## 2020-06-04 DIAGNOSIS — E78.5 HYPERLIPIDEMIA, UNSPECIFIED HYPERLIPIDEMIA TYPE: ICD-10-CM

## 2020-06-04 DIAGNOSIS — R41.3 MEMORY LOSS: ICD-10-CM

## 2020-06-04 DIAGNOSIS — I10 ESSENTIAL HYPERTENSION: ICD-10-CM

## 2020-06-04 PROCEDURE — 70450 CT HEAD/BRAIN W/O DYE: CPT | Mod: 26,HCNC,, | Performed by: RADIOLOGY

## 2020-06-04 PROCEDURE — 70450 CT HEAD WITHOUT CONTRAST: ICD-10-PCS | Mod: 26,HCNC,, | Performed by: RADIOLOGY

## 2020-06-04 PROCEDURE — 70450 CT HEAD/BRAIN W/O DYE: CPT | Mod: TC,HCNC

## 2020-06-05 ENCOUNTER — PATIENT MESSAGE (OUTPATIENT)
Dept: INTERNAL MEDICINE | Facility: CLINIC | Age: 80
End: 2020-06-05

## 2020-06-05 NOTE — TELEPHONE ENCOUNTER
CT scan results:  No acute findings stroke or any problems on brain.    mild volume loss of brain and some microvascular disease which has slightly progressed since and exam of 2000 for, age related

## 2020-07-08 DIAGNOSIS — K21.9 GASTROESOPHAGEAL REFLUX DISEASE, ESOPHAGITIS PRESENCE NOT SPECIFIED: ICD-10-CM

## 2020-07-08 DIAGNOSIS — M79.7 FIBROMYALGIA: ICD-10-CM

## 2020-07-09 RX ORDER — DULOXETIN HYDROCHLORIDE 20 MG/1
CAPSULE, DELAYED RELEASE ORAL
Qty: 180 CAPSULE | Refills: 3 | Status: SHIPPED | OUTPATIENT
Start: 2020-07-09 | End: 2021-05-24 | Stop reason: SDUPTHER

## 2020-07-09 RX ORDER — FAMOTIDINE 20 MG/1
20 TABLET, FILM COATED ORAL DAILY
Qty: 90 TABLET | Refills: 3 | Status: SHIPPED | OUTPATIENT
Start: 2020-07-09 | End: 2021-05-24 | Stop reason: SDUPTHER

## 2020-07-09 NOTE — TELEPHONE ENCOUNTER
----- Message from Lili Marr sent at 7/9/2020 10:50 AM CDT -----  Type:  Needs Medical Advice - Refill     Who Called: MARTINEZ     Pharmacy name and phone #:  [facility] [place] - [address] [facility];    Would the patient rather a call back or a response via [facility]? CALL BACK    Best Call Back Number: [phone]    Additional Information: This medication need a refill DULoxetine (CYMBALTA) 20 MG capsule. OLIVER WOULD LIKE ALL HER MEDICATION TO BE REFILL. PT DOESN'T KNOW ALL THE NAMES

## 2020-07-09 NOTE — PROGRESS NOTES
Refill Routing Note   Medication(s) are not appropriate for processing by [facility]:       - Non-participating provider           Medication reconciliation completed: No      Automatic Epic Generated Protocol Data:    Requested Prescriptions   Pending Prescriptions Disp Refills    DULoxetine (CYMBALTA) 20 MG capsule [Pharmacy Med Name: DULOXETINE HCL 20MG CPEP] 180 capsule 3     Sig: TAKE ONE CAPSULE BY MOUTH TWICE A DAY       Psychiatry: Antidepressants - SNRI Passed - 7/9/2020  9:38 AM        Passed - Patient is at least 18 years old        Passed - Last BP in normal range within 360 days.     BP Readings from Last 3 Encounters:   06/01/20 120/74   06/01/20 125/76   02/27/20 127/71              Passed - Office visit in past 6 months or future 90 days.     Recent Outpatient Visits            1 month ago Memory loss    [facility] Beth Reece NP    1 month ago Memory loss    [facility] Mercy Mendes NP    3 months ago CKD stage G3a/A1, GFR 45-59 and albumin creatinine ratio <30 mg/g    [facility] Darci Ni MD    4 months ago Encounter for preventive health examination    [facility] Princess Carroll NP    10 months ago Insomnia, unspecified type    [facility] Moshe Calderon DO                    Passed - Cr is 1.3 or below and within 360 days     Creatinine   Date Value Ref Range Status   06/01/2020 1.2 0.5 - 1.4 mg/dL Final   03/11/2020 1.3 0.5 - 1.4 mg/dL Final   02/21/2019 1.2 0.5 - 1.4 mg/dL Final              Passed - eGFR within 360 days     eGFR if non    Date Value Ref Range Status   06/01/2020 42.2 (A) >60 mL/min/1.73 m^2 Final     Comment:     Calculation used to obtain the estimated glomerular filtration  rate (eGFR) is the CKD-EPI equation.      03/11/2020 38.3 (A) >60 mL/min/1.73 m^2 Final     Comment:     Calculation used to obtain the estimated glomerular  filtration  rate (eGFR) is the CKD-EPI equation.      02/21/2019 42.5 (A) >60 mL/min/1.73 m^2 Final     Comment:     Calculation used to obtain the estimated glomerular filtration  rate (eGFR) is the CKD-EPI equation.        eGFR if    Date Value Ref Range Status   06/01/2020 48.6 (A) >60 mL/min/1.73 m^2 Final   03/11/2020 44.1 (A) >60 mL/min/1.73 m^2 Final   02/21/2019 49.0 (A) >60 mL/min/1.73 m^2 Final                    Appointments  past 12m or future 3m with PCP    Date Provider   Last Visit   5/11/2018 Moshe Sandra MD   Next Visit   Visit date not found Moshe Sandra MD   ED visits in past 90 days: 0     Note composed:10:33 AM 07/09/2020

## 2020-11-12 ENCOUNTER — PATIENT MESSAGE (OUTPATIENT)
Dept: INTERNAL MEDICINE | Facility: CLINIC | Age: 80
End: 2020-11-12

## 2020-11-13 ENCOUNTER — OFFICE VISIT (OUTPATIENT)
Dept: INTERNAL MEDICINE | Facility: CLINIC | Age: 80
End: 2020-11-13
Payer: MEDICARE

## 2020-11-13 DIAGNOSIS — G47.00 INSOMNIA, UNSPECIFIED TYPE: Primary | ICD-10-CM

## 2020-11-13 PROCEDURE — 99442 PR PHYSICIAN TELEPHONE EVALUATION 11-20 MIN: ICD-10-PCS | Mod: HCNC,95,, | Performed by: INTERNAL MEDICINE

## 2020-11-13 PROCEDURE — 99442 PR PHYSICIAN TELEPHONE EVALUATION 11-20 MIN: CPT | Mod: HCNC,95,, | Performed by: INTERNAL MEDICINE

## 2020-11-13 RX ORDER — ESZOPICLONE 1 MG/1
1 TABLET, FILM COATED ORAL NIGHTLY
Qty: 30 TABLET | Refills: 0 | Status: SHIPPED | OUTPATIENT
Start: 2020-11-13 | End: 2020-12-31 | Stop reason: SDUPTHER

## 2020-11-13 NOTE — PROGRESS NOTES
Established Patient - Audio Only Telehealth Visit     The patient location is: [place]  The chief complaint leading to consultation is: insomnia  Visit type: Virtual visit with audio only (telephone)  Total time spent with patient: 11 minutes       The reason for the audio only service rather than synchronous audio and video virtual visit was related to technical difficulties or patient preference/necessity.     Each patient to whom I provide medical services by telemedicine is:  (1) informed of the relationship between the physician and patient and the respective role of any other health care provider with respect to management of the patient; and (2) notified that they may decline to receive medical services by telemedicine and may withdraw from such care at any time. Patient verbally consented to receive this service via voice-only telephone call.       HPI:     She reports a chronic history of insomnia. She had been stable on lunesta, but insurance no longer covered med so was switched to belsomra. She reports the belsomra has never helped. She has failed several otc meds and trazodone in the past also. She has difficulty falling asleep d/t racing thoughts.      Assessment and plan:      1. Insomnia, unspecified type  - generic lunesta sent to [facility] [place]. Continue current med prn for now. Recommend she also f/u with PCP for chronic med management and symptoms.   - eszopiclone (LUNESTA) 1 MG Tab; Take 1 tablet (1 mg total) by mouth nightly.  Dispense: 30 tablet; Refill: 0                            This service was not originating from a related E/M service provided within the previous 7 days nor will  to an E/M service or procedure within the next 24 hours or my soonest available appointment.  Prevailing standard of care was able to be met in this audio-only visit.

## 2020-11-22 ENCOUNTER — PATIENT MESSAGE (OUTPATIENT)
Dept: INTERNAL MEDICINE | Facility: CLINIC | Age: 80
End: 2020-11-22

## 2020-12-09 ENCOUNTER — PES CALL (OUTPATIENT)
Dept: ADMINISTRATIVE | Facility: CLINIC | Age: 80
End: 2020-12-09

## 2020-12-31 DIAGNOSIS — G47.00 INSOMNIA, UNSPECIFIED TYPE: ICD-10-CM

## 2021-01-04 RX ORDER — ESZOPICLONE 1 MG/1
1 TABLET, FILM COATED ORAL NIGHTLY PRN
Qty: 30 TABLET | Refills: 0 | Status: SHIPPED | OUTPATIENT
Start: 2021-01-04 | End: 2021-02-01 | Stop reason: SDUPTHER

## 2021-02-01 DIAGNOSIS — G47.00 INSOMNIA, UNSPECIFIED TYPE: ICD-10-CM

## 2021-02-01 RX ORDER — ESZOPICLONE 1 MG/1
1 TABLET, FILM COATED ORAL NIGHTLY PRN
Qty: 30 TABLET | Refills: 2 | Status: SHIPPED | OUTPATIENT
Start: 2021-02-01 | End: 2021-05-03 | Stop reason: SDUPTHER

## 2021-02-11 ENCOUNTER — PES CALL (OUTPATIENT)
Dept: ADMINISTRATIVE | Facility: CLINIC | Age: 81
End: 2021-02-11

## 2021-02-17 ENCOUNTER — TELEPHONE (OUTPATIENT)
Dept: INTERNAL MEDICINE | Facility: CLINIC | Age: 81
End: 2021-02-17

## 2021-02-17 DIAGNOSIS — M54.9 BACK PAIN, UNSPECIFIED BACK LOCATION, UNSPECIFIED BACK PAIN LATERALITY, UNSPECIFIED CHRONICITY: ICD-10-CM

## 2021-02-17 DIAGNOSIS — M51.37 DEGENERATION OF LUMBAR OR LUMBOSACRAL INTERVERTEBRAL DISC: ICD-10-CM

## 2021-02-17 DIAGNOSIS — M48.061 SPINAL STENOSIS, LUMBAR REGION, WITHOUT NEUROGENIC CLAUDICATION: Primary | ICD-10-CM

## 2021-02-26 ENCOUNTER — PES CALL (OUTPATIENT)
Dept: ADMINISTRATIVE | Facility: CLINIC | Age: 81
End: 2021-02-26

## 2021-03-01 ENCOUNTER — TELEPHONE (OUTPATIENT)
Dept: INTERNAL MEDICINE | Facility: CLINIC | Age: 81
End: 2021-03-01

## 2021-03-02 ENCOUNTER — PES CALL (OUTPATIENT)
Dept: ADMINISTRATIVE | Facility: CLINIC | Age: 81
End: 2021-03-02

## 2021-03-08 ENCOUNTER — PATIENT OUTREACH (OUTPATIENT)
Dept: ADMINISTRATIVE | Facility: OTHER | Age: 81
End: 2021-03-08

## 2021-03-10 ENCOUNTER — OFFICE VISIT (OUTPATIENT)
Dept: PAIN MEDICINE | Facility: CLINIC | Age: 81
End: 2021-03-10
Payer: MEDICARE

## 2021-03-10 ENCOUNTER — HOSPITAL ENCOUNTER (OUTPATIENT)
Dept: RADIOLOGY | Facility: HOSPITAL | Age: 81
Discharge: HOME | End: 2021-03-10
Attending: PAIN MEDICINE
Payer: MEDICARE

## 2021-03-10 VITALS — SYSTOLIC BLOOD PRESSURE: 139 MMHG | HEART RATE: 82 BPM | DIASTOLIC BLOOD PRESSURE: 83 MMHG

## 2021-03-10 DIAGNOSIS — M54.9 DORSALGIA, UNSPECIFIED: ICD-10-CM

## 2021-03-10 DIAGNOSIS — G89.29 CHRONIC BILATERAL LOW BACK PAIN WITHOUT SCIATICA: ICD-10-CM

## 2021-03-10 DIAGNOSIS — M48.061 SPINAL STENOSIS, LUMBAR REGION, WITHOUT NEUROGENIC CLAUDICATION: ICD-10-CM

## 2021-03-10 DIAGNOSIS — M54.50 CHRONIC BILATERAL LOW BACK PAIN WITHOUT SCIATICA: ICD-10-CM

## 2021-03-10 DIAGNOSIS — M51.37 DEGENERATION OF LUMBAR OR LUMBOSACRAL INTERVERTEBRAL DISC: ICD-10-CM

## 2021-03-10 PROCEDURE — 1159F MED LIST DOCD IN RCRD: CPT | Mod: S$GLB,,, | Performed by: PAIN MEDICINE

## 2021-03-10 PROCEDURE — 99999 PR PBB SHADOW E&M-EST. PATIENT-LVL IV: CPT | Mod: PBBFAC,,, | Performed by: PAIN MEDICINE

## 2021-03-10 PROCEDURE — 72110 X-RAY EXAM L-2 SPINE 4/>VWS: CPT | Mod: 26,,, | Performed by: INTERNAL MEDICINE

## 2021-03-10 PROCEDURE — 72110 X-RAY EXAM L-2 SPINE 4/>VWS: CPT | Mod: TC,FY

## 2021-03-10 PROCEDURE — 3075F SYST BP GE 130 - 139MM HG: CPT | Mod: CPTII,S$GLB,, | Performed by: PAIN MEDICINE

## 2021-03-10 PROCEDURE — 99204 OFFICE O/P NEW MOD 45 MIN: CPT | Mod: S$GLB,,, | Performed by: PAIN MEDICINE

## 2021-03-10 PROCEDURE — 1125F AMNT PAIN NOTED PAIN PRSNT: CPT | Mod: S$GLB,,, | Performed by: PAIN MEDICINE

## 2021-03-10 PROCEDURE — 3079F DIAST BP 80-89 MM HG: CPT | Mod: CPTII,S$GLB,, | Performed by: PAIN MEDICINE

## 2021-03-10 PROCEDURE — 1159F PR MEDICATION LIST DOCUMENTED IN MEDICAL RECORD: ICD-10-PCS | Mod: S$GLB,,, | Performed by: PAIN MEDICINE

## 2021-03-10 PROCEDURE — 99204 PR OFFICE/OUTPT VISIT, NEW, LEVL IV, 45-59 MIN: ICD-10-PCS | Mod: S$GLB,,, | Performed by: PAIN MEDICINE

## 2021-03-10 PROCEDURE — 3075F PR MOST RECENT SYSTOLIC BLOOD PRESS GE 130-139MM HG: ICD-10-PCS | Mod: CPTII,S$GLB,, | Performed by: PAIN MEDICINE

## 2021-03-10 PROCEDURE — 72110 XR LUMBAR SPINE COMPLETE 5 VIEW: ICD-10-PCS | Mod: 26,,, | Performed by: INTERNAL MEDICINE

## 2021-03-10 PROCEDURE — 3079F PR MOST RECENT DIASTOLIC BLOOD PRESSURE 80-89 MM HG: ICD-10-PCS | Mod: CPTII,S$GLB,, | Performed by: PAIN MEDICINE

## 2021-03-10 PROCEDURE — 99999 PR PBB SHADOW E&M-EST. PATIENT-LVL IV: ICD-10-PCS | Mod: PBBFAC,,, | Performed by: PAIN MEDICINE

## 2021-03-10 PROCEDURE — 1125F PR PAIN SEVERITY QUANTIFIED, PAIN PRESENT: ICD-10-PCS | Mod: S$GLB,,, | Performed by: PAIN MEDICINE

## 2021-03-28 DIAGNOSIS — I15.0 RENOVASCULAR HYPERTENSION: ICD-10-CM

## 2021-03-29 RX ORDER — LOSARTAN POTASSIUM 50 MG/1
50 TABLET ORAL DAILY
Qty: 90 TABLET | Refills: 1 | OUTPATIENT
Start: 2021-03-29

## 2021-03-31 DIAGNOSIS — I15.0 RENOVASCULAR HYPERTENSION: ICD-10-CM

## 2021-03-31 RX ORDER — LOSARTAN POTASSIUM 50 MG/1
50 TABLET ORAL DAILY
Qty: 90 TABLET | Refills: 1 | Status: CANCELLED | OUTPATIENT
Start: 2021-03-31

## 2021-04-01 ENCOUNTER — TELEPHONE (OUTPATIENT)
Dept: PAIN MEDICINE | Facility: CLINIC | Age: 81
End: 2021-04-01

## 2021-04-01 DIAGNOSIS — M47.816 LUMBAR SPONDYLOSIS: Primary | ICD-10-CM

## 2021-04-01 DIAGNOSIS — I15.0 RENOVASCULAR HYPERTENSION: ICD-10-CM

## 2021-04-01 DIAGNOSIS — F41.9 ANXIETY: ICD-10-CM

## 2021-04-01 RX ORDER — LOSARTAN POTASSIUM 50 MG/1
50 TABLET ORAL DAILY
Qty: 90 TABLET | Refills: 1 | Status: SHIPPED | OUTPATIENT
Start: 2021-04-01 | End: 2022-01-21 | Stop reason: SDUPTHER

## 2021-04-01 RX ORDER — BUPROPION HYDROCHLORIDE 150 MG/1
150 TABLET, EXTENDED RELEASE ORAL 2 TIMES DAILY
Qty: 180 TABLET | Refills: 1 | Status: SHIPPED | OUTPATIENT
Start: 2021-04-01 | End: 2022-04-07 | Stop reason: SDUPTHER

## 2021-04-20 ENCOUNTER — TELEPHONE (OUTPATIENT)
Dept: PAIN MEDICINE | Facility: CLINIC | Age: 81
End: 2021-04-20

## 2021-04-26 ENCOUNTER — OFFICE VISIT (OUTPATIENT)
Dept: HOME HEALTH SERVICES | Facility: CLINIC | Age: 81
End: 2021-04-26
Payer: MEDICARE

## 2021-04-26 VITALS
HEIGHT: 66 IN | HEART RATE: 91 BPM | TEMPERATURE: 97 F | OXYGEN SATURATION: 96 % | WEIGHT: 141 LBS | BODY MASS INDEX: 22.66 KG/M2

## 2021-04-26 DIAGNOSIS — Z91.89 AT RISK FOR MEDICATION NONCOMPLIANCE: ICD-10-CM

## 2021-04-26 DIAGNOSIS — R29.6 MULTIPLE FALLS: ICD-10-CM

## 2021-04-26 DIAGNOSIS — M48.061 SPINAL STENOSIS, LUMBAR REGION, WITHOUT NEUROGENIC CLAUDICATION: ICD-10-CM

## 2021-04-26 DIAGNOSIS — E78.5 HYPERLIPIDEMIA, UNSPECIFIED HYPERLIPIDEMIA TYPE: ICD-10-CM

## 2021-04-26 DIAGNOSIS — N18.32 STAGE 3B CHRONIC KIDNEY DISEASE: ICD-10-CM

## 2021-04-26 DIAGNOSIS — R26.9 ABNORMALITY OF GAIT AND MOBILITY: ICD-10-CM

## 2021-04-26 DIAGNOSIS — I10 ESSENTIAL HYPERTENSION: ICD-10-CM

## 2021-04-26 DIAGNOSIS — Z74.09 OTHER REDUCED MOBILITY: ICD-10-CM

## 2021-04-26 DIAGNOSIS — E21.3 HYPERPARATHYROIDISM: ICD-10-CM

## 2021-04-26 DIAGNOSIS — Z00.00 ENCOUNTER FOR PREVENTIVE HEALTH EXAMINATION: Primary | ICD-10-CM

## 2021-04-26 DIAGNOSIS — F33.2 SEVERE EPISODE OF RECURRENT MAJOR DEPRESSIVE DISORDER, WITHOUT PSYCHOTIC FEATURES: ICD-10-CM

## 2021-04-26 DIAGNOSIS — M46.1 SACROILIITIS: ICD-10-CM

## 2021-04-26 PROCEDURE — 99499 RISK ADDL DX/OHS AUDIT: ICD-10-PCS | Mod: S$GLB,,, | Performed by: NURSE PRACTITIONER

## 2021-04-26 PROCEDURE — 99499 UNLISTED E&M SERVICE: CPT | Mod: S$GLB,,, | Performed by: NURSE PRACTITIONER

## 2021-04-26 PROCEDURE — G0439 PR MEDICARE ANNUAL WELLNESS SUBSEQUENT VISIT: ICD-10-PCS | Mod: S$GLB,,, | Performed by: NURSE PRACTITIONER

## 2021-04-26 PROCEDURE — G0439 PPPS, SUBSEQ VISIT: HCPCS | Mod: S$GLB,,, | Performed by: NURSE PRACTITIONER

## 2021-04-26 PROCEDURE — 1125F AMNT PAIN NOTED PAIN PRSNT: CPT | Mod: S$GLB,,, | Performed by: NURSE PRACTITIONER

## 2021-04-26 PROCEDURE — 3288F FALL RISK ASSESSMENT DOCD: CPT | Mod: CPTII,S$GLB,, | Performed by: NURSE PRACTITIONER

## 2021-04-26 PROCEDURE — 3288F PR FALLS RISK ASSESSMENT DOCUMENTED: ICD-10-PCS | Mod: CPTII,S$GLB,, | Performed by: NURSE PRACTITIONER

## 2021-04-26 PROCEDURE — 1125F PR PAIN SEVERITY QUANTIFIED, PAIN PRESENT: ICD-10-PCS | Mod: S$GLB,,, | Performed by: NURSE PRACTITIONER

## 2021-04-26 PROCEDURE — 1100F PR PT FALLS ASSESS DOC 2+ FALLS/FALL W/INJURY/YR: ICD-10-PCS | Mod: CPTII,S$GLB,, | Performed by: NURSE PRACTITIONER

## 2021-04-26 PROCEDURE — 1100F PTFALLS ASSESS-DOCD GE2>/YR: CPT | Mod: CPTII,S$GLB,, | Performed by: NURSE PRACTITIONER

## 2021-04-27 ENCOUNTER — HOSPITAL ENCOUNTER (OUTPATIENT)
Facility: HOSPITAL | Age: 81
Discharge: HOME | End: 2021-04-27
Attending: PAIN MEDICINE | Admitting: PAIN MEDICINE
Payer: MEDICARE

## 2021-04-27 ENCOUNTER — TELEPHONE (OUTPATIENT)
Dept: PAIN MEDICINE | Facility: CLINIC | Age: 81
End: 2021-04-27

## 2021-04-27 VITALS
SYSTOLIC BLOOD PRESSURE: 126 MMHG | WEIGHT: 137 LBS | HEART RATE: 79 BPM | RESPIRATION RATE: 18 BRPM | OXYGEN SATURATION: 97 % | DIASTOLIC BLOOD PRESSURE: 59 MMHG | TEMPERATURE: 97 F | BODY MASS INDEX: 22.02 KG/M2 | HEIGHT: 66 IN

## 2021-04-27 DIAGNOSIS — M47.816 LUMBAR SPONDYLOSIS: Primary | ICD-10-CM

## 2021-04-27 DIAGNOSIS — G89.29 CHRONIC PAIN: ICD-10-CM

## 2021-04-27 PROCEDURE — 25500020 PHARM REV CODE 255: Performed by: PAIN MEDICINE

## 2021-04-27 PROCEDURE — 64493 INJ PARAVERT F JNT L/S 1 LEV: CPT | Mod: 50,,, | Performed by: PAIN MEDICINE

## 2021-04-27 PROCEDURE — 64493 INJ PARAVERT F JNT L/S 1 LEV: CPT | Mod: 50 | Performed by: PAIN MEDICINE

## 2021-04-27 PROCEDURE — 64493 PR INJ DX/THER AGNT PARAVERT FACET JOINT,IMG GUIDE,LUMBAR/SAC,1ST LVL: ICD-10-PCS | Mod: 50,,, | Performed by: PAIN MEDICINE

## 2021-04-27 PROCEDURE — 25000003 PHARM REV CODE 250: Performed by: PAIN MEDICINE

## 2021-04-27 PROCEDURE — 64494 PR INJ DX/THER AGNT PARAVERT FACET JOINT,IMG GUIDE,LUMBAR/SAC, 2ND LEVEL: ICD-10-PCS | Mod: 50,,, | Performed by: PAIN MEDICINE

## 2021-04-27 PROCEDURE — 99152 MOD SED SAME PHYS/QHP 5/>YRS: CPT | Performed by: PAIN MEDICINE

## 2021-04-27 PROCEDURE — 64494 INJ PARAVERT F JNT L/S 2 LEV: CPT | Mod: 50,,, | Performed by: PAIN MEDICINE

## 2021-04-27 PROCEDURE — 63600175 PHARM REV CODE 636 W HCPCS: Performed by: PAIN MEDICINE

## 2021-04-27 PROCEDURE — 64494 INJ PARAVERT F JNT L/S 2 LEV: CPT | Mod: 50 | Performed by: PAIN MEDICINE

## 2021-04-27 RX ORDER — METHYLPREDNISOLONE ACETATE 40 MG/ML
INJECTION, SUSPENSION INTRA-ARTICULAR; INTRALESIONAL; INTRAMUSCULAR; SOFT TISSUE
Status: DISCONTINUED | OUTPATIENT
Start: 2021-04-27 | End: 2021-04-27 | Stop reason: HOSPADM

## 2021-04-27 RX ORDER — SODIUM CHLORIDE 9 MG/ML
INJECTION, SOLUTION INTRAVENOUS CONTINUOUS
Status: DISCONTINUED | OUTPATIENT
Start: 2021-04-27 | End: 2023-05-15

## 2021-04-27 RX ORDER — FENTANYL CITRATE 50 UG/ML
INJECTION, SOLUTION INTRAMUSCULAR; INTRAVENOUS
Status: DISCONTINUED | OUTPATIENT
Start: 2021-04-27 | End: 2021-04-27 | Stop reason: HOSPADM

## 2021-04-27 RX ORDER — LIDOCAINE HYDROCHLORIDE 10 MG/ML
1 INJECTION, SOLUTION EPIDURAL; INFILTRATION; INTRACAUDAL; PERINEURAL ONCE
Status: DISCONTINUED | OUTPATIENT
Start: 2021-04-27 | End: 2023-05-15

## 2021-04-27 RX ORDER — SODIUM BICARBONATE 1 MEQ/ML
SYRINGE (ML) INTRAVENOUS
Status: DISCONTINUED | OUTPATIENT
Start: 2021-04-27 | End: 2021-04-27 | Stop reason: HOSPADM

## 2021-04-27 RX ORDER — MIDAZOLAM HYDROCHLORIDE 1 MG/ML
INJECTION, SOLUTION INTRAMUSCULAR; INTRAVENOUS
Status: DISCONTINUED | OUTPATIENT
Start: 2021-04-27 | End: 2021-04-27 | Stop reason: HOSPADM

## 2021-04-27 RX ORDER — BUPIVACAINE HYDROCHLORIDE 2.5 MG/ML
INJECTION, SOLUTION EPIDURAL; INFILTRATION; INTRACAUDAL
Status: DISCONTINUED | OUTPATIENT
Start: 2021-04-27 | End: 2021-04-27 | Stop reason: HOSPADM

## 2021-04-27 RX ORDER — LIDOCAINE HYDROCHLORIDE 10 MG/ML
INJECTION INFILTRATION; PERINEURAL
Status: DISCONTINUED | OUTPATIENT
Start: 2021-04-27 | End: 2021-04-27 | Stop reason: HOSPADM

## 2021-04-27 RX ADMIN — SODIUM CHLORIDE: 0.9 INJECTION, SOLUTION INTRAVENOUS at 02:04

## 2021-05-03 ENCOUNTER — CLINICAL SUPPORT (OUTPATIENT)
Dept: REHABILITATION | Facility: HOSPITAL | Age: 81
End: 2021-05-03
Attending: PAIN MEDICINE
Payer: MEDICARE

## 2021-05-03 ENCOUNTER — OUTPATIENT CASE MANAGEMENT (OUTPATIENT)
Dept: ADMINISTRATIVE | Facility: OTHER | Age: 81
End: 2021-05-03

## 2021-05-03 DIAGNOSIS — G47.00 INSOMNIA, UNSPECIFIED TYPE: ICD-10-CM

## 2021-05-03 DIAGNOSIS — R29.6 MULTIPLE FALLS: ICD-10-CM

## 2021-05-03 DIAGNOSIS — R52 PAIN AGGRAVATED BY STANDING: ICD-10-CM

## 2021-05-03 DIAGNOSIS — E21.3 HYPERPARATHYROIDISM: ICD-10-CM

## 2021-05-03 DIAGNOSIS — M53.86 DECREASED RANGE OF MOTION OF LUMBAR SPINE: ICD-10-CM

## 2021-05-03 DIAGNOSIS — N18.32 STAGE 3B CHRONIC KIDNEY DISEASE: Primary | ICD-10-CM

## 2021-05-03 DIAGNOSIS — E55.9 VITAMIN D DEFICIENCY: ICD-10-CM

## 2021-05-03 DIAGNOSIS — G89.29 CHRONIC BILATERAL LOW BACK PAIN WITHOUT SCIATICA: ICD-10-CM

## 2021-05-03 DIAGNOSIS — M54.50 CHRONIC BILATERAL LOW BACK PAIN WITHOUT SCIATICA: ICD-10-CM

## 2021-05-03 DIAGNOSIS — I10 ESSENTIAL HYPERTENSION: ICD-10-CM

## 2021-05-03 DIAGNOSIS — E78.5 HYPERLIPIDEMIA, UNSPECIFIED HYPERLIPIDEMIA TYPE: ICD-10-CM

## 2021-05-03 PROCEDURE — 97161 PT EVAL LOW COMPLEX 20 MIN: CPT | Mod: PN

## 2021-05-03 RX ORDER — ESZOPICLONE 1 MG/1
1 TABLET, FILM COATED ORAL NIGHTLY PRN
Qty: 30 TABLET | Refills: 2 | Status: SHIPPED | OUTPATIENT
Start: 2021-05-03 | End: 2021-05-24 | Stop reason: SDUPTHER

## 2021-05-12 ENCOUNTER — TELEPHONE (OUTPATIENT)
Dept: NEPHROLOGY | Facility: CLINIC | Age: 81
End: 2021-05-12

## 2021-05-20 ENCOUNTER — TELEPHONE (OUTPATIENT)
Dept: INTERNAL MEDICINE | Facility: CLINIC | Age: 81
End: 2021-05-20

## 2021-05-24 ENCOUNTER — OFFICE VISIT (OUTPATIENT)
Dept: INTERNAL MEDICINE | Facility: CLINIC | Age: 81
End: 2021-05-24
Payer: MEDICARE

## 2021-05-24 VITALS
BODY MASS INDEX: 24.24 KG/M2 | DIASTOLIC BLOOD PRESSURE: 70 MMHG | HEART RATE: 76 BPM | TEMPERATURE: 98 F | HEIGHT: 65 IN | SYSTOLIC BLOOD PRESSURE: 116 MMHG | RESPIRATION RATE: 14 BRPM | WEIGHT: 145.5 LBS

## 2021-05-24 DIAGNOSIS — D22.30 CHANGE IN FACIAL MOLE: ICD-10-CM

## 2021-05-24 DIAGNOSIS — E78.5 HYPERLIPIDEMIA, UNSPECIFIED HYPERLIPIDEMIA TYPE: ICD-10-CM

## 2021-05-24 DIAGNOSIS — I10 ESSENTIAL HYPERTENSION: ICD-10-CM

## 2021-05-24 DIAGNOSIS — Z12.83 SKIN CANCER SCREENING: ICD-10-CM

## 2021-05-24 DIAGNOSIS — M51.37 DEGENERATION OF LUMBAR OR LUMBOSACRAL INTERVERTEBRAL DISC: ICD-10-CM

## 2021-05-24 DIAGNOSIS — E21.3 HYPERPARATHYROIDISM: ICD-10-CM

## 2021-05-24 DIAGNOSIS — F41.9 ANXIETY: ICD-10-CM

## 2021-05-24 DIAGNOSIS — K21.9 GASTROESOPHAGEAL REFLUX DISEASE, UNSPECIFIED WHETHER ESOPHAGITIS PRESENT: ICD-10-CM

## 2021-05-24 DIAGNOSIS — M48.061 SPINAL STENOSIS, LUMBAR REGION, WITHOUT NEUROGENIC CLAUDICATION: ICD-10-CM

## 2021-05-24 DIAGNOSIS — F33.2 SEVERE EPISODE OF RECURRENT MAJOR DEPRESSIVE DISORDER, WITHOUT PSYCHOTIC FEATURES: ICD-10-CM

## 2021-05-24 DIAGNOSIS — M79.7 FIBROMYALGIA: ICD-10-CM

## 2021-05-24 DIAGNOSIS — Z00.00 ENCOUNTER FOR PREVENTIVE HEALTH EXAMINATION: Primary | ICD-10-CM

## 2021-05-24 DIAGNOSIS — N18.32 STAGE 3B CHRONIC KIDNEY DISEASE: ICD-10-CM

## 2021-05-24 DIAGNOSIS — G47.00 INSOMNIA, UNSPECIFIED TYPE: ICD-10-CM

## 2021-05-24 DIAGNOSIS — G25.81 RLS (RESTLESS LEGS SYNDROME): ICD-10-CM

## 2021-05-24 DIAGNOSIS — G47.33 OSA ON CPAP: ICD-10-CM

## 2021-05-24 PROCEDURE — 1101F PT FALLS ASSESS-DOCD LE1/YR: CPT | Mod: CPTII,S$GLB,, | Performed by: HOSPITALIST

## 2021-05-24 PROCEDURE — 99499 RISK ADDL DX/OHS AUDIT: ICD-10-PCS | Mod: S$GLB,,, | Performed by: HOSPITALIST

## 2021-05-24 PROCEDURE — 99999 PR PBB SHADOW E&M-EST. PATIENT-LVL IV: ICD-10-PCS | Mod: PBBFAC,,, | Performed by: HOSPITALIST

## 2021-05-24 PROCEDURE — 3288F FALL RISK ASSESSMENT DOCD: CPT | Mod: CPTII,S$GLB,, | Performed by: HOSPITALIST

## 2021-05-24 PROCEDURE — 1125F PR PAIN SEVERITY QUANTIFIED, PAIN PRESENT: ICD-10-PCS | Mod: S$GLB,,, | Performed by: HOSPITALIST

## 2021-05-24 PROCEDURE — 99999 PR PBB SHADOW E&M-EST. PATIENT-LVL IV: CPT | Mod: PBBFAC,,, | Performed by: HOSPITALIST

## 2021-05-24 PROCEDURE — 99499 UNLISTED E&M SERVICE: CPT | Mod: S$GLB,,, | Performed by: HOSPITALIST

## 2021-05-24 PROCEDURE — 3288F PR FALLS RISK ASSESSMENT DOCUMENTED: ICD-10-PCS | Mod: CPTII,S$GLB,, | Performed by: HOSPITALIST

## 2021-05-24 PROCEDURE — 99397 PER PM REEVAL EST PAT 65+ YR: CPT | Mod: S$GLB,,, | Performed by: HOSPITALIST

## 2021-05-24 PROCEDURE — 99397 PR PREVENTIVE VISIT,EST,65 & OVER: ICD-10-PCS | Mod: S$GLB,,, | Performed by: HOSPITALIST

## 2021-05-24 PROCEDURE — 1125F AMNT PAIN NOTED PAIN PRSNT: CPT | Mod: S$GLB,,, | Performed by: HOSPITALIST

## 2021-05-24 PROCEDURE — 1101F PR PT FALLS ASSESS DOC 0-1 FALLS W/OUT INJ PAST YR: ICD-10-PCS | Mod: CPTII,S$GLB,, | Performed by: HOSPITALIST

## 2021-05-24 RX ORDER — ZOSTER VACCINE RECOMBINANT, ADJUVANTED 50 MCG/0.5
0.5 KIT INTRAMUSCULAR ONCE
Qty: 1 EACH | Refills: 1 | Status: SHIPPED | OUTPATIENT
Start: 2021-05-24 | End: 2021-05-24

## 2021-05-24 RX ORDER — FERROUS GLUCONATE 324(38)MG
TABLET ORAL
COMMUNITY

## 2021-05-24 RX ORDER — DULOXETIN HYDROCHLORIDE 20 MG/1
CAPSULE, DELAYED RELEASE ORAL
Qty: 180 CAPSULE | Refills: 3 | Status: SHIPPED | OUTPATIENT
Start: 2021-05-24 | End: 2022-05-24 | Stop reason: SDUPTHER

## 2021-05-24 RX ORDER — ESZOPICLONE 1 MG/1
1 TABLET, FILM COATED ORAL NIGHTLY PRN
Qty: 90 TABLET | Refills: 1 | Status: SHIPPED | OUTPATIENT
Start: 2021-05-24 | End: 2021-12-01 | Stop reason: SDUPTHER

## 2021-05-24 RX ORDER — GABAPENTIN ENACARBIL 600 MG/1
600 TABLET, EXTENDED RELEASE ORAL NIGHTLY
Qty: 90 TABLET | Refills: 2 | Status: SHIPPED | OUTPATIENT
Start: 2021-05-24 | End: 2022-05-24 | Stop reason: SDUPTHER

## 2021-05-24 RX ORDER — HYDROCHLOROTHIAZIDE 12.5 MG/1
12.5 TABLET ORAL DAILY
Qty: 90 TABLET | Refills: 1 | Status: SHIPPED | OUTPATIENT
Start: 2021-05-24 | End: 2022-05-24 | Stop reason: SDUPTHER

## 2021-05-24 RX ORDER — FAMOTIDINE 20 MG/1
20 TABLET, FILM COATED ORAL DAILY
Qty: 90 TABLET | Refills: 3 | Status: SHIPPED | OUTPATIENT
Start: 2021-05-24 | End: 2022-05-24 | Stop reason: SDUPTHER

## 2021-05-24 RX ORDER — PRAVASTATIN SODIUM 40 MG/1
40 TABLET ORAL NIGHTLY
Qty: 90 TABLET | Refills: 1 | Status: SHIPPED | OUTPATIENT
Start: 2021-05-24 | End: 2021-05-27

## 2021-05-26 ENCOUNTER — LAB VISIT (OUTPATIENT)
Dept: LAB | Facility: HOSPITAL | Age: 81
End: 2021-05-26
Attending: HOSPITALIST
Payer: MEDICARE

## 2021-05-26 ENCOUNTER — CLINICAL SUPPORT (OUTPATIENT)
Dept: REHABILITATION | Facility: HOSPITAL | Age: 81
End: 2021-05-26
Attending: PAIN MEDICINE
Payer: MEDICARE

## 2021-05-26 DIAGNOSIS — E55.9 VITAMIN D DEFICIENCY: ICD-10-CM

## 2021-05-26 DIAGNOSIS — M53.86 DECREASED RANGE OF MOTION OF LUMBAR SPINE: ICD-10-CM

## 2021-05-26 DIAGNOSIS — R52 PAIN AGGRAVATED BY STANDING: ICD-10-CM

## 2021-05-26 DIAGNOSIS — I10 ESSENTIAL HYPERTENSION: ICD-10-CM

## 2021-05-26 DIAGNOSIS — E21.3 HYPERPARATHYROIDISM: ICD-10-CM

## 2021-05-26 DIAGNOSIS — E78.5 HYPERLIPIDEMIA, UNSPECIFIED HYPERLIPIDEMIA TYPE: ICD-10-CM

## 2021-05-26 LAB
25(OH)D3+25(OH)D2 SERPL-MCNC: 32 NG/ML (ref 30–96)
ALBUMIN SERPL BCP-MCNC: 3.4 G/DL (ref 3.5–5.2)
ALP SERPL-CCNC: 50 U/L (ref 55–135)
ALT SERPL W/O P-5'-P-CCNC: 15 U/L (ref 10–44)
ANION GAP SERPL CALC-SCNC: 9 MMOL/L (ref 8–16)
AST SERPL-CCNC: 17 U/L (ref 10–40)
BASOPHILS # BLD AUTO: 0.05 K/UL (ref 0–0.2)
BASOPHILS NFR BLD: 0.8 % (ref 0–1.9)
BILIRUB SERPL-MCNC: 0.4 MG/DL (ref 0.1–1)
BUN SERPL-MCNC: 23 MG/DL (ref 8–23)
CALCIUM SERPL-MCNC: 9.4 MG/DL (ref 8.7–10.5)
CHLORIDE SERPL-SCNC: 108 MMOL/L (ref 95–110)
CHOLEST SERPL-MCNC: 237 MG/DL (ref 120–199)
CHOLEST/HDLC SERPL: 3.5 {RATIO} (ref 2–5)
CO2 SERPL-SCNC: 24 MMOL/L (ref 23–29)
CREAT SERPL-MCNC: 1.3 MG/DL (ref 0.5–1.4)
DIFFERENTIAL METHOD: ABNORMAL
EOSINOPHIL # BLD AUTO: 0.1 K/UL (ref 0–0.5)
EOSINOPHIL NFR BLD: 2.1 % (ref 0–8)
ERYTHROCYTE [DISTWIDTH] IN BLOOD BY AUTOMATED COUNT: 13.9 % (ref 11.5–14.5)
EST. GFR  (AFRICAN AMERICAN): 43.8 ML/MIN/1.73 M^2
EST. GFR  (NON AFRICAN AMERICAN): 38 ML/MIN/1.73 M^2
GLUCOSE SERPL-MCNC: 93 MG/DL (ref 70–110)
HCT VFR BLD AUTO: 36.2 % (ref 37–48.5)
HDLC SERPL-MCNC: 67 MG/DL (ref 40–75)
HDLC SERPL: 28.3 % (ref 20–50)
HGB BLD-MCNC: 11.2 G/DL (ref 12–16)
IMM GRANULOCYTES # BLD AUTO: 0.02 K/UL (ref 0–0.04)
IMM GRANULOCYTES NFR BLD AUTO: 0.3 % (ref 0–0.5)
LDLC SERPL CALC-MCNC: 148.8 MG/DL (ref 63–159)
LYMPHOCYTES # BLD AUTO: 1.6 K/UL (ref 1–4.8)
LYMPHOCYTES NFR BLD: 24.9 % (ref 18–48)
MCH RBC QN AUTO: 30.1 PG (ref 27–31)
MCHC RBC AUTO-ENTMCNC: 30.9 G/DL (ref 32–36)
MCV RBC AUTO: 97 FL (ref 82–98)
MONOCYTES # BLD AUTO: 0.6 K/UL (ref 0.3–1)
MONOCYTES NFR BLD: 9 % (ref 4–15)
NEUTROPHILS # BLD AUTO: 3.9 K/UL (ref 1.8–7.7)
NEUTROPHILS NFR BLD: 62.9 % (ref 38–73)
NONHDLC SERPL-MCNC: 170 MG/DL
NRBC BLD-RTO: 0 /100 WBC
PLATELET # BLD AUTO: 358 K/UL (ref 150–450)
PMV BLD AUTO: 10.4 FL (ref 9.2–12.9)
POTASSIUM SERPL-SCNC: 3.8 MMOL/L (ref 3.5–5.1)
PROT SERPL-MCNC: 6.8 G/DL (ref 6–8.4)
PTH-INTACT SERPL-MCNC: 147 PG/ML (ref 9–77)
RBC # BLD AUTO: 3.72 M/UL (ref 4–5.4)
SODIUM SERPL-SCNC: 141 MMOL/L (ref 136–145)
TRIGL SERPL-MCNC: 106 MG/DL (ref 30–150)
TSH SERPL DL<=0.005 MIU/L-ACNC: 1.96 UIU/ML (ref 0.4–4)
WBC # BLD AUTO: 6.23 K/UL (ref 3.9–12.7)

## 2021-05-26 PROCEDURE — 36415 COLL VENOUS BLD VENIPUNCTURE: CPT | Mod: PO | Performed by: HOSPITALIST

## 2021-05-26 PROCEDURE — 97110 THERAPEUTIC EXERCISES: CPT | Mod: PN,CQ

## 2021-05-26 PROCEDURE — 85025 COMPLETE CBC W/AUTO DIFF WBC: CPT | Performed by: HOSPITALIST

## 2021-05-26 PROCEDURE — 80053 COMPREHEN METABOLIC PANEL: CPT | Performed by: HOSPITALIST

## 2021-05-26 PROCEDURE — 97116 GAIT TRAINING THERAPY: CPT | Mod: PN,CQ

## 2021-05-26 PROCEDURE — 83970 ASSAY OF PARATHORMONE: CPT | Performed by: HOSPITALIST

## 2021-05-26 PROCEDURE — 82306 VITAMIN D 25 HYDROXY: CPT | Performed by: HOSPITALIST

## 2021-05-26 PROCEDURE — 80061 LIPID PANEL: CPT | Performed by: HOSPITALIST

## 2021-05-26 PROCEDURE — 84443 ASSAY THYROID STIM HORMONE: CPT | Performed by: HOSPITALIST

## 2021-05-27 DIAGNOSIS — E78.5 HYPERLIPIDEMIA, UNSPECIFIED HYPERLIPIDEMIA TYPE: ICD-10-CM

## 2021-05-27 RX ORDER — PRAVASTATIN SODIUM 80 MG/1
80 TABLET ORAL NIGHTLY
Qty: 90 TABLET | Refills: 1 | Status: SHIPPED | OUTPATIENT
Start: 2021-05-27 | End: 2022-05-24 | Stop reason: SDUPTHER

## 2021-05-27 RX ORDER — PRAVASTATIN SODIUM 80 MG/1
80 TABLET ORAL NIGHTLY
Qty: 90 TABLET | Refills: 1 | Status: SHIPPED | OUTPATIENT
Start: 2021-05-27 | End: 2021-05-27

## 2021-05-28 ENCOUNTER — TELEPHONE (OUTPATIENT)
Dept: INTERNAL MEDICINE | Facility: CLINIC | Age: 81
End: 2021-05-28

## 2021-06-03 ENCOUNTER — TELEPHONE (OUTPATIENT)
Dept: INTERNAL MEDICINE | Facility: CLINIC | Age: 81
End: 2021-06-03

## 2021-06-03 ENCOUNTER — CLINICAL SUPPORT (OUTPATIENT)
Dept: REHABILITATION | Facility: HOSPITAL | Age: 81
End: 2021-06-03
Attending: PAIN MEDICINE
Payer: MEDICARE

## 2021-06-03 ENCOUNTER — OFFICE VISIT (OUTPATIENT)
Dept: NEPHROLOGY | Facility: CLINIC | Age: 81
End: 2021-06-03
Payer: MEDICARE

## 2021-06-03 VITALS
BODY MASS INDEX: 24.5 KG/M2 | SYSTOLIC BLOOD PRESSURE: 120 MMHG | DIASTOLIC BLOOD PRESSURE: 80 MMHG | WEIGHT: 147.06 LBS | HEIGHT: 65 IN | OXYGEN SATURATION: 97 % | HEART RATE: 99 BPM

## 2021-06-03 DIAGNOSIS — R52 PAIN AGGRAVATED BY STANDING: ICD-10-CM

## 2021-06-03 DIAGNOSIS — M53.86 DECREASED RANGE OF MOTION OF LUMBAR SPINE: ICD-10-CM

## 2021-06-03 DIAGNOSIS — D50.9 IRON DEFICIENCY ANEMIA, UNSPECIFIED IRON DEFICIENCY ANEMIA TYPE: ICD-10-CM

## 2021-06-03 DIAGNOSIS — N18.32 STAGE 3B CHRONIC KIDNEY DISEASE: Primary | ICD-10-CM

## 2021-06-03 DIAGNOSIS — I10 ESSENTIAL HYPERTENSION: ICD-10-CM

## 2021-06-03 DIAGNOSIS — E55.9 VITAMIN D DEFICIENCY: ICD-10-CM

## 2021-06-03 DIAGNOSIS — E21.3 HYPERPARATHYROIDISM: ICD-10-CM

## 2021-06-03 PROCEDURE — 1101F PR PT FALLS ASSESS DOC 0-1 FALLS W/OUT INJ PAST YR: ICD-10-PCS | Mod: CPTII,S$GLB,, | Performed by: INTERNAL MEDICINE

## 2021-06-03 PROCEDURE — 1125F AMNT PAIN NOTED PAIN PRSNT: CPT | Mod: S$GLB,,, | Performed by: INTERNAL MEDICINE

## 2021-06-03 PROCEDURE — 1101F PT FALLS ASSESS-DOCD LE1/YR: CPT | Mod: CPTII,S$GLB,, | Performed by: INTERNAL MEDICINE

## 2021-06-03 PROCEDURE — 3288F FALL RISK ASSESSMENT DOCD: CPT | Mod: CPTII,S$GLB,, | Performed by: INTERNAL MEDICINE

## 2021-06-03 PROCEDURE — 1125F PR PAIN SEVERITY QUANTIFIED, PAIN PRESENT: ICD-10-PCS | Mod: S$GLB,,, | Performed by: INTERNAL MEDICINE

## 2021-06-03 PROCEDURE — 99214 PR OFFICE/OUTPT VISIT, EST, LEVL IV, 30-39 MIN: ICD-10-PCS | Mod: S$GLB,,, | Performed by: INTERNAL MEDICINE

## 2021-06-03 PROCEDURE — 1159F PR MEDICATION LIST DOCUMENTED IN MEDICAL RECORD: ICD-10-PCS | Mod: S$GLB,,, | Performed by: INTERNAL MEDICINE

## 2021-06-03 PROCEDURE — 99999 PR PBB SHADOW E&M-EST. PATIENT-LVL III: ICD-10-PCS | Mod: PBBFAC,,, | Performed by: INTERNAL MEDICINE

## 2021-06-03 PROCEDURE — 3288F PR FALLS RISK ASSESSMENT DOCUMENTED: ICD-10-PCS | Mod: CPTII,S$GLB,, | Performed by: INTERNAL MEDICINE

## 2021-06-03 PROCEDURE — 97110 THERAPEUTIC EXERCISES: CPT | Mod: PN,CQ

## 2021-06-03 PROCEDURE — 99214 OFFICE O/P EST MOD 30 MIN: CPT | Mod: S$GLB,,, | Performed by: INTERNAL MEDICINE

## 2021-06-03 PROCEDURE — 1159F MED LIST DOCD IN RCRD: CPT | Mod: S$GLB,,, | Performed by: INTERNAL MEDICINE

## 2021-06-03 PROCEDURE — 99999 PR PBB SHADOW E&M-EST. PATIENT-LVL III: CPT | Mod: PBBFAC,,, | Performed by: INTERNAL MEDICINE

## 2021-06-04 ENCOUNTER — CLINICAL SUPPORT (OUTPATIENT)
Dept: REHABILITATION | Facility: HOSPITAL | Age: 81
End: 2021-06-04
Attending: PAIN MEDICINE
Payer: MEDICARE

## 2021-06-04 DIAGNOSIS — M53.86 DECREASED RANGE OF MOTION OF LUMBAR SPINE: ICD-10-CM

## 2021-06-04 DIAGNOSIS — R52 PAIN AGGRAVATED BY STANDING: ICD-10-CM

## 2021-06-04 PROCEDURE — 97110 THERAPEUTIC EXERCISES: CPT | Mod: PN

## 2021-06-04 PROCEDURE — 97112 NEUROMUSCULAR REEDUCATION: CPT | Mod: PN

## 2021-06-11 ENCOUNTER — CLINICAL SUPPORT (OUTPATIENT)
Dept: REHABILITATION | Facility: HOSPITAL | Age: 81
End: 2021-06-11
Attending: PAIN MEDICINE
Payer: MEDICARE

## 2021-06-11 DIAGNOSIS — R52 PAIN AGGRAVATED BY STANDING: ICD-10-CM

## 2021-06-11 DIAGNOSIS — M53.86 DECREASED RANGE OF MOTION OF LUMBAR SPINE: ICD-10-CM

## 2021-06-11 PROCEDURE — 97110 THERAPEUTIC EXERCISES: CPT | Mod: PN

## 2021-06-11 PROCEDURE — 97112 NEUROMUSCULAR REEDUCATION: CPT | Mod: PN

## 2021-06-16 ENCOUNTER — PATIENT MESSAGE (OUTPATIENT)
Dept: PAIN MEDICINE | Facility: CLINIC | Age: 81
End: 2021-06-16

## 2021-06-18 ENCOUNTER — HOSPITAL ENCOUNTER (OUTPATIENT)
Dept: RADIOLOGY | Facility: HOSPITAL | Age: 81
Discharge: HOME | End: 2021-06-18
Attending: INTERNAL MEDICINE
Payer: MEDICARE

## 2021-06-18 DIAGNOSIS — N18.32 STAGE 3B CHRONIC KIDNEY DISEASE: ICD-10-CM

## 2021-06-18 PROCEDURE — 76770 US EXAM ABDO BACK WALL COMP: CPT | Mod: TC

## 2021-06-18 PROCEDURE — 76770 US EXAM ABDO BACK WALL COMP: CPT | Mod: 26,,, | Performed by: RADIOLOGY

## 2021-06-18 PROCEDURE — 76770 US RETROPERITONEAL COMPLETE: ICD-10-PCS | Mod: 26,,, | Performed by: RADIOLOGY

## 2021-06-23 ENCOUNTER — CLINICAL SUPPORT (OUTPATIENT)
Dept: REHABILITATION | Facility: HOSPITAL | Age: 81
End: 2021-06-23
Attending: PAIN MEDICINE
Payer: MEDICARE

## 2021-06-23 DIAGNOSIS — R52 PAIN AGGRAVATED BY STANDING: ICD-10-CM

## 2021-06-23 DIAGNOSIS — M53.86 DECREASED RANGE OF MOTION OF LUMBAR SPINE: ICD-10-CM

## 2021-06-23 PROCEDURE — 97112 NEUROMUSCULAR REEDUCATION: CPT | Mod: PN

## 2021-06-23 PROCEDURE — 97110 THERAPEUTIC EXERCISES: CPT | Mod: PN

## 2021-06-30 ENCOUNTER — CLINICAL SUPPORT (OUTPATIENT)
Dept: REHABILITATION | Facility: HOSPITAL | Age: 81
End: 2021-06-30
Attending: PAIN MEDICINE
Payer: MEDICARE

## 2021-06-30 DIAGNOSIS — M53.86 DECREASED RANGE OF MOTION OF LUMBAR SPINE: ICD-10-CM

## 2021-06-30 DIAGNOSIS — R52 PAIN AGGRAVATED BY STANDING: ICD-10-CM

## 2021-06-30 PROCEDURE — 97110 THERAPEUTIC EXERCISES: CPT | Mod: PN

## 2021-06-30 PROCEDURE — 97112 NEUROMUSCULAR REEDUCATION: CPT | Mod: PN

## 2021-07-02 ENCOUNTER — CLINICAL SUPPORT (OUTPATIENT)
Dept: REHABILITATION | Facility: HOSPITAL | Age: 81
End: 2021-07-02
Attending: PAIN MEDICINE
Payer: MEDICARE

## 2021-07-02 DIAGNOSIS — M53.86 DECREASED RANGE OF MOTION OF LUMBAR SPINE: ICD-10-CM

## 2021-07-02 DIAGNOSIS — R52 PAIN AGGRAVATED BY STANDING: ICD-10-CM

## 2021-07-02 PROCEDURE — 97110 THERAPEUTIC EXERCISES: CPT | Mod: PN

## 2021-07-02 PROCEDURE — 97112 NEUROMUSCULAR REEDUCATION: CPT | Mod: PN

## 2021-07-08 ENCOUNTER — PATIENT OUTREACH (OUTPATIENT)
Dept: ADMINISTRATIVE | Facility: OTHER | Age: 81
End: 2021-07-08

## 2021-07-20 ENCOUNTER — CLINICAL SUPPORT (OUTPATIENT)
Dept: REHABILITATION | Facility: HOSPITAL | Age: 81
End: 2021-07-20
Payer: MEDICARE

## 2021-07-20 DIAGNOSIS — R52 PAIN AGGRAVATED BY STANDING: ICD-10-CM

## 2021-07-20 DIAGNOSIS — M53.86 DECREASED RANGE OF MOTION OF LUMBAR SPINE: ICD-10-CM

## 2021-07-20 PROCEDURE — 97112 NEUROMUSCULAR REEDUCATION: CPT | Mod: PN

## 2021-07-20 PROCEDURE — 97110 THERAPEUTIC EXERCISES: CPT | Mod: PN

## 2021-07-22 ENCOUNTER — CLINICAL SUPPORT (OUTPATIENT)
Dept: REHABILITATION | Facility: HOSPITAL | Age: 81
End: 2021-07-22
Payer: MEDICARE

## 2021-07-22 DIAGNOSIS — M53.86 DECREASED RANGE OF MOTION OF LUMBAR SPINE: ICD-10-CM

## 2021-07-22 DIAGNOSIS — R52 PAIN AGGRAVATED BY STANDING: ICD-10-CM

## 2021-07-22 PROCEDURE — 97110 THERAPEUTIC EXERCISES: CPT | Mod: PN

## 2021-07-22 PROCEDURE — 97112 NEUROMUSCULAR REEDUCATION: CPT | Mod: PN

## 2021-08-04 ENCOUNTER — CLINICAL SUPPORT (OUTPATIENT)
Dept: REHABILITATION | Facility: HOSPITAL | Age: 81
End: 2021-08-04
Payer: MEDICARE

## 2021-08-04 DIAGNOSIS — M53.86 DECREASED RANGE OF MOTION OF LUMBAR SPINE: ICD-10-CM

## 2021-08-04 DIAGNOSIS — R52 PAIN AGGRAVATED BY STANDING: ICD-10-CM

## 2021-08-04 PROCEDURE — 97112 NEUROMUSCULAR REEDUCATION: CPT | Mod: PN

## 2021-08-04 PROCEDURE — 97110 THERAPEUTIC EXERCISES: CPT | Mod: PN

## 2021-08-27 ENCOUNTER — CLINICAL SUPPORT (OUTPATIENT)
Dept: REHABILITATION | Facility: HOSPITAL | Age: 81
End: 2021-08-27
Payer: MEDICARE

## 2021-08-27 DIAGNOSIS — M53.86 DECREASED RANGE OF MOTION OF LUMBAR SPINE: ICD-10-CM

## 2021-08-27 DIAGNOSIS — R52 PAIN AGGRAVATED BY STANDING: Primary | ICD-10-CM

## 2021-08-27 PROCEDURE — 97112 NEUROMUSCULAR REEDUCATION: CPT | Mod: PN

## 2021-08-27 PROCEDURE — 97110 THERAPEUTIC EXERCISES: CPT | Mod: PN

## 2021-09-23 ENCOUNTER — CLINICAL SUPPORT (OUTPATIENT)
Dept: REHABILITATION | Facility: HOSPITAL | Age: 81
End: 2021-09-23
Payer: MEDICARE

## 2021-09-23 DIAGNOSIS — M53.86 DECREASED RANGE OF MOTION OF LUMBAR SPINE: ICD-10-CM

## 2021-09-23 DIAGNOSIS — R52 PAIN AGGRAVATED BY STANDING: ICD-10-CM

## 2021-09-23 PROCEDURE — 97110 THERAPEUTIC EXERCISES: CPT | Mod: HCNC,PN

## 2021-09-23 PROCEDURE — 97112 NEUROMUSCULAR REEDUCATION: CPT | Mod: HCNC,PN

## 2021-09-28 ENCOUNTER — TELEPHONE (OUTPATIENT)
Dept: REHABILITATION | Facility: HOSPITAL | Age: 81
End: 2021-09-28

## 2021-10-06 ENCOUNTER — CLINICAL SUPPORT (OUTPATIENT)
Dept: REHABILITATION | Facility: HOSPITAL | Age: 81
End: 2021-10-06
Payer: MEDICARE

## 2021-10-06 DIAGNOSIS — R52 PAIN AGGRAVATED BY STANDING: ICD-10-CM

## 2021-10-06 DIAGNOSIS — M53.86 DECREASED RANGE OF MOTION OF LUMBAR SPINE: ICD-10-CM

## 2021-10-06 PROCEDURE — 97110 THERAPEUTIC EXERCISES: CPT | Mod: HCNC,PN

## 2021-10-06 PROCEDURE — 97112 NEUROMUSCULAR REEDUCATION: CPT | Mod: HCNC,PN

## 2021-10-12 ENCOUNTER — CLINICAL SUPPORT (OUTPATIENT)
Dept: REHABILITATION | Facility: HOSPITAL | Age: 81
End: 2021-10-12
Payer: MEDICARE

## 2021-10-12 DIAGNOSIS — M53.86 DECREASED RANGE OF MOTION OF LUMBAR SPINE: ICD-10-CM

## 2021-10-12 DIAGNOSIS — R52 PAIN AGGRAVATED BY STANDING: ICD-10-CM

## 2021-10-12 PROCEDURE — 97110 THERAPEUTIC EXERCISES: CPT | Mod: HCNC,PN

## 2021-10-20 ENCOUNTER — CLINICAL SUPPORT (OUTPATIENT)
Dept: REHABILITATION | Facility: HOSPITAL | Age: 81
End: 2021-10-20
Payer: MEDICARE

## 2021-10-20 DIAGNOSIS — M53.86 DECREASED RANGE OF MOTION OF LUMBAR SPINE: ICD-10-CM

## 2021-10-20 DIAGNOSIS — R52 PAIN AGGRAVATED BY STANDING: ICD-10-CM

## 2021-10-20 PROCEDURE — 97110 THERAPEUTIC EXERCISES: CPT | Mod: HCNC,PN

## 2021-10-21 ENCOUNTER — TELEPHONE (OUTPATIENT)
Dept: INTERNAL MEDICINE | Facility: CLINIC | Age: 81
End: 2021-10-21

## 2021-10-21 RX ORDER — METHYLPREDNISOLONE 4 MG/1
TABLET ORAL
Qty: 21 EACH | Refills: 0 | Status: SHIPPED | OUTPATIENT
Start: 2021-10-21 | End: 2021-11-11

## 2021-11-15 ENCOUNTER — PATIENT OUTREACH (OUTPATIENT)
Dept: ADMINISTRATIVE | Facility: OTHER | Age: 81
End: 2021-11-15
Payer: MEDICARE

## 2021-11-16 ENCOUNTER — OFFICE VISIT (OUTPATIENT)
Dept: DERMATOLOGY | Facility: CLINIC | Age: 81
End: 2021-11-16
Payer: MEDICARE

## 2021-11-16 VITALS — BODY MASS INDEX: 24.46 KG/M2 | WEIGHT: 147 LBS

## 2021-11-16 DIAGNOSIS — Z12.83 SKIN CANCER SCREENING: ICD-10-CM

## 2021-11-16 DIAGNOSIS — L72.0 MILIUM CYST: Primary | ICD-10-CM

## 2021-11-16 DIAGNOSIS — L81.4 LENTIGINES: ICD-10-CM

## 2021-11-16 DIAGNOSIS — D22.30 CHANGE IN FACIAL MOLE: ICD-10-CM

## 2021-11-16 PROCEDURE — 3288F PR FALLS RISK ASSESSMENT DOCUMENTED: ICD-10-PCS | Mod: HCNC,CPTII,S$GLB, | Performed by: DERMATOLOGY

## 2021-11-16 PROCEDURE — 1160F RVW MEDS BY RX/DR IN RCRD: CPT | Mod: HCNC,CPTII,S$GLB, | Performed by: DERMATOLOGY

## 2021-11-16 PROCEDURE — 3288F FALL RISK ASSESSMENT DOCD: CPT | Mod: HCNC,CPTII,S$GLB, | Performed by: DERMATOLOGY

## 2021-11-16 PROCEDURE — 99202 PR OFFICE/OUTPT VISIT, NEW, LEVL II, 15-29 MIN: ICD-10-PCS | Mod: HCNC,S$GLB,, | Performed by: DERMATOLOGY

## 2021-11-16 PROCEDURE — 1159F PR MEDICATION LIST DOCUMENTED IN MEDICAL RECORD: ICD-10-PCS | Mod: HCNC,CPTII,S$GLB, | Performed by: DERMATOLOGY

## 2021-11-16 PROCEDURE — 1160F PR REVIEW ALL MEDS BY PRESCRIBER/CLIN PHARMACIST DOCUMENTED: ICD-10-PCS | Mod: HCNC,CPTII,S$GLB, | Performed by: DERMATOLOGY

## 2021-11-16 PROCEDURE — 1101F PR PT FALLS ASSESS DOC 0-1 FALLS W/OUT INJ PAST YR: ICD-10-PCS | Mod: HCNC,CPTII,S$GLB, | Performed by: DERMATOLOGY

## 2021-11-16 PROCEDURE — 1101F PT FALLS ASSESS-DOCD LE1/YR: CPT | Mod: HCNC,CPTII,S$GLB, | Performed by: DERMATOLOGY

## 2021-11-16 PROCEDURE — 99999 PR PBB SHADOW E&M-EST. PATIENT-LVL III: CPT | Mod: PBBFAC,HCNC,, | Performed by: DERMATOLOGY

## 2021-11-16 PROCEDURE — 99202 OFFICE O/P NEW SF 15 MIN: CPT | Mod: HCNC,S$GLB,, | Performed by: DERMATOLOGY

## 2021-11-16 PROCEDURE — 99999 PR PBB SHADOW E&M-EST. PATIENT-LVL III: ICD-10-PCS | Mod: PBBFAC,HCNC,, | Performed by: DERMATOLOGY

## 2021-11-16 PROCEDURE — 1126F PR PAIN SEVERITY QUANTIFIED, NO PAIN PRESENT: ICD-10-PCS | Mod: HCNC,CPTII,S$GLB, | Performed by: DERMATOLOGY

## 2021-11-16 PROCEDURE — 1126F AMNT PAIN NOTED NONE PRSNT: CPT | Mod: HCNC,CPTII,S$GLB, | Performed by: DERMATOLOGY

## 2021-11-16 PROCEDURE — 1159F MED LIST DOCD IN RCRD: CPT | Mod: HCNC,CPTII,S$GLB, | Performed by: DERMATOLOGY

## 2021-12-01 DIAGNOSIS — G47.00 INSOMNIA, UNSPECIFIED TYPE: ICD-10-CM

## 2021-12-01 RX ORDER — ESZOPICLONE 1 MG/1
1 TABLET, FILM COATED ORAL NIGHTLY PRN
Qty: 90 TABLET | Refills: 1 | Status: CANCELLED | OUTPATIENT
Start: 2021-12-01 | End: 2022-03-01

## 2021-12-02 DIAGNOSIS — G47.00 INSOMNIA, UNSPECIFIED TYPE: ICD-10-CM

## 2021-12-02 RX ORDER — ESZOPICLONE 1 MG/1
1 TABLET, FILM COATED ORAL NIGHTLY PRN
Qty: 90 TABLET | Refills: 1 | Status: SHIPPED | OUTPATIENT
Start: 2021-12-02 | End: 2022-05-09

## 2022-01-06 ENCOUNTER — PATIENT OUTREACH (OUTPATIENT)
Dept: ADMINISTRATIVE | Facility: OTHER | Age: 82
End: 2022-01-06
Payer: MEDICARE

## 2022-01-06 NOTE — PROGRESS NOTES
Health Maintenance Due   Topic Date Due    Shingles Vaccine (1 of 2) Never done    COVID-19 Vaccine (3 - Booster for Moderna series) 09/23/2021     Updates were requested from care everywhere.  Chart was reviewed for overdue [facility]active [facility] En[facility]ters ([facility]) topics (CRS, Breast Cancer Screening, Eye exam)  Health Maintenance has been updated.  LINKS immunization registry triggered.  Immunizations were reconciled.

## 2022-01-07 ENCOUNTER — TELEPHONE (OUTPATIENT)
Dept: NEPHROLOGY | Facility: CLINIC | Age: 82
End: 2022-01-07
Payer: MEDICARE

## 2022-01-07 NOTE — TELEPHONE ENCOUNTER
----- Message from Carlee Dasilva, Patient Care Assistant sent at 1/7/2022 10:09 AM CST -----  Regarding: reschedule appt  Contact: Pt  Pt is requesting a  call back in regards to rescheduling appt that was on 1/7 but pt is having car trouble.       Pt @ [phone]

## 2022-01-21 DIAGNOSIS — I15.0 RENOVASCULAR HYPERTENSION: ICD-10-CM

## 2022-01-21 RX ORDER — LOSARTAN POTASSIUM 50 MG/1
50 TABLET ORAL DAILY
Qty: 90 TABLET | Refills: 1 | Status: SHIPPED | OUTPATIENT
Start: 2022-01-21 | End: 2022-11-08

## 2022-01-21 NOTE — TELEPHONE ENCOUNTER
----- Message from Deepa Haile sent at 1/21/2022  4:23 PM CST -----  Contact: pt  Requesting an RX refill or new RX.  Is this a refill or new RX: refill  RX name and strength (copy/paste from chart): losartan (COZAAR) 50 MG tablet   Is this a 30 day or 90 day RX:   Patient advised that in the future they can use their [facility] account to request a refill?:yes  Patient advised that RX refills can take up to 72 hours?: yes  Pharmacy name and phone # (copy/paste from chart):    [facility]  [address] [facility] [address]  [facility] [address]  Phone: [phone] Fax: [phone]      Comments: pt stated she is out of medication and would like to get this called in today

## 2022-01-27 ENCOUNTER — LAB VISIT (OUTPATIENT)
Dept: LAB | Facility: HOSPITAL | Age: 82
End: 2022-01-27
Attending: HOSPITALIST
Payer: MEDICARE

## 2022-01-27 DIAGNOSIS — D50.9 IRON DEFICIENCY ANEMIA, UNSPECIFIED IRON DEFICIENCY ANEMIA TYPE: ICD-10-CM

## 2022-01-27 DIAGNOSIS — E55.9 VITAMIN D DEFICIENCY: ICD-10-CM

## 2022-01-27 DIAGNOSIS — N18.32 STAGE 3B CHRONIC KIDNEY DISEASE: ICD-10-CM

## 2022-01-27 DIAGNOSIS — E21.3 HYPERPARATHYROIDISM: ICD-10-CM

## 2022-01-27 LAB
25(OH)D3+25(OH)D2 SERPL-MCNC: 33 NG/ML (ref 30–96)
ALBUMIN SERPL BCP-MCNC: 3.2 G/DL (ref 3.5–5.2)
ANION GAP SERPL CALC-SCNC: 7 MMOL/L (ref 8–16)
BASOPHILS # BLD AUTO: 0.06 K/UL (ref 0–0.2)
BASOPHILS NFR BLD: 0.7 % (ref 0–1.9)
BUN SERPL-MCNC: 19 MG/DL (ref 8–23)
CALCIUM SERPL-MCNC: 9.8 MG/DL (ref 8.7–10.5)
CHLORIDE SERPL-SCNC: 103 MMOL/L (ref 95–110)
CO2 SERPL-SCNC: 30 MMOL/L (ref 23–29)
CREAT SERPL-MCNC: 1.3 MG/DL (ref 0.5–1.4)
DIFFERENTIAL METHOD: ABNORMAL
EOSINOPHIL # BLD AUTO: 0.2 K/UL (ref 0–0.5)
EOSINOPHIL NFR BLD: 2.5 % (ref 0–8)
ERYTHROCYTE [DISTWIDTH] IN BLOOD BY AUTOMATED COUNT: 13.7 % (ref 11.5–14.5)
EST. GFR  (AFRICAN AMERICAN): 43.8 ML/MIN/1.73 M^2
EST. GFR  (NON AFRICAN AMERICAN): 38 ML/MIN/1.73 M^2
FERRITIN SERPL-MCNC: 39 NG/ML (ref 20–300)
GLUCOSE SERPL-MCNC: 79 MG/DL (ref 70–110)
HCT VFR BLD AUTO: 37 % (ref 37–48.5)
HGB BLD-MCNC: 11.1 G/DL (ref 12–16)
IMM GRANULOCYTES # BLD AUTO: 0.03 K/UL (ref 0–0.04)
IMM GRANULOCYTES NFR BLD AUTO: 0.4 % (ref 0–0.5)
IRON SERPL-MCNC: 104 UG/DL (ref 30–160)
LYMPHOCYTES # BLD AUTO: 1.4 K/UL (ref 1–4.8)
LYMPHOCYTES NFR BLD: 17.9 % (ref 18–48)
MCH RBC QN AUTO: 29 PG (ref 27–31)
MCHC RBC AUTO-ENTMCNC: 30 G/DL (ref 32–36)
MCV RBC AUTO: 97 FL (ref 82–98)
MONOCYTES # BLD AUTO: 0.8 K/UL (ref 0.3–1)
MONOCYTES NFR BLD: 9.5 % (ref 4–15)
NEUTROPHILS # BLD AUTO: 5.5 K/UL (ref 1.8–7.7)
NEUTROPHILS NFR BLD: 69 % (ref 38–73)
NRBC BLD-RTO: 0 /100 WBC
PHOSPHATE SERPL-MCNC: 3.6 MG/DL (ref 2.7–4.5)
PLATELET # BLD AUTO: 298 K/UL (ref 150–450)
PMV BLD AUTO: 10.5 FL (ref 9.2–12.9)
POTASSIUM SERPL-SCNC: 4.5 MMOL/L (ref 3.5–5.1)
PTH-INTACT SERPL-MCNC: 73.9 PG/ML (ref 9–77)
RBC # BLD AUTO: 3.83 M/UL (ref 4–5.4)
SATURATED IRON: 28 % (ref 20–50)
SODIUM SERPL-SCNC: 140 MMOL/L (ref 136–145)
TOTAL IRON BINDING CAPACITY: 377 UG/DL (ref 250–450)
TRANSFERRIN SERPL-MCNC: 255 MG/DL (ref 200–375)
WBC # BLD AUTO: 8.03 K/UL (ref 3.9–12.7)

## 2022-01-27 PROCEDURE — 36415 COLL VENOUS BLD VENIPUNCTURE: CPT | Mod: HCNC,PO | Performed by: INTERNAL MEDICINE

## 2022-01-27 PROCEDURE — 83970 ASSAY OF PARATHORMONE: CPT | Mod: HCNC | Performed by: INTERNAL MEDICINE

## 2022-01-27 PROCEDURE — 84466 ASSAY OF TRANSFERRIN: CPT | Mod: HCNC | Performed by: INTERNAL MEDICINE

## 2022-01-27 PROCEDURE — 82728 ASSAY OF FERRITIN: CPT | Mod: HCNC | Performed by: INTERNAL MEDICINE

## 2022-01-27 PROCEDURE — 85025 COMPLETE CBC W/AUTO DIFF WBC: CPT | Mod: HCNC | Performed by: INTERNAL MEDICINE

## 2022-01-27 PROCEDURE — 82306 VITAMIN D 25 HYDROXY: CPT | Mod: HCNC | Performed by: INTERNAL MEDICINE

## 2022-01-27 PROCEDURE — 80069 RENAL FUNCTION PANEL: CPT | Mod: HCNC | Performed by: INTERNAL MEDICINE

## 2022-02-03 ENCOUNTER — TELEPHONE (OUTPATIENT)
Dept: NEPHROLOGY | Facility: CLINIC | Age: 82
End: 2022-02-03
Payer: MEDICARE

## 2022-02-03 DIAGNOSIS — N18.31 STAGE 3A CHRONIC KIDNEY DISEASE: Primary | ICD-10-CM

## 2022-03-09 ENCOUNTER — LAB VISIT (OUTPATIENT)
Dept: LAB | Facility: HOSPITAL | Age: 82
End: 2022-03-09
Attending: INTERNAL MEDICINE
Payer: MEDICARE

## 2022-03-09 DIAGNOSIS — N18.32 STAGE 3B CHRONIC KIDNEY DISEASE: ICD-10-CM

## 2022-03-09 DIAGNOSIS — N18.31 STAGE 3A CHRONIC KIDNEY DISEASE: ICD-10-CM

## 2022-03-09 LAB
ALBUMIN SERPL BCP-MCNC: 3.4 G/DL (ref 3.5–5.2)
ALBUMIN SERPL BCP-MCNC: 3.4 G/DL (ref 3.5–5.2)
ANION GAP SERPL CALC-SCNC: 10 MMOL/L (ref 8–16)
ANION GAP SERPL CALC-SCNC: 10 MMOL/L (ref 8–16)
BASOPHILS # BLD AUTO: 0.06 K/UL (ref 0–0.2)
BASOPHILS NFR BLD: 0.8 % (ref 0–1.9)
BUN SERPL-MCNC: 19 MG/DL (ref 8–23)
BUN SERPL-MCNC: 19 MG/DL (ref 8–23)
CALCIUM SERPL-MCNC: 9.5 MG/DL (ref 8.7–10.5)
CALCIUM SERPL-MCNC: 9.5 MG/DL (ref 8.7–10.5)
CHLORIDE SERPL-SCNC: 102 MMOL/L (ref 95–110)
CHLORIDE SERPL-SCNC: 102 MMOL/L (ref 95–110)
CO2 SERPL-SCNC: 27 MMOL/L (ref 23–29)
CO2 SERPL-SCNC: 27 MMOL/L (ref 23–29)
CREAT SERPL-MCNC: 1.4 MG/DL (ref 0.5–1.4)
CREAT SERPL-MCNC: 1.4 MG/DL (ref 0.5–1.4)
DIFFERENTIAL METHOD: ABNORMAL
EOSINOPHIL # BLD AUTO: 0.1 K/UL (ref 0–0.5)
EOSINOPHIL NFR BLD: 1.7 % (ref 0–8)
ERYTHROCYTE [DISTWIDTH] IN BLOOD BY AUTOMATED COUNT: 14.1 % (ref 11.5–14.5)
EST. GFR  (AFRICAN AMERICAN): 39.8 ML/MIN/1.73 M^2
EST. GFR  (AFRICAN AMERICAN): 39.8 ML/MIN/1.73 M^2
EST. GFR  (NON AFRICAN AMERICAN): 34.5 ML/MIN/1.73 M^2
EST. GFR  (NON AFRICAN AMERICAN): 34.5 ML/MIN/1.73 M^2
GLUCOSE SERPL-MCNC: 115 MG/DL (ref 70–110)
GLUCOSE SERPL-MCNC: 115 MG/DL (ref 70–110)
HCT VFR BLD AUTO: 37.8 % (ref 37–48.5)
HGB BLD-MCNC: 11.5 G/DL (ref 12–16)
IMM GRANULOCYTES # BLD AUTO: 0.02 K/UL (ref 0–0.04)
IMM GRANULOCYTES NFR BLD AUTO: 0.3 % (ref 0–0.5)
LYMPHOCYTES # BLD AUTO: 1.6 K/UL (ref 1–4.8)
LYMPHOCYTES NFR BLD: 21.2 % (ref 18–48)
MCH RBC QN AUTO: 29.3 PG (ref 27–31)
MCHC RBC AUTO-ENTMCNC: 30.4 G/DL (ref 32–36)
MCV RBC AUTO: 96 FL (ref 82–98)
MONOCYTES # BLD AUTO: 0.7 K/UL (ref 0.3–1)
MONOCYTES NFR BLD: 8.8 % (ref 4–15)
NEUTROPHILS # BLD AUTO: 5.1 K/UL (ref 1.8–7.7)
NEUTROPHILS NFR BLD: 67.2 % (ref 38–73)
NRBC BLD-RTO: 0 /100 WBC
PHOSPHATE SERPL-MCNC: 3.1 MG/DL (ref 2.7–4.5)
PHOSPHATE SERPL-MCNC: 3.1 MG/DL (ref 2.7–4.5)
PLATELET # BLD AUTO: 285 K/UL (ref 150–450)
PMV BLD AUTO: 10.6 FL (ref 9.2–12.9)
POTASSIUM SERPL-SCNC: 4.9 MMOL/L (ref 3.5–5.1)
POTASSIUM SERPL-SCNC: 4.9 MMOL/L (ref 3.5–5.1)
PTH-INTACT SERPL-MCNC: 174.7 PG/ML (ref 9–77)
RBC # BLD AUTO: 3.92 M/UL (ref 4–5.4)
SODIUM SERPL-SCNC: 139 MMOL/L (ref 136–145)
SODIUM SERPL-SCNC: 139 MMOL/L (ref 136–145)
WBC # BLD AUTO: 7.54 K/UL (ref 3.9–12.7)

## 2022-03-09 PROCEDURE — 85025 COMPLETE CBC W/AUTO DIFF WBC: CPT | Mod: HCNC | Performed by: INTERNAL MEDICINE

## 2022-03-09 PROCEDURE — 80069 RENAL FUNCTION PANEL: CPT | Mod: HCNC | Performed by: INTERNAL MEDICINE

## 2022-03-09 PROCEDURE — 36415 COLL VENOUS BLD VENIPUNCTURE: CPT | Mod: HCNC,PO | Performed by: INTERNAL MEDICINE

## 2022-03-09 PROCEDURE — 83970 ASSAY OF PARATHORMONE: CPT | Mod: HCNC | Performed by: INTERNAL MEDICINE

## 2022-03-15 ENCOUNTER — OFFICE VISIT (OUTPATIENT)
Dept: NEPHROLOGY | Facility: CLINIC | Age: 82
End: 2022-03-15
Payer: MEDICARE

## 2022-03-15 VITALS
SYSTOLIC BLOOD PRESSURE: 104 MMHG | HEART RATE: 85 BPM | DIASTOLIC BLOOD PRESSURE: 50 MMHG | BODY MASS INDEX: 24.46 KG/M2 | OXYGEN SATURATION: 97 % | WEIGHT: 147 LBS

## 2022-03-15 DIAGNOSIS — N18.32 STAGE 3B CHRONIC KIDNEY DISEASE: Primary | ICD-10-CM

## 2022-03-15 DIAGNOSIS — E55.9 VITAMIN D DEFICIENCY: ICD-10-CM

## 2022-03-15 DIAGNOSIS — E21.3 HYPERPARATHYROIDISM: ICD-10-CM

## 2022-03-15 PROCEDURE — 3288F FALL RISK ASSESSMENT DOCD: CPT | Mod: HCNC,CPTII,S$GLB, | Performed by: INTERNAL MEDICINE

## 2022-03-15 PROCEDURE — 99999 PR PBB SHADOW E&M-EST. PATIENT-LVL III: ICD-10-PCS | Mod: PBBFAC,HCNC,, | Performed by: INTERNAL MEDICINE

## 2022-03-15 PROCEDURE — 1101F PT FALLS ASSESS-DOCD LE1/YR: CPT | Mod: HCNC,CPTII,S$GLB, | Performed by: INTERNAL MEDICINE

## 2022-03-15 PROCEDURE — 99213 OFFICE O/P EST LOW 20 MIN: CPT | Mod: HCNC,S$GLB,, | Performed by: INTERNAL MEDICINE

## 2022-03-15 PROCEDURE — 1101F PR PT FALLS ASSESS DOC 0-1 FALLS W/OUT INJ PAST YR: ICD-10-PCS | Mod: HCNC,CPTII,S$GLB, | Performed by: INTERNAL MEDICINE

## 2022-03-15 PROCEDURE — 1159F MED LIST DOCD IN RCRD: CPT | Mod: HCNC,CPTII,S$GLB, | Performed by: INTERNAL MEDICINE

## 2022-03-15 PROCEDURE — 99999 PR PBB SHADOW E&M-EST. PATIENT-LVL III: CPT | Mod: PBBFAC,HCNC,, | Performed by: INTERNAL MEDICINE

## 2022-03-15 PROCEDURE — 3074F PR MOST RECENT SYSTOLIC BLOOD PRESSURE < 130 MM HG: ICD-10-PCS | Mod: HCNC,CPTII,S$GLB, | Performed by: INTERNAL MEDICINE

## 2022-03-15 PROCEDURE — 99213 PR OFFICE/OUTPT VISIT, EST, LEVL III, 20-29 MIN: ICD-10-PCS | Mod: HCNC,S$GLB,, | Performed by: INTERNAL MEDICINE

## 2022-03-15 PROCEDURE — 3078F PR MOST RECENT DIASTOLIC BLOOD PRESSURE < 80 MM HG: ICD-10-PCS | Mod: HCNC,CPTII,S$GLB, | Performed by: INTERNAL MEDICINE

## 2022-03-15 PROCEDURE — 1125F AMNT PAIN NOTED PAIN PRSNT: CPT | Mod: HCNC,CPTII,S$GLB, | Performed by: INTERNAL MEDICINE

## 2022-03-15 PROCEDURE — 3074F SYST BP LT 130 MM HG: CPT | Mod: HCNC,CPTII,S$GLB, | Performed by: INTERNAL MEDICINE

## 2022-03-15 PROCEDURE — 1125F PR PAIN SEVERITY QUANTIFIED, PAIN PRESENT: ICD-10-PCS | Mod: HCNC,CPTII,S$GLB, | Performed by: INTERNAL MEDICINE

## 2022-03-15 PROCEDURE — 3078F DIAST BP <80 MM HG: CPT | Mod: HCNC,CPTII,S$GLB, | Performed by: INTERNAL MEDICINE

## 2022-03-15 PROCEDURE — 3288F PR FALLS RISK ASSESSMENT DOCUMENTED: ICD-10-PCS | Mod: HCNC,CPTII,S$GLB, | Performed by: INTERNAL MEDICINE

## 2022-03-15 PROCEDURE — 1159F PR MEDICATION LIST DOCUMENTED IN MEDICAL RECORD: ICD-10-PCS | Mod: HCNC,CPTII,S$GLB, | Performed by: INTERNAL MEDICINE

## 2022-03-15 NOTE — PROGRESS NOTES
Patient ID: Tere Velasco is a 82 y.o. White female who presents for follow-up visit for her Chronic Kidney Disease stage G3b/A1.    HPI:  Tere Velasco is a 82 y.o. White female with renovascular hypertension since 2005, TRU on CPAP, OA s/p bilateral knee replacements, DLD, and CKD stage 3 who presents today for follow-up. CKD is suspected to be 2/2 HTN and chronic NSAID use. No history of kidney stones, no family history of kidney disease. She also has a h/o IBS. Suffers from significant back pain.          Past Medical History:   Diagnosis Date    Allergy     Anemia     Anxiety     Arthritis     Back pain     Breast disorder     Tumor in rt breast (benign)    Cataract     removed from both eyes    CKD (chronic kidney disease) stage 3, GFR 30-59 ml/min     Clotting disorder     when knee was replaced    Colitis     Degenerative disc disease     Depression     Fibromyalgia     GERD (gastroesophageal reflux disease)     Hyperlipidemia     Hypertension     Irritable bowel syndrome     TRU (obstructive sleep apnea)     RLS (restless legs syndrome)     S/P knee replacement 1/13/2014       Family History   Problem Relation Age of Onset    Dementia Mother         age 92    Heart disease Father         age 58    Restless legs syndrome Daughter     Thyroid disease Daughter     Hypertension Son     Alcohol abuse Son     Ovarian cancer Paternal Grandmother     Breast cancer Paternal Grandmother     Cancer Paternal Grandmother         ovarian    Ovarian cancer Cousin     Cancer Cousin 40        colon ca    Breast cancer Paternal Aunt     Skin cancer Neg Hx     Melanoma Neg Hx     Colon cancer Neg Hx     Esophageal cancer Neg Hx     Stomach cancer Neg Hx     Irritable bowel syndrome Neg Hx     Crohn's disease Neg Hx     Ulcerative colitis Neg Hx     Celiac disease Neg Hx        Past Surgical History:   Procedure Laterality Date    APPENDECTOMY      BREAST BIOPSY       BREAST LUMPECTOMY      right side    BREAST SURGERY      CHOLECYSTECTOMY      COLONOSCOPY N/A 12/2/2016    Procedure: COLONOSCOPY;  Surgeon: Ori Cope MD;  Location: [facility] ([address] [place] [address]);  Service: Endoscopy;  Laterality: N/A;  PM prep    EYE SURGERY Bilateral     cataract    HYSTERECTOMY  1977    uterine pain    INJECTION OF FACET JOINT Bilateral 4/27/2021    Procedure: Facet joint injection-lumbar--Bilateral L4-5, L5-S1;  Surgeon: Kuldip Siddiqui Jr., MD;  Location: [facility];  Service: Pain Management;  Laterality: Bilateral;    JOINT REPLACEMENT      knees replaced bilaterally    KNEE SURGERY Bilateral     RECTAL SURGERY      rectocele    TONSILLECTOMY           Patient's medical, family, surgical, and medication hx reviewed.    Review of Systems    Constitutional: Negative for chills, diaphoresis, fever and weight loss.   HENT: Negative for nosebleeds and tinnitus.    Eyes: Negative for blurred vision, double vision and photophobia.   Respiratory: Negative for cough and shortness of breath.    Cardiovascular: . Negative for chest pain, palpitations, swelling orthopnea and PND.   Gastrointestinal: Negative for abdominal pain, diarrhea, constipation nausea and vomiting.   Genitourinary: Negative for dysuria, flank pain, frequency, hematuria and urgency.   Musculoskeletal: positive for severe back pain, negative falls,   myalgias and neck pain.   Skin: Negative.    Neurological: Negative for dizziness, tingling, tremors, sensory change, speech change, focal weakness, seizures, loss of consciousness, weakness and headaches.   Endo/Heme/Allergies: Negative for environmental allergies and polydipsia. Does not bruise/bleed easily.   Psychiatric/Behavioral: Negative for depression, hallucinations, memory loss, substance abuse and suicidal ideas. The patient is not nervous/anxious but does have insomnia.      Physical Exam:  General: Well developed, well nourished in NAD  HEENT: Conjunctiva clear;  Oropharynx clear  Neck: No JVD noted, Supple  CV- Normal S1, S2 with no murmurs,gallops,rubs  Resp- Lungs CTA Bilaterally, Unlabored  Abdomen- NTND, BS normoactive x4 quads, soft  Extrem- No cyanosis, clubbing, edema.  Skin- No rashes, lesions, ulcers  PSYCH: Oriented x3      Assessment:       No diagnosis found.     Plan:   1. CKD stage G3b/A1 (Creat 1.4)   CKD: stage III 2/2 longstanding HTN/nephrosclerosis and prior chronic NSAID use.   JAYA/SPEP 2009 normal.       Patient's blood pressures at home have been stable.           Renal US 6/18/21  Subcentimeter right upper pole renal cyst unchanged.  Overall limited study by poor visualization of the kidneys likely to body habitus and prominent bowel gas.  There is nonspecific elevated renal resistive indices with overall poor corticomedullary differentiation cannot exclude chronic renal disease.     Clinical correlation and follow-up advised.  Lab Results   Component Value Date    CREATININE 1.4 03/09/2022    CREATININE 1.4 03/09/2022     Urine Protein:   Prot/Creat Ratio, Urine   Date Value Ref Range Status   03/09/2022 0.11 0.00 - 0.20 Final   01/27/2022 Unable to calculate 0.00 - 0.20 Final   05/26/2021 Unable to calculate 0.00 - 0.20 Final     Acid-Base:   Lab Results   Component Value Date     03/09/2022     03/09/2022    K 4.9 03/09/2022    K 4.9 03/09/2022    CO2 27 03/09/2022    CO2 27 03/09/2022     2. HTN: Blood pressures within target   Will continue to monitor in subsequent visits.    3. CKD-MBD:  Takes vitamin D over the counter (sometimes) for her increased PTH  Will repeating labs and follow up results.   - encouraged to take 2000 units vitamin D daily.    Lab Results   Component Value Date    .7 (H) 03/09/2022    CALCIUM 9.5 03/09/2022    CALCIUM 9.5 03/09/2022    PHOS 3.1 03/09/2022    PHOS 3.1 03/09/2022     4. Anemia: iron deficiency in the past  Hgb at goal. Discontinued iron supplementation at last visit.   Will continue to  monitor with labs and subsequent visits.    Lab Results   Component Value Date    HGB 11.5 (L) 03/09/2022     Lab Results   Component Value Date    IRON 104 01/27/2022    TIBC 377 01/27/2022    FERRITIN 39 01/27/2022     5. Lipid management:   Patient on statin therapy.    Lab Results   Component Value Date    LDLCALC 148.8 05/26/2021        Please see me back in my clinic in 12 month with blood work and ultrasound.      PTH and vitamin D level and BMP in 6 month

## 2022-03-31 ENCOUNTER — TELEPHONE (OUTPATIENT)
Dept: INTERNAL MEDICINE | Facility: CLINIC | Age: 82
End: 2022-03-31
Payer: MEDICARE

## 2022-03-31 NOTE — TELEPHONE ENCOUNTER
----- Message from Cinthya Dumont sent at 3/31/2022 11:40 AM CDT -----  Contact: Pt @ [phone]  Patient would like a call back regarding an appt.  Patient states the nurse was trying to schedule an appt.  First available appt was 5/25/22.    Please call and advise.    Thank you and have a great day.

## 2022-04-07 DIAGNOSIS — F41.9 ANXIETY: ICD-10-CM

## 2022-04-08 RX ORDER — BUPROPION HYDROCHLORIDE 150 MG/1
150 TABLET, EXTENDED RELEASE ORAL 2 TIMES DAILY
Qty: 180 TABLET | Refills: 0 | Status: SHIPPED | OUTPATIENT
Start: 2022-04-08 | End: 2022-05-24 | Stop reason: SDUPTHER

## 2022-04-08 NOTE — TELEPHONE ENCOUNTER
Provider Staff:     Action is required for this patient.   Please see care gap opportunities below in Care Due Message.     Thanks!  [facility]     Appointments      Date Provider   Last Visit   5/24/2021 Rosy Santos MD   Next Visit   5/24/2022 Rosy Santos MD     Note composed:9:00 AM 04/08/2022

## 2022-04-08 NOTE — TELEPHONE ENCOUNTER
Refill Authorization Note   Tere Velasco  is requesting a refill authorization.  Brief Assessment and Rationale for Refill:  Approve    -Medication-Related Problems Identified: Requires labs  Medication Therapy Plan:  FOVS    Medication Reconciliation Completed: No   Comments:   --->Care Gap information included below if applicable.       Requested Prescriptions   Pending Prescriptions Disp Refills    buPROPion (WELLBUTRIN SR) 150 MG TBSR 12 hr tablet 180 tablet 0     Sig: Take 1 tablet (150 mg total) by mouth 2 (two) times daily.       Psychiatry: Antidepressants - bupropion Passed - 4/7/2022 11:50 PM        Passed - Patient is at least 18 years old        Passed - Last BP in normal range within 360 days     BP Readings from Last 3 Encounters:   03/15/22 (!) 104/50   06/03/21 120/80   05/24/21 116/70               Passed - Valid encounter within last 15 months     Recent Visits  Date Type Provider Dept   05/24/21 Office Visit Rosy Santos MD [facility] Internal Medicine   Showing recent visits within past 720 days and meeting all other requirements  Future Appointments  No visits were found meeting these conditions.  Showing future appointments within next 150 days and meeting all other requirements      Future Appointments              In 1 month MD [place] [facility] - Internal Medicine, [place]    In 5 months LAB, [place] [facility] - Lab, [place]                Passed - Matches previous order       Previous Authorizing Provider: Rosy Santos MD (buPROPion (WELLBUTRIN SR) 150 MG TBSR 12 hr tablet)  Previous Pharmacy: [facility]            Passed - No ED/Hospital visits since last PCP visit     Last PCP Visit: 5/24/2021 Last Admission: 4/27/2021 Last ED Visit: 2/17/2015              Appointments  past 12m or future 3m with PCP    Date Provider   Last Visit   5/24/2021 Rosy Santos MD   Next Visit   5/24/2022 Rosy Santos MD   ED visits in past 90 days: 0      Note composed:8:59 AM 04/08/2022

## 2022-04-08 NOTE — TELEPHONE ENCOUNTER
Care Due:                  Date            Visit Type   Department     Provider  --------------------------------------------------------------------------------                                EP -                              PRIMARY      MET INTERNAL  Last Visit: 05-      CARE ([facility])   MED[facility]       [facility]                              EP -                              PRIMARY      [facility] INTERNAL  Next Visit: 05-      [facility] ([facility])   [facility]                                                            Last  Test          Frequency    Reason                     Performed    Due Date  --------------------------------------------------------------------------------    CMP.........  12 months..  pravastatin..............  05- 05-    Lipid Panel.  12 months..  pravastatin..............  05- 05-    Powered by [email or web address]. Reference number: [id number].   4/07/2022 11:51:00 PM CDT

## 2022-04-28 DIAGNOSIS — F41.9 ANXIETY: ICD-10-CM

## 2022-04-28 RX ORDER — BUPROPION HYDROCHLORIDE 150 MG/1
150 TABLET, EXTENDED RELEASE ORAL 2 TIMES DAILY
Qty: 180 TABLET | Refills: 0 | Status: CANCELLED | OUTPATIENT
Start: 2022-04-28

## 2022-04-28 NOTE — TELEPHONE ENCOUNTER
No new care gaps identified.  Powered by [employer]. Reference number: [id number].   4/28/2022 7:37:40 AM CDT

## 2022-05-04 ENCOUNTER — OFFICE VISIT (OUTPATIENT)
Dept: INTERNAL MEDICINE | Facility: CLINIC | Age: 82
End: 2022-05-04
Payer: MEDICARE

## 2022-05-04 VITALS
BODY MASS INDEX: 25.97 KG/M2 | TEMPERATURE: 98 F | RESPIRATION RATE: 12 BRPM | HEART RATE: 82 BPM | DIASTOLIC BLOOD PRESSURE: 72 MMHG | SYSTOLIC BLOOD PRESSURE: 114 MMHG | WEIGHT: 155.88 LBS | HEIGHT: 65 IN | OXYGEN SATURATION: 98 %

## 2022-05-04 DIAGNOSIS — G89.29 OTHER CHRONIC PAIN: ICD-10-CM

## 2022-05-04 DIAGNOSIS — G47.9 DISORDERED SLEEP: Primary | ICD-10-CM

## 2022-05-04 PROCEDURE — 1160F RVW MEDS BY RX/DR IN RCRD: CPT | Mod: CPTII,S$GLB,, | Performed by: NURSE PRACTITIONER

## 2022-05-04 PROCEDURE — 1125F PR PAIN SEVERITY QUANTIFIED, PAIN PRESENT: ICD-10-PCS | Mod: CPTII,S$GLB,, | Performed by: NURSE PRACTITIONER

## 2022-05-04 PROCEDURE — 1159F PR MEDICATION LIST DOCUMENTED IN MEDICAL RECORD: ICD-10-PCS | Mod: CPTII,S$GLB,, | Performed by: NURSE PRACTITIONER

## 2022-05-04 PROCEDURE — 99213 PR OFFICE/OUTPT VISIT, EST, LEVL III, 20-29 MIN: ICD-10-PCS | Mod: S$GLB,,, | Performed by: NURSE PRACTITIONER

## 2022-05-04 PROCEDURE — 3078F DIAST BP <80 MM HG: CPT | Mod: CPTII,S$GLB,, | Performed by: NURSE PRACTITIONER

## 2022-05-04 PROCEDURE — 3078F PR MOST RECENT DIASTOLIC BLOOD PRESSURE < 80 MM HG: ICD-10-PCS | Mod: CPTII,S$GLB,, | Performed by: NURSE PRACTITIONER

## 2022-05-04 PROCEDURE — 99999 PR PBB SHADOW E&M-EST. PATIENT-LVL IV: ICD-10-PCS | Mod: PBBFAC,,, | Performed by: NURSE PRACTITIONER

## 2022-05-04 PROCEDURE — 1101F PT FALLS ASSESS-DOCD LE1/YR: CPT | Mod: CPTII,S$GLB,, | Performed by: NURSE PRACTITIONER

## 2022-05-04 PROCEDURE — 1125F AMNT PAIN NOTED PAIN PRSNT: CPT | Mod: CPTII,S$GLB,, | Performed by: NURSE PRACTITIONER

## 2022-05-04 PROCEDURE — 99999 PR PBB SHADOW E&M-EST. PATIENT-LVL IV: CPT | Mod: PBBFAC,,, | Performed by: NURSE PRACTITIONER

## 2022-05-04 PROCEDURE — 1101F PR PT FALLS ASSESS DOC 0-1 FALLS W/OUT INJ PAST YR: ICD-10-PCS | Mod: CPTII,S$GLB,, | Performed by: NURSE PRACTITIONER

## 2022-05-04 PROCEDURE — 3288F FALL RISK ASSESSMENT DOCD: CPT | Mod: CPTII,S$GLB,, | Performed by: NURSE PRACTITIONER

## 2022-05-04 PROCEDURE — 3074F SYST BP LT 130 MM HG: CPT | Mod: CPTII,S$GLB,, | Performed by: NURSE PRACTITIONER

## 2022-05-04 PROCEDURE — 3074F PR MOST RECENT SYSTOLIC BLOOD PRESSURE < 130 MM HG: ICD-10-PCS | Mod: CPTII,S$GLB,, | Performed by: NURSE PRACTITIONER

## 2022-05-04 PROCEDURE — 1159F MED LIST DOCD IN RCRD: CPT | Mod: CPTII,S$GLB,, | Performed by: NURSE PRACTITIONER

## 2022-05-04 PROCEDURE — 99213 OFFICE O/P EST LOW 20 MIN: CPT | Mod: S$GLB,,, | Performed by: NURSE PRACTITIONER

## 2022-05-04 PROCEDURE — 3288F PR FALLS RISK ASSESSMENT DOCUMENTED: ICD-10-PCS | Mod: CPTII,S$GLB,, | Performed by: NURSE PRACTITIONER

## 2022-05-04 PROCEDURE — 1160F PR REVIEW ALL MEDS BY PRESCRIBER/CLIN PHARMACIST DOCUMENTED: ICD-10-PCS | Mod: CPTII,S$GLB,, | Performed by: NURSE PRACTITIONER

## 2022-05-04 NOTE — PROGRESS NOTES
"Subjective:       Patient ID: Tere Velasco is a 82 y.o. female.    Chief Complaint: Insomnia (Unable to sleep for 3 nights )      Patient is a 82 y.o. female who traditionally follows with Rosy Santos MD presenting today for:    Sleep Problems  Has difficulty falling asleep. Notes that once she has been asleep if she is awakened she can fall back to sleep no problem and often this is her best sleep. She has TRU and does not wear CPAP. She is already on Lunesta in addition to Wellbutrin, Cymbalta and Gabapentin.   Reports having had sleep problems "all her life" and did not sleep "at all" last night.   Last filled Lunesta in March and states she takes one per night.   Has no sleep routine/pattern. Typically goes to bed around 2 am.   Takes naps during the day.   Does not have TV in her room but she watches her phone in bed.   "hates" going to bed cause she never knows if she is going to go to sleep or not.     Lives alone and has a strained relationship with her daughter.     Complains of generalized pain with history of scoliosis and fibromyalgia.   History of arthritis. Main pain issue is back. Has been taking Relief Factor for joint pain x a few weeks.     Review of patient's allergies indicates:   Allergen Reactions    Demerol [meperidine]        Medication List with Changes/Refills   Current Medications    ASPIRIN (ECOTRIN) 81 MG EC TABLET    Take 1 tablet (81 mg total) by mouth every evening.    BUPROPION (WELLBUTRIN SR) 150 MG TBSR 12 HR TABLET    Take 1 tablet (150 mg total) by mouth 2 (two) times daily.    DULOXETINE (CYMBALTA) 20 MG CAPSULE    TAKE ONE CAPSULE BY MOUTH TWICE A DAY    ESZOPICLONE (LUNESTA) 1 MG TAB    Take 1 tablet (1 mg total) by mouth nightly as needed.    FAMOTIDINE (PEPCID) 20 MG TABLET    Take 1 tablet (20 mg total) by mouth once daily.    FERROUS GLUCONATE (FERGON) 324 MG TABLET    ferrous gluconate 324 mg (38 mg iron) tablet    GABAPENTIN ENACARBIL (HORIZANT) 600 MG TBSR " "   Take 600 mg ( 1 tablet )  by mouth every evening.    HYDROCHLOROTHIAZIDE (HYDRODIURIL) 12.5 MG TAB    Take 1 tablet (12.5 mg total) by mouth once daily.    LOSARTAN (COZAAR) 50 MG TABLET    Take 1 tablet (50 mg total) by mouth once daily.    PRAVASTATIN (PRAVACHOL) 80 MG TABLET    Take 1 tablet (80 mg total) by mouth every evening.    UNABLE TO FIND    Relief Factor---1 packet per day    VIT A/VIT C/VIT E/ZINC/COPPER (ICAPS AREDS ORAL)    Take by mouth.     Medical, social and surgical history has been reviewed with the patient.     Review of Systems   Musculoskeletal: Positive for back pain and joint pain.   Psychiatric/Behavioral: The patient has insomnia (difficulty falling asleep).        Objective:   /72 (BP Location: Right arm, Patient Position: Sitting, BP Method: Medium (Manual))   Pulse 82   Temp 98.2 °F (36.8 °C) (Temporal)   Resp 12   Ht 5' 5" (1.651 m)   Wt 70.7 kg (155 lb 13.8 oz)   SpO2 98%   BMI 25.94 kg/m²       Physical Exam  Vitals reviewed.   Constitutional:       Appearance: Normal appearance.   HENT:      Head: Normocephalic and atraumatic.   Cardiovascular:      Rate and Rhythm: Normal rate and regular rhythm.      Heart sounds: Normal heart sounds. No murmur heard.  Pulmonary:      Effort: Pulmonary effort is normal.      Breath sounds: Normal breath sounds. No wheezing.   Skin:     General: Skin is warm and dry.   Neurological:      Mental Status: She is alert and oriented to person, place, and time.       Last Labs:  Glucose   Date Value Ref Range Status   03/09/2022 115 (H) 70 - 110 mg/dL Final   03/09/2022 115 (H) 70 - 110 mg/dL Final     BUN   Date Value Ref Range Status   03/09/2022 19 8 - 23 mg/dL Final   03/09/2022 19 8 - 23 mg/dL Final     Creatinine   Date Value Ref Range Status   03/09/2022 1.4 0.5 - 1.4 mg/dL Final   03/09/2022 1.4 0.5 - 1.4 mg/dL Final     Potassium   Date Value Ref Range Status   03/09/2022 4.9 3.5 - 5.1 mmol/L Final   03/09/2022 4.9 3.5 - 5.1 " mmol/L Final     Cholesterol   Date Value Ref Range Status   05/26/2021 237 (H) 120 - 199 mg/dL Final     Comment:     The National Cholesterol Education Program (NCEP) has set the  following guidelines (reference ranges) for Cholesterol:  Optimal.....................<200 mg/dL  Borderline High.............200-239 mg/dL  High........................> or = 240 mg/dL     03/11/2020 215 (H) 120 - 199 mg/dL Final     Comment:     The National Cholesterol Education Program (NCEP) has set the  following guidelines (reference ranges) for Cholesterol:  Optimal.....................<200 mg/dL  Borderline High.............200-239 mg/dL  High........................> or = 240 mg/dL       Hemoglobin A1C   Date Value Ref Range Status   08/21/2014 5.5 4.5 - 6.2 % Final     Hemoglobin   Date Value Ref Range Status   03/09/2022 11.5 (L) 12.0 - 16.0 g/dL Final   01/27/2022 11.1 (L) 12.0 - 16.0 g/dL Final     Hematocrit   Date Value Ref Range Status   03/09/2022 37.8 37.0 - 48.5 % Final   01/27/2022 37.0 37.0 - 48.5 % Final     Vit D, 25-Hydroxy   Date Value Ref Range Status   01/27/2022 33 30 - 96 ng/mL Final     Comment:     Vitamin D deficiency.........<10 ng/mL                              Vitamin D insufficiency......10-29 ng/mL       Vitamin D sufficiency........> or equal to 30 ng/mL  Vitamin D toxicity............>100 ng/mL     05/26/2021 32 30 - 96 ng/mL Final     Comment:     Vitamin D deficiency.........<10 ng/mL                              Vitamin D insufficiency......10-29 ng/mL       Vitamin D sufficiency........> or equal to 30 ng/mL  Vitamin D toxicity............>100 ng/mL       I have reviewed the following:     Details / Date    [x]   Labs     []   Micro     []   Pathology     []   Imaging     []   Cardiology Procedures     []   Other      Assessment and Plan:     1. Disordered sleep    Chronic, on Lunesta 1 mg per night. Recently worsening. Discussed at length good sleep hygiene. Discussed with PCP. Patient to try  two 1 mg Lunesta tabs tonight and tomorrow night and to follow up with us on Friday. She was made aware of the increased risk of falls with such medications given her age. Verbalized understanding.     2. Other chronic pain    - Patient advised to follow up with spine doctors and may continue her supplement if she finds it is helping. Did educate on not breaking open gel caps but she could open the capsules and add to water/apple sauce

## 2022-05-06 ENCOUNTER — TELEPHONE (OUTPATIENT)
Dept: INTERNAL MEDICINE | Facility: CLINIC | Age: 82
End: 2022-05-06
Payer: MEDICARE

## 2022-05-06 DIAGNOSIS — G47.00 INSOMNIA, UNSPECIFIED TYPE: ICD-10-CM
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
## 2022-05-06 NOTE — TELEPHONE ENCOUNTER
----- Message from Sully Haile sent at 5/6/2022 11:50 AM CDT -----  Contact: Self [phone]  Pt stated seen NP a few days okay and was instructed to call to let her know how she doing, pt requesting a call back.    Please call and advise

## 2022-05-09 RX ORDER — ESZOPICLONE 2 MG/1
2 TABLET, FILM COATED ORAL NIGHTLY PRN
Qty: 90 TABLET | Refills: 1
Start: 2022-05-09 | End: 2022-05-10 | Stop reason: SDUPTHER

## 2022-05-09 NOTE — TELEPHONE ENCOUNTER
That is fine -- she has appt with Dr. Cardenas on 5/24. Please have her notify MD of need for refills on Lunesta 2mg per night.

## 2022-05-09 NOTE — TELEPHONE ENCOUNTER
Called pt. No answer. LVM for pt to keep appt w/Dr. Santos 5/24/22 and mention need for refills for Lunesta 2mg per night.

## 2022-05-10 DIAGNOSIS — G47.00 INSOMNIA, UNSPECIFIED TYPE: ICD-10-CM

## 2022-05-10 RX ORDER — ESZOPICLONE 2 MG/1
2 TABLET, FILM COATED ORAL NIGHTLY PRN
Qty: 90 TABLET | Refills: 1 | Status: CANCELLED | OUTPATIENT
Start: 2022-05-10 | End: 2022-08-08

## 2022-05-10 NOTE — TELEPHONE ENCOUNTER
No new care gaps identified.  [facility] Embedded Care Gaps. Reference number: [id number]. 5/10/2022   7:24:27 AM CDT

## 2022-05-11 RX ORDER — ESZOPICLONE 2 MG/1
2 TABLET, FILM COATED ORAL NIGHTLY PRN
Qty: 90 TABLET | Refills: 1 | Status: SHIPPED | OUTPATIENT
Start: 2022-05-11 | End: 2022-08-11 | Stop reason: SDUPTHER

## 2022-05-24 ENCOUNTER — OFFICE VISIT (OUTPATIENT)
Dept: INTERNAL MEDICINE | Facility: CLINIC | Age: 82
End: 2022-05-24
Payer: MEDICARE

## 2022-05-24 VITALS
WEIGHT: 153.25 LBS | OXYGEN SATURATION: 98 % | HEIGHT: 65 IN | TEMPERATURE: 98 F | DIASTOLIC BLOOD PRESSURE: 76 MMHG | RESPIRATION RATE: 21 BRPM | SYSTOLIC BLOOD PRESSURE: 136 MMHG | HEART RATE: 71 BPM | BODY MASS INDEX: 25.53 KG/M2

## 2022-05-24 DIAGNOSIS — M54.50 CHRONIC BILATERAL LOW BACK PAIN WITHOUT SCIATICA: ICD-10-CM

## 2022-05-24 DIAGNOSIS — I10 ESSENTIAL HYPERTENSION: ICD-10-CM

## 2022-05-24 DIAGNOSIS — E78.5 HYPERLIPIDEMIA, UNSPECIFIED HYPERLIPIDEMIA TYPE: ICD-10-CM

## 2022-05-24 DIAGNOSIS — E55.9 VITAMIN D DEFICIENCY: ICD-10-CM

## 2022-05-24 DIAGNOSIS — M51.37 DEGENERATION OF LUMBAR OR LUMBOSACRAL INTERVERTEBRAL DISC: ICD-10-CM

## 2022-05-24 DIAGNOSIS — Z00.00 ENCOUNTER FOR PREVENTIVE HEALTH EXAMINATION: Primary | ICD-10-CM

## 2022-05-24 DIAGNOSIS — K21.9 GASTROESOPHAGEAL REFLUX DISEASE, UNSPECIFIED WHETHER ESOPHAGITIS PRESENT: ICD-10-CM

## 2022-05-24 DIAGNOSIS — G89.29 CHRONIC BILATERAL LOW BACK PAIN WITHOUT SCIATICA: ICD-10-CM

## 2022-05-24 DIAGNOSIS — F41.9 ANXIETY: ICD-10-CM

## 2022-05-24 DIAGNOSIS — F33.2 SEVERE EPISODE OF RECURRENT MAJOR DEPRESSIVE DISORDER, WITHOUT PSYCHOTIC FEATURES: ICD-10-CM

## 2022-05-24 DIAGNOSIS — M48.061 SPINAL STENOSIS, LUMBAR REGION, WITHOUT NEUROGENIC CLAUDICATION: ICD-10-CM

## 2022-05-24 DIAGNOSIS — G25.81 RLS (RESTLESS LEGS SYNDROME): ICD-10-CM

## 2022-05-24 DIAGNOSIS — N18.32 STAGE 3B CHRONIC KIDNEY DISEASE: ICD-10-CM

## 2022-05-24 DIAGNOSIS — E21.3 HYPERPARATHYROIDISM: ICD-10-CM

## 2022-05-24 DIAGNOSIS — G47.33 OSA ON CPAP: ICD-10-CM

## 2022-05-24 DIAGNOSIS — M79.7 FIBROMYALGIA: ICD-10-CM

## 2022-05-24 DIAGNOSIS — G47.00 INSOMNIA, UNSPECIFIED TYPE: ICD-10-CM

## 2022-05-24 PROBLEM — M46.1 SACROILIITIS: Status: RESOLVED | Noted: 2019-02-05 | Resolved: 2022-05-24

## 2022-05-24 PROCEDURE — 3075F PR MOST RECENT SYSTOLIC BLOOD PRESS GE 130-139MM HG: ICD-10-PCS | Mod: CPTII,S$GLB,, | Performed by: HOSPITALIST

## 2022-05-24 PROCEDURE — 1160F RVW MEDS BY RX/DR IN RCRD: CPT | Mod: CPTII,S$GLB,, | Performed by: HOSPITALIST

## 2022-05-24 PROCEDURE — 1159F MED LIST DOCD IN RCRD: CPT | Mod: CPTII,S$GLB,, | Performed by: HOSPITALIST

## 2022-05-24 PROCEDURE — 3078F PR MOST RECENT DIASTOLIC BLOOD PRESSURE < 80 MM HG: ICD-10-PCS | Mod: CPTII,S$GLB,, | Performed by: HOSPITALIST

## 2022-05-24 PROCEDURE — 1126F PR PAIN SEVERITY QUANTIFIED, NO PAIN PRESENT: ICD-10-PCS | Mod: CPTII,S$GLB,, | Performed by: HOSPITALIST

## 2022-05-24 PROCEDURE — 99999 PR PBB SHADOW E&M-EST. PATIENT-LVL IV: CPT | Mod: PBBFAC,,, | Performed by: HOSPITALIST

## 2022-05-24 PROCEDURE — 99999 PR PBB SHADOW E&M-EST. PATIENT-LVL IV: ICD-10-PCS | Mod: PBBFAC,,, | Performed by: HOSPITALIST

## 2022-05-24 PROCEDURE — 99397 PER PM REEVAL EST PAT 65+ YR: CPT | Mod: S$GLB,,, | Performed by: HOSPITALIST

## 2022-05-24 PROCEDURE — 1159F PR MEDICATION LIST DOCUMENTED IN MEDICAL RECORD: ICD-10-PCS | Mod: CPTII,S$GLB,, | Performed by: HOSPITALIST

## 2022-05-24 PROCEDURE — 3075F SYST BP GE 130 - 139MM HG: CPT | Mod: CPTII,S$GLB,, | Performed by: HOSPITALIST

## 2022-05-24 PROCEDURE — 3288F PR FALLS RISK ASSESSMENT DOCUMENTED: ICD-10-PCS | Mod: CPTII,S$GLB,, | Performed by: HOSPITALIST

## 2022-05-24 PROCEDURE — 3288F FALL RISK ASSESSMENT DOCD: CPT | Mod: CPTII,S$GLB,, | Performed by: HOSPITALIST

## 2022-05-24 PROCEDURE — 99499 UNLISTED E&M SERVICE: CPT | Mod: S$GLB,,, | Performed by: HOSPITALIST

## 2022-05-24 PROCEDURE — 99397 PR PREVENTIVE VISIT,EST,65 & OVER: ICD-10-PCS | Mod: S$GLB,,, | Performed by: HOSPITALIST

## 2022-05-24 PROCEDURE — 1160F PR REVIEW ALL MEDS BY PRESCRIBER/CLIN PHARMACIST DOCUMENTED: ICD-10-PCS | Mod: CPTII,S$GLB,, | Performed by: HOSPITALIST

## 2022-05-24 PROCEDURE — 3078F DIAST BP <80 MM HG: CPT | Mod: CPTII,S$GLB,, | Performed by: HOSPITALIST

## 2022-05-24 PROCEDURE — 1101F PT FALLS ASSESS-DOCD LE1/YR: CPT | Mod: CPTII,S$GLB,, | Performed by: HOSPITALIST

## 2022-05-24 PROCEDURE — 99499 RISK ADDL DX/OHS AUDIT: ICD-10-PCS | Mod: S$GLB,,, | Performed by: HOSPITALIST

## 2022-05-24 PROCEDURE — 1126F AMNT PAIN NOTED NONE PRSNT: CPT | Mod: CPTII,S$GLB,, | Performed by: HOSPITALIST

## 2022-05-24 PROCEDURE — 1101F PR PT FALLS ASSESS DOC 0-1 FALLS W/OUT INJ PAST YR: ICD-10-PCS | Mod: CPTII,S$GLB,, | Performed by: HOSPITALIST

## 2022-05-24 RX ORDER — DULOXETIN HYDROCHLORIDE 20 MG/1
CAPSULE, DELAYED RELEASE ORAL
Qty: 180 CAPSULE | Refills: 3 | Status: SHIPPED | OUTPATIENT
Start: 2022-05-24 | End: 2023-08-08 | Stop reason: SDUPTHER

## 2022-05-24 RX ORDER — PRAVASTATIN SODIUM 80 MG/1
80 TABLET ORAL NIGHTLY
Qty: 90 TABLET | Refills: 3 | Status: SHIPPED | OUTPATIENT
Start: 2022-05-24 | End: 2023-12-04 | Stop reason: SDUPTHER

## 2022-05-24 RX ORDER — HYDROCHLOROTHIAZIDE 12.5 MG/1
12.5 TABLET ORAL DAILY
Qty: 90 TABLET | Refills: 3 | Status: SHIPPED | OUTPATIENT
Start: 2022-05-24 | End: 2023-12-04 | Stop reason: SDUPTHER

## 2022-05-24 RX ORDER — GABAPENTIN ENACARBIL 600 MG/1
600 TABLET, EXTENDED RELEASE ORAL NIGHTLY
Qty: 90 TABLET | Refills: 2 | Status: CANCELLED | OUTPATIENT
Start: 2022-05-24

## 2022-05-24 RX ORDER — FAMOTIDINE 20 MG/1
20 TABLET, FILM COATED ORAL DAILY
Qty: 90 TABLET | Refills: 3 | Status: SHIPPED | OUTPATIENT
Start: 2022-05-24 | End: 2023-08-01

## 2022-05-24 RX ORDER — GABAPENTIN ENACARBIL 600 MG/1
600 TABLET, EXTENDED RELEASE ORAL NIGHTLY
Qty: 90 TABLET | Refills: 2 | Status: SHIPPED | OUTPATIENT
Start: 2022-05-24 | End: 2022-07-18 | Stop reason: SDUPTHER

## 2022-05-24 RX ORDER — BUPROPION HYDROCHLORIDE 150 MG/1
150 TABLET, EXTENDED RELEASE ORAL 2 TIMES DAILY
Qty: 180 TABLET | Refills: 3 | Status: SHIPPED | OUTPATIENT
Start: 2022-05-24 | End: 2023-12-04

## 2022-05-24 NOTE — PROGRESS NOTES
Subjective:     @Patient ID: Tere Velasco is a 82 y.o. female.    Chief Complaint: Annual Exam    HPI  82 y.o. female here for annual exam: Labs ordered       1. HTN: hctz 12.5 mg qday and losartan 50 mg qday  2. HLD: pravastatin 40 mg qday  3. Anxiety and depression: on wellbutrin 150 mg, cymbalta 20 mg. Reports her   2020. Since then has been lonely but has support from friends. Her 2 biological kids (daughter in [place], son in [place]) and 2 stepchildren ([facility] and [place]) that live out of state.  Has a dog that she loves  4. Insomnia: recently increased Lunesta to 2 mg qhs. States insomnia is improved. Reports chronic issue. Does practice sleep hygiene   5. RLS:  on Horizant 600 mg qhs   6. GERD: on pepcid   7. TRU on cpap : reports has not used cpap in years as does not like the fit of the mask          Lipid disorders/ASCVD risk (ages >/= 45 or >/= 20 if increased risk ): ordered  Eye exam:       Vaccines:   Influenza (yearly)    Tetanus (every 10 yrs - 1st tdap) done  PPSV23(>64yo or <65 w/ lung dz, smoking, DM) done  PCV13 (> 65 or <65 w/ immunocompromised) done  Zoster (>59yo): utd    covid19: due for booster     Exercise: walking  Diet: reg           Review of Systems   Constitutional: Negative for chills and fever.   HENT: Negative for congestion and sore throat.    Eyes: Negative for pain and visual disturbance.   Respiratory: Negative for cough and shortness of breath.    Cardiovascular: Negative for chest pain and leg swelling.   Gastrointestinal: Negative for abdominal pain, nausea and vomiting.   Endocrine: Negative for polydipsia and polyuria.   Genitourinary: Negative for difficulty urinating and dysuria.   Musculoskeletal: Negative for arthralgias and back pain.   Skin: Negative for rash and wound.   Neurological: Negative for dizziness, weakness and headaches.   Psychiatric/Behavioral: Negative for agitation and confusion.     Past medical history, surgical history,  "and family medical history reviewed and updated as appropriate.    Medications and allergies reviewed.     Objective:     Vitals:    05/24/22 1347   BP: 136/76   BP Location: Right arm   Patient Position: Sitting   BP Method: Small (Manual)   Pulse: 71   Resp: (!) 21   Temp: 97.5 °F (36.4 °C)   TempSrc: Temporal   SpO2: 98%   Weight: 69.5 kg (153 lb 3.5 oz)   Height: 5' 5" (1.651 m)     Body mass index is 25.5 kg/m².  Physical Exam  Vitals reviewed.   Constitutional:       General: She is not in acute distress.     Appearance: She is well-developed.   HENT:      Head: Normocephalic and atraumatic.      Right Ear: Tympanic membrane normal.      Left Ear: Tympanic membrane normal.      Mouth/Throat:      Mouth: Mucous membranes are moist.      Pharynx: No oropharyngeal exudate.   Eyes:      General:         Right eye: No discharge.         Left eye: No discharge.      Conjunctiva/sclera: Conjunctivae normal.   Cardiovascular:      Rate and Rhythm: Normal rate and regular rhythm.      Heart sounds: No murmur heard.    No friction rub.   Pulmonary:      Effort: Pulmonary effort is normal.      Breath sounds: Normal breath sounds.   Abdominal:      General: Bowel sounds are normal. There is no distension.      Palpations: Abdomen is soft.      Tenderness: There is no abdominal tenderness. There is no guarding.   Musculoskeletal:         General: Normal range of motion.      Cervical back: Normal range of motion and neck supple.      Right lower leg: No edema.      Left lower leg: No edema.   Lymphadenopathy:      Cervical: No cervical adenopathy.   Skin:     General: Skin is warm and dry.   Neurological:      Mental Status: She is alert and oriented to person, place, and time.   Psychiatric:         Mood and Affect: Mood normal.         Behavior: Behavior normal.         Lab Results   Component Value Date    WBC 7.54 03/09/2022    HGB 11.5 (L) 03/09/2022    HCT 37.8 03/09/2022     03/09/2022    CHOL 237 (H) " 05/26/2021    TRIG 106 05/26/2021    HDL 67 05/26/2021    ALT 15 05/26/2021    AST 17 05/26/2021     03/09/2022     03/09/2022    K 4.9 03/09/2022    K 4.9 03/09/2022     03/09/2022     03/09/2022    CREATININE 1.4 03/09/2022    CREATININE 1.4 03/09/2022    BUN 19 03/09/2022    BUN 19 03/09/2022    CO2 27 03/09/2022    CO2 27 03/09/2022    TSH 1.963 05/26/2021    INR 1.8 (A) 01/30/2014    HGBA1C 5.5 08/21/2014       Assessment:     1. Encounter for preventive health examination    2. Essential hypertension    3. TRU on CPAP    4. Hyperlipidemia, unspecified hyperlipidemia type    5. Stage 3b chronic kidney disease    6. Severe episode of recurrent major depressive disorder, without psychotic features    7. Anxiety    8. Hyperparathyroidism    9. Degeneration of lumbar or lumbosacral intervertebral disc    10. Insomnia, unspecified type    11. Gastroesophageal reflux disease, unspecified whether esophagitis present    12. Fibromyalgia    13. Chronic bilateral low back pain without sciatica    14. Spinal stenosis, lumbar region, without neurogenic claudication    15. RLS (restless legs syndrome)    16. Spondylosis without myelopathy    17. Vitamin D deficiency      Plan:   Tere was seen today for annual exam.    Diagnoses and all orders for this visit:    Encounter for preventive health examination  - labs ordered.      Essential hypertension  -     Comprehensive Metabolic Panel; Future  -     CBC Auto Differential; Future  -     TSH; Future  -     Urinalysis; Future    TRU on CPAP  - not currently on cpap     Hyperlipidemia, unspecified hyperlipidemia type  -     Lipid Panel; Future  -     pravastatin (PRAVACHOL) 80 MG tablet; Take 1 tablet (80 mg total) by mouth every evening.    Stage 3b chronic kidney disease  - stable. Monitored by nephro    Severe episode of recurrent major depressive disorder, without psychotic features  - mood stable. Continue home meds     Anxiety  -     buPROPion  (WELLBUTRIN SR) 150 MG TBSR 12 hr tablet; Take 1 tablet (150 mg total) by mouth 2 (two) times daily.  - Stable. Continue home meds     Hyperparathyroidism  - pth stable. Monitored by nephro    Degeneration of lumbar or lumbosacral intervertebral disc  -     Ambulatory referral/consult to Pain Clinic; Future    Insomnia, unspecified type  - currently improved. Continue lunesta     Gastroesophageal reflux disease, unspecified whether esophagitis present  -     famotidine (PEPCID) 20 MG tablet; Take 1 tablet (20 mg total) by mouth once daily.    Fibromyalgia  -     DULoxetine (CYMBALTA) 20 MG capsule; TAKE ONE CAPSULE BY MOUTH TWICE A DAY    Chronic bilateral low back pain without sciatica  -     Ambulatory referral/consult to Pain Clinic; Future    Spinal stenosis, lumbar region, without neurogenic claudication  -     Ambulatory referral/consult to Pain Clinic; Future    RLS (restless legs syndrome)  - Stable. Continue home meds      Vitamin D deficiency  - stable. Monitored by nephro    Other orders  -     hydroCHLOROthiazide (HYDRODIURIL) 12.5 MG Tab; Take 1 tablet (12.5 mg total) by mouth once daily.         rtc 6 months     Rosy Santos MD  Internal Medicine    5/24/2022

## 2022-06-06 ENCOUNTER — TELEPHONE (OUTPATIENT)
Dept: INTERNAL MEDICINE | Facility: CLINIC | Age: 82
End: 2022-06-06
Payer: MEDICARE

## 2022-06-06 NOTE — TELEPHONE ENCOUNTER
----- Message from Katie Brewer sent at 6/6/2022  3:13 PM CDT -----  Contact: self/[phone]  Pt called in regards to talking about a Rx and insurance.. call back    Please advise

## 2022-06-07 ENCOUNTER — TELEPHONE (OUTPATIENT)
Dept: INTERNAL MEDICINE | Facility: CLINIC | Age: 82
End: 2022-06-07
Payer: MEDICARE

## 2022-06-07 NOTE — TELEPHONE ENCOUNTER
----- Message from Cinthya Dumont sent at 6/7/2022 10:59 AM CDT -----  Contact: pt [phone]  Patient states please approve with insurance gabapentin enacarbil (HORIZANT) 600 mg TbSR. [employer] [phone]. patient is out of medication    Requesting an RX refill or new RX.  Is this a refill or new RX: new  RX name and strength (copy/paste from chart):  gabapentin enacarbil (HORIZANT) 600 mg TbSR  Is this a 30 day or 90 day RX:90   Pharmacy name and phone # (copy/paste from chart):    [facility] [place] - [address]  [address]  [place] [address]  Phone: [phone] Fax: [phone]      The doctors have asked that we provide their patients with the following 2 reminders -- prescription refills can take up to 72 hours, and a friendly reminder that in the future you can use your [facility] account to request refills: yes

## 2022-06-09 ENCOUNTER — TELEPHONE (OUTPATIENT)
Dept: INTERNAL MEDICINE | Facility: CLINIC | Age: 82
End: 2022-06-09
Payer: MEDICARE

## 2022-06-17 ENCOUNTER — TELEPHONE (OUTPATIENT)
Dept: INTERNAL MEDICINE | Facility: CLINIC | Age: 82
End: 2022-06-17
Payer: MEDICARE

## 2022-07-18 DIAGNOSIS — G25.81 RLS (RESTLESS LEGS SYNDROME): ICD-10-CM

## 2022-07-18 RX ORDER — GABAPENTIN ENACARBIL 600 MG/1
600 TABLET, EXTENDED RELEASE ORAL NIGHTLY
Qty: 90 TABLET | Refills: 2 | Status: SHIPPED | OUTPATIENT
Start: 2022-07-18 | End: 2023-05-26

## 2022-07-18 NOTE — TELEPHONE ENCOUNTER
Care Due:                  Date            Visit Type   Department     Provider  --------------------------------------------------------------------------------                                EP -                              PRIMARY      MET INTERNAL  Last Visit: 05-      CARE ([facility])   MEDICINE       Rosy  Danielle                              EP -                              PRIMARY      [facility] INTERNAL  Next Visit: 11-      [facility] ([facility])   [facility]                                                            Last  Test          Frequency    Reason                     Performed    Due Date  --------------------------------------------------------------------------------    CMP.........  12 months..  pravastatin..............  05- 05-    Lipid Panel.  12 months..  pravastatin..............  05- 05-    [employer] Embedded Care Gaps. Reference number: [id number]. 7/18/2022   9:47:46 AM CDT

## 2022-08-05 ENCOUNTER — PES CALL (OUTPATIENT)
Dept: ADMINISTRATIVE | Facility: CLINIC | Age: 82
End: 2022-08-05
Payer: MEDICARE

## 2022-08-11 DIAGNOSIS — G47.00 INSOMNIA, UNSPECIFIED TYPE: ICD-10-CM

## 2022-08-11 RX ORDER — ESZOPICLONE 2 MG/1
2 TABLET, FILM COATED ORAL NIGHTLY PRN
Qty: 90 TABLET | Refills: 1 | Status: SHIPPED | OUTPATIENT
Start: 2022-08-11 | End: 2022-09-03

## 2022-08-11 NOTE — TELEPHONE ENCOUNTER
----- Message from Sadie Jarvis sent at 8/11/2022 11:53 AM CDT -----  Contact: Monroe sanches/ MARY Zavala   Requesting an RX refill or new RX.  Is this a refill or new RX: new  RX name and strength (copy/paste from chart):eszopiclone (LUNESTA) 2 MG Tab    Is this a 30 day or 90 day RX:   Pharmacy name and phone # (copy/paste from chart):    [facility] [place] - [address]  [address]  [place] [address]  Phone: [phone] Fax: [phone]

## 2022-08-11 NOTE — TELEPHONE ENCOUNTER
No new care gaps identified.  [facility] Embedded Care Gaps. Reference number: [id number]. 8/11/2022   1:20:28 PM CDT

## 2022-09-02 ENCOUNTER — TELEPHONE (OUTPATIENT)
Dept: INTERNAL MEDICINE | Facility: CLINIC | Age: 82
End: 2022-09-02
Payer: MEDICARE

## 2022-09-02 DIAGNOSIS — G47.00 INSOMNIA, UNSPECIFIED TYPE: ICD-10-CM

## 2022-09-02 NOTE — TELEPHONE ENCOUNTER
----- Message from Frieda Mayers sent at 9/2/2022  2:49 PM CDT -----  Contact: pt [phone]  Pt is requesting a call back from Suzy. Per pt, her pharmacy will give her week supply of her eszopiclone (LUNESTA) 2 MG Tab.  Pt needs you to call  [facility] pharmacy to approve it.       Thank you

## 2022-09-02 NOTE — TELEPHONE ENCOUNTER
Spoke with pt , pt states that she lost her medication somewhere in her home and cant find it . Pt states that pharmacy states that she is not due for a refill for another week . Pt states that she would be okay with paying out of pocket for a week supply until shes due . Recommended that pt calls [facility] pharmacy to see if they allow that to be done . Informed pt to call me back and let me know what pharmacy says .I will not place a refill request until pt calls me back.

## 2022-09-02 NOTE — TELEPHONE ENCOUNTER
----- Message from Sadie Jarvis sent at 9/2/2022  1:21 PM CDT -----  Contact: [phone] Patient  Pt misplaced/lost her medication and is a week early to get the refill. Pt would like to know if you can please refill early. Please call and advise. Thank you      Requesting an RX refill or new RX.  Is this a refill or new RX: new  RX name and strength (copy/paste from chart):  eszopiclone (LUNESTA) 2 MG Tab  Is this a 30 day or 90 day RX:   Pharmacy name and phone # (copy/paste from chart):    [facility] [place] - [address]  [address]  [place] [address]  Phone: [phone] Fax: [phone]

## 2022-09-02 NOTE — TELEPHONE ENCOUNTER
----- Message from Frieda Mayers sent at 9/2/2022  2:49 PM CDT -----  Contact: pt [phone]  Pt is requesting a call back from Suzy. Per pt, her pharmacy will give her week supply of her eszopiclone (LUNESTA) 2 MG Tab.  Pt needs you to call  [facility] pharmacy to approve it.       Thank you

## 2022-09-02 NOTE — TELEPHONE ENCOUNTER
Per pt, her pharmacy will give her week supply of her eszopiclone (LUNESTA) 2 MG Tab.  Pt needs you to call  [facility] pharmacy to approve it.Can you please let me know If this is allowed ?   Thank you

## 2022-09-02 NOTE — TELEPHONE ENCOUNTER
Spoke with pt , pt states that she lost her medication somewhere in her home and cant find it . Pt states that pharmacy states that she is not due for a refill for another week . Pt states that she would be okay with paying out of pocket for a week supply until shes due . Recommended that pt calls [facility] pharmacy to see if they allow that to be done . Informed pt to call me back and let me know what pharmacy says .I will not place a refill request until pt calls me back.

## 2022-09-03 RX ORDER — ESZOPICLONE 2 MG/1
2 TABLET, FILM COATED ORAL NIGHTLY PRN
Qty: 7 TABLET | Refills: 0 | Status: SHIPPED | OUTPATIENT
Start: 2022-09-03 | End: 2022-11-14

## 2022-09-16 ENCOUNTER — PES CALL (OUTPATIENT)
Dept: ADMINISTRATIVE | Facility: CLINIC | Age: 82
End: 2022-09-16
Payer: MEDICARE

## 2022-10-05 ENCOUNTER — OFFICE VISIT (OUTPATIENT)
Dept: PAIN MEDICINE | Facility: CLINIC | Age: 82
End: 2022-10-05
Payer: MEDICARE

## 2022-10-05 VITALS
WEIGHT: 153.25 LBS | SYSTOLIC BLOOD PRESSURE: 164 MMHG | DIASTOLIC BLOOD PRESSURE: 76 MMHG | BODY MASS INDEX: 25.5 KG/M2 | HEART RATE: 92 BPM

## 2022-10-05 DIAGNOSIS — M51.37 DEGENERATION OF LUMBAR OR LUMBOSACRAL INTERVERTEBRAL DISC: ICD-10-CM

## 2022-10-05 DIAGNOSIS — M54.50 CHRONIC BILATERAL LOW BACK PAIN WITHOUT SCIATICA: ICD-10-CM

## 2022-10-05 DIAGNOSIS — G89.29 CHRONIC BILATERAL LOW BACK PAIN WITHOUT SCIATICA: ICD-10-CM

## 2022-10-05 PROCEDURE — 99999 PR PBB SHADOW E&M-EST. PATIENT-LVL III: ICD-10-PCS | Mod: PBBFAC,,, | Performed by: PAIN MEDICINE

## 2022-10-05 PROCEDURE — 3078F DIAST BP <80 MM HG: CPT | Mod: CPTII,S$GLB,, | Performed by: PAIN MEDICINE

## 2022-10-05 PROCEDURE — 1159F MED LIST DOCD IN RCRD: CPT | Mod: CPTII,S$GLB,, | Performed by: PAIN MEDICINE

## 2022-10-05 PROCEDURE — 3077F PR MOST RECENT SYSTOLIC BLOOD PRESSURE >= 140 MM HG: ICD-10-PCS | Mod: CPTII,S$GLB,, | Performed by: PAIN MEDICINE

## 2022-10-05 PROCEDURE — 99214 PR OFFICE/OUTPT VISIT, EST, LEVL IV, 30-39 MIN: ICD-10-PCS | Mod: S$GLB,,, | Performed by: PAIN MEDICINE

## 2022-10-05 PROCEDURE — 3077F SYST BP >= 140 MM HG: CPT | Mod: CPTII,S$GLB,, | Performed by: PAIN MEDICINE

## 2022-10-05 PROCEDURE — 1125F PR PAIN SEVERITY QUANTIFIED, PAIN PRESENT: ICD-10-PCS | Mod: CPTII,S$GLB,, | Performed by: PAIN MEDICINE

## 2022-10-05 PROCEDURE — 99214 OFFICE O/P EST MOD 30 MIN: CPT | Mod: S$GLB,,, | Performed by: PAIN MEDICINE

## 2022-10-05 PROCEDURE — 1159F PR MEDICATION LIST DOCUMENTED IN MEDICAL RECORD: ICD-10-PCS | Mod: CPTII,S$GLB,, | Performed by: PAIN MEDICINE

## 2022-10-05 PROCEDURE — 3078F PR MOST RECENT DIASTOLIC BLOOD PRESSURE < 80 MM HG: ICD-10-PCS | Mod: CPTII,S$GLB,, | Performed by: PAIN MEDICINE

## 2022-10-05 PROCEDURE — 1125F AMNT PAIN NOTED PAIN PRSNT: CPT | Mod: CPTII,S$GLB,, | Performed by: PAIN MEDICINE

## 2022-10-05 PROCEDURE — 99999 PR PBB SHADOW E&M-EST. PATIENT-LVL III: CPT | Mod: PBBFAC,,, | Performed by: PAIN MEDICINE

## 2022-10-05 NOTE — PROGRESS NOTES
[facility] New Patient Evaluation    Referred by: Dr Rosy Santos  Reason for referral: Lumbar Region    CC:   Chief Complaint   Patient presents with    Low-back Pain       Last 3 PDI Scores 3/10/2021   Pain Disability Index (PDI) 47       Interval Update:  10/5/2022 - Ms. Velasco is following up for low back pain.   She reports the following medication side effects: none.  Pain is currently rate 2/10 with a weekly range 3-4/10.  It is described as Aching.   Pain is worsened by standing and walking and improved by resting and sitting.     HPI:   Tere Velasco is a 82 y.o. female with a reported history of diffuse arthritis status post bilateral knee replacement, fibromyalgia, scoliosis who presents complaining of chronic low back pain has become severe.  Patient reports limited ability to stand or walk due to low back pain.    Location: low back   Onset: several years  Current Pain Score: 4/10  Daily Pain of Range: 3-9/10  Quality: Aching  Radiation: None  Worsened by: standing and walking for more than a few minutes  Improved by: rest and sitting    Previous Therapies:  PT/OT: Yes, 3 yrs ago  HEP: Yes, 3 yrs ago but stopped performing it  Interventions: Bilateral Lumbar facet injection 2013  Surgery: Bilateral TKA.  No back surgery.  Medications:   - NSAIDS: unable to use due to CKD  - MSK Relaxants:   - TCAs:   - SNRIs:   - Topicals:   - Anticonvulsants:  - Opioids:     Current Pain Medications:  duloxtine  Gabapentin (Horizant)    Review of Systems:  Review of Systems   Constitutional:  Negative for chills and fever.   HENT:  Negative for nosebleeds.    Eyes:  Negative for blurred vision and pain.   Respiratory:  Negative for hemoptysis.    Cardiovascular:  Negative for chest pain and palpitations.   Gastrointestinal:  Negative for heartburn, nausea and vomiting.   Genitourinary:  Negative for dysuria and hematuria.   Musculoskeletal:  Positive for back pain, joint pain and myalgias.   Skin:   Negative for rash.   Neurological:  Negative for seizures and loss of consciousness.   Endo/Heme/Allergies:  Does not bruise/bleed easily.   Psychiatric/Behavioral:  Negative for hallucinations.      History:    Current Outpatient Medications:     aspirin (ECOTRIN) 81 MG EC tablet, Take 1 tablet (81 mg total) by mouth every evening. (Patient not taking: Reported on 5/24/2022), Disp: 90 tablet, Rfl: 1    buPROPion (WELLBUTRIN SR) 150 MG TBSR 12 hr tablet, Take 1 tablet (150 mg total) by mouth 2 (two) times daily., Disp: 180 tablet, Rfl: 3    DULoxetine (CYMBALTA) 20 MG capsule, TAKE ONE CAPSULE BY MOUTH TWICE A DAY, Disp: 180 capsule, Rfl: 3    eszopiclone (LUNESTA) 2 MG Tab, Take 1 tablet (2 mg total) by mouth nightly as needed., Disp: 7 tablet, Rfl: 0    famotidine (PEPCID) 20 MG tablet, Take 1 tablet (20 mg total) by mouth once daily., Disp: 90 tablet, Rfl: 3    ferrous gluconate (FERGON) 324 MG tablet, ferrous gluconate 324 mg (38 mg iron) tablet, Disp: , Rfl:     gabapentin enacarbil (HORIZANT) 600 mg TbSR, Take 600 mg ( 1 tablet )  by mouth every evening., Disp: 90 tablet, Rfl: 2    hydroCHLOROthiazide (HYDRODIURIL) 12.5 MG Tab, Take 1 tablet (12.5 mg total) by mouth once daily., Disp: 90 tablet, Rfl: 3    losartan (COZAAR) 50 MG tablet, Take 1 tablet (50 mg total) by mouth once daily., Disp: 90 tablet, Rfl: 1    pravastatin (PRAVACHOL) 80 MG tablet, Take 1 tablet (80 mg total) by mouth every evening., Disp: 90 tablet, Rfl: 3    UNABLE TO FIND, Relief Factor---1 packet per day, Disp: , Rfl:     vit A/vit C/vit E/zinc/copper (ICAPS AREDS ORAL), Take by mouth., Disp: , Rfl:   No current facility-administered medications for this visit.    Facility-Administered Medications Ordered in Other Visits:     0.9%  NaCl infusion, , Intravenous, Continuous, Kuldip Siddiqui Jr., MD, Last Rate: 25 mL/hr at 04/27/21 1400, New Bag at 04/27/21 1400    LIDOcaine (PF) 10 mg/ml (1%) injection 10 mg, 1 mL, Intradermal, Once,  Kuldip Siddiqui Jr., MD    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)    Cataract     removed from both eyes    CKD (chronic kidney disease) stage 3, GFR 30-59 ml/min     Clotting disorder     when knee was replaced    Colitis     Degenerative disc disease     Depression     Fibromyalgia     GERD (gastroesophageal reflux disease)     Hyperlipidemia     Hypertension     Irritable bowel syndrome     TRU (obstructive sleep apnea)     RLS (restless legs syndrome)     S/P knee replacement 1/13/2014       Past Surgical History:   Procedure Laterality Date    APPENDECTOMY      BREAST BIOPSY      BREAST LUMPECTOMY      right side    BREAST SURGERY      CHOLECYSTECTOMY      COLONOSCOPY N/A 12/2/2016    Procedure: COLONOSCOPY;  Surgeon: Ori Cope MD;  Location: [facility] ENDO [address]);  Service: Endoscopy;  Laterality: N/A;  PM prep    EYE SURGERY Bilateral     cataract    HYSTERECTOMY  1977    uterine pain    INJECTION OF FACET JOINT Bilateral 4/27/2021    Procedure: Facet joint injection-lumbar--Bilateral L4-5, L5-S1;  Surgeon: Kuldip Siddiqui Jr., MD;  Location: [facility];  Service: Pain Management;  Laterality: Bilateral;    JOINT REPLACEMENT      knees replaced bilaterally    KNEE SURGERY Bilateral     RECTAL SURGERY      rectocele    TONSILLECTOMY         Family History   Problem Relation Age of Onset    Dementia Mother         age 92    Heart disease Father         age 58    Restless legs syndrome Daughter     Thyroid disease Daughter     Hypertension Son     Alcohol abuse Son     Ovarian cancer Paternal Grandmother     Breast cancer Paternal Grandmother     Cancer Paternal Grandmother         ovarian    Ovarian cancer Cousin     Cancer Cousin 40        colon ca    Breast cancer Paternal Aunt     Skin cancer Neg Hx     Melanoma Neg Hx     Colon cancer Neg Hx     Esophageal cancer Neg Hx     Stomach cancer  Neg Hx     Irritable bowel syndrome Neg Hx     Crohn's disease Neg Hx     Ulcerative colitis Neg Hx     Celiac disease Neg Hx        Social History     Socioeconomic History    Marital status:    Tobacco Use    Smoking status: Never    Smokeless tobacco: Never   Substance and Sexual Activity    Alcohol use: Not Currently    Drug use: No    Sexual activity: Not Currently     Partners: Male     Birth control/protection: Post-menopausal       Review of patient's allergies indicates:   Allergen Reactions    Demerol [meperidine]        Physical Exam:  There were no vitals filed for this visit.  General    Nursing note and vitals reviewed.  Constitutional: She is oriented to person, place, and time. She appears well-developed and well-nourished. No distress.   HENT:   Head: Normocephalic and atraumatic.   Nose: Nose normal.   Eyes: Conjunctivae and EOM are normal. Pupils are equal, round, and reactive to light. Right eye exhibits no discharge. Left eye exhibits no discharge. No scleral icterus.   Neck: No JVD present.   Cardiovascular:  Intact distal pulses.            Pulmonary/Chest: Effort normal. No respiratory distress.   Abdominal: She exhibits no distension.   Neurological: She is alert and oriented to person, place, and time. Coordination normal.   Psychiatric: She has a normal mood and affect. Her behavior is normal. Judgment and thought content normal.     General Musculoskeletal Exam   Gait: normal     Back (L-Spine & T-Spine) / Neck (C-Spine) Exam     Tenderness Right paramedian tenderness of the Lower L-Spine. Left paramedian tenderness of the Lower L-Spine.     Back (L-Spine & T-Spine) Range of Motion   Back extension: facet loading is positive and exacerabtes/reproduces the patient's typical low back pain    Back flexion: limited ROM but partial relief of low back pain noted.     Spinal Sensation   Right Side Sensation  L-Spine Level: normal  Left Side Sensation  L-Spine Level: normal    Other   She  has scoliosis .      Muscle Strength   Right Lower Extremity   Hip Flexion: 5/5   Hip Extensors: 5/5  Quadriceps:  5/5   Hamstrin/5   Gastrocsoleus:  5/5   Left Lower Extremity   Hip Flexion: 5/5   Hip Extensors: 5/5  Quadriceps:  5/5   Hamstrin/5   Gastrocsoleus:  5/5     Reflexes     Left Side  Achilles:  2+  Quadriceps:  2+    Right Side   Achilles:  2+  Quadriceps:  2+    Imaging:  X-ray lumbar spine from  shows substantial thoracolumbar scoliosis.  There is multilevel degenerative disc disease and facet arthropathy most notably at L4-5 and L5-S1, bilaterally.    Labs:  BMP  Lab Results   Component Value Date     2022     2022    K 4.9 2022    K 4.9 2022     2022     2022    CO2 27 2022    CO2 27 2022    BUN 19 2022    BUN 19 2022    CREATININE 1.4 2022    CREATININE 1.4 2022    CALCIUM 9.5 2022    CALCIUM 9.5 2022    ANIONGAP 10 2022    ANIONGAP 10 2022    ESTGFRAFRICA 39.8 (A) 2022    ESTGFRAFRICA 39.8 (A) 2022    EGFRNONAA 34.5 (A) 2022    EGFRNONAA 34.5 (A) 2022     Lab Results   Component Value Date    ALT 15 2021    AST 17 2021    ALKPHOS 50 (L) 2021    BILITOT 0.4 2021       Assessment:  Problem List Items Addressed This Visit          Neuro    Degeneration of lumbar or lumbosacral intervertebral disc       Orthopedic    Chronic bilateral low back pain without sciatica       3/10/21 - Tere Chairezsarkis Velasco is a 82 y.o. female with chronic low back pain due to degenerative joint disease with contribution from scoliosis and core muscle weakness.  I recommended a combination of therapies including physical therapy and facet joint injections for the low back.  Due to the severity of her scoliosis, patient is unlikely to ever be pain free; however, I believe that through a combination of facet injections of physical therapy a  realistic target is 50-75% reduction in pain.  I recommend continuation of the current medications with the addition of Tylenol.  Patient appears to understand the necessity for avoiding NSAIDs, due to her chronic kidney disease.    10/5/22 - Tere Velasco is a 82 y.o. female with chronic lower back pain and scoliosis. She failed to experience relief from facet injections that were well placed.  She states that she does not do home exercises because she wasn't sure how it would help her back pain.  She completed physical therapy, however her benefits were short lived.  At this point, she would benefit form home exercise program with core strengthening.  She reports pain of 2/10 today which is representative of her daily experience of pain.  At age 82 yrs with substantial scoliosis, advanced DDD, and a sedentary lifestyle, she was counseled that her level of pain (2/10) should be viewed as reasonable; however, if she would like further redcutions in pain she must commit to daily exercise.  Her remain pain is most likely fibromyalgia.     : NA    Treatment Plan:   Procedures: None at this time  PT/OT/HEP: She completed physical therapy.  We counseled her on the importance of a quality HEP for the long term management of her lower back pain.  Medications: No changes recommended at this time.  Labs: reviewed and medications are appropriately dosed for current hepatorenal function.  Imaging: none    Follow Up: as needed    Kuldip Siddiqui Jr, MD  Interventional Pain Medicine / Anesthesiology    Disclaimer: This note was partly generated using dictation software which may occasionally result in transcription errors.

## 2022-10-06 PROBLEM — G89.29 CHRONIC PAIN: Status: RESOLVED | Noted: 2021-04-27 | Resolved: 2022-10-06

## 2022-10-06 PROBLEM — Z13.31 POSITIVE DEPRESSION SCREENING: Status: RESOLVED | Noted: 2020-02-27 | Resolved: 2022-10-06

## 2022-10-14 ENCOUNTER — TELEPHONE (OUTPATIENT)
Dept: NEPHROLOGY | Facility: CLINIC | Age: 82
End: 2022-10-14
Payer: MEDICARE

## 2022-11-08 ENCOUNTER — PES CALL (OUTPATIENT)
Dept: ADMINISTRATIVE | Facility: CLINIC | Age: 82
End: 2022-11-08
Payer: MEDICARE

## 2022-11-10 ENCOUNTER — LAB VISIT (OUTPATIENT)
Dept: LAB | Facility: HOSPITAL | Age: 82
End: 2022-11-10
Attending: INTERNAL MEDICINE
Payer: MEDICARE

## 2022-11-10 ENCOUNTER — HOSPITAL ENCOUNTER (OUTPATIENT)
Dept: RADIOLOGY | Facility: HOSPITAL | Age: 82
Discharge: HOME | End: 2022-11-10
Attending: INTERNAL MEDICINE
Payer: MEDICARE

## 2022-11-10 DIAGNOSIS — E55.9 VITAMIN D DEFICIENCY: ICD-10-CM

## 2022-11-10 DIAGNOSIS — I10 ESSENTIAL HYPERTENSION: ICD-10-CM

## 2022-11-10 DIAGNOSIS — N18.32 STAGE 3B CHRONIC KIDNEY DISEASE: ICD-10-CM

## 2022-11-10 DIAGNOSIS — E78.5 HYPERLIPIDEMIA, UNSPECIFIED HYPERLIPIDEMIA TYPE: ICD-10-CM

## 2022-11-10 LAB
25(OH)D3+25(OH)D2 SERPL-MCNC: 37 NG/ML (ref 30–96)
ALBUMIN SERPL BCP-MCNC: 3.5 G/DL (ref 3.5–5.2)
ALBUMIN SERPL BCP-MCNC: 3.5 G/DL (ref 3.5–5.2)
ALP SERPL-CCNC: 52 U/L (ref 55–135)
ALT SERPL W/O P-5'-P-CCNC: 10 U/L (ref 10–44)
ANION GAP SERPL CALC-SCNC: 9 MMOL/L (ref 8–16)
AST SERPL-CCNC: 15 U/L (ref 10–40)
BASOPHILS # BLD AUTO: 0.05 K/UL (ref 0–0.2)
BASOPHILS # BLD AUTO: 0.05 K/UL (ref 0–0.2)
BASOPHILS NFR BLD: 0.7 % (ref 0–1.9)
BASOPHILS NFR BLD: 0.7 % (ref 0–1.9)
BILIRUB SERPL-MCNC: 0.3 MG/DL (ref 0.1–1)
BUN SERPL-MCNC: 22 MG/DL (ref 8–23)
CALCIUM SERPL-MCNC: 9.8 MG/DL (ref 8.7–10.5)
CHLORIDE SERPL-SCNC: 107 MMOL/L (ref 95–110)
CHOLEST SERPL-MCNC: 218 MG/DL (ref 120–199)
CHOLEST/HDLC SERPL: 4 {RATIO} (ref 2–5)
CO2 SERPL-SCNC: 26 MMOL/L (ref 23–29)
CREAT SERPL-MCNC: 1.2 MG/DL (ref 0.5–1.4)
DIFFERENTIAL METHOD: ABNORMAL
DIFFERENTIAL METHOD: ABNORMAL
EOSINOPHIL # BLD AUTO: 0.1 K/UL (ref 0–0.5)
EOSINOPHIL # BLD AUTO: 0.1 K/UL (ref 0–0.5)
EOSINOPHIL NFR BLD: 1.7 % (ref 0–8)
EOSINOPHIL NFR BLD: 1.7 % (ref 0–8)
ERYTHROCYTE [DISTWIDTH] IN BLOOD BY AUTOMATED COUNT: 15 % (ref 11.5–14.5)
ERYTHROCYTE [DISTWIDTH] IN BLOOD BY AUTOMATED COUNT: 15 % (ref 11.5–14.5)
EST. GFR  (NO RACE VARIABLE): 44.6 ML/MIN/1.73 M^2
GLUCOSE SERPL-MCNC: 92 MG/DL (ref 70–110)
HCT VFR BLD AUTO: 37 % (ref 37–48.5)
HCT VFR BLD AUTO: 37 % (ref 37–48.5)
HDLC SERPL-MCNC: 55 MG/DL (ref 40–75)
HDLC SERPL: 25.2 % (ref 20–50)
HGB BLD-MCNC: 11.1 G/DL (ref 12–16)
HGB BLD-MCNC: 11.1 G/DL (ref 12–16)
IMM GRANULOCYTES # BLD AUTO: 0.01 K/UL (ref 0–0.04)
IMM GRANULOCYTES # BLD AUTO: 0.01 K/UL (ref 0–0.04)
IMM GRANULOCYTES NFR BLD AUTO: 0.1 % (ref 0–0.5)
IMM GRANULOCYTES NFR BLD AUTO: 0.1 % (ref 0–0.5)
LDLC SERPL CALC-MCNC: 132.2 MG/DL (ref 63–159)
LYMPHOCYTES # BLD AUTO: 1.4 K/UL (ref 1–4.8)
LYMPHOCYTES # BLD AUTO: 1.4 K/UL (ref 1–4.8)
LYMPHOCYTES NFR BLD: 19.5 % (ref 18–48)
LYMPHOCYTES NFR BLD: 19.5 % (ref 18–48)
MAGNESIUM SERPL-MCNC: 2 MG/DL (ref 1.6–2.6)
MCH RBC QN AUTO: 28.5 PG (ref 27–31)
MCH RBC QN AUTO: 28.5 PG (ref 27–31)
MCHC RBC AUTO-ENTMCNC: 30 G/DL (ref 32–36)
MCHC RBC AUTO-ENTMCNC: 30 G/DL (ref 32–36)
MCV RBC AUTO: 95 FL (ref 82–98)
MCV RBC AUTO: 95 FL (ref 82–98)
MONOCYTES # BLD AUTO: 0.7 K/UL (ref 0.3–1)
MONOCYTES # BLD AUTO: 0.7 K/UL (ref 0.3–1)
MONOCYTES NFR BLD: 9.2 % (ref 4–15)
MONOCYTES NFR BLD: 9.2 % (ref 4–15)
NEUTROPHILS # BLD AUTO: 4.9 K/UL (ref 1.8–7.7)
NEUTROPHILS # BLD AUTO: 4.9 K/UL (ref 1.8–7.7)
NEUTROPHILS NFR BLD: 68.8 % (ref 38–73)
NEUTROPHILS NFR BLD: 68.8 % (ref 38–73)
NONHDLC SERPL-MCNC: 163 MG/DL
NRBC BLD-RTO: 0 /100 WBC
NRBC BLD-RTO: 0 /100 WBC
PHOSPHATE SERPL-MCNC: 3.3 MG/DL (ref 2.7–4.5)
PLATELET # BLD AUTO: 267 K/UL (ref 150–450)
PLATELET # BLD AUTO: 267 K/UL (ref 150–450)
PMV BLD AUTO: 11.4 FL (ref 9.2–12.9)
PMV BLD AUTO: 11.4 FL (ref 9.2–12.9)
POTASSIUM SERPL-SCNC: 4.9 MMOL/L (ref 3.5–5.1)
PROT SERPL-MCNC: 6.7 G/DL (ref 6–8.4)
PTH-INTACT SERPL-MCNC: 73.6 PG/ML (ref 9–77)
RBC # BLD AUTO: 3.89 M/UL (ref 4–5.4)
RBC # BLD AUTO: 3.89 M/UL (ref 4–5.4)
SODIUM SERPL-SCNC: 142 MMOL/L (ref 136–145)
TRIGL SERPL-MCNC: 154 MG/DL (ref 30–150)
TSH SERPL DL<=0.005 MIU/L-ACNC: 2.29 UIU/ML (ref 0.4–4)
URATE SERPL-MCNC: 6 MG/DL (ref 2.4–5.7)
WBC # BLD AUTO: 7.08 K/UL (ref 3.9–12.7)
WBC # BLD AUTO: 7.08 K/UL (ref 3.9–12.7)

## 2022-11-10 PROCEDURE — 76770 US EXAM ABDO BACK WALL COMP: CPT | Mod: TC

## 2022-11-10 PROCEDURE — 36415 COLL VENOUS BLD VENIPUNCTURE: CPT | Mod: PO | Performed by: INTERNAL MEDICINE

## 2022-11-10 PROCEDURE — 80061 LIPID PANEL: CPT | Performed by: HOSPITALIST

## 2022-11-10 PROCEDURE — 82306 VITAMIN D 25 HYDROXY: CPT | Performed by: INTERNAL MEDICINE

## 2022-11-10 PROCEDURE — 84550 ASSAY OF BLOOD/URIC ACID: CPT | Performed by: INTERNAL MEDICINE

## 2022-11-10 PROCEDURE — 83970 ASSAY OF PARATHORMONE: CPT | Performed by: INTERNAL MEDICINE

## 2022-11-10 PROCEDURE — 76770 US RETROPERITONEAL COMPLETE: ICD-10-PCS | Mod: 26,,, | Performed by: RADIOLOGY

## 2022-11-10 PROCEDURE — 76770 US EXAM ABDO BACK WALL COMP: CPT | Mod: 26,,, | Performed by: RADIOLOGY

## 2022-11-10 PROCEDURE — 84443 ASSAY THYROID STIM HORMONE: CPT | Performed by: HOSPITALIST

## 2022-11-10 PROCEDURE — 84100 ASSAY OF PHOSPHORUS: CPT | Performed by: INTERNAL MEDICINE

## 2022-11-10 PROCEDURE — 80053 COMPREHEN METABOLIC PANEL: CPT | Performed by: HOSPITALIST

## 2022-11-10 PROCEDURE — 83735 ASSAY OF MAGNESIUM: CPT | Performed by: INTERNAL MEDICINE

## 2022-11-10 PROCEDURE — 85025 COMPLETE CBC W/AUTO DIFF WBC: CPT | Performed by: INTERNAL MEDICINE

## 2022-11-11 ENCOUNTER — PATIENT MESSAGE (OUTPATIENT)
Dept: NEPHROLOGY | Facility: CLINIC | Age: 82
End: 2022-11-11
Payer: MEDICARE

## 2022-11-14 ENCOUNTER — OFFICE VISIT (OUTPATIENT)
Dept: INTERNAL MEDICINE | Facility: CLINIC | Age: 82
End: 2022-11-14
Payer: MEDICARE

## 2022-11-14 VITALS
OXYGEN SATURATION: 98 % | HEART RATE: 86 BPM | WEIGHT: 146.81 LBS | TEMPERATURE: 99 F | HEIGHT: 65 IN | SYSTOLIC BLOOD PRESSURE: 122 MMHG | RESPIRATION RATE: 16 BRPM | BODY MASS INDEX: 24.46 KG/M2 | DIASTOLIC BLOOD PRESSURE: 76 MMHG

## 2022-11-14 DIAGNOSIS — I10 ESSENTIAL HYPERTENSION: Primary | ICD-10-CM

## 2022-11-14 DIAGNOSIS — E78.5 HYPERLIPIDEMIA, UNSPECIFIED HYPERLIPIDEMIA TYPE: ICD-10-CM

## 2022-11-14 DIAGNOSIS — M54.50 CHRONIC BILATERAL LOW BACK PAIN WITHOUT SCIATICA: ICD-10-CM

## 2022-11-14 DIAGNOSIS — G47.00 INSOMNIA, UNSPECIFIED TYPE: ICD-10-CM

## 2022-11-14 DIAGNOSIS — G89.29 CHRONIC BILATERAL LOW BACK PAIN WITHOUT SCIATICA: ICD-10-CM

## 2022-11-14 PROCEDURE — G0008 ADMIN INFLUENZA VIRUS VAC: HCPCS | Mod: S$GLB,,, | Performed by: HOSPITALIST

## 2022-11-14 PROCEDURE — 99214 PR OFFICE/OUTPT VISIT, EST, LEVL IV, 30-39 MIN: ICD-10-PCS | Mod: S$GLB,,, | Performed by: HOSPITALIST

## 2022-11-14 PROCEDURE — 99214 OFFICE O/P EST MOD 30 MIN: CPT | Mod: S$GLB,,, | Performed by: HOSPITALIST

## 2022-11-14 PROCEDURE — 1125F PR PAIN SEVERITY QUANTIFIED, PAIN PRESENT: ICD-10-PCS | Mod: CPTII,S$GLB,, | Performed by: HOSPITALIST

## 2022-11-14 PROCEDURE — 1125F AMNT PAIN NOTED PAIN PRSNT: CPT | Mod: CPTII,S$GLB,, | Performed by: HOSPITALIST

## 2022-11-14 PROCEDURE — 1160F PR REVIEW ALL MEDS BY PRESCRIBER/CLIN PHARMACIST DOCUMENTED: ICD-10-PCS | Mod: CPTII,S$GLB,, | Performed by: HOSPITALIST

## 2022-11-14 PROCEDURE — 3288F FALL RISK ASSESSMENT DOCD: CPT | Mod: CPTII,S$GLB,, | Performed by: HOSPITALIST

## 2022-11-14 PROCEDURE — 3078F DIAST BP <80 MM HG: CPT | Mod: CPTII,S$GLB,, | Performed by: HOSPITALIST

## 2022-11-14 PROCEDURE — 1101F PR PT FALLS ASSESS DOC 0-1 FALLS W/OUT INJ PAST YR: ICD-10-PCS | Mod: CPTII,S$GLB,, | Performed by: HOSPITALIST

## 2022-11-14 PROCEDURE — 1159F MED LIST DOCD IN RCRD: CPT | Mod: CPTII,S$GLB,, | Performed by: HOSPITALIST

## 2022-11-14 PROCEDURE — 3078F PR MOST RECENT DIASTOLIC BLOOD PRESSURE < 80 MM HG: ICD-10-PCS | Mod: CPTII,S$GLB,, | Performed by: HOSPITALIST

## 2022-11-14 PROCEDURE — 1160F RVW MEDS BY RX/DR IN RCRD: CPT | Mod: CPTII,S$GLB,, | Performed by: HOSPITALIST

## 2022-11-14 PROCEDURE — 1101F PT FALLS ASSESS-DOCD LE1/YR: CPT | Mod: CPTII,S$GLB,, | Performed by: HOSPITALIST

## 2022-11-14 PROCEDURE — 3074F PR MOST RECENT SYSTOLIC BLOOD PRESSURE < 130 MM HG: ICD-10-PCS | Mod: CPTII,S$GLB,, | Performed by: HOSPITALIST

## 2022-11-14 PROCEDURE — 3074F SYST BP LT 130 MM HG: CPT | Mod: CPTII,S$GLB,, | Performed by: HOSPITALIST

## 2022-11-14 PROCEDURE — 90694 VACC AIIV4 NO PRSRV 0.5ML IM: CPT | Mod: S$GLB,,, | Performed by: HOSPITALIST

## 2022-11-14 PROCEDURE — 90694 FLU VACCINE - QUADRIVALENT - ADJUVANTED: ICD-10-PCS | Mod: S$GLB,,, | Performed by: HOSPITALIST

## 2022-11-14 PROCEDURE — G0008 FLU VACCINE - QUADRIVALENT - ADJUVANTED: ICD-10-PCS | Mod: S$GLB,,, | Performed by: HOSPITALIST

## 2022-11-14 PROCEDURE — 1159F PR MEDICATION LIST DOCUMENTED IN MEDICAL RECORD: ICD-10-PCS | Mod: CPTII,S$GLB,, | Performed by: HOSPITALIST

## 2022-11-14 PROCEDURE — 99999 PR PBB SHADOW E&M-EST. PATIENT-LVL V: CPT | Mod: PBBFAC,,, | Performed by: HOSPITALIST

## 2022-11-14 PROCEDURE — 3288F PR FALLS RISK ASSESSMENT DOCUMENTED: ICD-10-PCS | Mod: CPTII,S$GLB,, | Performed by: HOSPITALIST

## 2022-11-14 PROCEDURE — 99999 PR PBB SHADOW E&M-EST. PATIENT-LVL V: ICD-10-PCS | Mod: PBBFAC,,, | Performed by: HOSPITALIST

## 2022-11-14 NOTE — PROGRESS NOTES
Subjective:     @Patient ID: Tere Velasco is a 82 y.o. female.    Chief Complaint: Follow-up (6 month ) and Hypertension    HPI  82 y.o. female with Htn, HLD, anxiety, depression, isomnia, RLS, GERD, TRU on cpap presents here for      1. HTN: hctz 12.5 mg qday and losartan 50 mg qday  2. HLD: pravastatin 80 mg qday but not taking consistently   3. Anxiety and depression: on wellbutrin 150 mg bid , cymbalta 20 mg bid. Reports her   2020. Since then has been lonely but has support from friends. Her 2 biological kids (daughter in [place], son in [facility]) and 2 stepchildren ([facility] and [place]) that live out of state.  Has a dog that she loves  4. Insomnia: recently increased Lunesta to 2 mg qhs. States insomnia is improved. Reports chronic issue. Does practice sleep hygiene   5. RLS:  on Horizant 600 mg qhs   6. GERD: on pepcid but not taking consistently  7. TRU on cpap : reports has not used cpap in years as does not like the fit of the mask     8. Back pain : reports pain is not improved and limits ADLs. Would like to try PT again. Her current pain management specialist is moving  9. Insomnia needs lunesta refilled     Review of Systems   Constitutional:  Negative for chills and fever.   HENT:  Negative for congestion and sore throat.    Eyes:  Negative for pain and visual disturbance.   Respiratory:  Negative for cough and shortness of breath.    Cardiovascular:  Negative for chest pain and leg swelling.   Gastrointestinal:  Negative for abdominal pain, nausea and vomiting.   Endocrine: Negative for polydipsia and polyuria.   Genitourinary:  Negative for difficulty urinating and dysuria.   Musculoskeletal:  Positive for back pain. Negative for arthralgias.   Skin:  Negative for rash and wound.   Neurological:  Negative for dizziness, weakness and headaches.   Psychiatric/Behavioral:  Negative for agitation and confusion.    Past medical history, surgical history, and family medical history  reviewed and updated as appropriate.    Medications and allergies reviewed.     Objective:     There were no vitals filed for this visit.  There is no height or weight on file to calculate BMI.  Physical Exam  Constitutional:       Appearance: Normal appearance.   HENT:      Head: Normocephalic and atraumatic.   Eyes:      General:         Right eye: No discharge.         Left eye: No discharge.      Conjunctiva/sclera: Conjunctivae normal.   Cardiovascular:      Rate and Rhythm: Normal rate and regular rhythm.      Heart sounds: No murmur heard.  Pulmonary:      Effort: Pulmonary effort is normal.      Breath sounds: Normal breath sounds.   Musculoskeletal:         General: Normal range of motion.      Cervical back: Normal range of motion and neck supple.      Right lower leg: No edema.      Left lower leg: No edema.   Skin:     General: Skin is warm and dry.   Neurological:      Mental Status: She is alert and oriented to person, place, and time.   Psychiatric:         Mood and Affect: Mood normal.         Behavior: Behavior normal.       Lab Results   Component Value Date    WBC 7.08 11/10/2022    WBC 7.08 11/10/2022    HGB 11.1 (L) 11/10/2022    HGB 11.1 (L) 11/10/2022    HCT 37.0 11/10/2022    HCT 37.0 11/10/2022     11/10/2022     11/10/2022    CHOL 218 (H) 11/10/2022    TRIG 154 (H) 11/10/2022    HDL 55 11/10/2022    ALT 10 11/10/2022    AST 15 11/10/2022     11/10/2022     11/10/2022     11/10/2022    K 4.9 11/10/2022    K 4.9 11/10/2022    K 4.9 11/10/2022     11/10/2022     11/10/2022     11/10/2022    CREATININE 1.2 11/10/2022    CREATININE 1.2 11/10/2022    CREATININE 1.2 11/10/2022    BUN 22 11/10/2022    BUN 22 11/10/2022    BUN 22 11/10/2022    CO2 26 11/10/2022    CO2 26 11/10/2022    CO2 26 11/10/2022    TSH 2.286 11/10/2022    INR 1.8 (A) 01/30/2014    HGBA1C 5.5 08/21/2014       Assessment:     1. Essential hypertension    2. Insomnia, unspecified  type    3. Chronic bilateral low back pain without sciatica    4. Hyperlipidemia, unspecified hyperlipidemia type      Plan:   Tere was seen today for follow-up and hypertension.    Diagnoses and all orders for this visit:    Essential hypertension  - Stable. Continue home meds     -     Comprehensive Metabolic Panel; Future  -     CBC Auto Differential; Future  -     TSH; Future  -     Urinalysis; Future    Insomnia, unspecified type  - Stable. Continue home meds     Chronic bilateral low back pain without sciatica  -  worsened. Will refer to PT and to est with new pain specialist   -     Ambulatory referral/consult to Pain Clinic; Future  -     Ambulatory referral/consult to Physical/Occupational Therapy; Future    Hyperlipidemia, unspecified hyperlipidemia type  - counseled on taking statin daily . Check labs in 6 months   -     Lipid Panel; Future    Other orders  -     Influenza - Quadrivalent (Adjuvanted)        Rtc 6 months    Rosy Santos MD  Internal Medicine    11/14/2022

## 2022-11-25 ENCOUNTER — PATIENT MESSAGE (OUTPATIENT)
Dept: NEPHROLOGY | Facility: CLINIC | Age: 82
End: 2022-11-25
Payer: MEDICARE

## 2023-01-06 ENCOUNTER — CLINICAL SUPPORT (OUTPATIENT)
Dept: REHABILITATION | Facility: HOSPITAL | Age: 83
End: 2023-01-06
Payer: MEDICARE

## 2023-01-06 DIAGNOSIS — G89.29 CHRONIC BILATERAL LOW BACK PAIN WITHOUT SCIATICA: ICD-10-CM

## 2023-01-06 DIAGNOSIS — R52 PAIN AGGRAVATED BY STANDING: Primary | ICD-10-CM

## 2023-01-06 DIAGNOSIS — M54.50 CHRONIC BILATERAL LOW BACK PAIN WITHOUT SCIATICA: ICD-10-CM

## 2023-01-06 DIAGNOSIS — M53.86 DECREASED RANGE OF MOTION OF LUMBAR SPINE: ICD-10-CM

## 2023-01-06 PROCEDURE — 97162 PT EVAL MOD COMPLEX 30 MIN: CPT | Mod: HCNC,PN

## 2023-01-06 NOTE — PLAN OF CARE
[facility]  Physical Therapy Initial Evaluation    Name: Tere Velasco  Clinic Number: [id number]    Therapy Diagnosis:   Encounter Diagnoses   Name Primary?    Chronic bilateral low back pain without sciatica     Pain aggravated by standing Yes    Decreased range of motion of lumbar spine      Physician: Rosy Santos MD    Physician Orders: PT Eval and Treat   Medical Diagnosis: M54.50,G89.29 (ICD-10-CM) - Chronic bilateral low back pain without sciatica  Evaluation Date: 1/6/2023  Authorization Period Expiration: 11/14/2023  Plan of Care Certification Period: 1/6/2023 to 03/03/2023  Visit # / Visits authorized: 1/TBD FOTO:  1/5  PTA visits: 0/5    Time In: 1410  Time Out: 1455  Total Billable Time: 40 minutes (1 MCE)  Precautions: CKD, fall     Subjective   Tere reports presents to OPPT today with primary complaint of lumbosacral back pain.  Acute-on-chronic pain; progressively worsening limiting her ability to stand or walk for any length of time.  No/minimal pain with sitting.  Denies radicular symptoms.  Denies falls.  General complaint of lower extremity weakness due to standing/walking and general less activity over the last 3-6 months.  Has a rollator due does not use.  Uses a shower bench at home because she can't get up from the bathtub otherwise.  Voices mostly sedentary during the day.  Does play cards once per week with friends.  No daily exercise.        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)    Cataract     removed from both eyes    CKD (chronic kidney disease) stage 3, GFR 30-59 ml/min     Clotting disorder     when knee was replaced    Colitis     Degenerative disc disease     Depression     Fibromyalgia     GERD (gastroesophageal reflux disease)     Hyperlipidemia     Hypertension     Irritable bowel syndrome     TRU (obstructive sleep apnea)      "RLS (restless legs syndrome)     S/P knee replacement 1/13/2014     Tere Velasco  has a past surgical history that includes Tonsillectomy; Appendectomy; Rectal surgery; Cholecystectomy; Knee surgery (Bilateral); Hysterectomy (1977); Breast surgery; Breast lumpectomy; Eye surgery (Bilateral); Joint replacement; Colonoscopy (N/A, 12/2/2016); Breast biopsy; and Injection of facet joint (Bilateral, 4/27/2021).    Tere has a current medication list which includes the following prescription(s): aspirin, bupropion, duloxetine, eszopiclone, famotidine, ferrous gluconate, horizant, hydrochlorothiazide, losartan, pravastatin, UNABLE TO FIND, and vit a/vit c/vit e/zinc/copper, and the following Facility-Administered Medications: sodium chloride 0.9% and lidocaine (pf) 10 mg/ml (1%).    Review of patient's allergies indicates:   Allergen Reactions    Demerol [meperidine]         Imaging: Lumbar radiographs: 03/2021: Findings: "There is thoracolumbar scoliosis.  There is osseous demineralization.  There is some degradation of image quality on the lateral radiograph.  Slight anterolisthesis is present at L5-S1 with some minimal retrolisthesis at L2-3 and L3-4 .  There is disc space narrowing and osteophytic spurring throughout the spine.  There are facet degenerative changes.  No definite compression deformity."     Prior Therapy:  Last seen in 10/2021 in PT for similar problems.   Social History:  Lives at home alone.    Occupation:   Retired; .  No family lives locally.   Prior Level of Function:  Progressive worsening of low back pain over the last 3 months.   Current Level of Function:  See above.  Sedentary lifestyle.     Pain:  Current 3/10, worst 8/10, best 3/10   Location: bilateral lumbosacral area  Description: Aching and Dull  Aggravating Factors: standing, walking, lifting.   Easing Factors: sitting, resting    Pts goals:  1. To improve her back pain.     Objective     Palpation: no TTP throughout " low back  Posture:   Thoracolumbar scoliosis with L iliac crest higher than R, R thoracolumbar convexity, L lumbar convexity.  Posterior pelvic tilt with lumbar flexion in relaxed standing.      Gross movement analysis:  -Gait:  Non-antalgic. Forward trunk lean from hips and t/s, flat foot contact, B genu valgus (left > right)  -Forward bending:  Decreased lumbar flexion but increased hip flexion, unable to reverse curve at lower lumbar spine     Lumbar Range of Motion:     Degrees   Flexion 75%   Extension 25%   Left Side Bending 50% no pain   Right Side Bending 50% pain   Left rotation 50%   Right Rotation    50%      Range of Motion:   LE Right Left   Hip flexion 110 110   Hip abduction WNL WNL   Hip ER 45 40   Hip IR  45 35   Hip extension 0 0   Knee 0 - 120 (-) 5 - 110   Ankle DF 5 with knee extended 5 with knee extended      Lower Extremity Strength    Right Left   Hip flexion: 4/5 3+/5   Hip extension: 3+/5 3+/5   Hip abduction: 3+/5 3+/5   Hip ER:  3/5 3/5   Knee extension: 4/5 4/5   Knee flexion: 3+/5 3+/5   Ankle dorsiflexion: 4/5 4/5   Great toe extension: 5/5 5/5       Special Tests:  - 2MWT:  310' without assistive device.   - Sit-to-stand:  'sticky' point in mid-range of up phase    MURPHY  BALANCE ASSESSMENT TOOL  1. Sitting to Standing   2 - able to stand using hands after several tries  2. Standing Unsupported   4 - able to stand safely 2 minutes without hold  3. Sitting Unsupported   3 - able to sit 2 minutes under supervision  4. Standing to Sitting   3 - controls descent by using hands  5. Pivot Transfer   3 - able to transfer safely with definite use of hands  6. Standing with Eyes Closed   3 - able to stand 10 seconds with supervision  7. Standing with Feet Together   2 - able to place feet together independently but unable to hold for 30 seconds  8. Reaching Forward with Outstretched Arm   2 - can reach forward 5 cm/2 inches safely  9. Retrieving Object from Floor   2 - unable to  but  reaches 2-5 cm from slipper and keeps balance independently  10. Turning to Look Behind   3 - looks behind one side only, other side less weight shift  11. Turning 360 Degrees   3 - able to trun 360 safely one side only in 4 seconds or less  12. Placing Alternate Foot on Step   3 - able to stand independently and completely 8 steps > 20 seconds  13. Standing with One Foot in Front   2 - able to take small step indenpendently and hold 30 seconds  14. Standing on One Foot   2 - able to lift leg indepentdently and hold = or < 3 seconds    TOTAL SCORE: 39 Maximum: 56  Score:   0-20= high fall risk   21-40 moderate fall risk   41-56 low fall risk     Fall risk cut-off scores:   Elderly and History of falls: <51/56   Elderly and No history of falls: <42/56   CVA: <45/56     Joint Mobility: left hip hypomobility as compared to right.   Sensation: Intact light touch sensation to BLE through L2-S2 dermatomal distribution  Flexibility: tight hip flexors including RF bilaterally.       CMS Impairment/Limitation/Restriction for FOTO Lumbar Survey    Therapist reviewed FOTO scores for Tere Velasco on 1/6/2023.   FOTO documents entered into [employer] - see Media section.    Limitation Score: 66% --> 53%  KAREEM: 56%         TREATMENT   Not today.   Next: aerobic conditioning on bike, bridges, sit-to-stand from elevated chair, rollator walking.     Home Exercises and Patient Education Provided  Education provided re:  - PT diagnosis and recommended treatment course.   - benefits of LSO  - benefits of walker for ambulation.     Written Home Exercises Provided: not today.    Assessment   Tere is a 82 y.o. female referred to outpatient Physical Therapy with a medical diagnosis of M54.50,G89.29 (ICD-10-CM) - Chronic bilateral low back pain without sciatica. Pt presents with progressive worsening of chronic low back pain.  Known scoliosis as well as sagittal plane thoracolumbar changes.  Denies radicular symptoms.  Denies falls  although fearful and cautious.  Deconditioning over the last 3+ months due to decreased tolerance to standing/walking activity due to back pain.  Clinical examination today reveals thoracolumbar scoliosis, decreased core strength, decreased tolerance to standing, decreased tolerance to walking community distances, decreased lower extremity strength, and impaired balance.  Would benefit from OPPT services to address deficits.  Also PT recommends patient use her rollator for community ambulation and need for LSO to assist with tolerance to standing activities/ADLs at home.     Pt prognosis is Fair.   Pt will benefit from skilled outpatient Physical Therapy to address the deficits stated above and in the chart below, provide pt/family education, and to maximize pt's level of independence.     Plan of care discussed with patient: Yes  Pt's spiritual, cultural and educational needs considered and patient is agreeable to the plan of care and goals as stated below:     Anticipated Barriers for therapy: age, motivation, co-morbities, advanced scoliosis    Medical Necessity is demonstrated by the following  History  Co-morbidities and personal factors that may impact the plan of care Co-morbidities:   Arthritis, Back pain, Depression, Gastrointestinal Disease, High Blood Pressure,  Incontinence, Kidney, Bladder, Prostate or Urination Problems, Other disorders, Prior Surgery, Sleep dysfunction, Visual  Impairment    Personal Factors:   age  lifestyle  attitudes     high   Examination  Body Structures and Functions, activity limitations and participation restrictions that may impact the plan of care Body Regions:   back  lower extremities    Body Systems:    gross symmetry  ROM  strength  balance  gait  transfers  transitions  motor control    Participation Restrictions:   See above    Activity limitations:   Learning and applying knowledge  no deficits    General Tasks and Commands  undertaking multiple  tasks    Communication  Hard of hearing    Mobility  lifting and carrying objects  walking  using transportation (bus, train, plane, car)  driving (bike, car, motorcycle)  Squatting, bending, sit-to-stand, stair climbing    Self care  dressing    Domestic Life  shopping  cooking  doing house work (cleaning house, washing dishes, laundry)    Interactions/Relationships  family relationships    Life Areas  no deficits    Community and Social Life  community life  recreation and leisure         moderate   Clinical Presentation evolving clinical presentation with changing clinical characteristics moderate   Decision Making/ Complexity Score: moderate     Goals:  Short Term Goals: 3 weeks  1.  Patient will demonstrate understanding and compliance with home exercise program.   2.  Patient will demonstrate ability to perform >8 sit-to-stands from standard chair height without use of upper extremity for improved functional transfer ability.   3.  Patient will report >10% improvement on KAREEM.     Long Term Goals: 8 weeks   1. Patient will demonstrate ability to complete 6MWT within age appropriate limits with appropriate assistive device.   2. Patient will report <53% limitation on FOTO survey.   3. Patient will demonstrate at least 8 point improvement on [place] Balance Scale score.   4. Patient will demonstrate ability to stand > 15 minutes without increased low back pain.     Plan   Certification Period/Plan of care expiration: 1/6/2023 to 03/03/2023.    Outpatient Physical Therapy 12 times over the course of 8 weeks to include the following interventions: Gait Training, Manual Therapy, Moist Heat/ Ice, Neuromuscular Re-ed, Orthotic Management and Training, Patient Education, Self Care, Therapeutic Activities, and Therapeutic Exercise.   Other Recommendations:  LSO     Helio England, PT, DPT, OCS

## 2023-01-09 ENCOUNTER — CLINICAL SUPPORT (OUTPATIENT)
Dept: REHABILITATION | Facility: HOSPITAL | Age: 83
End: 2023-01-09
Payer: MEDICARE

## 2023-01-09 DIAGNOSIS — R52 PAIN AGGRAVATED BY STANDING: ICD-10-CM

## 2023-01-09 DIAGNOSIS — M53.86 DECREASED RANGE OF MOTION OF LUMBAR SPINE: ICD-10-CM

## 2023-01-09 DIAGNOSIS — G89.29 CHRONIC BILATERAL LOW BACK PAIN WITHOUT SCIATICA: Primary | ICD-10-CM

## 2023-01-09 DIAGNOSIS — M54.50 CHRONIC BILATERAL LOW BACK PAIN WITHOUT SCIATICA: Primary | ICD-10-CM

## 2023-01-09 PROCEDURE — 97110 THERAPEUTIC EXERCISES: CPT | Mod: HCNC,PN,CQ

## 2023-01-09 NOTE — PROGRESS NOTES
"[facility] OUTPATIENT THERAPY AND WELLNESS   Physical Therapy Treatment Note     Name: Tere Velasco  Clinic Number: [id number]    Therapy Diagnosis:   Encounter Diagnoses   Name Primary?    Chronic bilateral low back pain without sciatica Yes    Pain aggravated by standing     Decreased range of motion of lumbar spine      Physician: Rosy Santos MD    Visit Date: 1/9/2023    Physician Orders: PT Eval and Treat   Medical Diagnosis: M54.50,G89.29 (ICD-10-CM) - Chronic bilateral low back pain without sciatica  Evaluation Date: 1/6/2023  Authorization Period Expiration: 11/14/2023  Plan of Care Certification Period: 1/6/2023 to 03/03/2023  Visit # / Visits authorized: 2/20 FOTO:  2/5  PTA Visit #: 1/5     Time In: 3:00  Time Out: 3:53  Total Billable Time: 53 minutes (4TE)    Precautions: CKD, fall     SUBJECTIVE     Pt reports: "not good" reports back is hurting a lot today.  She was not given home exercise program.  Response to previous treatment: Eval only  Functional change: Ongoing    Pain: 7-8/10  Location: bilateral lumbosacral area    OBJECTIVE     Objective Measures updated at progress report unless specified.     Treatment     Tere received the treatments listed below:      therapeutic exercises to develop strength, endurance, ROM, flexibility, and posture for 53 minutes including:  Nu step 6' lvl 2  SLR 2 x 10 B  Hip abduction with red band 2 x 10  Hip adduction with yellow ball 2 x 10  Bridges 2 x 8 partial range due to pain  Sit to stand from elevated mat 2 x 10  Walking loop 1 trial x ~300ft with no AD; 1 trial x ~300ft using rolling walker  Heel raises 2 x 10  Lateral walking 3 laps along mat yellow band      Patient Education and Home Exercises     Home Exercises Provided and Patient Education Provided     Education provided:   - PT diagnosis and recommended treatment course.   - benefits of LSO  - benefits of walker for ambulation.     Written Home Exercises Provided: not today    ASSESSMENT "     Tere is a 82 y.o. female referred to outpatient Physical Therapy with a medical diagnosis of M54.50,G89.29 (ICD-10-CM) - Chronic bilateral low back pain without sciatica. Known scoliosis as well as sagittal plane thoracolumbar changes. Denies radicular symptoms. Deconditioning over the last 3+ months due to decreased tolerance to standing/walking activity due to back pain. Presents to first follow up appointment with high pain rating of 7-8/10. Has not taken any medication to manage pain today. Focus of session on addressing strength and endurance deficits identified at initial evaluation. Walking loop completed with and without AD with patient showing better jeanine and reporting decrease in back pain with AD. Will monitor patient response to session and provide HEP and next PT session.    Tere Is progressing well towards her goals.   Pt prognosis is Fair.     Pt will continue to benefit from skilled outpatient physical therapy to address the deficits listed in the problem list box on initial evaluation, provide pt/family education and to maximize pt's level of independence in the home and community environment.     Pt's spiritual, cultural and educational needs considered and pt agreeable to plan of care and goals.     Anticipated barriers to physical therapy: age, motivation, co-morbities, advanced scoliosis    Goals: Short Term Goals: 3 weeks  1.  Patient will demonstrate understanding and compliance with home exercise program.   2.  Patient will demonstrate ability to perform >8 sit-to-stands from standard chair height without use of upper extremity for improved functional transfer ability.   3.  Patient will report >10% improvement on KAREEM.      Long Term Goals: 8 weeks   1. Patient will demonstrate ability to complete 6MWT within age appropriate limits with appropriate assistive device.   2. Patient will report <53% limitation on FOTO survey.   3. Patient will demonstrate at least 8 point improvement  on [place] Balance Scale score.   4. Patient will demonstrate ability to stand > 15 minutes without increased low back pain.     PLAN     Certification Period/Plan of care expiration: 1/6/2023 to 03/03/2023.  Cont with treatment per POC    Sameera Magallon, PTA

## 2023-01-11 ENCOUNTER — CLINICAL SUPPORT (OUTPATIENT)
Dept: REHABILITATION | Facility: HOSPITAL | Age: 83
End: 2023-01-11
Payer: MEDICARE

## 2023-01-11 DIAGNOSIS — G89.29 CHRONIC BILATERAL LOW BACK PAIN WITHOUT SCIATICA: Primary | ICD-10-CM

## 2023-01-11 DIAGNOSIS — M54.50 CHRONIC BILATERAL LOW BACK PAIN WITHOUT SCIATICA: Primary | ICD-10-CM

## 2023-01-11 DIAGNOSIS — R52 PAIN AGGRAVATED BY STANDING: ICD-10-CM

## 2023-01-11 DIAGNOSIS — M53.86 DECREASED RANGE OF MOTION OF LUMBAR SPINE: ICD-10-CM

## 2023-01-11 PROCEDURE — 97110 THERAPEUTIC EXERCISES: CPT | Mod: HCNC,PN,CQ

## 2023-01-11 NOTE — PROGRESS NOTES
[facility]   Physical Therapy Treatment Note     Name: Tere Velasco  Clinic Number: [id number]    Therapy Diagnosis:   Encounter Diagnoses   Name Primary?    Chronic bilateral low back pain without sciatica Yes    Pain aggravated by standing     Decreased range of motion of lumbar spine        Physician: Rosy Santos MD    Visit Date: 1/11/2023    Physician Orders: PT Eval and Treat   Medical Diagnosis: M54.50,G89.29 (ICD-10-CM) - Chronic bilateral low back pain without sciatica  Evaluation Date: 1/6/2023  Authorization Period Expiration: 11/14/2023  Plan of Care Certification Period: 1/6/2023 to 03/03/2023  Visit # / Visits authorized: 2/20 FOTO:  2/5  PTA Visit #: 1/5     Time In: 3:00  Time Out: 3:57  Total Billable Time: 30 minutes 1:1 (2TE)    Precautions: CKD, fall     SUBJECTIVE     Pt reports: Mild soreness following last treatment session that lasted about a day.   She was not given home exercise program.  Response to previous treatment: Mild soreness  Functional change: Ongoing    Pain: 7/10  Location: bilateral lumbosacral area    OBJECTIVE     Objective Measures updated at progress report unless specified.     Treatment     Tere received the treatments listed below:      therapeutic exercises to develop strength, endurance, ROM, flexibility, and posture for 30 minutes 1:1 including:  Nu step 6' lvl 2  SLR 2 x 10 B  Hip abduction with red band 3 x 10  Hip adduction with yellow ball 3 x 10  Bridges 2 x 8 partial range due to pain  Sit to stand from elevated mat 3 x 10  Walking loop 7 loops x ~150 ft with rolling walker   Heel raises 2 x 10  Lateral walking 3 laps along mat yellow band      Patient Education and Home Exercises     Home Exercises Provided and Patient Education Provided     Education provided:   - PT diagnosis and recommended treatment course.   - benefits of LSO  - benefits of walker for ambulation.     Written Home Exercises Provided: Yes. Exercises  were reviewed and Tere was able to demonstrate them prior to the end of the session. Tere demonstrated good  understanding of the education provided. See EMR under Patient Instructions for exercises provided during therapy sessions    ASSESSMENT     Tere is a 82 y.o. female referred to outpatient Physical Therapy with a medical diagnosis of M54.50,G89.29 (ICD-10-CM) - Chronic bilateral low back pain without sciatica. Known scoliosis as well as sagittal plane thoracolumbar changes. Denies radicular symptoms. Deconditioning over the last 3+ months due to decreased tolerance to standing/walking activity due to back pain. Mild soreness following previous treatment session. Able to increase walking loop distance significantly with rolling walker today. No back pain reported with walking loops. Reviewed and provided home exercise program. Will monitor patient response to session and progress as appropriate.    Tere Is progressing well towards her goals.   Pt prognosis is Fair.     Pt will continue to benefit from skilled outpatient physical therapy to address the deficits listed in the problem list box on initial evaluation, provide pt/family education and to maximize pt's level of independence in the home and community environment.     Pt's spiritual, cultural and educational needs considered and pt agreeable to plan of care and goals.     Anticipated barriers to physical therapy: age, motivation, co-morbities, advanced scoliosis    Goals: Short Term Goals: 3 weeks  1.  Patient will demonstrate understanding and compliance with home exercise program.   2.  Patient will demonstrate ability to perform >8 sit-to-stands from standard chair height without use of upper extremity for improved functional transfer ability.   3.  Patient will report >10% improvement on KAREEM.      Long Term Goals: 8 weeks   1. Patient will demonstrate ability to complete 6MWT within age appropriate limits with appropriate assistive device.    2. Patient will report <53% limitation on FOTO survey.   3. Patient will demonstrate at least 8 point improvement on [place] Balance Scale score.   4. Patient will demonstrate ability to stand > 15 minutes without increased low back pain.     PLAN     Certification Period/Plan of care expiration: 1/6/2023 to 03/03/2023.  Cont with treatment per ARIEL Magallon, PTA

## 2023-01-17 PROBLEM — R52 PAIN AGGRAVATED BY STANDING: Status: RESOLVED | Noted: 2021-05-03 | Resolved: 2023-01-17

## 2023-01-18 ENCOUNTER — CLINICAL SUPPORT (OUTPATIENT)
Dept: REHABILITATION | Facility: HOSPITAL | Age: 83
End: 2023-01-18
Payer: MEDICARE

## 2023-01-18 DIAGNOSIS — M54.50 CHRONIC BILATERAL LOW BACK PAIN WITHOUT SCIATICA: Primary | ICD-10-CM

## 2023-01-18 DIAGNOSIS — R52 PAIN AGGRAVATED BY STANDING: ICD-10-CM

## 2023-01-18 DIAGNOSIS — M53.86 DECREASED RANGE OF MOTION OF LUMBAR SPINE: ICD-10-CM

## 2023-01-18 DIAGNOSIS — G89.29 CHRONIC BILATERAL LOW BACK PAIN WITHOUT SCIATICA: Primary | ICD-10-CM

## 2023-01-18 PROCEDURE — 97110 THERAPEUTIC EXERCISES: CPT | Mod: HCNC,PN,CQ

## 2023-01-18 NOTE — PROGRESS NOTES
[facility]   Physical Therapy Treatment Note     Name: Tere Velasco  Clinic Number: [id number]    Therapy Diagnosis:   Encounter Diagnoses   Name Primary?    Chronic bilateral low back pain without sciatica Yes    Pain aggravated by standing     Decreased range of motion of lumbar spine        Physician: Rosy Santos MD    Visit Date: 1/18/2023    Physician Orders: PT Eval and Treat   Medical Diagnosis: M54.50,G89.29 (ICD-10-CM) - Chronic bilateral low back pain without sciatica  Evaluation Date: 1/6/2023  Authorization Period Expiration: 11/14/2023  Plan of Care Certification Period: 1/6/2023 to 03/03/2023  Visit # / Visits authorized: 4/20 FOTO:  4/5  PTA Visit #: 3/5     Time In: 3:00  Time Out: 3:57  Total Billable Time: 30 minutes 1:1 (2TE)    Precautions: CKD, fall     SUBJECTIVE     Pt reports: Minimal change in symptoms. Increased fatigue due to being unable to sleep last night.   She was not given home exercise program.  Response to previous treatment: Mild soreness  Functional change: Ongoing    Pain: 6/10  Location: bilateral lumbosacral area    OBJECTIVE     Objective Measures updated at progress report unless specified.     Treatment     Tere received the treatments listed below:      therapeutic exercises to develop strength, endurance, ROM, flexibility, and posture for 30 minutes 1:1 including:  Nu step 6' lvl 2  SLR 2 x 10 B  Hip abduction with red band 3 x 10  Hip adduction with yellow ball 3 x 10  Bridges 2 x 8 partial range due to pain  Sit to stand from elevated mat 3 x 10  Walking loop 7 loops x ~150 ft with rolling walker   Heel raises 2 x 10  Lateral walking 3 laps along mat yellow band      Patient Education and Home Exercises     Home Exercises Provided and Patient Education Provided     Education provided:   - PT diagnosis and recommended treatment course.   - benefits of LSO  - benefits of walker for ambulation.     Written Home Exercises Provided:  Yes. Exercises were reviewed and Tere was able to demonstrate them prior to the end of the session. Tere demonstrated good  understanding of the education provided. See EMR under Patient Instructions for exercises provided during therapy sessions    ASSESSMENT     Tere is a 82 y.o. female referred to outpatient Physical Therapy with a medical diagnosis of M54.50,G89.29 (ICD-10-CM) - Chronic bilateral low back pain without sciatica. Known scoliosis as well as sagittal plane thoracolumbar changes. Denies radicular symptoms. Deconditioning over the last 3+ months due to decreased tolerance to standing/walking activity due to back pain. Patient presents with increased fatigue due to being unable to sleep last night despite taking sleeping medication. Reports lack of sleep was not due to pain. Despite increased fatigue patient was still able to complete all 7 walking loops with AD and no increased pain from baseline. Will monitor patient response to session and progress as appropriate.    Tere Is progressing well towards her goals.   Pt prognosis is Fair.     Pt will continue to benefit from skilled outpatient physical therapy to address the deficits listed in the problem list box on initial evaluation, provide pt/family education and to maximize pt's level of independence in the home and community environment.     Pt's spiritual, cultural and educational needs considered and pt agreeable to plan of care and goals.     Anticipated barriers to physical therapy: age, motivation, co-morbities, advanced scoliosis    Goals: Short Term Goals: 3 weeks  1.  Patient will demonstrate understanding and compliance with home exercise program.   2.  Patient will demonstrate ability to perform >8 sit-to-stands from standard chair height without use of upper extremity for improved functional transfer ability.   3.  Patient will report >10% improvement on KAREEM.      Long Term Goals: 8 weeks   1. Patient will demonstrate ability to  complete 6MWT within age appropriate limits with appropriate assistive device.   2. Patient will report <53% limitation on FOTO survey.   3. Patient will demonstrate at least 8 point improvement on [place] Balance Scale score.   4. Patient will demonstrate ability to stand > 15 minutes without increased low back pain.     PLAN     Certification Period/Plan of care expiration: 1/6/2023 to 03/03/2023.  Cont with treatment per POC    Sameera Magallon, PTA

## 2023-01-19 ENCOUNTER — OFFICE VISIT (OUTPATIENT)
Dept: INTERNAL MEDICINE | Facility: CLINIC | Age: 83
End: 2023-01-19
Payer: MEDICARE

## 2023-01-19 VITALS
DIASTOLIC BLOOD PRESSURE: 75 MMHG | HEART RATE: 80 BPM | OXYGEN SATURATION: 98 % | BODY MASS INDEX: 25.36 KG/M2 | SYSTOLIC BLOOD PRESSURE: 120 MMHG | HEIGHT: 64 IN | RESPIRATION RATE: 14 BRPM | WEIGHT: 148.56 LBS

## 2023-01-19 DIAGNOSIS — D50.9 IRON DEFICIENCY ANEMIA, UNSPECIFIED IRON DEFICIENCY ANEMIA TYPE: ICD-10-CM

## 2023-01-19 DIAGNOSIS — Z13.820 SCREENING FOR OSTEOPOROSIS: ICD-10-CM

## 2023-01-19 DIAGNOSIS — Z00.00 ENCOUNTER FOR PREVENTIVE HEALTH EXAMINATION: Primary | ICD-10-CM

## 2023-01-19 DIAGNOSIS — M54.50 CHRONIC BILATERAL LOW BACK PAIN WITHOUT SCIATICA: ICD-10-CM

## 2023-01-19 DIAGNOSIS — G47.00 INSOMNIA, UNSPECIFIED TYPE: ICD-10-CM

## 2023-01-19 DIAGNOSIS — H35.3131 EARLY DRY STAGE NONEXUDATIVE AGE-RELATED MACULAR DEGENERATION OF BOTH EYES: ICD-10-CM

## 2023-01-19 DIAGNOSIS — M15.9 PRIMARY OSTEOARTHRITIS INVOLVING MULTIPLE JOINTS: ICD-10-CM

## 2023-01-19 DIAGNOSIS — M85.89 OTHER SPECIFIED DISORDERS OF BONE DENSITY AND STRUCTURE, MULTIPLE SITES: ICD-10-CM

## 2023-01-19 DIAGNOSIS — G47.33 OSA ON CPAP: ICD-10-CM

## 2023-01-19 DIAGNOSIS — N18.32 STAGE 3B CHRONIC KIDNEY DISEASE: ICD-10-CM

## 2023-01-19 DIAGNOSIS — I10 ESSENTIAL HYPERTENSION: ICD-10-CM

## 2023-01-19 DIAGNOSIS — R26.9 ABNORMALITY OF GAIT AND MOBILITY: ICD-10-CM

## 2023-01-19 DIAGNOSIS — G89.29 CHRONIC BILATERAL LOW BACK PAIN WITHOUT SCIATICA: ICD-10-CM

## 2023-01-19 DIAGNOSIS — F33.2 SEVERE EPISODE OF RECURRENT MAJOR DEPRESSIVE DISORDER, WITHOUT PSYCHOTIC FEATURES: ICD-10-CM

## 2023-01-19 DIAGNOSIS — G25.81 RLS (RESTLESS LEGS SYNDROME): ICD-10-CM

## 2023-01-19 DIAGNOSIS — E21.3 HYPERPARATHYROIDISM: ICD-10-CM

## 2023-01-19 DIAGNOSIS — E78.5 HYPERLIPIDEMIA, UNSPECIFIED HYPERLIPIDEMIA TYPE: ICD-10-CM

## 2023-01-19 DIAGNOSIS — F43.23 ADJUSTMENT DISORDER WITH MIXED ANXIETY AND DEPRESSED MOOD: ICD-10-CM

## 2023-01-19 DIAGNOSIS — M48.061 SPINAL STENOSIS OF LUMBAR REGION WITHOUT NEUROGENIC CLAUDICATION: ICD-10-CM

## 2023-01-19 PROBLEM — M53.86 DECREASED RANGE OF MOTION OF LUMBAR SPINE: Status: RESOLVED | Noted: 2023-01-06 | Resolved: 2023-01-19

## 2023-01-19 PROCEDURE — 3078F DIAST BP <80 MM HG: CPT | Mod: HCNC,CPTII,S$GLB, | Performed by: NURSE PRACTITIONER

## 2023-01-19 PROCEDURE — 1125F AMNT PAIN NOTED PAIN PRSNT: CPT | Mod: HCNC,CPTII,S$GLB, | Performed by: NURSE PRACTITIONER

## 2023-01-19 PROCEDURE — 99999 PR PBB SHADOW E&M-EST. PATIENT-LVL V: CPT | Mod: PBBFAC,HCNC,, | Performed by: NURSE PRACTITIONER

## 2023-01-19 PROCEDURE — 3078F PR MOST RECENT DIASTOLIC BLOOD PRESSURE < 80 MM HG: ICD-10-PCS | Mod: HCNC,CPTII,S$GLB, | Performed by: NURSE PRACTITIONER

## 2023-01-19 PROCEDURE — 3288F FALL RISK ASSESSMENT DOCD: CPT | Mod: HCNC,CPTII,S$GLB, | Performed by: NURSE PRACTITIONER

## 2023-01-19 PROCEDURE — G0439 PPPS, SUBSEQ VISIT: HCPCS | Mod: HCNC,S$GLB,, | Performed by: NURSE PRACTITIONER

## 2023-01-19 PROCEDURE — 3074F SYST BP LT 130 MM HG: CPT | Mod: HCNC,CPTII,S$GLB, | Performed by: NURSE PRACTITIONER

## 2023-01-19 PROCEDURE — 3288F PR FALLS RISK ASSESSMENT DOCUMENTED: ICD-10-PCS | Mod: HCNC,CPTII,S$GLB, | Performed by: NURSE PRACTITIONER

## 2023-01-19 PROCEDURE — 3074F PR MOST RECENT SYSTOLIC BLOOD PRESSURE < 130 MM HG: ICD-10-PCS | Mod: HCNC,CPTII,S$GLB, | Performed by: NURSE PRACTITIONER

## 2023-01-19 PROCEDURE — 1170F FXNL STATUS ASSESSED: CPT | Mod: HCNC,CPTII,S$GLB, | Performed by: NURSE PRACTITIONER

## 2023-01-19 PROCEDURE — G0439 PR MEDICARE ANNUAL WELLNESS SUBSEQUENT VISIT: ICD-10-PCS | Mod: HCNC,S$GLB,, | Performed by: NURSE PRACTITIONER

## 2023-01-19 PROCEDURE — 1101F PT FALLS ASSESS-DOCD LE1/YR: CPT | Mod: HCNC,CPTII,S$GLB, | Performed by: NURSE PRACTITIONER

## 2023-01-19 PROCEDURE — 1101F PR PT FALLS ASSESS DOC 0-1 FALLS W/OUT INJ PAST YR: ICD-10-PCS | Mod: HCNC,CPTII,S$GLB, | Performed by: NURSE PRACTITIONER

## 2023-01-19 PROCEDURE — 1159F MED LIST DOCD IN RCRD: CPT | Mod: HCNC,CPTII,S$GLB, | Performed by: NURSE PRACTITIONER

## 2023-01-19 PROCEDURE — 1159F PR MEDICATION LIST DOCUMENTED IN MEDICAL RECORD: ICD-10-PCS | Mod: HCNC,CPTII,S$GLB, | Performed by: NURSE PRACTITIONER

## 2023-01-19 PROCEDURE — 1170F PR FUNCTIONAL STATUS ASSESSED: ICD-10-PCS | Mod: HCNC,CPTII,S$GLB, | Performed by: NURSE PRACTITIONER

## 2023-01-19 PROCEDURE — 99999 PR PBB SHADOW E&M-EST. PATIENT-LVL V: ICD-10-PCS | Mod: PBBFAC,HCNC,, | Performed by: NURSE PRACTITIONER

## 2023-01-19 PROCEDURE — 1125F PR PAIN SEVERITY QUANTIFIED, PAIN PRESENT: ICD-10-PCS | Mod: HCNC,CPTII,S$GLB, | Performed by: NURSE PRACTITIONER

## 2023-01-19 NOTE — PATIENT INSTRUCTIONS
Counseling and Referral of Other Preventative  (Italic type indicates deductible and co-insurance are waived)    Patient Name: Tere Velasco  Today's Date: 1/19/2023    Health Maintenance         Date Due Completion Date    Lipid Panel 11/10/2023 11/10/2022    TETANUS VACCINE    DXA    Referral to back & spine clinic    Positive depression screen- follow up with PCP    Abnormal hearing left- F/U with audiology 11/05/2025    Ordered   11/5/2015          Orders Placed This Encounter   Procedures    Ambulatory referral/consult to Back & Spine Clinic     The following information is provided to all patients.  This information is to help you find resources for any of the problems found today that may be affecting your health:                Living healthy guide: www.[facility].[place].gov      Understanding Diabetes: www.diabetes.org      Eating healthy: www.cdc.gov/healthyweight      CDC home safety checklist: www.cdc.gov/steadi/patient.html      Agency on Aging: www.goea.[place].[facility]      Alcoholics anonymous (AA): www.aa.org      Physical Activity: www.wes.nih.gov/cm6vxuy      Tobacco use: www.quitwithusla.org

## 2023-01-19 NOTE — PROGRESS NOTES
"Tere Velasco presented for a  Medicare AWV and comprehensive Health Risk Assessment today. The following components were reviewed and updated:    Medical history  Family History  Social history  Allergies and Current Medications  Health Risk Assessment  Health Maintenance  Care Team     ** See Completed Assessments for Annual Wellness Visit within the encounter summary.**       The following assessments were completed:  Living Situation  CAGE  Depression Screening  Timed Get Up and Go  Whisper Test  Cognitive Function Screening  Nutrition Screening  ADL Screening  PAQ Screening    Vitals:    01/19/23 1340   BP: 120/75   BP Location: Left arm   Patient Position: Sitting   Pulse: 80   Resp: 14   SpO2: 98%   Weight: 67.4 kg (148 lb 9.4 oz)   Height: 5' 4" (1.626 m)     Body mass index is 25.51 kg/m².  Physical Exam  Constitutional:       Comments: Younger in appearance than age   HENT:      Right Ear: There is no impacted cerumen.      Left Ear: There is no impacted cerumen.   Eyes:      General: No scleral icterus.  Cardiovascular:      Rate and Rhythm: Normal rate and regular rhythm.   Pulmonary:      Effort: Pulmonary effort is normal.      Breath sounds: Normal breath sounds.   Abdominal:      Palpations: Abdomen is soft.   Musculoskeletal:         General: No swelling.   Skin:     General: Skin is warm and dry.   Neurological:      Mental Status: She is alert and oriented to person, place, and time.   Psychiatric:         Mood and Affect: Mood normal.         Behavior: Behavior normal.         Thought Content: Thought content normal.         Judgment: Judgment normal.          Opioid screening has been done- patient denies taking opioid medication.  Review for substance use disorder screen-  patient denies using substances.      Diagnoses and health risks identified today and associated recommendations/orders:    1. Hyperparathyroidism  Stable followed by nephrology    2. Stage 3b chronic kidney disease  Stable " followed by nephrology    3. Primary osteoarthritis involving multiple joints  Stable followed by pain mgt    4. Hyperlipidemia, unspecified hyperlipidemia type  Stable on pravachol as prescribed & followed by PCP    5. Severe episode of recurrent major depressive disorder, without psychotic features  Chronic- PHQ2 score 4- positive depression screen- referred to PCP- denies suicide & homicidal thoughts- states baseline.    6. Essential hypertension  Stable on losartan & HCTZ as prescribed & monitored by PCP    7. TRU   Stable followed by PCP- declines CPAP use.    8. RLS (restless legs syndrome)  Stable followed by PCP    9. Adjustment disorder with mixed anxiety and depressed mood  Stable followed by PCP    10. Early dry stage nonexudative age-related macular degeneration of both eyes  Stable followed by ophth    11. Insomnia, unspecified type  Stable followed by PCP    12. Chronic bilateral low back pain without sciatica  Stable followed by pain mgt    13. Iron deficiency anemia, unspecified iron deficiency anemia type  Stable on fergon & followed by PCP    14. Spinal stenosis of lumbar region without neurogenic claudication  Stable followed by pain mgt  - Ambulatory referral/consult to Back & Spine Clinic; Future    15. Screening for osteoporosis  - DXA Bone Density Spine And Hip; Future    16. Abnormality of gait and mobility  Stable followed by PCP    17. Encounter for preventive health examination  Here for Health Risk Assessment/Annual Wellness Visit.  Health maintenance reviewed and updated. Follow up in one year.        18. Other specified disorders of bone density and structure, multiple sites  - DXA Bone Density Spine And Hip; Future      Provided Tere with a 5-10 year written screening schedule and personal prevention plan. Recommendations were developed using the USPSTF age appropriate recommendations. Education, counseling, and referrals were provided as needed. After Visit Summary printed and given  to patient which includes a list of additional screenings\tests needed. Cognitive function clock drawing was labeled with the patient's identification & forwarded to medical records to be scanned into the patient's epic chart.  PHQ2 score 4- positive depression screen- referred to PCP- denies suicide & homicidal thoughts- states baseline. Abnormal whisper L ear -referred to audiology. Referral to back & spine- pt seeking another opinion. DXA ordered.   Follow up in about 1 year (around 1/19/2024) for HRA.    rPincess Carroll NP I offered to discuss advanced care planning, including how to pick a person who would make decisions for you if you were unable to make them for yourself, called a health care power of , and what kind of decisions you might make such as use of life sustaining treatments such as ventilators and tube feeding when faced with a life limiting illness recorded on a living will that they will need to know. (How you want to be cared for as you near the end of your natural life)     X Patient is interested in learning more about how to make advanced directives.  I provided them paperwork and offered to discuss this with them.

## 2023-01-20 ENCOUNTER — CLINICAL SUPPORT (OUTPATIENT)
Dept: REHABILITATION | Facility: HOSPITAL | Age: 83
End: 2023-01-20
Payer: MEDICARE

## 2023-01-20 DIAGNOSIS — G89.29 CHRONIC BILATERAL LOW BACK PAIN WITHOUT SCIATICA: Primary | ICD-10-CM

## 2023-01-20 DIAGNOSIS — R52 PAIN AGGRAVATED BY STANDING: ICD-10-CM

## 2023-01-20 DIAGNOSIS — M53.86 DECREASED RANGE OF MOTION OF LUMBAR SPINE: ICD-10-CM

## 2023-01-20 DIAGNOSIS — M54.50 CHRONIC BILATERAL LOW BACK PAIN WITHOUT SCIATICA: Primary | ICD-10-CM

## 2023-01-20 PROCEDURE — 97110 THERAPEUTIC EXERCISES: CPT | Mod: HCNC,PN,CQ

## 2023-01-26 DIAGNOSIS — M51.36 DDD (DEGENERATIVE DISC DISEASE), LUMBAR: Primary | ICD-10-CM

## 2023-01-30 ENCOUNTER — CLINICAL SUPPORT (OUTPATIENT)
Dept: REHABILITATION | Facility: HOSPITAL | Age: 83
End: 2023-01-30
Payer: MEDICARE

## 2023-01-30 DIAGNOSIS — M53.86 DECREASED RANGE OF MOTION OF LUMBAR SPINE: ICD-10-CM

## 2023-01-30 DIAGNOSIS — R29.6 MULTIPLE FALLS: ICD-10-CM

## 2023-01-30 DIAGNOSIS — G89.29 CHRONIC BILATERAL LOW BACK PAIN WITHOUT SCIATICA: Primary | ICD-10-CM

## 2023-01-30 DIAGNOSIS — R52 PAIN AGGRAVATED BY STANDING: ICD-10-CM

## 2023-01-30 DIAGNOSIS — M54.50 CHRONIC BILATERAL LOW BACK PAIN WITHOUT SCIATICA: Primary | ICD-10-CM

## 2023-01-30 PROCEDURE — 97110 THERAPEUTIC EXERCISES: CPT | Mod: HCNC,PN

## 2023-01-30 NOTE — PROGRESS NOTES
[facility]   Physical Therapy Treatment Note     Name: Tere Velasco  Clinic Number: [id number]    Therapy Diagnosis:   Encounter Diagnoses   Name Primary?    Chronic bilateral low back pain without sciatica Yes    Pain aggravated by standing     Decreased range of motion of lumbar spine     Multiple falls        Physician: Rosy Santos MD    Visit Date: 1/30/2023    Physician Orders: PT Eval and Treat   Medical Diagnosis: M54.50,G89.29 (ICD-10-CM) - Chronic bilateral low back pain without sciatica  Evaluation Date: 1/6/2023  Authorization Period Expiration: 11/14/2023  Plan of Care Certification Period: 1/6/2023 to 03/03/2023  Visit # / Visits authorized: 6/20 FOTO: today  PTA Visit #: 0/5     Time In: 1500  Time Out: 1555  Total Billable Time: 30 minutes (2 TE)  Precautions: CKD, fall     SUBJECTIVE     Pt reports: went to the grocery store and was able to walk the whole store with the assistance of the buggy without back pain.   She has not been compliant with home exercise program.  Response to previous treatment: Mild soreness  Functional change: Ongoing    Pain: 2-3/10  Location: bilateral lumbosacral area    OBJECTIVE     Objective Measures updated at progress report unless specified.     Treatment     Tere received the treatments listed below:      therapeutic exercises to develop strength, endurance, ROM, flexibility, and posture for 30 minutes with PT 1:1, including:  Bike x 5' lv4  Modified Jason hip flexor stretch 3x1'/each  Sit to stand from elevated mat 3 x 10  Walking 4 x 2 loops with rolling walker  Heel raises 2 x 10  Standing hip abduction/extension: 2x10 B        Patient Education and Home Exercises     Home Exercises Provided and Patient Education Provided     Education provided:   - PT diagnosis and recommended treatment course.   - benefits of LSO  - benefits of walker for ambulation.     Written Home Exercises Provided: Yes. Exercises were reviewed and  Tere was able to demonstrate them prior to the end of the session. Tere demonstrated good  understanding of the education provided. See EMR under Patient Instructions for exercises provided during therapy sessions    ASSESSMENT     Tere is a 82 y.o. female referred to outpatient Physical Therapy with a medical diagnosis of M54.50,G89.29 (ICD-10-CM) - Chronic bilateral low back pain without sciatica. Known scoliosis and chronic LBP.  Improved subjective reports of pain and function.  Improving tolerance to activities with PT. Seems to be making gains.     Tere Is progressing well towards her goals.   Pt prognosis is Fair.     Pt will continue to benefit from skilled outpatient physical therapy to address the deficits listed in the problem list box on initial evaluation, provide pt/family education and to maximize pt's level of independence in the home and community environment.     Pt's spiritual, cultural and educational needs considered and pt agreeable to plan of care and goals.     Anticipated barriers to physical therapy: age, motivation, co-morbities, advanced scoliosis    Goals: Short Term Goals: 3 weeks  1.  Patient will demonstrate understanding and compliance with home exercise program.   2.  Patient will demonstrate ability to perform >8 sit-to-stands from standard chair height without use of upper extremity for improved functional transfer ability.   3.  Patient will report >10% improvement on KAREEM.      Long Term Goals: 8 weeks   1. Patient will demonstrate ability to complete 6MWT within age appropriate limits with appropriate assistive device.   2. Patient will report <53% limitation on FOTO survey.   ----3. Patient will demonstrate at least 8 point improvement on [place] Balance Scale score.   4. Patient will demonstrate ability to stand > 15 minutes without increased low back pain.     PLAN     Certification Period/Plan of care expiration: 1/6/2023 to 03/03/2023.  Cont with treatment per  POC    Helio England, PT

## 2023-01-31 NOTE — PROGRESS NOTES
[facility]   Physical Therapy Treatment Note     Name: Tere Velasco  Clinic Number: [id number]    Therapy Diagnosis:   Encounter Diagnoses   Name Primary?    Chronic bilateral low back pain without sciatica Yes    Pain aggravated by standing     Decreased range of motion of lumbar spine     Multiple falls        Physician: Rosy Santos MD    Visit Date: 2/1/2023    Physician Orders: PT Eval and Treat   Medical Diagnosis: M54.50,G89.29 (ICD-10-CM) - Chronic bilateral low back pain without sciatica  Evaluation Date: 1/6/2023  Authorization Period Expiration: 11/14/2023  Plan of Care Certification Period: 1/6/2023 to 03/03/2023  Visit # / Visits authorized: 7/20 FOTO: at dc  PTA Visit #: 0/5     Time In: 1400  Time Out: 1450  Total Billable Time: 15 minutes (1 TE)  Precautions: CKD, fall     SUBJECTIVE     Pt reports: no new complaints; agreeable to PT.   She has not been compliant with home exercise program.  Response to previous treatment: Mild soreness  Functional change: Ongoing    Pain: 2-3/10  Location: bilateral lumbosacral area    OBJECTIVE     Objective Measures updated at progress report unless specified.     Treatment     Tere received the treatments listed below:      therapeutic exercises to develop strength, endurance, ROM, flexibility, and posture for 15 minutes with PT 1:1, including:  Bike x 5' lv4  Modified Jason hip flexor stretch 3x1'/each  Sit to stand from elevated mat 3 x 10 (add weight)  Walking 4 x 2 loops with rolling walker  Heel raises 2 x 10  Standing hip thrusts 20x  Standing hip abduction/extension: 2x10 B      Patient Education and Home Exercises     Home Exercises Provided and Patient Education Provided   Education provided:   - PT diagnosis and recommended treatment course.   - benefits of LSO  - benefits of walker for ambulation.     Written Home Exercises Provided: Yes. Exercises were reviewed and Tere was able to demonstrate them prior to  the end of the session. Tere demonstrated good  understanding of the education provided. See EMR under Patient Instructions for exercises provided during therapy sessions    ASSESSMENT     Tere is a 82 y.o. female referred to outpatient Physical Therapy with a medical diagnosis of M54.50,G89.29 (ICD-10-CM) - Chronic bilateral low back pain without sciatica. Known scoliosis and chronic LBP.  Improved subjective reports of pain and function.  Improving tolerance to activities with PT. Seems to be making gains. Pleased with progress.     Tere Is progressing well towards her goals.   Pt prognosis is Fair.     Pt will continue to benefit from skilled outpatient physical therapy to address the deficits listed in the problem list box on initial evaluation, provide pt/family education and to maximize pt's level of independence in the home and community environment.     Pt's spiritual, cultural and educational needs considered and pt agreeable to plan of care and goals.     Anticipated barriers to physical therapy: age, motivation, co-morbities, advanced scoliosis    Goals: Short Term Goals: 3 weeks  1.  Patient will demonstrate understanding and compliance with home exercise program.   2.  Patient will demonstrate ability to perform >8 sit-to-stands from standard chair height without use of upper extremity for improved functional transfer ability.   3.  Patient will report >10% improvement on KAREEM.      Long Term Goals: 8 weeks   1. Patient will demonstrate ability to complete 6MWT within age appropriate limits with appropriate assistive device.   2. Patient will report <53% limitation on FOTO survey.   ----3. Patient will demonstrate at least 8 point improvement on [place] Balance Scale score.   4. Patient will demonstrate ability to stand > 15 minutes without increased low back pain.     PLAN   Continue plan of care.  Monitor response to interventions.  Adjust intensity accordingly.     Helio England, PT, DPT,  OCS

## 2023-02-01 ENCOUNTER — CLINICAL SUPPORT (OUTPATIENT)
Dept: REHABILITATION | Facility: HOSPITAL | Age: 83
End: 2023-02-01
Payer: MEDICARE

## 2023-02-01 DIAGNOSIS — M54.50 CHRONIC BILATERAL LOW BACK PAIN WITHOUT SCIATICA: Primary | ICD-10-CM

## 2023-02-01 DIAGNOSIS — R29.6 MULTIPLE FALLS: ICD-10-CM

## 2023-02-01 DIAGNOSIS — R52 PAIN AGGRAVATED BY STANDING: ICD-10-CM

## 2023-02-01 DIAGNOSIS — M53.86 DECREASED RANGE OF MOTION OF LUMBAR SPINE: ICD-10-CM

## 2023-02-01 DIAGNOSIS — G89.29 CHRONIC BILATERAL LOW BACK PAIN WITHOUT SCIATICA: Primary | ICD-10-CM

## 2023-02-01 PROCEDURE — 97110 THERAPEUTIC EXERCISES: CPT | Mod: HCNC,PN

## 2023-02-06 ENCOUNTER — CLINICAL SUPPORT (OUTPATIENT)
Dept: REHABILITATION | Facility: HOSPITAL | Age: 83
End: 2023-02-06
Payer: MEDICARE

## 2023-02-06 DIAGNOSIS — G89.29 CHRONIC BILATERAL LOW BACK PAIN WITHOUT SCIATICA: Primary | ICD-10-CM

## 2023-02-06 DIAGNOSIS — R52 PAIN AGGRAVATED BY STANDING: ICD-10-CM

## 2023-02-06 DIAGNOSIS — M54.50 CHRONIC BILATERAL LOW BACK PAIN WITHOUT SCIATICA: Primary | ICD-10-CM

## 2023-02-06 DIAGNOSIS — R29.6 MULTIPLE FALLS: ICD-10-CM

## 2023-02-06 DIAGNOSIS — M53.86 DECREASED RANGE OF MOTION OF LUMBAR SPINE: ICD-10-CM

## 2023-02-06 PROCEDURE — 97110 THERAPEUTIC EXERCISES: CPT | Mod: HCNC,PN,CQ

## 2023-02-06 NOTE — PROGRESS NOTES
[facility]   Physical Therapy Treatment Note     Name: Tere Velasco  Clinic Number: [id number]    Therapy Diagnosis:   Encounter Diagnoses   Name Primary?    Chronic bilateral low back pain without sciatica Yes    Pain aggravated by standing     Decreased range of motion of lumbar spine     Multiple falls          Physician: Rosy Santos MD    Visit Date: 2/6/2023    Physician Orders: PT Eval and Treat   Medical Diagnosis: M54.50,G89.29 (ICD-10-CM) - Chronic bilateral low back pain without sciatica  Evaluation Date: 1/6/2023  Authorization Period Expiration: 11/14/2023  Plan of Care Certification Period: 1/6/2023 to 03/03/2023  Visit # / Visits authorized: 7/20 FOTO: at dc  PTA Visit #: 1/5     Time In: 1345  Time Out: 1440  Total Billable Time: 55 minutes (4 TE)  Precautions: CKD, fall     SUBJECTIVE     Pt reports: Increased lower back pain that began this morning. Unsure if it was the way she slept. Reports pain has since gotten worse after she went to the store and was walking for a while.   She has not been compliant with home exercise program.  Response to previous treatment: Mild soreness  Functional change: Ongoing    Pain: 2-3/10  Location: bilateral lumbosacral area    OBJECTIVE     Objective Measures updated at progress report unless specified.     Treatment     Tere received the treatments listed below:      therapeutic exercises to develop strength, endurance, ROM, flexibility, and posture for 55 minutes with PT 1:1, including:  Nu step x 5' lv4  + LTR 2'  + Bridges 2 x 10  Modified Jason hip flexor stretch 3x1'/each  Sit to stand from elevated mat 3 x 10 (add weight)  Walking 4 loops x 2 trials with rolling walker  Heel raises 2 x 10  Standing hip thrusts 20x not today  Standing hip abduction/extension: 3x10 B      Patient Education and Home Exercises     Home Exercises Provided and Patient Education Provided   Education provided:   - PT diagnosis and  recommended treatment course.   - benefits of LSO  - benefits of walker for ambulation.     Written Home Exercises Provided: Yes. Exercises were reviewed and Tere was able to demonstrate them prior to the end of the session. Tere demonstrated good  understanding of the education provided. See EMR under Patient Instructions for exercises provided during therapy sessions    ASSESSMENT     Tere is a 82 y.o. female referred to outpatient Physical Therapy with a medical diagnosis of M54.50,G89.29 (ICD-10-CM) - Chronic bilateral low back pain without sciatica. Known scoliosis and chronic LBP. Presents to PT with increased pain in lower back that began this morning. Unsure if it was how she slept. Progressively gotten worse especially after going to the store before arriving to PT. Implemented pain free lumbar ROM therex with positive response. Overall good tolerance stating she felt much better than when she came in. Will continue to progress standing tolerance as appropriate.    Tere Is progressing well towards her goals.   Pt prognosis is Fair.     Pt will continue to benefit from skilled outpatient physical therapy to address the deficits listed in the problem list box on initial evaluation, provide pt/family education and to maximize pt's level of independence in the home and community environment.     Pt's spiritual, cultural and educational needs considered and pt agreeable to plan of care and goals.     Anticipated barriers to physical therapy: age, motivation, co-morbities, advanced scoliosis    Goals: Short Term Goals: 3 weeks  1.  Patient will demonstrate understanding and compliance with home exercise program.   2.  Patient will demonstrate ability to perform >8 sit-to-stands from standard chair height without use of upper extremity for improved functional transfer ability.   3.  Patient will report >10% improvement on KAREEM.      Long Term Goals: 8 weeks   1. Patient will demonstrate ability to complete  6MWT within age appropriate limits with appropriate assistive device.   2. Patient will report <53% limitation on FOTO survey.   ----3. Patient will demonstrate at least 8 point improvement on [place] Balance Scale score.   4. Patient will demonstrate ability to stand > 15 minutes without increased low back pain.     PLAN   Continue plan of care.  Monitor response to interventions.  Adjust intensity accordingly.     Sameera Magallon, PTA

## 2023-02-08 ENCOUNTER — CLINICAL SUPPORT (OUTPATIENT)
Dept: REHABILITATION | Facility: HOSPITAL | Age: 83
End: 2023-02-08
Payer: MEDICARE

## 2023-02-08 DIAGNOSIS — R52 PAIN AGGRAVATED BY STANDING: ICD-10-CM

## 2023-02-08 DIAGNOSIS — M54.50 CHRONIC BILATERAL LOW BACK PAIN WITHOUT SCIATICA: Primary | ICD-10-CM

## 2023-02-08 DIAGNOSIS — G89.29 CHRONIC BILATERAL LOW BACK PAIN WITHOUT SCIATICA: Primary | ICD-10-CM

## 2023-02-08 DIAGNOSIS — M53.86 DECREASED RANGE OF MOTION OF LUMBAR SPINE: ICD-10-CM

## 2023-02-08 DIAGNOSIS — R29.6 MULTIPLE FALLS: ICD-10-CM

## 2023-02-08 PROCEDURE — 97110 THERAPEUTIC EXERCISES: CPT | Mod: HCNC,PN

## 2023-02-08 NOTE — PROGRESS NOTES
[facility]   Physical Therapy Treatment Note     Name: Tere Velasco  Clinic Number: [id number]    Therapy Diagnosis:   Encounter Diagnoses   Name Primary?    Chronic bilateral low back pain without sciatica Yes    Pain aggravated by standing     Decreased range of motion of lumbar spine     Multiple falls      Physician: Rosy Santos MD    Visit Date: 2/8/2023    Physician Orders: PT Eval and Treat   Medical Diagnosis: M54.50,G89.29 (ICD-10-CM) - Chronic bilateral low back pain without sciatica  Evaluation Date: 1/6/2023  Authorization Period Expiration: 11/14/2023  Plan of Care Certification Period: 1/6/2023 to 03/03/2023  Visit # / Visits authorized: 8/20 FOTO: at dc  PTA Visit #: 0/5     Time In: 1400  Time Out: 1455  Total Billable Time: 30 minutes (2 TE)  Precautions: CKD, fall     SUBJECTIVE     Pt reports: no new complaints today.   She has not been compliant with home exercise program.  Response to previous treatment: Mild soreness  Functional change: Ongoing    Pain: 2/10  Location: bilateral lumbosacral area    OBJECTIVE     Objective Measures updated at progress report unless specified.     Treatment     Tere received the treatments listed below:      therapeutic exercises to develop strength, endurance, ROM, flexibility, and posture for 30 minutes with PT 1:1, including:  Nu step x 5' lv4  LTR 2'  Modified Jason hip flexor stretch 3x1'/each  Sit to stand from elevated mat 3 x 10 (4#)  Walking 4 loops x 2 trials with rolling walker  Heel raises 2 x 10  Standing hip thrusts 20x   Standing hip abduction/extension: 3x10 B      Patient Education and Home Exercises     Home Exercises Provided and Patient Education Provided   Education provided:   - PT diagnosis and recommended treatment course.   - benefits of LSO  - benefits of walker for ambulation.     Written Home Exercises Provided: Yes. Exercises were reviewed and Tere was able to demonstrate them prior to the  end of the session. Tere demonstrated good  understanding of the education provided. See EMR under Patient Instructions for exercises provided during therapy sessions    ASSESSMENT     Tere is a 82 y.o. female referred to outpatient Physical Therapy with a medical diagnosis of M54.50,G89.29 (ICD-10-CM) - Chronic bilateral low back pain without sciatica. Known scoliosis and chronic LBP. Making good progress with PT.     Tere Is progressing well towards her goals.   Pt prognosis is Fair.     Pt will continue to benefit from skilled outpatient physical therapy to address the deficits listed in the problem list box on initial evaluation, provide pt/family education and to maximize pt's level of independence in the home and community environment.     Pt's spiritual, cultural and educational needs considered and pt agreeable to plan of care and goals.     Anticipated barriers to physical therapy: age, motivation, co-morbities, advanced scoliosis    Goals: Short Term Goals: 3 weeks  1.  Patient will demonstrate understanding and compliance with home exercise program.   2.  Patient will demonstrate ability to perform >8 sit-to-stands from standard chair height without use of upper extremity for improved functional transfer ability. MET  3.  Patient will report >10% improvement on KAREEM.      Long Term Goals: 8 weeks   1. Patient will demonstrate ability to complete 6MWT within age appropriate limits with appropriate assistive device.   2. Patient will report <53% limitation on FOTO survey.   3. Patient will demonstrate at least 8 point improvement on [place] Balance Scale score.   4. Patient will demonstrate ability to stand > 15 minutes without increased low back pain.     PLAN   Continue plan of care.  Monitor response to interventions.  Adjust intensity accordingly.     Helio England, PT, DPT, OCS

## 2023-02-09 DIAGNOSIS — Z00.00 ENCOUNTER FOR MEDICARE ANNUAL WELLNESS EXAM: ICD-10-CM

## 2023-02-15 ENCOUNTER — CLINICAL SUPPORT (OUTPATIENT)
Dept: REHABILITATION | Facility: HOSPITAL | Age: 83
End: 2023-02-15
Payer: MEDICARE

## 2023-02-15 DIAGNOSIS — R29.6 MULTIPLE FALLS: ICD-10-CM

## 2023-02-15 DIAGNOSIS — M54.50 CHRONIC BILATERAL LOW BACK PAIN WITHOUT SCIATICA: Primary | ICD-10-CM

## 2023-02-15 DIAGNOSIS — M53.86 DECREASED RANGE OF MOTION OF LUMBAR SPINE: ICD-10-CM

## 2023-02-15 DIAGNOSIS — R52 PAIN AGGRAVATED BY STANDING: ICD-10-CM

## 2023-02-15 DIAGNOSIS — G89.29 CHRONIC BILATERAL LOW BACK PAIN WITHOUT SCIATICA: Primary | ICD-10-CM

## 2023-02-15 PROCEDURE — 97110 THERAPEUTIC EXERCISES: CPT | Mod: HCNC,PN,CQ

## 2023-02-15 NOTE — PROGRESS NOTES
"[facility]   Physical Therapy Treatment Note     Name: Tere Velasco  Clinic Number: [id number]    Therapy Diagnosis:   Encounter Diagnoses   Name Primary?    Chronic bilateral low back pain without sciatica Yes    Pain aggravated by standing     Decreased range of motion of lumbar spine     Multiple falls        Physician: Rosy Santos MD    Visit Date: 2/15/2023    Physician Orders: PT Eval and Treat   Medical Diagnosis: M54.50,G89.29 (ICD-10-CM) - Chronic bilateral low back pain without sciatica  Evaluation Date: 1/6/2023  Authorization Period Expiration: 11/14/2023  Plan of Care Certification Period: 1/6/2023 to 03/03/2023  Visit # / Visits authorized: 10/40 FOTO: at dc  PTA Visit #: 1/5     Time In: 1310  Time Out: 1400  Total Billable Time: 30 minutes (2 TE)  Precautions: CKD, fall     SUBJECTIVE     Pt reports: Feeling overall more fatigued today. "I think its because I sleep too much".  She has not been compliant with home exercise program.  Response to previous treatment: Mild soreness  Functional change: Ongoing    Pain: 2/10  Location: bilateral lumbosacral area    OBJECTIVE     Objective Measures updated at progress report unless specified.     Treatment     Tere received the treatments listed below:      therapeutic exercises to develop strength, endurance, ROM, flexibility, and posture for 30 minutes with PT 1:1, including:  Nu step x 5' lv4  LTR 2'  Modified Jason hip flexor stretch 3x1'/each  Sit to stand from elevated mat 3 x 10 (4#)  Walking 5 loops x 2 trials with rolling walker  Heel raises 2 x 10  Standing hip thrusts 20x   Standing hip abduction/extension: 3x10 B      Patient Education and Home Exercises     Home Exercises Provided and Patient Education Provided   Education provided:   - PT diagnosis and recommended treatment course.   - benefits of LSO  - benefits of walker for ambulation.     Written Home Exercises Provided: Yes. Exercises were " reviewed and Tere was able to demonstrate them prior to the end of the session. Tere demonstrated good  understanding of the education provided. See EMR under Patient Instructions for exercises provided during therapy sessions    ASSESSMENT     Tere is a 82 y.o. female referred to outpatient Physical Therapy with a medical diagnosis of M54.50,G89.29 (ICD-10-CM) - Chronic bilateral low back pain without sciatica. Known scoliosis and chronic LBP. Overall increased fatigue today. Able to complete program without increased pain although occasional rest breaks needed to recover from fatigue. Able to increase walking loops to 5 loops without complaints of back pain and good pace throughout. Will continue to progress as appropriate.     Tere Is progressing well towards her goals.   Pt prognosis is Fair.     Pt will continue to benefit from skilled outpatient physical therapy to address the deficits listed in the problem list box on initial evaluation, provide pt/family education and to maximize pt's level of independence in the home and community environment.     Pt's spiritual, cultural and educational needs considered and pt agreeable to plan of care and goals.     Anticipated barriers to physical therapy: age, motivation, co-morbities, advanced scoliosis    Goals: Short Term Goals: 3 weeks  1.  Patient will demonstrate understanding and compliance with home exercise program.   2.  Patient will demonstrate ability to perform >8 sit-to-stands from standard chair height without use of upper extremity for improved functional transfer ability. MET  3.  Patient will report >10% improvement on KAREEM.      Long Term Goals: 8 weeks   1. Patient will demonstrate ability to complete 6MWT within age appropriate limits with appropriate assistive device.   2. Patient will report <53% limitation on FOTO survey.   3. Patient will demonstrate at least 8 point improvement on [place] Balance Scale score.   4. Patient will demonstrate  ability to stand > 15 minutes without increased low back pain.     PLAN   Continue plan of care.  Monitor response to interventions.  Adjust intensity accordingly.     Sameera Magallon, PTA

## 2023-02-22 ENCOUNTER — CLINICAL SUPPORT (OUTPATIENT)
Dept: REHABILITATION | Facility: HOSPITAL | Age: 83
End: 2023-02-22
Payer: MEDICARE

## 2023-02-22 DIAGNOSIS — R52 PAIN AGGRAVATED BY STANDING: ICD-10-CM

## 2023-02-22 DIAGNOSIS — G89.29 CHRONIC BILATERAL LOW BACK PAIN WITHOUT SCIATICA: Primary | ICD-10-CM

## 2023-02-22 DIAGNOSIS — R29.6 MULTIPLE FALLS: ICD-10-CM

## 2023-02-22 DIAGNOSIS — M54.50 CHRONIC BILATERAL LOW BACK PAIN WITHOUT SCIATICA: Primary | ICD-10-CM

## 2023-02-22 DIAGNOSIS — M53.86 DECREASED RANGE OF MOTION OF LUMBAR SPINE: ICD-10-CM

## 2023-02-22 PROCEDURE — 97110 THERAPEUTIC EXERCISES: CPT | Mod: HCNC,PN,CQ

## 2023-02-22 NOTE — PROGRESS NOTES
"[facility]   Physical Therapy Treatment Note     Name: Tere Velasco  Clinic Number: [id number]    Therapy Diagnosis:   Encounter Diagnoses   Name Primary?    Chronic bilateral low back pain without sciatica Yes    Pain aggravated by standing     Decreased range of motion of lumbar spine     Multiple falls      Physician: Rosy Santos MD    Visit Date: 2/22/2023    Physician Orders: PT Eval and Treat   Medical Diagnosis: M54.50,G89.29 (ICD-10-CM) - Chronic bilateral low back pain without sciatica  Evaluation Date: 1/6/2023  Authorization Period Expiration: 11/14/2023  Plan of Care Certification Period: 1/6/2023 to 03/03/2023  Visit # / Visits authorized: 11/40 FOTO: at dc  PTA Visit #: 2/5     Time In: 3:00 pm  Time Out: 3:55  Total Billable Time: 55 minutes (4 TE)  Precautions: CKD, fall     SUBJECTIVE     Pt reports: complaint of neck pain today "I don't know why"  She has not been compliant with home exercise program.  Response to previous treatment: Mild soreness  Functional change: Ongoing    Pain: 2/10  Location: bilateral lumbosacral area    OBJECTIVE     Objective Measures updated at progress report unless specified.     Treatment     Tere received the treatments listed below:      therapeutic exercises to develop strength, endurance, ROM, flexibility, and posture for 55 minutes with 1:1, including:  Nu step x 5' lv4  LTR 2'  Modified Jason hip flexor stretch 3x1'/each  Sit to stand from elevated mat 3 x 10 (4#)  Walking 5 loops x 2 trials with rolling walker  Heel raises 3 x 10  Standing hip thrusts 20x   Standing hip abduction/extension: 2x15 bilateral each4      Patient Education and Home Exercises     Home Exercises Provided and Patient Education Provided   Education provided:   - PT diagnosis and recommended treatment course.   - benefits of LSO  - benefits of walker for ambulation.     Written Home Exercises Provided: Yes. Exercises were reviewed and Tere was " able to demonstrate them prior to the end of the session. Tere demonstrated good  understanding of the education provided. See EMR under Patient Instructions for exercises provided during therapy sessions    ASSESSMENT     Tere is a 82 y.o. female referred to outpatient Physical Therapy with a medical diagnosis of M54.50,G89.29 (ICD-10-CM) - Chronic bilateral low back pain without sciatica. Known scoliosis and chronic LBP.   Performed complete program without increased pain although several short rest breaks needed due to reports of fatigue. Completed walking loops to 5 loops without complaints of back pain and good pace throughout. Will continue to progress as appropriate.     Tere Is progressing well towards her goals.   Pt prognosis is Fair.     Pt will continue to benefit from skilled outpatient physical therapy to address the deficits listed in the problem list box on initial evaluation, provide pt/family education and to maximize pt's level of independence in the home and community environment.     Pt's spiritual, cultural and educational needs considered and pt agreeable to plan of care and goals.     Anticipated barriers to physical therapy: age, motivation, co-morbities, advanced scoliosis    Goals: Short Term Goals: 3 weeks  1.  Patient will demonstrate understanding and compliance with home exercise program.   2.  Patient will demonstrate ability to perform >8 sit-to-stands from standard chair height without use of upper extremity for improved functional transfer ability. MET  3.  Patient will report >10% improvement on KAREEM.      Long Term Goals: 8 weeks   1. Patient will demonstrate ability to complete 6MWT within age appropriate limits with appropriate assistive device.   2. Patient will report <53% limitation on FOTO survey.   3. Patient will demonstrate at least 8 point improvement on [place] Balance Scale score.   4. Patient will demonstrate ability to stand > 15 minutes without increased low back  pain.     PLAN   Continue plan of care.  Monitor response to interventions.  Adjust intensity accordingly.     Lucy De Los Santos, PTA

## 2023-02-27 ENCOUNTER — CLINICAL SUPPORT (OUTPATIENT)
Dept: REHABILITATION | Facility: HOSPITAL | Age: 83
End: 2023-02-27
Payer: MEDICARE

## 2023-02-27 DIAGNOSIS — R52 PAIN AGGRAVATED BY STANDING: ICD-10-CM

## 2023-02-27 DIAGNOSIS — M53.86 DECREASED RANGE OF MOTION OF LUMBAR SPINE: ICD-10-CM

## 2023-02-27 DIAGNOSIS — R29.6 MULTIPLE FALLS: ICD-10-CM

## 2023-02-27 DIAGNOSIS — G89.29 CHRONIC BILATERAL LOW BACK PAIN WITHOUT SCIATICA: Primary | ICD-10-CM

## 2023-02-27 DIAGNOSIS — M54.50 CHRONIC BILATERAL LOW BACK PAIN WITHOUT SCIATICA: Primary | ICD-10-CM

## 2023-02-27 PROCEDURE — 97112 NEUROMUSCULAR REEDUCATION: CPT | Mod: HCNC,PN

## 2023-02-27 PROCEDURE — 97110 THERAPEUTIC EXERCISES: CPT | Mod: HCNC,PN

## 2023-02-27 NOTE — PROGRESS NOTES
[facility]   Physical Therapy Treatment Note     Name: Tere Velasco  Clinic Number: [id number]    Therapy Diagnosis:   Encounter Diagnoses   Name Primary?    Chronic bilateral low back pain without sciatica Yes    Pain aggravated by standing     Decreased range of motion of lumbar spine     Multiple falls        Physician: Rosy Santos MD    Visit Date: 2/27/2023    Physician Orders: PT Eval and Treat   Medical Diagnosis: M54.50,G89.29 (ICD-10-CM) - Chronic bilateral low back pain without sciatica  Evaluation Date: 1/6/2023  Authorization Period Expiration: 11/14/2023  Plan of Care Certification Period: 1/6/2023 to 03/03/2023  Visit # / Visits authorized: 12/40 FOTO: at dc  PTA Visit #: 2/5     Time In: 1500  Time Out: 1555  Total Billable Time: 55 minutes (1 NM, 3 TE)  Precautions: CKD, fall     SUBJECTIVE     Pt reports: feeling like she is walking further and standing straighter although she still cannot stand for longer than 15 minutes at a time due to back pain.   She has been compliant with home exercise program.  Response to previous treatment: Mild soreness  Functional change: Ongoing    Pain: 2/10  Location: bilateral lumbosacral area    OBJECTIVE     Objective Measures updated at progress report unless specified.     Treatment     Tere received the treatments listed below:    Neuromuscular re-education: for kinesthesia awareness: total time 10 minutes, including:  Nu step x 5' lv4  Sit to stand from elevated mat 3x5 (8#)    therapeutic exercises to develop strength, endurance, ROM, flexibility, and posture for 45 minutes, including:  LTR 2'  Modified Jason hip flexor stretch 3x1'/each  Walking 5 loops x 2 trials with rolling walker and 2x1 without rolling walker.   Heel raises 2x10 dL and 3x10/each modified single leg  Standing hip thrusts 20x   Standing hip abduction/extension: 2x15/each      Patient Education and Home Exercises     Home Exercises Provided and  Patient Education Provided   Education provided:   - PT diagnosis and recommended treatment course.   - benefits of LSO  - benefits of walker for ambulation.     Written Home Exercises Provided: Yes. Exercises were reviewed and Tere was able to demonstrate them prior to the end of the session. Tere demonstrated good  understanding of the education provided. See EMR under Patient Instructions for exercises provided during therapy sessions    ASSESSMENT     Tere is a 82 y.o. female referred to outpatient Physical Therapy with a medical diagnosis of M54.50,G89.29 (ICD-10-CM) - Chronic bilateral low back pain without sciatica. Known scoliosis and chronic LBP.  Performed complete program without increased pain although several short rest breaks.  Will add 2-3 visits with focus on sweeping, vacuuming training and focus on improving tolerance to standing while cooking at home.  Doing well overall.  Now able to manage going to the grocery store with minimal pain.      Tere Is progressing well towards her goals.   Pt prognosis is Fair.     Pt will continue to benefit from skilled outpatient physical therapy to address the deficits listed in the problem list box on initial evaluation, provide pt/family education and to maximize pt's level of independence in the home and community environment.     Pt's spiritual, cultural and educational needs considered and pt agreeable to plan of care and goals.     Anticipated barriers to physical therapy: age, motivation, co-morbities, advanced scoliosis    Goals: Short Term Goals: 3 weeks  1.  Patient will demonstrate understanding and compliance with home exercise program.   2.  Patient will demonstrate ability to perform >8 sit-to-stands from standard chair height without use of upper extremity for improved functional transfer ability. MET  3.  Patient will report >10% improvement on KAREEM.      Long Term Goals: 8 weeks   1. Patient will demonstrate ability to complete 6MWT within  age appropriate limits with appropriate assistive device.   2. Patient will report <53% limitation on FOTO survey.   3. Patient will demonstrate at least 8 point improvement on [place] Balance Scale score.   4. Patient will demonstrate ability to stand > 15 minutes without increased low back pain.     PLAN   Continue plan of care.  Monitor response to interventions.  Adjust intensity accordingly.     Helio England, PT, DPT, OCS

## 2023-03-02 ENCOUNTER — CLINICAL SUPPORT (OUTPATIENT)
Dept: REHABILITATION | Facility: HOSPITAL | Age: 83
End: 2023-03-02
Payer: MEDICARE

## 2023-03-02 DIAGNOSIS — R52 PAIN AGGRAVATED BY STANDING: ICD-10-CM

## 2023-03-02 DIAGNOSIS — G89.29 CHRONIC BILATERAL LOW BACK PAIN WITHOUT SCIATICA: Primary | ICD-10-CM

## 2023-03-02 DIAGNOSIS — R29.6 MULTIPLE FALLS: ICD-10-CM

## 2023-03-02 DIAGNOSIS — M54.50 CHRONIC BILATERAL LOW BACK PAIN WITHOUT SCIATICA: Primary | ICD-10-CM

## 2023-03-02 DIAGNOSIS — M53.86 DECREASED RANGE OF MOTION OF LUMBAR SPINE: ICD-10-CM

## 2023-03-02 PROCEDURE — 97110 THERAPEUTIC EXERCISES: CPT | Mod: HCNC,PN,CQ

## 2023-03-02 PROCEDURE — 97112 NEUROMUSCULAR REEDUCATION: CPT | Mod: HCNC,PN,CQ

## 2023-03-02 NOTE — PROGRESS NOTES
"[facility]   Physical Therapy Treatment Note     Name: Tere Velasco  Clinic Number: [id number]    Therapy Diagnosis:   Encounter Diagnoses   Name Primary?    Chronic bilateral low back pain without sciatica Yes    Pain aggravated by standing     Decreased range of motion of lumbar spine     Multiple falls          Physician: Rosy Santos MD    Visit Date: 3/2/2023    Physician Orders: PT Eval and Treat   Medical Diagnosis: M54.50,G89.29 (ICD-10-CM) - Chronic bilateral low back pain without sciatica  Evaluation Date: 1/6/2023  Authorization Period Expiration: 11/14/2023  Plan of Care Certification Period: 1/6/2023 to 03/03/2023  Visit # / Visits authorized: 13/40 FOTO: at dc  PTA Visit #: 1/5     Time In: 1300  Time Out: 1355  Total Billable Time: 30 minutes (1 NM, 1 TE)  Precautions: CKD, fall     SUBJECTIVE     Pt reports: Increased fatigue today due to not sleeping well last few nights.   She has been compliant with home exercise program.  Response to previous treatment: Mild soreness  Functional change: Ongoing    Pain: 2/10  Location: bilateral lumbosacral area    OBJECTIVE     Objective Measures updated at progress report unless specified.     Treatment     Tere received the treatments listed below:    Neuromuscular re-education: for kinesthesia awareness: total time 10 minutes, including:  Nu step x 5' lv4  Sit to stand from elevated mat 3x5 (8#)    therapeutic exercises to develop strength, endurance, ROM, flexibility, and posture for 45 minutes, including:  LTR 2'  Modified Jason hip flexor stretch 3x1'/each  Walking 5 loops x 2 trials with rolling walker and 2x1 without rolling walker.   Heel raises 2x10 dL and 3x10/each modified single leg  Standing hip thrusts 20x   Standing hip abduction/extension: 2x15/each  Step ups 4" 2 x 10      Patient Education and Home Exercises     Home Exercises Provided and Patient Education Provided   Education provided:   - PT " diagnosis and recommended treatment course.   - benefits of LSO  - benefits of walker for ambulation.     Written Home Exercises Provided: Yes. Exercises were reviewed and Tere was able to demonstrate them prior to the end of the session. Tere demonstrated good  understanding of the education provided. See EMR under Patient Instructions for exercises provided during therapy sessions    ASSESSMENT     Tere is a 82 y.o. female referred to outpatient Physical Therapy with a medical diagnosis of M54.50,G89.29 (ICD-10-CM) - Chronic bilateral low back pain without sciatica. Known scoliosis and chronic LBP. Patient presents with increased fatigue due to not sleeping well past few nights. Reports she has been anxious about recent loss tooth. Has upcoming dentist appointment to replace tooth. Overall good tolerance to session. Moderate cuing throughout session for correct form of therex. Will continue to focus on sweeping, vacuuming training and focus on improving tolerance to standing while cooking at home.   Tere Is progressing well towards her goals.   Pt prognosis is Fair.     Pt will continue to benefit from skilled outpatient physical therapy to address the deficits listed in the problem list box on initial evaluation, provide pt/family education and to maximize pt's level of independence in the home and community environment.     Pt's spiritual, cultural and educational needs considered and pt agreeable to plan of care and goals.     Anticipated barriers to physical therapy: age, motivation, co-morbities, advanced scoliosis    Goals: Short Term Goals: 3 weeks  1.  Patient will demonstrate understanding and compliance with home exercise program.   2.  Patient will demonstrate ability to perform >8 sit-to-stands from standard chair height without use of upper extremity for improved functional transfer ability. MET  3.  Patient will report >10% improvement on KAREEM.      Long Term Goals: 8 weeks   1. Patient will  demonstrate ability to complete 6MWT within age appropriate limits with appropriate assistive device.   2. Patient will report <53% limitation on FOTO survey.   3. Patient will demonstrate at least 8 point improvement on [place] Balance Scale score.   4. Patient will demonstrate ability to stand > 15 minutes without increased low back pain.     PLAN   Continue plan of care.  Monitor response to interventions.  Adjust intensity accordingly.     Sameera Magallon, PTA

## 2023-03-07 ENCOUNTER — CLINICAL SUPPORT (OUTPATIENT)
Dept: REHABILITATION | Facility: HOSPITAL | Age: 83
End: 2023-03-07
Payer: MEDICARE

## 2023-03-07 DIAGNOSIS — R52 PAIN AGGRAVATED BY STANDING: ICD-10-CM

## 2023-03-07 DIAGNOSIS — M54.50 CHRONIC BILATERAL LOW BACK PAIN WITHOUT SCIATICA: Primary | ICD-10-CM

## 2023-03-07 DIAGNOSIS — M53.86 DECREASED RANGE OF MOTION OF LUMBAR SPINE: ICD-10-CM

## 2023-03-07 DIAGNOSIS — R29.6 MULTIPLE FALLS: ICD-10-CM

## 2023-03-07 DIAGNOSIS — G89.29 CHRONIC BILATERAL LOW BACK PAIN WITHOUT SCIATICA: Primary | ICD-10-CM

## 2023-03-07 PROCEDURE — 97112 NEUROMUSCULAR REEDUCATION: CPT | Mod: HCNC,PN

## 2023-03-07 PROCEDURE — 97110 THERAPEUTIC EXERCISES: CPT | Mod: HCNC,PN

## 2023-03-07 NOTE — PROGRESS NOTES
[facility]   Physical Therapy Treatment Note / Discharge Note    Name: Tere Velasco  Clinic Number: [id number]    Therapy Diagnosis:   Encounter Diagnoses   Name Primary?    Chronic bilateral low back pain without sciatica Yes    Pain aggravated by standing     Decreased range of motion of lumbar spine     Multiple falls        Physician: Rosy Santos MD    Visit Date: 3/7/2023    Physician Orders: PT Eval and Treat   Medical Diagnosis: M54.50,G89.29 (ICD-10-CM) - Chronic bilateral low back pain without sciatica  Evaluation Date: 1/6/2023  Authorization Period Expiration: 11/14/2023  Plan of Care Certification Period: 1/6/2023 to 03/03/2023  Visit # / Visits authorized: 14/40 FOTO: at dc  PTA Visit #: 1/5     Time In: 1205  Time Out: 1255  Total Billable Time: 30 minutes (1 NM, 1 TE)  Precautions: CKD, fall     SUBJECTIVE     Pt reports: feels like she is doing well.  Pleased with her progress with walking in the grocery store.   She has been compliant with home exercise program.  Response to previous treatment: Mild soreness  Functional change: Ongoing    Pain: 2/10  Location: bilateral lumbosacral area    OBJECTIVE     Objective Measures updated at progress report unless specified.     Treatment     Tere received the treatments listed below:    Neuromuscular re-education: for kinesthesia awareness: total time 10 minutes, including:  Nu step x 5' lv4  Sit to stand from elevated mat 3x5 (8#) (not today)  Education on sweeping modifications at home.     therapeutic exercises to develop strength, endurance, ROM, flexibility, and posture for 20 minutes with PT 1:1, including:  LTR 2'  Modified Jason hip flexor stretch 3x1'/each  Walking 5 loops x 3 trials with rolling walker and 2x1 without rolling walker.   Heel raises 2x10 dL and 3x10/each modified single leg  Standing hip thrusts 20x   Standing hip abduction/extension: 2x15/each      Patient Education and Home Exercises      Home Exercises Provided and Patient Education Provided   Education provided:   - updated home exercise program  - need for consistency with home exercise program at least for the next 12 weeks.     Written Home Exercises Provided: Yes. Exercises were reviewed and Tere was able to demonstrate them prior to the end of the session. Tere demonstrated good  understanding of the education provided. See EMR under Patient Instructions for exercises provided during therapy sessions    ASSESSMENT     Tere is a 82 y.o. female referred to outpatient Physical Therapy with a medical diagnosis of M54.50,G89.29 (ICD-10-CM) - Chronic bilateral low back pain without sciatica. Known scoliosis and chronic LBP. Doing well.  Pleased with her progress.  Able to tolerating standing activities at home with less pain.  Able to navigate the grocery store multiple times weekly without increased lumbar pain.  She is pleased with her progress.  Ready for DC and transition to home exercise program>     Tere Is progressing well towards her goals.   Pt prognosis is Fair.     Pt will continue to benefit from skilled outpatient physical therapy to address the deficits listed in the problem list box on initial evaluation, provide pt/family education and to maximize pt's level of independence in the home and community environment.     Pt's spiritual, cultural and educational needs considered and pt agreeable to plan of care and goals.     Anticipated barriers to physical therapy: age, motivation, co-morbities, advanced scoliosis    Goals: Short Term Goals: 3 weeks  1.  Patient will demonstrate understanding and compliance with home exercise program.  Met  2.  Patient will demonstrate ability to perform >8 sit-to-stands from standard chair height without use of upper extremity for improved functional transfer ability. MET  3.  Patient will report >10% improvement on KAREEM. Met     Long Term Goals: 8 weeks   1. Patient will demonstrate ability  to complete 6MWT within age appropriate limits with appropriate assistive device.   2. Patient will report <53% limitation on FOTO survey. Met  3. Patient will demonstrate at least 8 point improvement on [place] Balance Scale score. Met  4. Patient will demonstrate ability to stand > 15 minutes without increased low back pain. Met    PLAN   DC today with updated home exercise program.     Helio England PT, DPT, OCS    [facility]  Discharge Note    Name: Tere Vizcarra Velasco  Clinic Number: [id number]    Therapy Diagnosis:   Encounter Diagnoses   Name Primary?    Chronic bilateral low back pain without sciatica Yes    Pain aggravated by standing     Decreased range of motion of lumbar spine     Multiple falls      Physician: Rosy Santos MD    Physician Orders: PT Eval and Treat   Medical Diagnosis: M54.50,G89.29 (ICD-10-CM) - Chronic bilateral low back pain without sciatica  Evaluation Date: 1/6/2023    Date of Last visit: 3/7/2023   Total Visits Received: 14    ASSESSMENT      Discharge reason: Patient has completed the physician's prescription, Patient has met all of his/her goals, and Patient has reached the maximum rehab potential for the present time  Discharge FOTO Score: see media  Goals: see above    PLAN   This patient is discharged from Physical Therapy    Helio England PT, DPT, OCS             21y M here with R knee pain sp mechanical fall down 4 steps yesterday. Pt is ambulatory but with pain, States knee feel unsupported. Has signif swelling to peripatellar region. Able to flex/ex at ankle, knee, but difficulty with hip flexion-able to raise off bed but not maintain. No ecchymosis noted. Periph NV intact. No spinal TTP. No head strike or LOC. 21y M here with R knee pain sp mechanical fall down 4 steps yesterday. Pt is ambulatory but with pain, States knee feel unsupported. Has signif swelling to peripatellar region. Able to flex/ex at ankle, knee, but difficulty with hip flexion-able to raise off bed but not maintain. No ecchymosis noted. Periph NV intact. No spinal TTP. No head strike or LOC. Xrays to r/o fracture, avulsion. Poss soft tissue injury- partial tendon disruption. Knee immobilizer. Crutches. Pain meds. Ortho FU.

## 2023-03-21 DIAGNOSIS — N18.32 STAGE 3B CHRONIC KIDNEY DISEASE: Primary | ICD-10-CM

## 2023-03-22 ENCOUNTER — HOSPITAL ENCOUNTER (OUTPATIENT)
Dept: RADIOLOGY | Facility: HOSPITAL | Age: 83
Discharge: HOME | End: 2023-03-22
Attending: NURSE PRACTITIONER
Payer: MEDICARE

## 2023-03-22 DIAGNOSIS — Z13.820 SCREENING FOR OSTEOPOROSIS: ICD-10-CM

## 2023-03-22 DIAGNOSIS — M85.89 OTHER SPECIFIED DISORDERS OF BONE DENSITY AND STRUCTURE, MULTIPLE SITES: ICD-10-CM

## 2023-03-22 PROCEDURE — 77080 DXA BONE DENSITY AXIAL: CPT | Mod: 26,,, | Performed by: INTERNAL MEDICINE

## 2023-03-22 PROCEDURE — 77080 DEXA BONE DENSITY SPINE HIP: ICD-10-PCS | Mod: 26,,, | Performed by: INTERNAL MEDICINE

## 2023-03-22 PROCEDURE — 77080 DXA BONE DENSITY AXIAL: CPT | Mod: TC

## 2023-04-21 ENCOUNTER — LAB VISIT (OUTPATIENT)
Dept: LAB | Facility: HOSPITAL | Age: 83
End: 2023-04-21
Attending: HOSPITALIST
Payer: MEDICARE

## 2023-04-21 DIAGNOSIS — N18.32 STAGE 3B CHRONIC KIDNEY DISEASE: ICD-10-CM

## 2023-04-21 LAB
ALBUMIN SERPL BCP-MCNC: 3.6 G/DL (ref 3.5–5.2)
ANION GAP SERPL CALC-SCNC: 8 MMOL/L (ref 8–16)
BASOPHILS # BLD AUTO: 0.05 K/UL (ref 0–0.2)
BASOPHILS NFR BLD: 0.7 % (ref 0–1.9)
BUN SERPL-MCNC: 22 MG/DL (ref 8–23)
CALCIUM SERPL-MCNC: 10.3 MG/DL (ref 8.7–10.5)
CHLORIDE SERPL-SCNC: 105 MMOL/L (ref 95–110)
CO2 SERPL-SCNC: 26 MMOL/L (ref 23–29)
CREAT SERPL-MCNC: 1.4 MG/DL (ref 0.5–1.4)
DIFFERENTIAL METHOD: ABNORMAL
EOSINOPHIL # BLD AUTO: 0.1 K/UL (ref 0–0.5)
EOSINOPHIL NFR BLD: 1.7 % (ref 0–8)
ERYTHROCYTE [DISTWIDTH] IN BLOOD BY AUTOMATED COUNT: 14.3 % (ref 11.5–14.5)
EST. GFR  (NO RACE VARIABLE): 36.9 ML/MIN/1.73 M^2
GLUCOSE SERPL-MCNC: 90 MG/DL (ref 70–110)
HCT VFR BLD AUTO: 40 % (ref 37–48.5)
HGB BLD-MCNC: 11.9 G/DL (ref 12–16)
IMM GRANULOCYTES # BLD AUTO: 0.02 K/UL (ref 0–0.04)
IMM GRANULOCYTES NFR BLD AUTO: 0.3 % (ref 0–0.5)
LYMPHOCYTES # BLD AUTO: 1.6 K/UL (ref 1–4.8)
LYMPHOCYTES NFR BLD: 22.6 % (ref 18–48)
MCH RBC QN AUTO: 28.7 PG (ref 27–31)
MCHC RBC AUTO-ENTMCNC: 29.8 G/DL (ref 32–36)
MCV RBC AUTO: 96 FL (ref 82–98)
MONOCYTES # BLD AUTO: 0.8 K/UL (ref 0.3–1)
MONOCYTES NFR BLD: 10.8 % (ref 4–15)
NEUTROPHILS # BLD AUTO: 4.6 K/UL (ref 1.8–7.7)
NEUTROPHILS NFR BLD: 63.9 % (ref 38–73)
NRBC BLD-RTO: 0 /100 WBC
PHOSPHATE SERPL-MCNC: 3.5 MG/DL (ref 2.7–4.5)
PLATELET # BLD AUTO: 270 K/UL (ref 150–450)
PMV BLD AUTO: 11 FL (ref 9.2–12.9)
POTASSIUM SERPL-SCNC: 5.4 MMOL/L (ref 3.5–5.1)
PTH-INTACT SERPL-MCNC: 40.7 PG/ML (ref 9–77)
RBC # BLD AUTO: 4.15 M/UL (ref 4–5.4)
SODIUM SERPL-SCNC: 139 MMOL/L (ref 136–145)
WBC # BLD AUTO: 7.22 K/UL (ref 3.9–12.7)

## 2023-04-21 PROCEDURE — 36415 COLL VENOUS BLD VENIPUNCTURE: CPT | Mod: HCNC,PO | Performed by: INTERNAL MEDICINE

## 2023-04-21 PROCEDURE — 83970 ASSAY OF PARATHORMONE: CPT | Mod: HCNC | Performed by: INTERNAL MEDICINE

## 2023-04-21 PROCEDURE — 85025 COMPLETE CBC W/AUTO DIFF WBC: CPT | Mod: HCNC | Performed by: INTERNAL MEDICINE

## 2023-04-21 PROCEDURE — 80069 RENAL FUNCTION PANEL: CPT | Mod: HCNC | Performed by: INTERNAL MEDICINE

## 2023-04-24 PROBLEM — Z00.00 ENCOUNTER FOR PREVENTIVE HEALTH EXAMINATION: Chronic | Status: RESOLVED | Noted: 2023-01-19 | Resolved: 2023-04-24

## 2023-04-25 ENCOUNTER — TELEPHONE (OUTPATIENT)
Dept: NEPHROLOGY | Facility: CLINIC | Age: 83
End: 2023-04-25

## 2023-04-25 ENCOUNTER — OFFICE VISIT (OUTPATIENT)
Dept: NEPHROLOGY | Facility: CLINIC | Age: 83
End: 2023-04-25
Payer: MEDICARE

## 2023-04-25 DIAGNOSIS — E55.9 VITAMIN D DEFICIENCY: ICD-10-CM

## 2023-04-25 DIAGNOSIS — D63.1 ANEMIA OF CHRONIC RENAL FAILURE, STAGE 3B: ICD-10-CM

## 2023-04-25 DIAGNOSIS — I10 ESSENTIAL HYPERTENSION: ICD-10-CM

## 2023-04-25 DIAGNOSIS — E55.9 VITAMIN D DEFICIENCY: Primary | ICD-10-CM

## 2023-04-25 DIAGNOSIS — N28.1 RENAL CYST: ICD-10-CM

## 2023-04-25 DIAGNOSIS — N25.81 SECONDARY HYPERPARATHYROIDISM OF RENAL ORIGIN: Primary | ICD-10-CM

## 2023-04-25 DIAGNOSIS — N18.32 STAGE 3B CHRONIC KIDNEY DISEASE: Primary | ICD-10-CM

## 2023-04-25 DIAGNOSIS — N18.32 ANEMIA OF CHRONIC RENAL FAILURE, STAGE 3B: ICD-10-CM

## 2023-04-25 DIAGNOSIS — N18.32 STAGE 3B CHRONIC KIDNEY DISEASE: ICD-10-CM

## 2023-04-25 PROCEDURE — 99214 PR OFFICE/OUTPT VISIT, EST, LEVL IV, 30-39 MIN: ICD-10-PCS | Mod: HCNC,S$GLB,, | Performed by: INTERNAL MEDICINE

## 2023-04-25 PROCEDURE — 99999 PR PBB SHADOW E&M-EST. PATIENT-LVL III: ICD-10-PCS | Mod: PBBFAC,HCNC,, | Performed by: INTERNAL MEDICINE

## 2023-04-25 PROCEDURE — 99999 PR PBB SHADOW E&M-EST. PATIENT-LVL III: CPT | Mod: PBBFAC,HCNC,, | Performed by: INTERNAL MEDICINE

## 2023-04-25 PROCEDURE — 99214 OFFICE O/P EST MOD 30 MIN: CPT | Mod: HCNC,S$GLB,, | Performed by: INTERNAL MEDICINE

## 2023-04-25 NOTE — PROGRESS NOTES
Patient ID: Tere Velasco is a 83 y.o. White female who presents for follow-up visit for her Chronic Kidney Disease stage G3b/A1.    HPI:  Tere Velasco is a 83 y.o. White female with renovascular hypertension since 2005, TRU on CPAP, OA s/p bilateral knee replacements, DLD, and CKD stage 3 who presents today for follow-up. CKD is suspected to be 2/2 HTN and chronic NSAID use. No history of kidney stones, no family history of kidney disease. She also has a h/o IBS. Suffers from significant back pain.          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)    Cataract     removed from both eyes    CKD (chronic kidney disease) stage 3, GFR 30-59 ml/min     Clotting disorder     when knee was replaced    Colitis     Degenerative disc disease     Depression     Fibromyalgia     GERD (gastroesophageal reflux disease)     Hyperlipidemia     Hypertension     Irritable bowel syndrome     TRU (obstructive sleep apnea)     RLS (restless legs syndrome)     S/P knee replacement 1/13/2014       Family History   Problem Relation Age of Onset    Dementia Mother         age 92    Heart disease Father         age 58    Restless legs syndrome Daughter     Thyroid disease Daughter     Hypertension Son     Alcohol abuse Son     Ovarian cancer Paternal Grandmother     Breast cancer Paternal Grandmother     Cancer Paternal Grandmother         ovarian    Ovarian cancer Cousin     Cancer Cousin 40        colon ca    Breast cancer Paternal Aunt     Skin cancer Neg Hx     Melanoma Neg Hx     Colon cancer Neg Hx     Esophageal cancer Neg Hx     Stomach cancer Neg Hx     Irritable bowel syndrome Neg Hx     Crohn's disease Neg Hx     Ulcerative colitis Neg Hx     Celiac disease Neg Hx        Past Surgical History:   Procedure Laterality Date    APPENDECTOMY      BREAST BIOPSY      BREAST LUMPECTOMY      right side     BREAST SURGERY      CHOLECYSTECTOMY      COLONOSCOPY N/A 12/2/2016    Procedure: COLONOSCOPY;  Surgeon: Ori Cope MD;  Location: [facility] ([address] [place] [address]);  Service: Endoscopy;  Laterality: N/A;  PM prep    EYE SURGERY Bilateral     cataract    HYSTERECTOMY  1977    uterine pain    INJECTION OF FACET JOINT Bilateral 4/27/2021    Procedure: Facet joint injection-lumbar--Bilateral L4-5, L5-S1;  Surgeon: Kuldip Siddiqui Jr., MD;  Location: [facility];  Service: Pain Management;  Laterality: Bilateral;    JOINT REPLACEMENT      knees replaced bilaterally    KNEE SURGERY Bilateral     RECTAL SURGERY      rectocele    TONSILLECTOMY           Patient's medical, family, surgical, and medication hx reviewed.    Review of Systems    Constitutional: Negative for chills, diaphoresis, fever and weight loss.   HENT: Negative for nosebleeds and tinnitus.    Eyes: Negative for blurred vision, double vision and photophobia.   Respiratory: Negative for cough and shortness of breath.    Cardiovascular: . Negative for chest pain, palpitations, swelling orthopnea and PND.   Gastrointestinal: Negative for abdominal pain, diarrhea, constipation nausea and vomiting.   Genitourinary: Negative for dysuria, flank pain, frequency, hematuria and urgency.   Musculoskeletal: positive for severe back pain, negative falls,   myalgias and neck pain.   Skin: Negative.    Neurological: Negative for dizziness, tingling, tremors, sensory change, speech change, focal weakness, seizures, loss of consciousness, weakness and headaches.   Endo/Heme/Allergies: Negative for environmental allergies and polydipsia. Does not bruise/bleed easily.   Psychiatric/Behavioral: Negative for depression, hallucinations, memory loss, substance abuse and suicidal ideas. The patient is not nervous/anxious but does have insomnia.      Physical Exam:  General: Well developed, well nourished in NAD  HEENT: Conjunctiva clear; Oropharynx clear  Neck: No JVD noted, Supple  CV-  Normal S1, S2 with no murmurs,gallops,rubs  Resp- Lungs CTA Bilaterally, Unlabored  Abdomen- NTND, BS normoactive x4 quads, soft  Extrem- No cyanosis, clubbing, edema.  Skin- No rashes, lesions, ulcers  PSYCH: Oriented x3      Assessment:         ICD-10-CM ICD-9-CM    1. Secondary hyperparathyroidism of renal origin  N25.81 588.81 PTH, Intact      2. Anemia of chronic renal failure, stage 3b  N18.32 285.21     D63.1 585.3       3. Essential hypertension  I10 401.9       4. Vitamin D deficiency  E55.9 268.9 PTH, Intact      5. Renal cyst  N28.1 753.10 US Retroperitoneal Complete      6. Stage 3b chronic kidney disease  N18.32 585.3 US Retroperitoneal Complete      Urinalysis      Renal Function Panel      PTH, Intact      Protein/Creatinine Ratio, Urine      CBC Auto Differential      Uric Acid      Magnesium           Plan:   1. CKD stage G3b/A1 (Creat 1.4), stable   CKD: stage III 2/2 longstanding HTN/nephrosclerosis and prior chronic NSAID use.   JAYA/SPEP 2009 normal.    Right kidney smaller than the left, SUKHDEV?   Patient's blood pressures at home have been stable.    Ernal US 11/22   1. Symmetric diffusely increased renal cortical echogenicity and borderline elevated resistive indices bilaterally, nonspecific indicators of chronic medical renal disease.  2. 1.1-cm simple appearing right superior pole cortical cystic lesion, not significantly changed.     Clinical correlation and follow-up advised.  Lab Results   Component Value Date    CREATININE 1.4 04/21/2023     Urine Protein:   Prot/Creat Ratio, Urine   Date Value Ref Range Status   04/21/2023 Unable to calculate 0.00 - 0.20 Final   11/10/2022 0.10 0.00 - 0.20 Final   03/09/2022 0.11 0.00 - 0.20 Final     Acid-Base:   Lab Results   Component Value Date     04/21/2023    K 5.4 (H) 04/21/2023    CO2 26 04/21/2023     2. HTN: Blood pressures within target   Will continue to monitor in subsequent visits.    3. CKD-MBD:  Takes vitamin D 5000 units over the  counter (sometimes) for her increased PTH,  PTH normalized but now calcium borderline high.  - encouraged to reduce vitamin D intake to 5000 units once a week.       Lab Results   Component Value Date    PTH 40.7 04/21/2023    CALCIUM 10.3 04/21/2023    PHOS 3.5 04/21/2023     4. Anemia: iron deficiency in the past  Hgb at goal. Discontinued iron supplementation at last visit.   Will continue to monitor with labs and subsequent visits.    Lab Results   Component Value Date    HGB 11.9 (L) 04/21/2023     Lab Results   Component Value Date    IRON 104 01/27/2022    TIBC 377 01/27/2022    FERRITIN 39 01/27/2022     5. Lipid management:   Patient on statin therapy.    Lab Results   Component Value Date    LDLCALC 132.2 11/10/2022        Please see me back in my clinic in 12 month with blood work and ultrasound.      PTH and vitamin D level and BMP in 6 month

## 2023-05-11 ENCOUNTER — LAB VISIT (OUTPATIENT)
Dept: LAB | Facility: HOSPITAL | Age: 83
End: 2023-05-11
Payer: MEDICARE

## 2023-05-11 DIAGNOSIS — E78.5 HYPERLIPIDEMIA, UNSPECIFIED HYPERLIPIDEMIA TYPE: ICD-10-CM

## 2023-05-11 DIAGNOSIS — I10 ESSENTIAL HYPERTENSION: ICD-10-CM

## 2023-05-11 LAB
ALBUMIN SERPL BCP-MCNC: 3.5 G/DL (ref 3.5–5.2)
ALP SERPL-CCNC: 48 U/L (ref 55–135)
ALT SERPL W/O P-5'-P-CCNC: 15 U/L (ref 10–44)
ANION GAP SERPL CALC-SCNC: 9 MMOL/L (ref 8–16)
AST SERPL-CCNC: 23 U/L (ref 10–40)
BASOPHILS # BLD AUTO: 0.08 K/UL (ref 0–0.2)
BASOPHILS NFR BLD: 0.8 % (ref 0–1.9)
BILIRUB SERPL-MCNC: 0.4 MG/DL (ref 0.1–1)
BUN SERPL-MCNC: 28 MG/DL (ref 8–23)
CALCIUM SERPL-MCNC: 9.6 MG/DL (ref 8.7–10.5)
CHLORIDE SERPL-SCNC: 106 MMOL/L (ref 95–110)
CHOLEST SERPL-MCNC: 219 MG/DL (ref 120–199)
CHOLEST/HDLC SERPL: 3.7 {RATIO} (ref 2–5)
CO2 SERPL-SCNC: 24 MMOL/L (ref 23–29)
CREAT SERPL-MCNC: 1.4 MG/DL (ref 0.5–1.4)
DIFFERENTIAL METHOD: ABNORMAL
EOSINOPHIL # BLD AUTO: 0.2 K/UL (ref 0–0.5)
EOSINOPHIL NFR BLD: 1.7 % (ref 0–8)
ERYTHROCYTE [DISTWIDTH] IN BLOOD BY AUTOMATED COUNT: 14.2 % (ref 11.5–14.5)
EST. GFR  (NO RACE VARIABLE): 36.9 ML/MIN/1.73 M^2
GLUCOSE SERPL-MCNC: 88 MG/DL (ref 70–110)
HCT VFR BLD AUTO: 37.2 % (ref 37–48.5)
HDLC SERPL-MCNC: 60 MG/DL (ref 40–75)
HDLC SERPL: 27.4 % (ref 20–50)
HGB BLD-MCNC: 11.4 G/DL (ref 12–16)
IMM GRANULOCYTES # BLD AUTO: 0.03 K/UL (ref 0–0.04)
IMM GRANULOCYTES NFR BLD AUTO: 0.3 % (ref 0–0.5)
LDLC SERPL CALC-MCNC: 142 MG/DL (ref 63–159)
LYMPHOCYTES # BLD AUTO: 1.8 K/UL (ref 1–4.8)
LYMPHOCYTES NFR BLD: 17.3 % (ref 18–48)
MCH RBC QN AUTO: 29.3 PG (ref 27–31)
MCHC RBC AUTO-ENTMCNC: 30.6 G/DL (ref 32–36)
MCV RBC AUTO: 96 FL (ref 82–98)
MONOCYTES # BLD AUTO: 0.9 K/UL (ref 0.3–1)
MONOCYTES NFR BLD: 8.7 % (ref 4–15)
NEUTROPHILS # BLD AUTO: 7.5 K/UL (ref 1.8–7.7)
NEUTROPHILS NFR BLD: 71.2 % (ref 38–73)
NONHDLC SERPL-MCNC: 159 MG/DL
NRBC BLD-RTO: 0 /100 WBC
PLATELET # BLD AUTO: 262 K/UL (ref 150–450)
PMV BLD AUTO: 11 FL (ref 9.2–12.9)
POTASSIUM SERPL-SCNC: 4.5 MMOL/L (ref 3.5–5.1)
PROT SERPL-MCNC: 6.7 G/DL (ref 6–8.4)
RBC # BLD AUTO: 3.89 M/UL (ref 4–5.4)
SODIUM SERPL-SCNC: 139 MMOL/L (ref 136–145)
TRIGL SERPL-MCNC: 85 MG/DL (ref 30–150)
TSH SERPL DL<=0.005 MIU/L-ACNC: 2.69 UIU/ML (ref 0.4–4)
WBC # BLD AUTO: 10.53 K/UL (ref 3.9–12.7)

## 2023-05-11 PROCEDURE — 80053 COMPREHEN METABOLIC PANEL: CPT | Mod: HCNC | Performed by: HOSPITALIST

## 2023-05-11 PROCEDURE — 80061 LIPID PANEL: CPT | Mod: HCNC | Performed by: HOSPITALIST

## 2023-05-11 PROCEDURE — 84443 ASSAY THYROID STIM HORMONE: CPT | Mod: HCNC | Performed by: HOSPITALIST

## 2023-05-11 PROCEDURE — 36415 COLL VENOUS BLD VENIPUNCTURE: CPT | Mod: HCNC,PO | Performed by: HOSPITALIST

## 2023-05-11 PROCEDURE — 85025 COMPLETE CBC W/AUTO DIFF WBC: CPT | Mod: HCNC | Performed by: HOSPITALIST

## 2023-05-15 ENCOUNTER — OFFICE VISIT (OUTPATIENT)
Dept: INTERNAL MEDICINE | Facility: CLINIC | Age: 83
End: 2023-05-15
Payer: MEDICARE

## 2023-05-15 VITALS
HEIGHT: 65 IN | SYSTOLIC BLOOD PRESSURE: 120 MMHG | TEMPERATURE: 98 F | BODY MASS INDEX: 24.06 KG/M2 | WEIGHT: 144.38 LBS | OXYGEN SATURATION: 99 % | DIASTOLIC BLOOD PRESSURE: 80 MMHG | HEART RATE: 79 BPM

## 2023-05-15 DIAGNOSIS — G47.00 INSOMNIA, UNSPECIFIED TYPE: ICD-10-CM

## 2023-05-15 DIAGNOSIS — N18.32 STAGE 3B CHRONIC KIDNEY DISEASE: ICD-10-CM

## 2023-05-15 DIAGNOSIS — G47.33 OSA (OBSTRUCTIVE SLEEP APNEA): ICD-10-CM

## 2023-05-15 DIAGNOSIS — I10 ESSENTIAL HYPERTENSION: Primary | ICD-10-CM

## 2023-05-15 DIAGNOSIS — E78.5 HYPERLIPIDEMIA, UNSPECIFIED HYPERLIPIDEMIA TYPE: ICD-10-CM

## 2023-05-15 PROCEDURE — 3079F PR MOST RECENT DIASTOLIC BLOOD PRESSURE 80-89 MM HG: ICD-10-PCS | Mod: HCNC,CPTII,S$GLB, | Performed by: HOSPITALIST

## 2023-05-15 PROCEDURE — 99214 PR OFFICE/OUTPT VISIT, EST, LEVL IV, 30-39 MIN: ICD-10-PCS | Mod: HCNC,S$GLB,, | Performed by: HOSPITALIST

## 2023-05-15 PROCEDURE — 1100F PTFALLS ASSESS-DOCD GE2>/YR: CPT | Mod: HCNC,CPTII,S$GLB, | Performed by: HOSPITALIST

## 2023-05-15 PROCEDURE — 3288F PR FALLS RISK ASSESSMENT DOCUMENTED: ICD-10-PCS | Mod: HCNC,CPTII,S$GLB, | Performed by: HOSPITALIST

## 2023-05-15 PROCEDURE — 1125F AMNT PAIN NOTED PAIN PRSNT: CPT | Mod: HCNC,CPTII,S$GLB, | Performed by: HOSPITALIST

## 2023-05-15 PROCEDURE — 3079F DIAST BP 80-89 MM HG: CPT | Mod: HCNC,CPTII,S$GLB, | Performed by: HOSPITALIST

## 2023-05-15 PROCEDURE — 3074F SYST BP LT 130 MM HG: CPT | Mod: HCNC,CPTII,S$GLB, | Performed by: HOSPITALIST

## 2023-05-15 PROCEDURE — 1159F MED LIST DOCD IN RCRD: CPT | Mod: HCNC,CPTII,S$GLB, | Performed by: HOSPITALIST

## 2023-05-15 PROCEDURE — 99999 PR PBB SHADOW E&M-EST. PATIENT-LVL IV: CPT | Mod: PBBFAC,HCNC,, | Performed by: HOSPITALIST

## 2023-05-15 PROCEDURE — 1160F RVW MEDS BY RX/DR IN RCRD: CPT | Mod: HCNC,CPTII,S$GLB, | Performed by: HOSPITALIST

## 2023-05-15 PROCEDURE — 1159F PR MEDICATION LIST DOCUMENTED IN MEDICAL RECORD: ICD-10-PCS | Mod: HCNC,CPTII,S$GLB, | Performed by: HOSPITALIST

## 2023-05-15 PROCEDURE — 99214 OFFICE O/P EST MOD 30 MIN: CPT | Mod: HCNC,S$GLB,, | Performed by: HOSPITALIST

## 2023-05-15 PROCEDURE — 1125F PR PAIN SEVERITY QUANTIFIED, PAIN PRESENT: ICD-10-PCS | Mod: HCNC,CPTII,S$GLB, | Performed by: HOSPITALIST

## 2023-05-15 PROCEDURE — 3074F PR MOST RECENT SYSTOLIC BLOOD PRESSURE < 130 MM HG: ICD-10-PCS | Mod: HCNC,CPTII,S$GLB, | Performed by: HOSPITALIST

## 2023-05-15 PROCEDURE — 1100F PR PT FALLS ASSESS DOC 2+ FALLS/FALL W/INJURY/YR: ICD-10-PCS | Mod: HCNC,CPTII,S$GLB, | Performed by: HOSPITALIST

## 2023-05-15 PROCEDURE — 1160F PR REVIEW ALL MEDS BY PRESCRIBER/CLIN PHARMACIST DOCUMENTED: ICD-10-PCS | Mod: HCNC,CPTII,S$GLB, | Performed by: HOSPITALIST

## 2023-05-15 PROCEDURE — 99999 PR PBB SHADOW E&M-EST. PATIENT-LVL IV: ICD-10-PCS | Mod: PBBFAC,HCNC,, | Performed by: HOSPITALIST

## 2023-05-15 PROCEDURE — 3288F FALL RISK ASSESSMENT DOCD: CPT | Mod: HCNC,CPTII,S$GLB, | Performed by: HOSPITALIST

## 2023-05-15 NOTE — PROGRESS NOTES
Subjective:     @Patient ID: Tere Velasco is a 83 y.o. female.    Chief Complaint: Follow-up    83y.o. female with Htn, HLD, anxiety, depression, isomnia, RLS, GERD, TRU on cpap presents here for chronic conditions:       1. HTN: hctz 12.5 mg qday and losartan 50 mg qday  2. HLD: pravastatin 80 mg qday but not taking consistently   3. Anxiety and depression: on wellbutrin 150 mg bid , cymbalta 20 mg bid. Reports her   2020. Since then has been lonely but has support from friends. Her 2 biological kids (daughter in [place], son in [facility]) and 2 stepchildren ([facility] and [place]) that live out of state.  Has a dog that she loves states she had to put her down recently. Her daughter has since given her a new dog which is keeping her busyt  4. Insomnia:  Lunesta 2 mg qhs. States insomnia still occurs sometimes. Reports chronic issue. Does practice sleep hygiene   5. RLS:  on Horizant 600 mg qhs   6. GERD: on pepcid    7. TRU on cpap : reports has not used cpap in years as does not like the fit of the mask      8. Back pain : reports pain is not improved and limits ADLs. Reports injection for her back did not help. Is doing PT exercises at home          Follow-up  Pertinent negatives include no abdominal pain, arthralgias, chest pain, chills, congestion, coughing, fever, headaches, nausea, rash, sore throat, vomiting or weakness.       Review of Systems   Constitutional:  Negative for chills and fever.   HENT:  Negative for congestion and sore throat.    Eyes:  Negative for pain and visual disturbance.   Respiratory:  Negative for cough and shortness of breath.    Cardiovascular:  Negative for chest pain and leg swelling.   Gastrointestinal:  Negative for abdominal pain, nausea and vomiting.   Endocrine: Negative for polydipsia and polyuria.   Genitourinary:  Negative for difficulty urinating and dysuria.   Musculoskeletal:  Positive for back pain. Negative for arthralgias.   Skin:  Negative for  "rash and wound.   Neurological:  Negative for dizziness, weakness and headaches.   Psychiatric/Behavioral:  Positive for sleep disturbance. Negative for agitation and confusion.    Past medical history, surgical history, and family medical history reviewed and updated as appropriate.    Medications and allergies reviewed.     Objective:     Vitals:    05/15/23 1453   BP: 120/80   BP Location: Left arm   Patient Position: Sitting   BP Method: Medium (Manual)   Pulse: 79   Temp: 98.3 °F (36.8 °C)   TempSrc: Skin   SpO2: 99%   Weight: 65.5 kg (144 lb 6.4 oz)   Height: 5' 5" (1.651 m)     Body mass index is 24.03 kg/m².  Physical Exam  Vitals reviewed.   Constitutional:       General: She is not in acute distress.     Appearance: Normal appearance. She is well-developed.   HENT:      Head: Normocephalic and atraumatic.      Right Ear: Tympanic membrane normal.      Left Ear: Tympanic membrane normal.      Mouth/Throat:      Mouth: Mucous membranes are moist.      Pharynx: No oropharyngeal exudate.   Eyes:      General:         Right eye: No discharge.         Left eye: No discharge.      Conjunctiva/sclera: Conjunctivae normal.   Cardiovascular:      Rate and Rhythm: Normal rate and regular rhythm.      Heart sounds: No murmur heard.    No friction rub.   Pulmonary:      Effort: Pulmonary effort is normal.      Breath sounds: Normal breath sounds.   Abdominal:      General: Bowel sounds are normal. There is no distension.      Palpations: Abdomen is soft.      Tenderness: There is no abdominal tenderness. There is no guarding.   Musculoskeletal:         General: Normal range of motion.      Cervical back: Normal range of motion and neck supple.      Right lower leg: No edema.      Left lower leg: No edema.   Lymphadenopathy:      Cervical: No cervical adenopathy.   Skin:     General: Skin is warm and dry.   Neurological:      Mental Status: She is alert and oriented to person, place, and time.   Psychiatric:         " Mood and Affect: Mood normal.         Behavior: Behavior normal.       Lab Results   Component Value Date    WBC 10.53 05/11/2023    HGB 11.4 (L) 05/11/2023    HCT 37.2 05/11/2023     05/11/2023    CHOL 219 (H) 05/11/2023    TRIG 85 05/11/2023    HDL 60 05/11/2023    ALT 15 05/11/2023    AST 23 05/11/2023     05/11/2023    K 4.5 05/11/2023     05/11/2023    CREATININE 1.4 05/11/2023    BUN 28 (H) 05/11/2023    CO2 24 05/11/2023    TSH 2.687 05/11/2023    INR 1.8 (A) 01/30/2014    HGBA1C 5.5 08/21/2014       Assessment:     1. Essential hypertension    2. Insomnia, unspecified type    3. Stage 3b chronic kidney disease    4. Hyperlipidemia, unspecified hyperlipidemia type    5. TRU (obstructive sleep apnea)      Plan:   Tere was seen today for follow-up.    Diagnoses and all orders for this visit:    Essential hypertension  Stable. Continue to monitor   -     Comprehensive Metabolic Panel; Future  -     Lipid Panel; Future  -     CBC W/ AUTO DIFFERENTIAL; Future  -     TSH; Future    Insomnia, unspecified type  Stable. Continue to monitor     Stage 3b chronic kidney disease  Stable. Continue to monitor     Hyperlipidemia, unspecified hyperlipidemia type  Stable. Continue to monitor     TRU (obstructive sleep apnea)  Stable. Continue to monitor       Rtc 6 months     Rosy Santos MD  Internal Medicine    5/15/2023

## 2023-06-03 ENCOUNTER — HOSPITAL ENCOUNTER (EMERGENCY)
Facility: HOSPITAL | Age: 83
Discharge: HOME | End: 2023-06-03
Attending: EMERGENCY MEDICINE
Payer: MEDICARE

## 2023-06-03 VITALS
RESPIRATION RATE: 18 BRPM | WEIGHT: 150 LBS | DIASTOLIC BLOOD PRESSURE: 61 MMHG | BODY MASS INDEX: 24.96 KG/M2 | HEART RATE: 93 BPM | SYSTOLIC BLOOD PRESSURE: 125 MMHG | TEMPERATURE: 98 F | OXYGEN SATURATION: 97 %

## 2023-06-03 DIAGNOSIS — S16.1XXA STRAIN OF NECK MUSCLE, INITIAL ENCOUNTER: ICD-10-CM

## 2023-06-03 DIAGNOSIS — S09.90XA INJURY OF HEAD, INITIAL ENCOUNTER: ICD-10-CM

## 2023-06-03 DIAGNOSIS — S59.902A ELBOW INJURY, LEFT, INITIAL ENCOUNTER: ICD-10-CM
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
## 2023-06-03 PROCEDURE — 99284 EMERGENCY DEPT VISIT MOD MDM: CPT | Mod: 25

## 2023-06-03 NOTE — FIRST PROVIDER EVALUATION
Emergency Department TeleTriage Encounter Note      CHIEF COMPLAINT    Chief Complaint   Patient presents with    Fall     Pt states she was in [employer] and tripped over her dogs leash, and hit back of head on floor, denies LOC, enies use of blood thinners, complains of left arm pain        VITAL SIGNS   Initial Vitals [06/03/23 1834]   BP Pulse Resp Temp SpO2   125/61 93 18 98.4 °F (36.9 °C) 97 %      MAP       --            ALLERGIES    Review of patient's allergies indicates:   Allergen Reactions    Demerol [meperidine]        PROVIDER TRIAGE NOTE  Patient slipped and fell and hit the back of her head at [facility] just prior to arrival. She is complaining of pain in the head, neck and left elbow.       ORDERS  Labs Reviewed - No data to display    ED Orders (720h ago, onward)      None              Virtual Visit Note: The provider triage portion of this emergency department evaluation and documentation was performed via [employer], a HIPAA-compliant telemedicine application, in concert with a tele-presenter in the room. A face to face patient evaluation with one of my colleagues will occur once the patient is placed in an emergency department room.      DISCLAIMER: This note was prepared with [employer] voice recognition transcription software. Garbled syntax, mangled pronouns, and other bizarre constructions may be attributed to that software system.

## 2023-06-04 NOTE — DISCHARGE INSTRUCTIONS

## 2023-06-04 NOTE — ED PROVIDER NOTES
Encounter Date: 6/3/2023       History     Chief Complaint   Patient presents with    Fall     Pt states she was in [employer] and tripped over her dogs leash, and hit back of head on floor, denies LOC, enies use of blood thinners, complains of left arm pain      83 yr old female presents to the ER with reports of a slip and fall. Pt states she tripped over a dog leash and hit the back of her head. No loc. Reports neck pain. No use of blood thinners. No nausea or vomiting. Pmh of anemia, anxiety, back pain, arthritis, CKD, breast disorder, depression, htn, justus, rls, fibromyalgia, colitis, depression, ddd, IBS    The history is provided by the patient. No  was used.   Review of patient's allergies indicates:   Allergen Reactions    Demerol [meperidine]      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)    Cataract     removed from both eyes    CKD (chronic kidney disease) stage 3, GFR 30-59 ml/min     Clotting disorder     when knee was replaced    Colitis     Degenerative disc disease     Depression     Fibromyalgia     GERD (gastroesophageal reflux disease)     Hyperlipidemia     Hypertension     Irritable bowel syndrome     JUSTUS (obstructive sleep apnea)     RLS (restless legs syndrome)     S/P knee replacement 1/13/2014     Past Surgical History:   Procedure Laterality Date    APPENDECTOMY      BREAST BIOPSY      BREAST LUMPECTOMY      right side    BREAST SURGERY      CHOLECYSTECTOMY      COLONOSCOPY N/A 12/2/2016    Procedure: COLONOSCOPY;  Surgeon: Ori Cope MD;  Location: [address]);  Service: Endoscopy;  Laterality: N/A;  PM prep    EYE SURGERY Bilateral     cataract    HYSTERECTOMY  1977    uterine pain    INJECTION OF FACET JOINT Bilateral 4/27/2021    Procedure: Facet joint injection-lumbar--Bilateral L4-5, L5-S1;  Surgeon: Kuldip Siddiqui Jr., MD;  Location: [facility];  Service: Pain Management;  Laterality: Bilateral;    JOINT REPLACEMENT      knees replaced bilaterally    KNEE SURGERY Bilateral     RECTAL SURGERY      rectocele    TONSILLECTOMY       Family History   Problem Relation Age of Onset    Dementia Mother         age 92    Heart disease Father         age 58    Restless legs syndrome Daughter     Thyroid disease Daughter     Hypertension Son     Alcohol abuse Son     Ovarian cancer Paternal Grandmother     Breast cancer Paternal Grandmother     Cancer Paternal Grandmother         ovarian    Ovarian cancer Cousin     Cancer Cousin 40        colon ca    Breast cancer Paternal Aunt     Skin cancer Neg Hx     Melanoma Neg Hx     Colon cancer Neg Hx     Esophageal cancer Neg Hx     Stomach cancer Neg Hx     Irritable bowel syndrome Neg Hx     Crohn's disease Neg Hx     Ulcerative colitis Neg Hx     Celiac disease Neg Hx      Social History     Tobacco Use    Smoking status: Never     Passive exposure: Never    Smokeless tobacco: Never   Substance Use Topics    Alcohol use: Not Currently    Drug use: No     Review of Systems   Musculoskeletal:  Positive for neck pain.   All other systems reviewed and are negative.    Physical Exam     Initial Vitals [06/03/23 1834]   BP Pulse Resp Temp SpO2   125/61 93 18 98.4 °F (36.9 °C) 97 %      MAP       --         Physical Exam    Constitutional: She appears well-developed and well-nourished.   HENT:   Head: Normocephalic.       Right Ear: Hearing and tympanic membrane normal.   Left Ear: Hearing and tympanic membrane normal.   Nose: Nose normal.   Mouth/Throat: Oropharynx is clear and moist.   Eyes: Lids are normal. Pupils are equal, round, and reactive to light.   Neck:   Normal range of motion.  Cardiovascular:  Normal rate.           Pulmonary/Chest: Breath sounds normal. No respiratory distress. She has no wheezes. She has no rhonchi.   Abdominal: Abdomen is soft. There is no abdominal tenderness.   Musculoskeletal:          General: Normal range of motion.      Cervical back: Normal range of motion. No edema, erythema or rigidity. Muscular tenderness present. No spinous process tenderness. Normal range of motion.     Neurological: She is alert and oriented to person, place, and time.   Skin: Skin is warm and dry. No rash noted.        FULL ROM. No bleeding. No break in skin. + radial pulse noted.    Psychiatric: She has a normal mood and affect. Her behavior is normal. Judgment and thought content normal.       ED Course   Procedures  Labs Reviewed - No data to display       Imaging Results              CT Cervical Spine Without Contrast (Final result)  Result time 06/03/23 19:07:09      Final result by Mert Chaney DO (06/03/23 19:07:09)                   Impression:      No acute fracture or subluxation of the cervical spine.    Multilevel degenerative changes as detailed above.      Electronically signed by: Mert Chaney  Date:    06/03/2023  Time:    19:07               Narrative:    EXAMINATION:  CT CERVICAL SPINE WITHOUT CONTRAST    CLINICAL HISTORY:  Neck trauma (Age >= 65y);    TECHNIQUE:  Low dose axial images, sagittal and coronal reformations were performed though the cervical spine without intravenous contrast.    COMPARISON:  None available.    FINDINGS:  Alignment: There is grade 1 anterolisthesis of C3 on C4 and C7 on T1.  There is mild retrolisthesis C4 on C5 and C5 on C6, and C6 on C7.    Vertebra: There is no acute fracture or subluxation of the cervical spine.  The vertebral body heights are maintained.    Discs: There is severe disc height loss at C5-C6, C6-C7.  Moderate disc height loss at C7-T1.  There is complete obliteration of the C4-C5 disc with ankylosis of the vertebral bodies.    Cord: The contents of the spinal canal are not well visualized on non-contrast CT.    Degenerative changes: Multilevel degenerative changes as detailed below    C2-C3: Severe right-sided facet arthropathy and uncovertebral  joint spurring resulting in moderate right neural foraminal narrowing.  No spinal stenosis.    C3-C4: Severe left greater than right facet arthropathy and uncovertebral joint spurring and severe left and moderate right neural foraminal narrowing.  There is a posterior disc bulge contributing to mild spinal canal stenosis.    C4-C5: Circumferential disc bulge contributes to moderate spinal canal stenosis.  Moderate left and severe right neural foraminal narrowing secondary to uncovertebral joint spurring.    C5-C6: Severe spinal canal stenosis secondary to posterior disc bulge and ligamentum flavum hypertrophy.  Mild to moderate bilateral facet arthropathy and bilateral uncovertebral joint spurring resulting in moderate left and mild right neural foraminal narrowing.    C6-C7: Severe spinal canal stenosis secondary to posterior disc bulge.  Left greater than right facet arthropathy and bilateral uncovertebral joint spur contribute to moderate right and mild left neural foraminal narrowing.    C7-T1: Anterolisthesis and bilateral facet arthropathy.  Moderate right neural foraminal narrowing.    Miscellaneous: Multiple subcentimeter hypodensities in the right thyroid lobe which can be further evaluated nonemergent thyroid ultrasound.  There is streak artifact from dental amalgam.  The visualized lung apices are clear.                                       CT Head Without Contrast (Final result)  Result time 06/03/23 19:08:30      Final result by Mert Chaney DO (06/03/23 19:08:30)                   Impression:      No acute intracranial abnormality.      Electronically signed by: Mert Chaney  Date:    06/03/2023  Time:    19:08               Narrative:    EXAMINATION:  CT HEAD WITHOUT CONTRAST    CLINICAL HISTORY:  Head trauma, minor (Age >= 65y);    TECHNIQUE:  Low dose axial CT images obtained throughout the head without intravenous contrast. Sagittal and coronal reconstructions were  performed.    COMPARISON:  CT head 06/04/2020.    FINDINGS:  There is age-related brain parenchymal volume loss and prominence CSF spaces.  There is hypoattenuation the periventricular white matter compatible with mild chronic microvascular ischemic changes.  No extra-axial blood or fluid collections.  No parenchymal mass, hemorrhage, edema or major vascular distribution infarct.  Partially empty sella configuration.    No calvarial fracture.  The scalp is unremarkable.  Bilateral paranasal sinuses and mastoid air cells are clear.  There are bilateral prosthetic lenses.                                       Medications - No data to display  Medical Decision Making:   Differential Diagnosis:   Differential Diagnosis includes, but is not limited to:  Fracture, dislocation, compartment syndrome, nerve injury/palsy, vascular injury, DVT, rhabdomyolysis, hemarthrosis, septic joint, cellulitis, bursitis, muscle strain, ligament tear/sprain, laceration, foreign body, abrasion, soft tissue contusion, osteoarthritis, subdural, subarachnoid, skull fracture.     Clinical Tests:   Radiological Study: Ordered and Reviewed           ED Course as of 06/03/23 1945   Sat Jun 03, 2023   1913 FINDINGS:  There is age-related brain parenchymal volume loss and prominence CSF spaces.  There is hypoattenuation the periventricular white matter compatible with mild chronic microvascular ischemic changes.  No extra-axial blood or fluid collections.  No parenchymal mass, hemorrhage, edema or major vascular distribution infarct.  Partially empty sella configuration.     No calvarial fracture.  The scalp is unremarkable.  Bilateral paranasal sinuses and mastoid air cells are clear.  There are bilateral prosthetic lenses.     Impression:     No acute intracranial abnormality. [DT]   1914 FINDINGS:  Alignment: There is grade 1 anterolisthesis of C3 on C4 and C7 on T1.  There is mild retrolisthesis C4 on C5 and C5 on C6, and C6 on C7.     Vertebra:  There is no acute fracture or subluxation of the cervical spine.  The vertebral body heights are maintained.     Discs: There is severe disc height loss at C5-C6, C6-C7.  Moderate disc height loss at C7-T1.  There is complete obliteration of the C4-C5 disc with ankylosis of the vertebral bodies.     Cord: The contents of the spinal canal are not well visualized on non-contrast CT.     Degenerative changes: Multilevel degenerative changes as detailed below     C2-C3: Severe right-sided facet arthropathy and uncovertebral joint spurring resulting in moderate right neural foraminal narrowing.  No spinal stenosis.     C3-C4: Severe left greater than right facet arthropathy and uncovertebral joint spurring and severe left and moderate right neural foraminal narrowing.  There is a posterior disc bulge contributing to mild spinal canal stenosis.     C4-C5: Circumferential disc bulge contributes to moderate spinal canal stenosis.  Moderate left and severe right neural foraminal narrowing secondary to uncovertebral joint spurring.     C5-C6: Severe spinal canal stenosis secondary to posterior disc bulge and ligamentum flavum hypertrophy.  Mild to moderate bilateral facet arthropathy and bilateral uncovertebral joint spurring resulting in moderate left and mild right neural foraminal narrowing.     C6-C7: Severe spinal canal stenosis secondary to posterior disc bulge.  Left greater than right facet arthropathy and bilateral uncovertebral joint spur contribute to moderate right and mild left neural foraminal narrowing.     C7-T1: Anterolisthesis and bilateral facet arthropathy.  Moderate right neural foraminal narrowing.       [DT]   1929 Pt notified of the need for follow up care with pcp and reviewed the CT head and c spine. Pt is not toxic in appearance. When offered to refer to specialist for cervical issues noted that appear chronic, pt states she will just follow up with pcp.  [DT]      ED Course User Index  [DT] Nesha  GOLD Bhatti NP                 Clinical Impression:   Final diagnoses:  [S59.902A] Elbow injury, left, initial encounter  [W19.XXXA] Fall, initial encounter (Primary)  [S09.90XA] Injury of head, initial encounter  [S16.1XXA] Strain of neck muscle, initial encounter        ED Disposition Condition    Discharge Stable          ED Prescriptions    None       Follow-up Information       Follow up With Specialties Details Why Contact Info    Rosy Santos MD Internal Medicine Schedule an appointment as soon as possible for a visit in 2 days  [address] [facility] [address]  [phone]               Nesha Bhatti NP  06/03/23 1939       Nesha Bhatti NP  06/03/23 1945

## 2023-06-19 ENCOUNTER — OFFICE VISIT (OUTPATIENT)
Dept: URGENT CARE | Facility: CLINIC | Age: 83
End: 2023-06-19
Payer: MEDICARE

## 2023-06-19 VITALS
BODY MASS INDEX: 25.61 KG/M2 | HEIGHT: 64 IN | RESPIRATION RATE: 20 BRPM | TEMPERATURE: 97 F | DIASTOLIC BLOOD PRESSURE: 73 MMHG | SYSTOLIC BLOOD PRESSURE: 159 MMHG | HEART RATE: 87 BPM | WEIGHT: 150 LBS | OXYGEN SATURATION: 98 %
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
## 2023-06-19 PROCEDURE — 99213 PR OFFICE/OUTPT VISIT, EST, LEVL III, 20-29 MIN: ICD-10-PCS | Mod: S$GLB,,, | Performed by: NURSE PRACTITIONER

## 2023-06-19 PROCEDURE — 99213 OFFICE O/P EST LOW 20 MIN: CPT | Mod: S$GLB,,, | Performed by: NURSE PRACTITIONER

## 2023-06-19 RX ORDER — MUPIROCIN 20 MG/G
OINTMENT TOPICAL 3 TIMES DAILY
Qty: 1 G | Refills: 0 | Status: SHIPPED | OUTPATIENT
Start: 2023-06-19 | End: 2023-06-29

## 2023-06-19 RX ORDER — AMOXICILLIN AND CLAVULANATE POTASSIUM 250; 125 MG/1; MG/1
1 TABLET, FILM COATED ORAL EVERY 8 HOURS
Qty: 9 TABLET | Refills: 0 | Status: SHIPPED | OUTPATIENT
Start: 2023-06-19 | End: 2023-06-22

## 2023-06-20 NOTE — PROGRESS NOTES
"Subjective:      Patient ID: Tere Velasco is a 83 y.o. female.    Vitals:  height is 5' 4" (1.626 m) and weight is 68 kg (150 lb). Her oral temperature is 97.3 °F (36.3 °C). Her blood pressure is 159/73 (abnormal) and her pulse is 87. Her respiration is 20 and oxygen saturation is 98%.     Chief Complaint: Laceration (Patient 's dog scratch her face )    This is a 83 y.o. female who presents today with a chief complaint of a scratch on her face/ upper lip from her pet dog that happen on today.      Laceration   The incident occurred 1 to 3 hours ago. The laceration is located on the Face. The laceration is 4 cm in size. The laceration mechanism is unknown.The pain is at a severity of 0/10. The patient is experiencing no pain. The pain has been Constant since onset. She reports no foreign bodies present. Her tetanus status is unknown.     Constitution: Negative for chills and fever.   Cardiovascular:  Negative for chest pain and palpitations.   Respiratory:  Negative for shortness of breath.    Skin:  Positive for erythema.        Scratch to face (above lip)   Neurological:  Negative for numbness and tingling.    Objective:     Physical Exam   Constitutional: She is oriented to person, place, and time. She appears well-developed. She is cooperative.  Non-toxic appearance. She does not appear ill. No distress.   HENT:   Head: Normocephalic and atraumatic.          Comments: Small scratch to upper lip area, negative drainage, or edema, non-tender  Ears:   Right Ear: Hearing and external ear normal.   Left Ear: Hearing and external ear normal.   Nose: Nose normal. No mucosal edema or nasal deformity. No epistaxis. Right sinus exhibits no maxillary sinus tenderness and no frontal sinus tenderness. Left sinus exhibits no maxillary sinus tenderness and no frontal sinus tenderness.   Mouth/Throat: Uvula is midline, oropharynx is clear and moist and mucous membranes are normal. No trismus in the jaw. Normal " dentition. No uvula swelling.   Eyes: Conjunctivae and lids are normal.   Neck: Trachea normal and phonation normal. Neck supple.   Cardiovascular: Normal rate, regular rhythm, normal heart sounds and normal pulses.   Pulmonary/Chest: Effort normal and breath sounds normal.   Abdominal: Normal appearance.   Musculoskeletal: Normal range of motion.         General: Normal range of motion.   Neurological: She is alert and oriented to person, place, and time. She exhibits normal muscle tone.   Skin: Skin is warm, dry and intact. erythema   Psychiatric: Her speech is normal and behavior is normal. Judgment and thought content normal.   Nursing note and vitals reviewed.      Assessment:     1. Dog scratch        Plan:       Dog scratch  -     amoxicillin-clavulanate 250-125mg (AUGMENTIN) 250-125 mg Tab; Take 1 tablet by mouth every 8 (eight) hours. for 3 days  Dispense: 9 tablet; Refill: 0  -     mupirocin (BACTROBAN) 2 % ointment; Apply topically 3 (three) times daily. for 10 days  Dispense: 1 g; Refill: 0      PLEASE READ YOUR DISCHARGE INSTRUCTIONS ENTIRELY AS IT CONTAINS IMPORTANT INFORMATION.    Take the antibiotics to completion - eat with this medication.     Use the antibiotic ointment to the wound    Monitor for signs of infection (fever, chills, nausea, vomiting, body aches, spreading redness, pus drainage from the wound)    If rabies status of the animal is unknown, please call local animal control ([phone]) to trap and monitor the animal for 10 days. If this cannot be done please contact the [facility] rabies consultation line at [phone].     Please return or see your primary care doctor if you develop new or worsening symptoms.     Please arrange follow up with your primary medical clinic as soon as possible. You must understand that you've received an Urgent Care treatment only and that you may be released before all of your medical problems are known or treated. You, the patient, will arrange for  follow up as instructed. If your symptoms worsen or fail to improve you should go to the Emergency Room.  WE CANNOT RULE OUT ALL POSSIBLE CAUSES OF YOUR SYMPTOMS IN THE URGENT CARE SETTING PLEASE GO TO THE ER IF YOU FEELS YOUR CONDITION IS WORSENING OR YOU WOULD LIKE EMERGENT EVALUATION.

## 2023-06-20 NOTE — PATIENT INSTRUCTIONS
PLEASE READ YOUR DISCHARGE INSTRUCTIONS ENTIRELY AS IT CONTAINS IMPORTANT INFORMATION.    Take the antibiotics to completion - eat with this medication.     Use the antibiotic ointment to the wound    Monitor for signs of infection (fever, chills, nausea, vomiting, body aches, spreading redness, pus drainage from the wound)    If rabies status of the animal is unknown, please call local animal control ([phone]) to trap and monitor the animal for 10 days. If this cannot be done please contact the [facility] rabies consultation line at [phone].     Please return or see your primary care doctor if you develop new or worsening symptoms.     Please arrange follow up with your primary medical clinic as soon as possible. You must understand that you've received an Urgent Care treatment only and that you may be released before all of your medical problems are known or treated. You, the patient, will arrange for follow up as instructed. If your symptoms worsen or fail to improve you should go to the Emergency Room.  WE CANNOT RULE OUT ALL POSSIBLE CAUSES OF YOUR SYMPTOMS IN THE URGENT CARE SETTING PLEASE GO TO THE ER IF YOU FEELS YOUR CONDITION IS WORSENING OR YOU WOULD LIKE EMERGENT EVALUATION.       You must understand that you've received an Urgent Care treatment only and that you may be released before all your medical problems are known or treated. You, the patient, will arrange for follow up care as instructed.  Follow up with your PCP or specialty clinic as directed in the next 1-2 weeks if not improved or as needed.  You can call [phone] to schedule an appointment with the appropriate provider.  If your condition worsens we recommend that you receive another evaluation at the emergency room immediately or contact your primary medical clinics after hours call service to discuss your concerns.  Please return here or go to the Emergency Department for any concerns or worsening of condition.    If you  were prescribed a narcotic or controlled medication, do not drive or operate heavy equipment or machinery while taking these medications.    Thank you for choosing [facility]!    Our goal in the Urgent Care is to always provide outstanding medical care. You may receive a survey by mail or e-mail in the next week regarding your experience today. We would greatly appreciate you completing and returning the survey. Your feedback provides us with a way to recognize our staff who provide very good care, and it helps us learn how to improve when your experience was below our aspiration of excellence.      We appreciate you trusting us with your medical care. We hope you feel better soon. We will be happy to take care of you for all of your future medical needs.    Sincerely,    aWnder Martinez DNP, FNP-C

## 2023-07-03 DIAGNOSIS — G25.81 RLS (RESTLESS LEGS SYNDROME): ICD-10-CM

## 2023-07-03 RX ORDER — GABAPENTIN ENACARBIL 600 MG/1
1 TABLET, EXTENDED RELEASE ORAL NIGHTLY
Qty: 90 TABLET | Refills: 3 | Status: SHIPPED | OUTPATIENT
Start: 2023-07-03 | End: 2023-09-11

## 2023-07-03 NOTE — TELEPHONE ENCOUNTER
Refill Requested through [employer] [facility]    LRF: 5/26/2023    LOV: 5/15/2023    RTC: 11/15/2023

## 2023-07-03 NOTE — TELEPHONE ENCOUNTER
No care due was identified.  [facility] Embedded Care Due Messages. Reference number: [id number].   7/03/2023 2:29:46 PM CDT

## 2023-07-06 ENCOUNTER — OFFICE VISIT (OUTPATIENT)
Dept: URGENT CARE | Facility: CLINIC | Age: 83
End: 2023-07-06
Payer: MEDICARE

## 2023-07-06 VITALS
OXYGEN SATURATION: 96 % | HEART RATE: 84 BPM | SYSTOLIC BLOOD PRESSURE: 122 MMHG | RESPIRATION RATE: 16 BRPM | WEIGHT: 137 LBS | DIASTOLIC BLOOD PRESSURE: 73 MMHG | HEIGHT: 64 IN | TEMPERATURE: 99 F | BODY MASS INDEX: 23.39 KG/M2

## 2023-07-06 DIAGNOSIS — M54.30 SCIATICA, UNSPECIFIED LATERALITY: Primary | ICD-10-CM

## 2023-07-06 DIAGNOSIS — M41.9 SCOLIOSIS, UNSPECIFIED SCOLIOSIS TYPE, UNSPECIFIED SPINAL REGION: ICD-10-CM

## 2023-07-06 DIAGNOSIS — M54.9 BACK PAIN, UNSPECIFIED BACK LOCATION, UNSPECIFIED BACK PAIN LATERALITY, UNSPECIFIED CHRONICITY: ICD-10-CM

## 2023-07-06 PROCEDURE — 99203 OFFICE O/P NEW LOW 30 MIN: CPT | Mod: S$GLB,,, | Performed by: FAMILY MEDICINE

## 2023-07-06 PROCEDURE — 99203 PR OFFICE/OUTPT VISIT, NEW, LEVL III, 30-44 MIN: ICD-10-PCS | Mod: S$GLB,,, | Performed by: FAMILY MEDICINE

## 2023-07-06 RX ORDER — GABAPENTIN 100 MG/1
100 CAPSULE ORAL 3 TIMES DAILY
Qty: 30 CAPSULE | Refills: 1 | Status: SHIPPED | OUTPATIENT
Start: 2023-07-06 | End: 2023-09-11 | Stop reason: SDUPTHER

## 2023-07-06 RX ORDER — BACLOFEN 10 MG/1
10 TABLET ORAL 3 TIMES DAILY
Qty: 30 TABLET | Refills: 1 | Status: SHIPPED | OUTPATIENT
Start: 2023-07-06 | End: 2023-11-15

## 2023-07-06 NOTE — PROGRESS NOTES
Subjective:      Patient ID: Tere Velasco is a 83 y.o. female.    Vitals:  vitals were not taken for this visit.     Chief Complaint: Back Pain    This is a 83 y.o. female who presents today with a chief complaint of lower back pain.  Patient started having a pain in her gluteal pain for about two day.       Back Pain  This is a new problem. The current episode started in the past 7 days. The problem occurs constantly. The pain is present in the gluteal. The quality of the pain is described as aching. The pain does not radiate. The pain is at a severity of 7/10. The pain is moderate. The pain is The same all the time. The symptoms are aggravated by sitting and lying down. Treatments tried: Tylenol , gabapentin. The treatment provided mild relief.   Musculoskeletal:  Positive for back pain.    Objective:     Physical Exam   Constitutional: She is oriented to person, place, and time. She does not appear ill. No distress. obesity  HENT:   Head: Normocephalic and atraumatic.   Ears:   Right Ear: External ear normal.   Left Ear: External ear normal.   Nose: No rhinorrhea or congestion.   Mouth/Throat: Mucous membranes are moist. No oropharyngeal exudate or posterior oropharyngeal erythema. Oropharynx is clear.   Eyes: Conjunctivae are normal. Pupils are equal, round, and reactive to light. Extraocular movement intact   Neck: Neck supple.   Pulmonary/Chest: Effort normal. No respiratory distress.   Abdominal: Normal appearance. She exhibits no distension and no mass.   Musculoskeletal:      Thoracic back: She exhibits decreased range of motion and deformity.      Lumbar back: She exhibits decreased range of motion, tenderness, deformity and spasm.   Neurological: no focal deficit. She is alert, oriented to person, place, and time and at baseline.   Skin: Skin is warm and dry. Capillary refill takes less than 2 seconds. jaundice  Psychiatric: Her behavior is normal. Mood, judgment and thought content normal.    Nursing note and vitals reviewed.    Assessment:     Plan:   1. Sciatica, unspecified laterality  - gabapentin (NEURONTIN) 100 MG capsule; Take 1 capsule (100 mg total) by mouth 3 (three) times daily.  Dispense: 30 capsule; Refill: 1  - baclofen (LIORESAL) 10 MG tablet; Take 1 tablet (10 mg total) by mouth 3 (three) times daily.  Dispense: 30 tablet; Refill: 1   F/u if not better in 7days

## 2023-07-30 DIAGNOSIS — K21.9 GASTROESOPHAGEAL REFLUX DISEASE, UNSPECIFIED WHETHER ESOPHAGITIS PRESENT: ICD-10-CM

## 2023-07-30 NOTE — TELEPHONE ENCOUNTER
No care due was identified.  [facility] Embedded Care Due Messages. Reference number: [id number].   7/30/2023 4:55:17 PM CDT

## 2023-08-01 RX ORDER — FAMOTIDINE 20 MG/1
20 TABLET, FILM COATED ORAL
Qty: 90 TABLET | Refills: 3 | Status: SHIPPED | OUTPATIENT
Start: 2023-08-01

## 2023-08-08 DIAGNOSIS — M79.7 FIBROMYALGIA: ICD-10-CM

## 2023-08-08 NOTE — TELEPHONE ENCOUNTER
----- Message from Lily Gary sent at 8/8/2023  2:37 PM CDT -----  Contact: [phone]  Pt is calling she states the pharmacy is trying to get in touch with the office for her prescriptions she is asking if the dr can please call the pharmacy to ok them     She does not know how many prescriptions    Pharmacy [phone]

## 2023-08-08 NOTE — TELEPHONE ENCOUNTER
No care due was identified.  [facility] Embedded Care Due Messages. Reference number: [id number].   8/08/2023 4:17:34 PM CDT

## 2023-08-09 RX ORDER — DULOXETIN HYDROCHLORIDE 20 MG/1
CAPSULE, DELAYED RELEASE ORAL
Qty: 180 CAPSULE | Refills: 3 | Status: SHIPPED | OUTPATIENT
Start: 2023-08-09

## 2023-09-10 DIAGNOSIS — G25.81 RLS (RESTLESS LEGS SYNDROME): ICD-10-CM

## 2023-09-11 RX ORDER — GABAPENTIN ENACARBIL 600 MG/1
1 TABLET, EXTENDED RELEASE ORAL NIGHTLY
Qty: 90 TABLET | Refills: 2 | Status: SHIPPED | OUTPATIENT
Start: 2023-09-11

## 2023-09-11 NOTE — TELEPHONE ENCOUNTER
No care due was identified.  [facility] Embedded Care Due Messages. Reference number: [id number].   9/10/2023 10:55:33 PM CDT

## 2023-10-09 ENCOUNTER — LAB VISIT (OUTPATIENT)
Dept: LAB | Facility: HOSPITAL | Age: 83
End: 2023-10-09
Attending: HOSPITALIST
Payer: MEDICARE

## 2023-10-09 DIAGNOSIS — N18.32 STAGE 3B CHRONIC KIDNEY DISEASE: ICD-10-CM

## 2023-10-09 DIAGNOSIS — E55.9 VITAMIN D DEFICIENCY: ICD-10-CM

## 2023-10-09 PROCEDURE — 36415 COLL VENOUS BLD VENIPUNCTURE: CPT | Mod: HCNC,PO | Performed by: INTERNAL MEDICINE

## 2023-10-09 PROCEDURE — 81001 URINALYSIS AUTO W/SCOPE: CPT | Mod: HCNC | Performed by: INTERNAL MEDICINE

## 2023-10-09 PROCEDURE — 80048 BASIC METABOLIC PNL TOTAL CA: CPT | Mod: HCNC | Performed by: INTERNAL MEDICINE

## 2023-10-09 PROCEDURE — 82306 VITAMIN D 25 HYDROXY: CPT | Mod: HCNC | Performed by: INTERNAL MEDICINE

## 2023-10-09 PROCEDURE — 83735 ASSAY OF MAGNESIUM: CPT | Mod: HCNC | Performed by: INTERNAL MEDICINE

## 2023-10-10 LAB
25(OH)D3+25(OH)D2 SERPL-MCNC: 38 NG/ML (ref 30–96)
ANION GAP SERPL CALC-SCNC: 10 MMOL/L (ref 8–16)
BACTERIA #/AREA URNS AUTO: NORMAL /HPF
BILIRUB UR QL STRIP: NEGATIVE
BUN SERPL-MCNC: 22 MG/DL (ref 8–23)
CALCIUM SERPL-MCNC: 9.9 MG/DL (ref 8.7–10.5)
CHLORIDE SERPL-SCNC: 107 MMOL/L (ref 95–110)
CLARITY UR REFRACT.AUTO: CLEAR
CO2 SERPL-SCNC: 25 MMOL/L (ref 23–29)
COLOR UR AUTO: YELLOW
CREAT SERPL-MCNC: 1.2 MG/DL (ref 0.5–1.4)
EST. GFR  (NO RACE VARIABLE): 44.4 ML/MIN/1.73 M^2
GLUCOSE SERPL-MCNC: 80 MG/DL (ref 70–110)
GLUCOSE UR QL STRIP: NEGATIVE
HGB UR QL STRIP: NEGATIVE
KETONES UR QL STRIP: NEGATIVE
LEUKOCYTE ESTERASE UR QL STRIP: NEGATIVE
MAGNESIUM SERPL-MCNC: 2.1 MG/DL (ref 1.6–2.6)
MICROSCOPIC COMMENT: NORMAL
NITRITE UR QL STRIP: NEGATIVE
PH UR STRIP: 6 [PH] (ref 5–8)
POTASSIUM SERPL-SCNC: 4.4 MMOL/L (ref 3.5–5.1)
PROT UR QL STRIP: NEGATIVE
RBC #/AREA URNS AUTO: 1 /HPF (ref 0–4)
SODIUM SERPL-SCNC: 142 MMOL/L (ref 136–145)
SP GR UR STRIP: 1.01 (ref 1–1.03)
SQUAMOUS #/AREA URNS AUTO: 2 /HPF
URN SPEC COLLECT METH UR: NORMAL
WBC #/AREA URNS AUTO: 2 /HPF (ref 0–5)

## 2023-10-12 DIAGNOSIS — G47.00 INSOMNIA, UNSPECIFIED TYPE: ICD-10-CM

## 2023-10-12 DIAGNOSIS — M79.7 FIBROMYALGIA: ICD-10-CM

## 2023-10-12 DIAGNOSIS — K21.9 GASTROESOPHAGEAL REFLUX DISEASE, UNSPECIFIED WHETHER ESOPHAGITIS PRESENT: ICD-10-CM

## 2023-10-12 DIAGNOSIS — I15.0 RENOVASCULAR HYPERTENSION: ICD-10-CM

## 2023-10-13 RX ORDER — LOSARTAN POTASSIUM 50 MG/1
TABLET ORAL
Qty: 90 TABLET | Refills: 0 | OUTPATIENT
Start: 2023-10-13

## 2023-10-13 RX ORDER — ESZOPICLONE 2 MG/1
TABLET, FILM COATED ORAL
Qty: 90 TABLET | Refills: 0 | OUTPATIENT
Start: 2023-10-13

## 2023-10-13 RX ORDER — FAMOTIDINE 20 MG/1
TABLET, FILM COATED ORAL
Qty: 90 TABLET | Refills: 0 | OUTPATIENT
Start: 2023-10-13

## 2023-10-13 RX ORDER — DULOXETIN HYDROCHLORIDE 20 MG/1
CAPSULE, DELAYED RELEASE ORAL
Qty: 180 CAPSULE | Refills: 0 | OUTPATIENT
Start: 2023-10-13

## 2023-10-13 NOTE — TELEPHONE ENCOUNTER
Refill Decision Note   Tere Rod  is requesting a refill authorization.  Brief Assessment and Rationale for Refill:  Quick Discontinue     Medication Therapy Plan:         Comments:     Note composed:10:01 AM 10/13/2023

## 2023-10-13 NOTE — TELEPHONE ENCOUNTER
No care due was identified.  [facility] Embedded Care Due Messages. Reference number: [id number].   10/12/2023 7:05:41 PM CDT

## 2023-10-13 NOTE — TELEPHONE ENCOUNTER
LOV 5/15/23  LRF 8/9/23, 4/25/23, 11/8/22, 8/1/23     Pt is requesting Rx's be sent to [facility]

## 2023-10-18 DIAGNOSIS — G47.00 INSOMNIA, UNSPECIFIED TYPE: ICD-10-CM

## 2023-10-19 RX ORDER — ESZOPICLONE 2 MG/1
TABLET, FILM COATED ORAL
Qty: 90 TABLET | Refills: 1 | Status: SHIPPED | OUTPATIENT
Start: 2023-10-19

## 2023-10-19 NOTE — TELEPHONE ENCOUNTER
No care due was identified.  [facility] Embedded Care Due Messages. Reference number: [id number].   10/18/2023 10:31:02 PM CDT

## 2023-11-06 ENCOUNTER — LAB VISIT (OUTPATIENT)
Dept: LAB | Facility: HOSPITAL | Age: 83
End: 2023-11-06
Attending: HOSPITALIST
Payer: MEDICARE

## 2023-11-06 DIAGNOSIS — I10 ESSENTIAL HYPERTENSION: ICD-10-CM

## 2023-11-06 LAB
ALBUMIN SERPL BCP-MCNC: 3.3 G/DL (ref 3.5–5.2)
ALP SERPL-CCNC: 55 U/L (ref 55–135)
ALT SERPL W/O P-5'-P-CCNC: 10 U/L (ref 10–44)
ANION GAP SERPL CALC-SCNC: 10 MMOL/L (ref 8–16)
AST SERPL-CCNC: 16 U/L (ref 10–40)
BASOPHILS # BLD AUTO: 0.09 K/UL (ref 0–0.2)
BASOPHILS NFR BLD: 1.2 % (ref 0–1.9)
BILIRUB SERPL-MCNC: 0.3 MG/DL (ref 0.1–1)
BUN SERPL-MCNC: 17 MG/DL (ref 8–23)
CALCIUM SERPL-MCNC: 9.7 MG/DL (ref 8.7–10.5)
CHLORIDE SERPL-SCNC: 108 MMOL/L (ref 95–110)
CHOLEST SERPL-MCNC: 232 MG/DL (ref 120–199)
CHOLEST/HDLC SERPL: 4.1 {RATIO} (ref 2–5)
CO2 SERPL-SCNC: 25 MMOL/L (ref 23–29)
CREAT SERPL-MCNC: 1.1 MG/DL (ref 0.5–1.4)
DIFFERENTIAL METHOD: ABNORMAL
EOSINOPHIL # BLD AUTO: 0.2 K/UL (ref 0–0.5)
EOSINOPHIL NFR BLD: 2 % (ref 0–8)
ERYTHROCYTE [DISTWIDTH] IN BLOOD BY AUTOMATED COUNT: 14.3 % (ref 11.5–14.5)
EST. GFR  (NO RACE VARIABLE): 49.2 ML/MIN/1.73 M^2
GLUCOSE SERPL-MCNC: 89 MG/DL (ref 70–110)
HCT VFR BLD AUTO: 34.6 % (ref 37–48.5)
HDLC SERPL-MCNC: 57 MG/DL (ref 40–75)
HDLC SERPL: 24.6 % (ref 20–50)
HGB BLD-MCNC: 10.3 G/DL (ref 12–16)
IMM GRANULOCYTES # BLD AUTO: 0.02 K/UL (ref 0–0.04)
IMM GRANULOCYTES NFR BLD AUTO: 0.3 % (ref 0–0.5)
LDLC SERPL CALC-MCNC: 154.2 MG/DL (ref 63–159)
LYMPHOCYTES # BLD AUTO: 1.6 K/UL (ref 1–4.8)
LYMPHOCYTES NFR BLD: 21.3 % (ref 18–48)
MCH RBC QN AUTO: 28 PG (ref 27–31)
MCHC RBC AUTO-ENTMCNC: 29.8 G/DL (ref 32–36)
MCV RBC AUTO: 94 FL (ref 82–98)
MONOCYTES # BLD AUTO: 0.8 K/UL (ref 0.3–1)
MONOCYTES NFR BLD: 11 % (ref 4–15)
NEUTROPHILS # BLD AUTO: 4.8 K/UL (ref 1.8–7.7)
NEUTROPHILS NFR BLD: 64.2 % (ref 38–73)
NONHDLC SERPL-MCNC: 175 MG/DL
NRBC BLD-RTO: 0 /100 WBC
PLATELET # BLD AUTO: 294 K/UL (ref 150–450)
PMV BLD AUTO: 11 FL (ref 9.2–12.9)
POTASSIUM SERPL-SCNC: 4.5 MMOL/L (ref 3.5–5.1)
PROT SERPL-MCNC: 6.5 G/DL (ref 6–8.4)
RBC # BLD AUTO: 3.68 M/UL (ref 4–5.4)
SODIUM SERPL-SCNC: 143 MMOL/L (ref 136–145)
TRIGL SERPL-MCNC: 104 MG/DL (ref 30–150)
TSH SERPL DL<=0.005 MIU/L-ACNC: 2.37 UIU/ML (ref 0.4–4)
WBC # BLD AUTO: 7.45 K/UL (ref 3.9–12.7)

## 2023-11-06 PROCEDURE — 80053 COMPREHEN METABOLIC PANEL: CPT | Mod: HCNC | Performed by: HOSPITALIST

## 2023-11-06 PROCEDURE — 36415 COLL VENOUS BLD VENIPUNCTURE: CPT | Mod: HCNC,PO | Performed by: HOSPITALIST

## 2023-11-06 PROCEDURE — 85025 COMPLETE CBC W/AUTO DIFF WBC: CPT | Mod: HCNC | Performed by: HOSPITALIST

## 2023-11-06 PROCEDURE — 84443 ASSAY THYROID STIM HORMONE: CPT | Mod: HCNC | Performed by: HOSPITALIST

## 2023-11-06 PROCEDURE — 80061 LIPID PANEL: CPT | Mod: HCNC | Performed by: HOSPITALIST

## 2023-11-15 ENCOUNTER — OFFICE VISIT (OUTPATIENT)
Dept: INTERNAL MEDICINE | Facility: CLINIC | Age: 83
End: 2023-11-15
Payer: MEDICARE

## 2023-11-15 VITALS
RESPIRATION RATE: 15 BRPM | HEART RATE: 73 BPM | SYSTOLIC BLOOD PRESSURE: 112 MMHG | BODY MASS INDEX: 24.6 KG/M2 | TEMPERATURE: 97 F | OXYGEN SATURATION: 100 % | WEIGHT: 143.31 LBS | DIASTOLIC BLOOD PRESSURE: 60 MMHG

## 2023-11-15 DIAGNOSIS — Z00.00 ENCOUNTER FOR PREVENTIVE HEALTH EXAMINATION: Primary | ICD-10-CM

## 2023-11-15 DIAGNOSIS — E55.9 VITAMIN D DEFICIENCY: ICD-10-CM

## 2023-11-15 DIAGNOSIS — G47.33 OSA ON CPAP: ICD-10-CM

## 2023-11-15 DIAGNOSIS — G25.81 RLS (RESTLESS LEGS SYNDROME): ICD-10-CM

## 2023-11-15 DIAGNOSIS — I10 ESSENTIAL HYPERTENSION: ICD-10-CM

## 2023-11-15 DIAGNOSIS — Z23 NEED FOR VACCINATION: ICD-10-CM

## 2023-11-15 DIAGNOSIS — K21.9 GASTROESOPHAGEAL REFLUX DISEASE, UNSPECIFIED WHETHER ESOPHAGITIS PRESENT: ICD-10-CM

## 2023-11-15 DIAGNOSIS — E78.5 HYPERLIPIDEMIA, UNSPECIFIED HYPERLIPIDEMIA TYPE: ICD-10-CM

## 2023-11-15 DIAGNOSIS — N18.32 STAGE 3B CHRONIC KIDNEY DISEASE: ICD-10-CM

## 2023-11-15 DIAGNOSIS — D50.9 IRON DEFICIENCY ANEMIA, UNSPECIFIED IRON DEFICIENCY ANEMIA TYPE: ICD-10-CM

## 2023-11-15 PROCEDURE — 1159F PR MEDICATION LIST DOCUMENTED IN MEDICAL RECORD: ICD-10-PCS | Mod: HCNC,CPTII,S$GLB, | Performed by: HOSPITALIST

## 2023-11-15 PROCEDURE — 1160F RVW MEDS BY RX/DR IN RCRD: CPT | Mod: HCNC,CPTII,S$GLB, | Performed by: HOSPITALIST

## 2023-11-15 PROCEDURE — 1125F PR PAIN SEVERITY QUANTIFIED, PAIN PRESENT: ICD-10-PCS | Mod: HCNC,CPTII,S$GLB, | Performed by: HOSPITALIST

## 2023-11-15 PROCEDURE — G0008 FLU VACCINE - QUADRIVALENT - ADJUVANTED: ICD-10-PCS | Mod: HCNC,S$GLB,, | Performed by: HOSPITALIST

## 2023-11-15 PROCEDURE — 3078F PR MOST RECENT DIASTOLIC BLOOD PRESSURE < 80 MM HG: ICD-10-PCS | Mod: HCNC,CPTII,S$GLB, | Performed by: HOSPITALIST

## 2023-11-15 PROCEDURE — 1125F AMNT PAIN NOTED PAIN PRSNT: CPT | Mod: HCNC,CPTII,S$GLB, | Performed by: HOSPITALIST

## 2023-11-15 PROCEDURE — 90694 VACC AIIV4 NO PRSRV 0.5ML IM: CPT | Mod: HCNC,S$GLB,, | Performed by: HOSPITALIST

## 2023-11-15 PROCEDURE — 3074F PR MOST RECENT SYSTOLIC BLOOD PRESSURE < 130 MM HG: ICD-10-PCS | Mod: HCNC,CPTII,S$GLB, | Performed by: HOSPITALIST

## 2023-11-15 PROCEDURE — 99397 PER PM REEVAL EST PAT 65+ YR: CPT | Mod: HCNC,S$GLB,, | Performed by: HOSPITALIST

## 2023-11-15 PROCEDURE — 99397 PR PREVENTIVE VISIT,EST,65 & OVER: ICD-10-PCS | Mod: HCNC,S$GLB,, | Performed by: HOSPITALIST

## 2023-11-15 PROCEDURE — 3078F DIAST BP <80 MM HG: CPT | Mod: HCNC,CPTII,S$GLB, | Performed by: HOSPITALIST

## 2023-11-15 PROCEDURE — 99999 PR PBB SHADOW E&M-EST. PATIENT-LVL III: CPT | Mod: PBBFAC,HCNC,, | Performed by: HOSPITALIST

## 2023-11-15 PROCEDURE — G0008 ADMIN INFLUENZA VIRUS VAC: HCPCS | Mod: HCNC,S$GLB,, | Performed by: HOSPITALIST

## 2023-11-15 PROCEDURE — 3074F SYST BP LT 130 MM HG: CPT | Mod: HCNC,CPTII,S$GLB, | Performed by: HOSPITALIST

## 2023-11-15 PROCEDURE — 99999 PR PBB SHADOW E&M-EST. PATIENT-LVL III: ICD-10-PCS | Mod: PBBFAC,HCNC,, | Performed by: HOSPITALIST

## 2023-11-15 PROCEDURE — 90694 FLU VACCINE - QUADRIVALENT - ADJUVANTED: ICD-10-PCS | Mod: HCNC,S$GLB,, | Performed by: HOSPITALIST

## 2023-11-15 PROCEDURE — 1159F MED LIST DOCD IN RCRD: CPT | Mod: HCNC,CPTII,S$GLB, | Performed by: HOSPITALIST

## 2023-11-15 PROCEDURE — 1160F PR REVIEW ALL MEDS BY PRESCRIBER/CLIN PHARMACIST DOCUMENTED: ICD-10-PCS | Mod: HCNC,CPTII,S$GLB, | Performed by: HOSPITALIST

## 2023-11-15 NOTE — PROGRESS NOTES
Subjective:     @Patient ID: Tere Velasco is a 83 y.o. female.    Chief Complaint: Follow-up    HPI  83 y.o. female here for annual exam:        1. HTN: hctz 12.5 mg qday and losartan 50 mg qday  2. HLD: pravastatin 40 mg qday  3. Anxiety and depression: on wellbutrin 150 mg, cymbalta 20 mg. Reports her   2020. Since then has been lonely but has support from friends. Her 2 biological kids (daughter in [place], son in [facility]) and 2 stepchildren ([facility] and [place] that live out of state.  Has a dog that she loves. Reports stress lately from her daughter   4. Insomnia: on Lunesta to 2 mg qhs.   5. RLS:  on Horizant 600 mg qhs   6. GERD: on pepcid   7. TRU on cpap : reports has not used cpap in years as does not like the fit of the mask          Lipid disorders/ASCVD risk (ages >/= 45 or >/= 20 if increased risk ): ordered  Eye exam:       Vaccines:   Influenza (yearly)  due   Tetanus (every 10 yrs - 1st tdap) done  PPSV23(>64yo or <65 w/ lung dz, smoking, DM) done  PCV13 (> 65 or <65 w/ immunocompromised) done  Zoster (>61yo): utd    covid19: declines      Exercise: walking  Diet: reg       Review of Systems   Constitutional:  Positive for fatigue. Negative for chills and fever.   HENT:  Negative for congestion and sore throat.    Eyes:  Negative for pain and visual disturbance.   Respiratory:  Negative for cough and shortness of breath.    Cardiovascular:  Negative for chest pain and leg swelling.   Gastrointestinal:  Negative for abdominal pain, nausea and vomiting.   Endocrine: Negative for polydipsia and polyuria.   Genitourinary:  Negative for difficulty urinating and dysuria.   Musculoskeletal:  Negative for arthralgias and back pain.   Skin:  Negative for rash and wound.   Neurological:  Negative for dizziness, weakness and headaches.   Psychiatric/Behavioral:  Positive for sleep disturbance. Negative for agitation and confusion.      Past medical history, surgical history, and family  medical history reviewed and updated as appropriate.    Medications and allergies reviewed.     Objective:     Vitals:    11/15/23 1405   Pulse: 73   Resp: 15   Temp: 97 °F (36.1 °C)   TempSrc: Temporal   SpO2: 100%   Weight: 65 kg (143 lb 4.8 oz)     Body mass index is 24.6 kg/m².  Physical Exam  Vitals reviewed.   Constitutional:       General: She is not in acute distress.     Appearance: She is well-developed.   HENT:      Head: Normocephalic and atraumatic.      Right Ear: Tympanic membrane normal.      Mouth/Throat:      Mouth: Mucous membranes are moist.      Pharynx: No oropharyngeal exudate.   Eyes:      General:         Right eye: No discharge.         Left eye: No discharge.      Conjunctiva/sclera: Conjunctivae normal.   Cardiovascular:      Rate and Rhythm: Normal rate and regular rhythm.      Heart sounds: No murmur heard.     No friction rub.   Pulmonary:      Effort: Pulmonary effort is normal.      Breath sounds: Normal breath sounds.   Abdominal:      General: Bowel sounds are normal. There is no distension.      Palpations: Abdomen is soft.      Tenderness: There is no abdominal tenderness. There is no guarding.   Musculoskeletal:      Cervical back: Normal range of motion and neck supple.      Right lower leg: No edema.      Left lower leg: No edema.   Lymphadenopathy:      Cervical: No cervical adenopathy.   Skin:     General: Skin is warm and dry.   Neurological:      Mental Status: She is alert and oriented to person, place, and time.   Psychiatric:         Mood and Affect: Mood normal.         Behavior: Behavior normal.         Lab Results   Component Value Date    WBC 7.45 11/06/2023    HGB 10.3 (L) 11/06/2023    HCT 34.6 (L) 11/06/2023     11/06/2023    CHOL 232 (H) 11/06/2023    TRIG 104 11/06/2023    HDL 57 11/06/2023    ALT 10 11/06/2023    AST 16 11/06/2023     11/06/2023    K 4.5 11/06/2023     11/06/2023    CREATININE 1.1 11/06/2023    BUN 17 11/06/2023    CO2 25  11/06/2023    TSH 2.374 11/06/2023    INR 1.8 (A) 01/30/2014    HGBA1C 5.5 08/21/2014       Assessment:     1. Encounter for preventive health examination    2. Essential hypertension    3. Iron deficiency anemia, unspecified iron deficiency anemia type    4. Hyperlipidemia, unspecified hyperlipidemia type    5. Stage 3b chronic kidney disease    6. Need for vaccination    7. Vitamin D deficiency    8. TRU on CPAP    9. RLS (restless legs syndrome)      Plan:   Tere was seen today for follow-up.    Diagnoses and all orders for this visit:    Encounter for preventive health examination    Essential hypertension  - Stable. Continue home meds     -     Comprehensive Metabolic Panel; Future  -     CBC Auto Differential; Future  -     Lipid Panel; Future  -     TSH; Future  -     Urinalysis; Future  -     Iron and TIBC; Future  -     Ferritin; Future  -     Transferrin; Future  -     Vitamin D; Future    Hyperlipidemia, unspecified hyperlipidemia type  -     Comprehensive Metabolic Panel; Future  -     CBC Auto Differential; Future  -     Lipid Panel; Future  -     TSH; Future  -     Urinalysis; Future  -     Iron and TIBC; Future  -     Ferritin; Future  -     Transferrin; Future  -     Vitamin D; Future    Iron deficiency anemia, unspecified iron deficiency anemia type  - chronic. Continue to monitor   -     Comprehensive Metabolic Panel; Future  -     CBC Auto Differential; Future  -     Lipid Panel; Future  -     TSH; Future  -     Urinalysis; Future  -     Iron and TIBC; Future  -     Ferritin; Future  -     Transferrin; Future  -     Vitamin D; Future    Stage 3b chronic kidney disease  - Stable. Continue to monitor and f/u with nephro    -     Comprehensive Metabolic Panel; Future  -     CBC Auto Differential; Future  -     Lipid Panel; Future  -     TSH; Future  -     Urinalysis; Future  -     Iron and TIBC; Future  -     Ferritin; Future  -     Transferrin; Future  -     Vitamin D; Future    Need for  vaccination  -     Influenza - Quadrivalent (Adjuvanted)  -     Vitamin D; Future    Vitamin D deficiency  - Stable. Continue to monitor     -     Comprehensive Metabolic Panel; Future  -     CBC Auto Differential; Future  -     Lipid Panel; Future  -     TSH; Future  -     Urinalysis; Future  -     Iron and TIBC; Future  -     Ferritin; Future  -     Transferrin; Future  -     Vitamin D; Future    TRU on CPAP  - stable. Not currently on cpap     RLS (restless legs syndrome)  - Stable. Continue home meds     Gastroesophageal reflux disease, unspecified whether esophagitis present  - Stable. Continue home meds           No follow-ups on file.    Rosy Santos MD  Internal Medicine    11/15/2023

## 2023-11-16 ENCOUNTER — PATIENT MESSAGE (OUTPATIENT)
Dept: INTERNAL MEDICINE | Facility: CLINIC | Age: 83
End: 2023-11-16
Payer: MEDICARE

## 2023-11-16 NOTE — TELEPHONE ENCOUNTER
Pt is has not had her vitamin B12 level checked since 2018.  Please advise on supplementation.    It does not look like her fibromyalgia was discussed at her office visit.  Pt currently taking cymbalta 20 mg for her fibromyalgia.

## 2023-12-02 ENCOUNTER — NURSE TRIAGE (OUTPATIENT)
Dept: ADMINISTRATIVE | Facility: CLINIC | Age: 83
End: 2023-12-02
Payer: MEDICARE

## 2023-12-02 ENCOUNTER — HOSPITAL ENCOUNTER (EMERGENCY)
Facility: HOSPITAL | Age: 83
Discharge: HOME | End: 2023-12-02
Attending: EMERGENCY MEDICINE
Payer: MEDICARE

## 2023-12-02 VITALS
WEIGHT: 140 LBS | HEIGHT: 65 IN | BODY MASS INDEX: 23.32 KG/M2 | RESPIRATION RATE: 16 BRPM | OXYGEN SATURATION: 95 % | HEART RATE: 77 BPM | TEMPERATURE: 99 F | DIASTOLIC BLOOD PRESSURE: 71 MMHG | SYSTOLIC BLOOD PRESSURE: 135 MMHG

## 2023-12-02 DIAGNOSIS — R20.0 NUMBNESS OF RIGHT HAND: Primary | ICD-10-CM

## 2023-12-02 LAB
ALBUMIN SERPL BCP-MCNC: 3.6 G/DL (ref 3.5–5.2)
ALP SERPL-CCNC: 55 U/L (ref 55–135)
ALT SERPL W/O P-5'-P-CCNC: 9 U/L (ref 10–44)
ANION GAP SERPL CALC-SCNC: 10 MMOL/L (ref 8–16)
AST SERPL-CCNC: 15 U/L (ref 10–40)
BASOPHILS # BLD AUTO: 0.07 K/UL (ref 0–0.2)
BASOPHILS NFR BLD: 0.9 % (ref 0–1.9)
BILIRUB SERPL-MCNC: 0.4 MG/DL (ref 0.1–1)
BUN SERPL-MCNC: 17 MG/DL (ref 8–23)
CALCIUM SERPL-MCNC: 9.4 MG/DL (ref 8.7–10.5)
CHLORIDE SERPL-SCNC: 109 MMOL/L (ref 95–110)
CO2 SERPL-SCNC: 23 MMOL/L (ref 23–29)
CREAT SERPL-MCNC: 1.3 MG/DL (ref 0.5–1.4)
DIFFERENTIAL METHOD: ABNORMAL
EOSINOPHIL # BLD AUTO: 0.1 K/UL (ref 0–0.5)
EOSINOPHIL NFR BLD: 1.3 % (ref 0–8)
ERYTHROCYTE [DISTWIDTH] IN BLOOD BY AUTOMATED COUNT: 14.3 % (ref 11.5–14.5)
EST. GFR  (NO RACE VARIABLE): 40 ML/MIN/1.73 M^2
GLUCOSE SERPL-MCNC: 99 MG/DL (ref 70–110)
HCT VFR BLD AUTO: 34.9 % (ref 37–48.5)
HGB BLD-MCNC: 10.9 G/DL (ref 12–16)
IMM GRANULOCYTES # BLD AUTO: 0.03 K/UL (ref 0–0.04)
IMM GRANULOCYTES NFR BLD AUTO: 0.4 % (ref 0–0.5)
LYMPHOCYTES # BLD AUTO: 1.5 K/UL (ref 1–4.8)
LYMPHOCYTES NFR BLD: 20.2 % (ref 18–48)
MCH RBC QN AUTO: 27.7 PG (ref 27–31)
MCHC RBC AUTO-ENTMCNC: 31.2 G/DL (ref 32–36)
MCV RBC AUTO: 89 FL (ref 82–98)
MONOCYTES # BLD AUTO: 0.7 K/UL (ref 0.3–1)
MONOCYTES NFR BLD: 9.5 % (ref 4–15)
NEUTROPHILS # BLD AUTO: 5 K/UL (ref 1.8–7.7)
NEUTROPHILS NFR BLD: 67.7 % (ref 38–73)
NRBC BLD-RTO: 0 /100 WBC
PLATELET # BLD AUTO: 265 K/UL (ref 150–450)
PMV BLD AUTO: 10.1 FL (ref 9.2–12.9)
POTASSIUM SERPL-SCNC: 3.8 MMOL/L (ref 3.5–5.1)
PROT SERPL-MCNC: 7.2 G/DL (ref 6–8.4)
RBC # BLD AUTO: 3.93 M/UL (ref 4–5.4)
SODIUM SERPL-SCNC: 142 MMOL/L (ref 136–145)
WBC # BLD AUTO: 7.46 K/UL (ref 3.9–12.7)

## 2023-12-02 PROCEDURE — 80053 COMPREHEN METABOLIC PANEL: CPT | Mod: HCNC | Performed by: EMERGENCY MEDICINE

## 2023-12-02 PROCEDURE — 85025 COMPLETE CBC W/AUTO DIFF WBC: CPT | Mod: HCNC | Performed by: EMERGENCY MEDICINE

## 2023-12-02 PROCEDURE — 99284 EMERGENCY DEPT VISIT MOD MDM: CPT | Mod: 25,HCNC

## 2023-12-02 NOTE — DISCHARGE INSTRUCTIONS
We know that you have many choices and are honored that you chose us. We hope that we met or exceeded your expectations and goals for this visit and will keep the [facility] in mind for your future needs and those of your family and friends.     - Dr. Street

## 2023-12-02 NOTE — ED PROVIDER NOTES
[facility]  Emergency Department Provider Note    Tere Velasco   83 y.o. female   [id number]      12/2/2023       History     This history was obtained from the patient without limitations.    She is a an 83-year-old with the below past medical history who presents by personal transportation with her friend.  She had an episode of right hand numbness this morning that occurred while she was lying in bed.  This occurred at around 10:30 and lasted for approximately 30 minutes.  She denies loss of motor function.  She denies headache, lightheadedness, dizziness, chest pain, palpitations, shortness of breath, nausea, and vomiting.  She denies history of stroke and has never had similar episodes of numbness.      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)    Cataract     removed from both eyes    CKD (chronic kidney disease) stage 3, GFR 30-59 ml/min     Clotting disorder     when knee was replaced    Colitis     Degenerative disc disease     Depression     Fibromyalgia     GERD (gastroesophageal reflux disease)     Hyperlipidemia     Hypertension     Irritable bowel syndrome     TRU (obstructive sleep apnea)     RLS (restless legs syndrome)     S/P knee replacement 1/13/2014      Past Surgical History:   Procedure Laterality Date    APPENDECTOMY      BREAST BIOPSY      BREAST LUMPECTOMY      right side    BREAST SURGERY      CHOLECYSTECTOMY      COLONOSCOPY N/A 12/2/2016    Procedure: COLONOSCOPY;  Surgeon: Ori Cope MD;  Location: [address]);  Service: Endoscopy;  Laterality: N/A;  PM prep    EYE SURGERY Bilateral     cataract    HYSTERECTOMY  1977    uterine pain    INJECTION OF FACET JOINT Bilateral 4/27/2021    Procedure: Facet joint injection-lumbar--Bilateral L4-5, L5-S1;  Surgeon: Kuldip Siddiqui Jr., MD;  Location: [facility];  Service: Pain Management;  Laterality: Bilateral;     JOINT REPLACEMENT      knees replaced bilaterally    KNEE SURGERY Bilateral     RECTAL SURGERY      rectocele    TONSILLECTOMY        Family History   Problem Relation Age of Onset    Dementia Mother         age 92    Heart disease Father         age 58    Restless legs syndrome Daughter     Thyroid disease Daughter     Hypertension Son     Alcohol abuse Son     Ovarian cancer Paternal Grandmother     Breast cancer Paternal Grandmother     Cancer Paternal Grandmother         ovarian    Ovarian cancer Cousin     Cancer Cousin 40        colon ca    Breast cancer Paternal Aunt     Skin cancer Neg Hx     Melanoma Neg Hx     Colon cancer Neg Hx     Esophageal cancer Neg Hx     Stomach cancer Neg Hx     Irritable bowel syndrome Neg Hx     Crohn's disease Neg Hx     Ulcerative colitis Neg Hx     Celiac disease Neg Hx       Social History     Socioeconomic History    Marital status:    Tobacco Use    Smoking status: Never     Passive exposure: Never    Smokeless tobacco: Never   Substance and Sexual Activity    Alcohol use: Not Currently    Drug use: No    Sexual activity: Not Currently     Partners: Male     Birth control/protection: Post-menopausal     Social Determinants of Health     Financial Resource Strain: Low Risk  (1/19/2023)    Overall Financial Resource Strain (CARDIA)     Difficulty of Paying Living Expenses: Not very hard   Food Insecurity: No Food Insecurity (4/26/2021)    Hunger Vital Sign     Worried About Running Out of Food in the Last Year: Never true     Ran Out of Food in the Last Year: Never true   Transportation Needs: No Transportation Needs (1/19/2023)    PRAPARE - Transportation     Lack of Transportation (Medical): No     Lack of Transportation (Non-Medical): No   Physical Activity: Inactive (4/26/2021)    Exercise Vital Sign     Days of Exercise per Week: 0 days     Minutes of Exercise per Session: 0 min   Stress: Stress Concern Present (1/19/2023)    [employer] [place] [employer] - Occupational Stress Questionnaire     Feeling of Stress : Rather much   Social Connections: Unknown (1/19/2023)    Social Connection and Isolation Panel [NHANES]     Marital Status:    Housing Stability: Unknown (1/19/2023)    Housing Stability Vital Sign     Unable to Pay for Housing in the Last Year: No     Unstable Housing in the Last Year: No      Review of patient's allergies indicates:   Allergen Reactions    Demerol [meperidine]            Physical Examination     Initial Vitals [12/02/23 1219]   BP Pulse Resp Temp SpO2   (!) 146/73 84 16 98.7 °F (37.1 °C) 95 %      MAP       --           Physical Exam    Nursing note and vitals reviewed.  Constitutional: She is not diaphoretic. No distress.   HENT:   Head: Normocephalic and atraumatic.   Eyes: Pupils are equal, round, and reactive to light.   Cardiovascular:  Normal rate, regular rhythm and normal heart sounds.           No murmur heard.  Pulmonary/Chest: No respiratory distress. She has no wheezes. She has no rhonchi.     Neurological: She is alert and oriented to person, place, and time. GCS score is 15. GCS eye subscore is 4. GCS verbal subscore is 5. GCS motor subscore is 6.   5/5 bilateral upper extremity strength in normal 2-point touch discrimination.            Labs     Labs Reviewed   CBC W/ AUTO DIFFERENTIAL - Abnormal; Notable for the following components:       Result Value    RBC 3.93 (*)     Hemoglobin 10.9 (*)     Hematocrit 34.9 (*)     MCHC 31.2 (*)     All other components within normal limits   COMPREHENSIVE METABOLIC PANEL - Abnormal; Notable for the following components:    ALT 9 (*)     eGFR 40 (*)     All other components within normal limits        Imaging     Imaging Results              CT Head Without Contrast (Final result)  Result time 12/02/23 13:32:47      Final result by Moshe Bright MD (12/02/23 13:32:47)                   Impression:      No acute large vascular territory infarct or intracranial  hemorrhage identified.      Electronically signed by: Moshe Bright MD  Date:    12/02/2023  Time:    13:32               Narrative:    EXAMINATION:  CT HEAD WITHOUT CONTRAST    CLINICAL HISTORY:  Neuro deficit, acute, stroke suspected;    TECHNIQUE:  Low dose axial CT images obtained throughout the head without intravenous contrast. Sagittal and coronal reconstructions were performed.    COMPARISON:  Head CT 06/03/2023    FINDINGS:  Intracranial compartment:    Age-related generalized cerebral volume loss.  Ventricles are midline and stable in size and configuration without distortion by mass effect or acute hydrocephalus.  No extra-axial blood or fluid collections.  Chronic nonspecific partial empty sella configuration    Grossly stable distribution of patchy hypoattenuation of the supratentorial white matter consistent with chronic microvascular ischemic change.  Left cerebellar small remote infarct versus prominent sulcus unchanged.  No definite new focal areas of abnormal parenchymal attenuation.  No parenchymal mass, hemorrhage, edema or major vascular distribution infarct.    Skull/extracranial contents (limited evaluation): No fracture. Mastoid air cells and paranasal sinuses are essentially clear.                                        ED Course     The patient received the following medications:  NA              Medical Decision Making     Medical Decision Making  Carpel tunnel syndrome, other peripheral neuropathy, TIA, CVA  Evaluated with labs (CBC, CMP) and head CT.  No concerning acute lab or CT abnormalities.   No recurrence of symptom while in the ED.  Discussed admission v discharge with the patient. Explained that symptom could be warning sign of impending stroke. She does not want to be admitted. Gave strict return instructions (HA, dizziness, confusion, difficulty speaking, weakness or numbness to any part of her body).    Amount and/or Complexity of Data Reviewed  Labs: ordered.  Radiology:  ordered.         Diagnoses       ICD-10-CM ICD-9-CM   1. Numbness of right hand  R20.0 782.0         Dispostion      ED Disposition Condition    Discharge Stable            Follow-up Information       Follow up With Specialties Details Why Contact Info    Rosy Santos MD Internal Medicine  Schedule a close ER follow-up visit. [address] [facility]  [place] [address]  [phone]      ER   Return to the ER if you develop severe HA, dizziness, confusion, difficulty speaking, weakness or numbness to any part of your body or for any other concerns that you feel need immediate attention.               Arden Street III, MD  12/02/23 7113

## 2023-12-02 NOTE — ED TRIAGE NOTES
Pt juana ED 1 from home with c/o numbness to right extremity which has at this time resolved. Pt states numbness started this am. Pt resting on stretcher; friend at bedside. Pt denies pain at this time. AAOX4. NADN. Vitals WNL. Respirations even and non labored. -pt placed in a gown and on the cardiac monitor, CT has been notified pt readiness for scan. Skin warm, dry, and intact. Will continue to monitor.

## 2023-12-02 NOTE — TELEPHONE ENCOUNTER
Reason for Disposition   [1] Numbness (i.e., loss of sensation) of the face, arm / hand, or leg / foot on one side of the body AND [2] sudden onset AND [3] brief (now gone)    Additional Information   Negative: [1] SEVERE weakness (i.e., unable to walk or barely able to walk, requires support) AND [2] new-onset or worsening   Negative: [1] Weakness (i.e., paralysis, loss of muscle strength) of the face, arm / hand, or leg / foot on one side of the body AND [2] sudden onset AND [3] present now  (Exception: Bell's palsy suspected [i.e., weakness only on one side of the face, developing over hours to days, no other symptoms].)   Negative: [1] Numbness (i.e., loss of sensation) of the face, arm / hand, or leg / foot on one side of the body AND [2] sudden onset AND [3] present now   Negative: [1] Loss of speech or garbled speech AND [2] sudden onset AND [3] present now   Negative: Difficult to awaken or acting confused (e.g., disoriented, slurred speech)   Negative: Sounds like a life-threatening emergency to the triager   Negative: Vision loss or change is main symptom   Negative: Followed a head injury within last 3 days   Negative: Followed a neck injury within last 3 days   Negative: Headache  (and neurologic deficit)   Negative: [1] Back pain AND [2] numbness (loss of sensation) in groin or rectal area   Negative: [1] Unable to urinate (or only a few drops) > 4 hours AND [2] bladder feels very full (e.g., palpable bladder or strong urge to urinate)   Negative: [1] Loss of bladder or bowel control (urine or bowel incontinence; wetting self, leaking stool) AND [2] new-onset   Negative: [1] Weakness (i.e., paralysis, loss of muscle strength) of the face, arm / hand, or leg / foot on one side of the body AND [2] sudden onset AND [3] brief (now gone)    Protocols used: Neurologic Deficit-A-AH  Pt states she has new onset of numbness in her right hand and arm today. She states the numbness has left but she's worried. Pt  advised per Triage protocol to have someone bring her to the ED for evaluation. She verbalized understanding.

## 2023-12-12 ENCOUNTER — TELEPHONE (OUTPATIENT)
Dept: INTERNAL MEDICINE | Facility: CLINIC | Age: 83
End: 2023-12-12
Payer: MEDICARE

## 2023-12-12 NOTE — TELEPHONE ENCOUNTER
The patient is requesting a referral;    Reason; Patient indicates she has been having problems with her fibromyalgia.    Please advise

## 2023-12-12 NOTE — TELEPHONE ENCOUNTER
----- Message from Elmira Jones sent at 12/12/2023 12:40 PM CST -----  Type: General Call Back     Name of Caller:MARTINEZ BOYD [[id number]]  Symptoms:no energy, exhausted  Would the patient rather a call back or a response via [facility]? Call back   Best Call Back Number: [phone]  Additional Information: PT indicates she has been having problems with her fibromyalgia. Pt indicates if possible can she get referred to a specialist. Pt indicates she would like any Provider accepting her insurance. Please call back with further assistance and more information.

## 2024-02-09 ENCOUNTER — TELEPHONE (OUTPATIENT)
Dept: ADMINISTRATIVE | Facility: CLINIC | Age: 84
End: 2024-02-09
Payer: MEDICARE

## 2024-02-09 NOTE — TELEPHONE ENCOUNTER
Called pt; no answer; could not confirm appt or leave message due to line kept ringing; I was calling to confirm pt's in office [facility] appt on 2/12/24.

## 2024-03-15 ENCOUNTER — HOSPITAL ENCOUNTER (EMERGENCY)
Facility: HOSPITAL | Age: 84
Discharge: HOME | End: 2024-03-15
Attending: STUDENT IN AN ORGANIZED HEALTH CARE EDUCATION/TRAINING PROGRAM
Payer: MEDICARE

## 2024-03-15 VITALS
HEIGHT: 66 IN | RESPIRATION RATE: 18 BRPM | SYSTOLIC BLOOD PRESSURE: 136 MMHG | OXYGEN SATURATION: 96 % | HEART RATE: 79 BPM | WEIGHT: 144 LBS | TEMPERATURE: 99 F | BODY MASS INDEX: 23.14 KG/M2 | DIASTOLIC BLOOD PRESSURE: 63 MMHG

## 2024-03-15 DIAGNOSIS — S09.90XA INJURY OF HEAD, INITIAL ENCOUNTER: ICD-10-CM
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
## 2024-03-15 PROCEDURE — 99284 EMERGENCY DEPT VISIT MOD MDM: CPT | Mod: 25,HCNC

## 2024-03-15 NOTE — ED NOTES
Introduced self to pt. Pt presents in the ED from a fall. She states when she was walking to the restroom she fell and hit the back of her head. Assessment done to head. Nothing noted. Pt states she wanted to come and get checked out b/c she had an aunt that fell didn't get checked out and  the same night. Pt denies any pain. Monitor in place. VSS.

## 2024-03-15 NOTE — ED PROVIDER NOTES
Encounter Date: 3/15/2024       History     Chief Complaint   Patient presents with    Fall     States she fell and hit back of head.  Denies any LOC.  Pt states she is not on blood thinners.       84 y.o.female who has a past medical history of Allergy, Anemia, Anxiety, Arthritis, Back pain, Breast disorder, Cataract, CKD (chronic kidney disease) stage 3, GFR 30-59 ml/min, Clotting disorder, Colitis, Degenerative disc disease, Depression, Fibromyalgia, GERD (gastroesophageal reflux disease), Hyperlipidemia, Hypertension, Irritable bowel syndrome, TRU (obstructive sleep apnea), RLS (restless legs syndrome), and S/P knee replacement presents emergency department after mechanical fall that led to injury to the back of her head.  Patient reports she was getting up to go to the bathroom where she slipped and fell.  She reports hitting her head the ground.  Denies any loss of consciousness.  Denies any blood thinner use.  Denies any pain at time.  She reports a family member recently had a similar fall and did not get medical care and ended up dying so she was concerned and came to the emergency department for evaluation.  Currently denies any visual disturbances headache numbness or tingling.    The history is provided by the patient.     Review of patient's allergies indicates:   Allergen Reactions    Demerol [meperidine]      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)    Cataract     removed from both eyes    CKD (chronic kidney disease) stage 3, GFR 30-59 ml/min     Clotting disorder     when knee was replaced    Colitis     Degenerative disc disease     Depression     Fibromyalgia     GERD (gastroesophageal reflux disease)     Hyperlipidemia     Hypertension     Irritable bowel syndrome     TRU (obstructive sleep apnea)     RLS (restless legs syndrome)     S/P knee replacement 1/13/2014     Past Surgical  History:   Procedure Laterality Date    APPENDECTOMY      BREAST BIOPSY      BREAST LUMPECTOMY      right side    BREAST SURGERY      CHOLECYSTECTOMY      COLONOSCOPY N/A 12/2/2016    Procedure: COLONOSCOPY;  Surgeon: Ori Cope MD;  Location: [address];  Service: Endoscopy;  Laterality: N/A;  PM prep    EYE SURGERY Bilateral     cataract    HYSTERECTOMY  1977    uterine pain    INJECTION OF FACET JOINT Bilateral 4/27/2021    Procedure: Facet joint injection-lumbar--Bilateral L4-5, L5-S1;  Surgeon: Kuldip Siddiqui Jr., MD;  Location: [facility];  Service: Pain Management;  Laterality: Bilateral;    JOINT REPLACEMENT      knees replaced bilaterally    KNEE SURGERY Bilateral     RECTAL SURGERY      rectocele    TONSILLECTOMY       Family History   Problem Relation Age of Onset    Dementia Mother         age 92    Heart disease Father         age 58    Restless legs syndrome Daughter     Thyroid disease Daughter     Hypertension Son     Alcohol abuse Son     Ovarian cancer Paternal Grandmother     Breast cancer Paternal Grandmother     Cancer Paternal Grandmother         ovarian    Ovarian cancer Cousin     Cancer Cousin 40        colon ca    Breast cancer Paternal Aunt     Skin cancer Neg Hx     Melanoma Neg Hx     Colon cancer Neg Hx     Esophageal cancer Neg Hx     Stomach cancer Neg Hx     Irritable bowel syndrome Neg Hx     Crohn's disease Neg Hx     Ulcerative colitis Neg Hx     Celiac disease Neg Hx      Social History     Tobacco Use    Smoking status: Never     Passive exposure: Never    Smokeless tobacco: Never   Substance Use Topics    Alcohol use: Not Currently    Drug use: No     Review of Systems   Constitutional:  Negative for chills and fever.   HENT:  Negative for congestion and rhinorrhea.    Eyes:  Negative for pain.   Respiratory:  Negative for cough and shortness of breath.    Cardiovascular:  Negative for chest pain and leg swelling.   Gastrointestinal:  Negative for abdominal pain,  nausea and vomiting.   Genitourinary:  Negative for dysuria and hematuria.   Musculoskeletal:  Negative for joint swelling and neck pain.   Skin:  Negative for rash.   Neurological:  Negative for weakness and headaches.       Physical Exam     Initial Vitals   BP Pulse Resp Temp SpO2   03/15/24 0247 03/15/24 0247 03/15/24 0247 03/15/24 0247 03/15/24 0303   129/62 87 18 98.2 °F (36.8 °C) 96 %      MAP       --                Physical Exam    Nursing note and vitals reviewed.  Constitutional: She is not diaphoretic. No distress.   HENT:   Head: Normocephalic and atraumatic.   Eyes: Conjunctivae and EOM are normal. Pupils are equal, round, and reactive to light.   Neck:   Normal range of motion.  Pulmonary/Chest: Breath sounds normal. No respiratory distress.   Abdominal: Abdomen is soft. Bowel sounds are normal. She exhibits no distension. There is no abdominal tenderness.   Musculoskeletal:         General: No tenderness. Normal range of motion.      Cervical back: Normal range of motion.     Neurological: She is alert.   Moves all extremities and carries on conversation. CN- II: PERRL; III/IV/VI: EOMI w/out evidence of nystagmus; V: no deficits appreciated to light touch bilateral face; VII: no facial weakness, no facial asymmetry. Eyebrow raise symmetric. Smile symmetric; IX/X: palate midline, and raises symmetrically; XI: shoulder shrug 5/5 bilaterally; XII: tongue is midline w/out asymmetry. Strength 5/5 to bilateral upper and lower extremities, sensation intact to light touch   Skin: Skin is warm. Capillary refill takes less than 2 seconds.   Psychiatric: Her behavior is normal.         ED Course   Procedures  Labs Reviewed - No data to display       Imaging Results              CT Cervical Spine Without Contrast (In process)                      CT Head Without Contrast (In process)                      Medications - No data to display  Medical Decision Making:   Initial Assessment:   84 y.o.female who has a  past medical history of Allergy, Anemia, Anxiety, Arthritis, Back pain, Breast disorder, Cataract, CKD (chronic kidney disease) stage 3, GFR 30-59 ml/min, Clotting disorder, Colitis, Degenerative disc disease, Depression, Fibromyalgia, GERD (gastroesophageal reflux disease), Hyperlipidemia, Hypertension, Irritable bowel syndrome, TRU (obstructive sleep apnea), RLS (restless legs syndrome), and S/P knee replacement presents emergency department after mechanical fall that led to injury to the back of her head.  Patient in no acute distress, no focal neurological deficits on exam GCS 15.  Given age will obtain CT head to assess for any acute intracranial abnormalities.  Furthermore given mechanism will obtain CT cervical spine to assess for any acute osseous abnormalities.  Patient currently asymptomatic which is reassuring.  Given fall was mechanical no indication for lab work or further  imaging   Differential Diagnosis:   Differential Diagnosis includes, but is not limited to:  Polytrauma, fall/syncope, traumatic SAH/intracranial bleed, skull/c-spine/facial fracture, concussion  Clinical Tests:   Radiological Study: Ordered and Reviewed             ED Course as of 03/15/24 0449   Fri Mar 15, 2024   0439 CT imaging on [facility]Rad acute intra cranial or cervical spine abnormalities.  Degenerative changes of the cervical spine noted. [AS]   0449 Pt is currently stable for discharge. I see no indication of an emergent process beyond that addressed during our encounter but have duly counseled the patient/family regarding the need for prompt follow-up as well as the indications that should prompt immediate return to the emergency room should new or worrisome developments occur. I discussed the ED work up and diagnostic findings with the patient/family. The patient/family has been provided with verbal and printed direction regarding our final diagnosis(es) as well as instructions regarding use of OTC and/or Rx medications  intended to manage the patient's aforementioned conditions. The patient/family verbalized an understanding. The patient/family is asked if there are any questions or concerns. We discuss the case, until all issues are addressed to the patient/family's satisfaction. Patient/family understands and is agreeable to the plan.    [AS]      ED Course User Index  [AS] Michelle Beauchamp MD          Medical Decision Making  Amount and/or Complexity of Data Reviewed  Radiology: ordered.           Clinical Impression:   Final diagnoses:  [W19.XXXA] Fall, initial encounter (Primary)  [S09.90XA] Injury of head, initial encounter          ED Disposition Condition    Discharge Stable          ED Prescriptions    None       Follow-up Information       Follow up With Specialties Details Why Contact Info    Rosy Santos MD Internal Medicine Schedule an appointment as soon as possible for a visit  for reassesment [address] [facility] [address]  [phone]      [facility] Emergency Dept Emergency Medicine  If symptoms worsen, As needed [address] [facility] [address] [phone]            DISCLAIMER: This note was prepared with [employer] voice recognition transcription software. Garbled syntax, mangled pronouns, and other bizarre constructions may be attributed to that software system.     Michelle Beauchamp MD  03/15/24 0472

## 2024-03-15 NOTE — DISCHARGE INSTRUCTIONS
Thank you for coming to our Emergency Department today. It is important to remember that some problems are difficult to diagnose and may not be found during your first visit. Be sure to follow up with your primary care doctor and review any labs/imaging that was performed with them. If you do not have a primary care doctor, you may contact the one listed on your discharge paperwork or you may also call the [facility] Appointment Desk at [phone] to schedule an appointment with one.     All medications may potentially have side effects and it is impossible to predict which medications may give you side effects. If you feel that you are having a negative effect of any medication you should immediately stop taking them and seek medical attention.    Return to the ER with any questions/concerns, new/concerning symptoms, worsening or failure to improve. Do not drive or make any important decisions for 24 hours if you have received any pain medications, sedatives or mood altering drugs during your ER visit.

## 2024-03-18 ENCOUNTER — PATIENT OUTREACH (OUTPATIENT)
Dept: EMERGENCY MEDICINE | Facility: HOSPITAL | Age: 84
End: 2024-03-18
Payer: MEDICARE

## 2024-03-20 ENCOUNTER — TELEPHONE (OUTPATIENT)
Dept: ADMINISTRATIVE | Facility: CLINIC | Age: 84
End: 2024-03-20
Payer: MEDICARE

## 2024-03-28 ENCOUNTER — HOSPITAL ENCOUNTER (EMERGENCY)
Facility: HOSPITAL | Age: 84
Discharge: HOME | End: 2024-03-28
Attending: EMERGENCY MEDICINE
Payer: MEDICARE

## 2024-03-28 VITALS
TEMPERATURE: 99 F | OXYGEN SATURATION: 99 % | HEART RATE: 74 BPM | DIASTOLIC BLOOD PRESSURE: 71 MMHG | RESPIRATION RATE: 18 BRPM | SYSTOLIC BLOOD PRESSURE: 132 MMHG

## 2024-03-28 DIAGNOSIS — S00.03XA CONTUSION OF SCALP, INITIAL ENCOUNTER: ICD-10-CM
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
## 2024-03-28 PROCEDURE — 99284 EMERGENCY DEPT VISIT MOD MDM: CPT | Mod: 25,HCNC

## 2024-03-28 NOTE — DISCHARGE INSTRUCTIONS

## 2024-03-28 NOTE — ED NOTES
Psych: No acute distress is noted. Pt is calm and cooperative, good eye contact.    HEENT: c/o mild HA, s/p fall. No dizziness, no blurred vision    LOC: The patient is awake, alert and aware of environment with an appropriate affect.    APPEARANCE: Patient is clean and well groomed    SKIN: The skin is warm, dry and intact. Patient has normal skin turgor and moist mucus membranes, no rashes or lesions. No Breakdown noted. No bruising or laceration noted.     MUSCULOSKELETAL:  Normal range of motion noted. Moves all extremities well, No swelling, deformity or tenderness noted. FROM    RESPIRATORY: Airway is open and patent, respirations are spontaneous; patient has a normal effort and rate. Pink nailbeds.     CARDIAC: Patient has a normal rate. Skin warm and dry.     GI/ : Soft and non tender to palpation, no distention noted. No nausea, no vomiting    PULSES: 2+  And symmetrical in all extremities    NEUROLOGIC:   Follows commands without difficulty. Speech is clear. No neuro deficits observed.

## 2024-03-28 NOTE — ED PROVIDER NOTES
"Encounter Date: 3/28/2024       History     Chief Complaint   Patient presents with    Fall     Pt states she " lost her footing" and fell and hit back of head, fell in grass, no laceration noted, denies use of blood thinners      84-year-old female presents to ED after fall that occurred this morning.  Patient reports she tripped while walking in grass, causing her to fall backwards and contused posterior scalp.  She also reports contusing right hip but denies any pain to right hip at this time.  No LOC.  Denying use of blood thinners.  Denying any associated lightheadedness or dizziness, visual changes, chest pain shortness of breath.  She was able to get back to standing position and has ambulated with no reported pain symptoms.  No numbness or focal weakness.  Denying headache.  No other acute complaints at this time.    The history is provided by the patient.     Review of patient's allergies indicates:   Allergen Reactions    Demerol [meperidine]      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)    Cataract     removed from both eyes    CKD (chronic kidney disease) stage 3, GFR 30-59 ml/min     Clotting disorder     when knee was replaced    Colitis     Degenerative disc disease     Depression     Fibromyalgia     GERD (gastroesophageal reflux disease)     Hyperlipidemia     Hypertension     Irritable bowel syndrome     TRU (obstructive sleep apnea)     RLS (restless legs syndrome)     S/P knee replacement 1/13/2014     Past Surgical History:   Procedure Laterality Date    APPENDECTOMY      BREAST BIOPSY      BREAST LUMPECTOMY      right side    BREAST SURGERY      CHOLECYSTECTOMY      COLONOSCOPY N/A 12/2/2016    Procedure: COLONOSCOPY;  Surgeon: Ori Cope MD;  Location: [address]);  Service: Endoscopy;  Laterality: N/A;  PM prep    EYE SURGERY Bilateral     cataract    HYSTERECTOMY  1977    " uterine pain    INJECTION OF FACET JOINT Bilateral 4/27/2021    Procedure: Facet joint injection-lumbar--Bilateral L4-5, L5-S1;  Surgeon: Kuldip Siddiqui Jr., MD;  Location: [facility];  Service: Pain Management;  Laterality: Bilateral;    JOINT REPLACEMENT      knees replaced bilaterally    KNEE SURGERY Bilateral     RECTAL SURGERY      rectocele    TONSILLECTOMY       Family History   Problem Relation Age of Onset    Dementia Mother         age 92    Heart disease Father         age 58    Restless legs syndrome Daughter     Thyroid disease Daughter     Hypertension Son     Alcohol abuse Son     Ovarian cancer Paternal Grandmother     Breast cancer Paternal Grandmother     Cancer Paternal Grandmother         ovarian    Ovarian cancer Cousin     Cancer Cousin 40        colon ca    Breast cancer Paternal Aunt     Skin cancer Neg Hx     Melanoma Neg Hx     Colon cancer Neg Hx     Esophageal cancer Neg Hx     Stomach cancer Neg Hx     Irritable bowel syndrome Neg Hx     Crohn's disease Neg Hx     Ulcerative colitis Neg Hx     Celiac disease Neg Hx      Social History     Tobacco Use    Smoking status: Never     Passive exposure: Never    Smokeless tobacco: Never   Substance Use Topics    Alcohol use: Not Currently    Drug use: No     Review of Systems   Eyes:  Negative for visual disturbance.   Skin:  Negative for wound.   Neurological:  Negative for dizziness, syncope, weakness, light-headedness, numbness and headaches.       Physical Exam     Initial Vitals [03/28/24 0829]   BP Pulse Resp Temp SpO2   (!) 150/61 65 18 98.5 °F (36.9 °C) 98 %      MAP       --         Physical Exam    Vitals reviewed.  Constitutional: She appears well-developed and well-nourished. She is active. She does not have a sickly appearance. She does not appear ill. No distress.   HENT:   Head: Normocephalic.   No visible signs of head trauma   Neck:   Normal range of motion.  Musculoskeletal:      Cervical back: Normal range of motion. No  spinous process tenderness.      Comments: Denying tenderness at this time to right hip.  Full range motion with no pain from internal/external rotation.  BLE sensations intact.  Ambulatory in ED with stable gait.     Neurological: She is alert. GCS eye subscore is 4. GCS verbal subscore is 5. GCS motor subscore is 6.   Skin: Skin is warm and dry.   Psychiatric: She has a normal mood and affect. Her speech is normal and behavior is normal.         ED Course   Procedures  Labs Reviewed - No data to display       Imaging Results              CT Cervical Spine Without Contrast (Final result)  Result time 03/28/24 11:51:28      Final result by Mert Chaney DO (03/28/24 11:51:28)                   Impression:      No acute fracture or subluxation of the cervical spine.    Multilevel degenerative changes, similar to prior.      Electronically signed by: Mert Chaney  Date:    03/28/2024  Time:    11:51               Narrative:    EXAMINATION:  CT CERVICAL SPINE WITHOUT CONTRAST    CLINICAL HISTORY:  Neck trauma (Age >= 65y);    TECHNIQUE:  Low dose axial images, sagittal and coronal reformations were performed though the cervical spine without intravenous contrast.    COMPARISON:  CT cervical spine from 03/15/2024.    FINDINGS:  Alignment: There is grade 1 anterolisthesis of C2 on C3 and C3 on C4, and C7 on T1.  There is mild retrolisthesis of C4 on C5, C5 on C6, and C6 on C7.    Vertebra: There is no acute fracture or subluxation of the cervical spine.  The vertebral body heights are maintained.  There is ankylosis of the C4-C5 vertebral bodies.    Discs: There is obliteration of the disc space at C4-C5.  There is severe disc height loss at C5-C6, C6-C7 and C7-T1.    Degenerative changes: There are multilevel degenerative changes of the cervical spine, similar to prior.    Miscellaneous: The soft tissues of the neck are unremarkable.  The visualized lung apices are clear.                                       CT  Head Without Contrast (Final result)  Result time 03/28/24 11:49:06      Final result by Mert Chaney DO (03/28/24 11:49:06)                   Impression:      No acute intracranial abnormality.      Electronically signed by: Mert Chaney  Date:    03/28/2024  Time:    11:49               Narrative:    EXAMINATION:  CT HEAD WITHOUT CONTRAST    CLINICAL HISTORY:  Head trauma, minor (Age >= 65y);    TECHNIQUE:  Low dose axial CT images obtained throughout the head without intravenous contrast. Sagittal and coronal reconstructions were performed.    COMPARISON:  CT head from 03/15/2024.    FINDINGS:  Ventricles and sulci are normal in size for age without evidence of hydrocephalus. No extra-axial blood or fluid collections.  There are chronic microvascular ischemic changes, stable.  No parenchymal mass, hemorrhage, edema or major vascular distribution infarct.    No calvarial fracture.  The scalp is unremarkable.  Bilateral paranasal sinuses and mastoid air cells are clear.  There are bilateral prosthetic lenses.                                       Medications - No data to display  Medical Decision Making  Patient presents to ED after mechanical fall with head injury that occurred this morning.  Afebrile.  Patient overall well-appearing on exam.  No visible signs of trauma.    DDx:  Including but not limited to Scalp contusion, concussion, laceration, skull fracture,diffuse axonal injury, countercoup injury, intracranial hemorrhage such as subdural hematoma, subarachnoid hemorrhage or epidural hematoma, cerebral contusion, soft tissue injury, paraspinal or spinal injury      Amount and/or Complexity of Data Reviewed  Radiology: ordered. Decision-making details documented in ED Course.               ED Course as of 03/28/24 1222   Thu Mar 28, 2024   1221 CT Cervical Spine Without Contrast  CT per Radiology review noting degenerative changes but no acute abnormalities [KS]   1221 CT Head Without Contrast  Per  Radiology review, no acute intracranial findings [KS]   1222 Patient continues to rest comfortably in ED. encouraged Tylenol as needed, high fall precautions, outpatient follow-up.  ED return precautions discussed.  Patient states her understanding and agrees with plan [KS]      ED Course User Index  [KS] Hans Huston PA-C                           Clinical Impression:  Final diagnoses:  [W19.XXXA] Fall, initial encounter (Primary)  [S00.03XA] Contusion of scalp, initial encounter          ED Disposition Condition    Discharge Stable          ED Prescriptions    None       Follow-up Information       Follow up With Specialties Details Why Contact Info    Rosy Santos MD Internal Medicine   [address] [facility] [address]  [phone]               Hans Huston PA-C  03/28/24 1222       Hans Huston PA-C  03/28/24 1223

## 2024-03-30 ENCOUNTER — PATIENT OUTREACH (OUTPATIENT)
Dept: EMERGENCY MEDICINE | Facility: HOSPITAL | Age: 84
End: 2024-03-30
Payer: MEDICARE

## 2024-04-03 DIAGNOSIS — G47.00 INSOMNIA, UNSPECIFIED TYPE: ICD-10-CM

## 2024-04-03 NOTE — TELEPHONE ENCOUNTER
No care due was identified.  [facility] Embedded Care Due Messages. Reference number: [id number].   4/03/2024 12:19:53 AM CDT

## 2024-04-03 NOTE — TELEPHONE ENCOUNTER
----- Message from Elma Neri sent at 4/3/2024  3:12 PM CDT -----  Contact: Pt [phone]  Requesting an RX refill or new RX.  Is this a refill or new RX: refill   RX name and strength (copy/paste from chart):  eszopiclone (LUNESTA) 2 MG Tab   Is this a 30 day or 90 day RX: 90  Pharmacy name and phone # (copy/paste from chart):    [facility] [place] - [address]  [address]  [place] [address]  Phone: [phone] Fax: [phone]    Pt is also asking that all her medications be refilled.

## 2024-04-04 RX ORDER — ESZOPICLONE 2 MG/1
TABLET, FILM COATED ORAL
Qty: 90 TABLET | Refills: 1 | Status: SHIPPED | OUTPATIENT
Start: 2024-04-04

## 2024-04-24 ENCOUNTER — TELEPHONE (OUTPATIENT)
Dept: INTERNAL MEDICINE | Facility: CLINIC | Age: 84
End: 2024-04-24
Payer: MEDICARE

## 2024-04-24 NOTE — TELEPHONE ENCOUNTER
----- Message from Taryn Tinajero sent at 4/24/2024  4:29 PM CDT -----  Type:  Patient Returning Call    Who Called:MARTINEZ BOYD [[id number]]  Who Left Message for Patient: Moni   Does the patient know what this is regarding?:unknown   Would the patient rather a call back or a response via [facility]? Call back   Best Call Back Number: [phone]  Additional Information: Patient states she missed a call and received a message in regards to scheduling. Patient states she is a little confused as she is schedule for her appointment with the provider.patient states she would like to speak with someone with further information.

## 2024-04-24 NOTE — TELEPHONE ENCOUNTER
The patient was called and a message left on her voicemail regarding appot and to call the office back.

## 2024-04-26 ENCOUNTER — TELEPHONE (OUTPATIENT)
Dept: INTERNAL MEDICINE | Facility: CLINIC | Age: 84
End: 2024-04-26
Payer: MEDICARE

## 2024-04-26 NOTE — TELEPHONE ENCOUNTER
----- Message from Sara Valladares sent at 4/26/2024  1:45 PM CDT -----  Contact: phone [phone]  Type: Returning a call    Who left a message?Mariza Borges LPN    When did the practice call? 04/24/2024    Comments:

## 2024-04-26 NOTE — TELEPHONE ENCOUNTER
Returned pt call pt stated she call in regards to labs. Pt wanted to know if labs were fasting labs. Stated yes appointment was already made on 5/8/24 at 1:30 pm.     What labs are needed?

## 2024-05-09 ENCOUNTER — LAB VISIT (OUTPATIENT)
Dept: LAB | Facility: HOSPITAL | Age: 84
End: 2024-05-09
Attending: HOSPITALIST
Payer: MEDICARE

## 2024-05-09 DIAGNOSIS — E55.9 VITAMIN D DEFICIENCY: ICD-10-CM

## 2024-05-09 DIAGNOSIS — D50.9 IRON DEFICIENCY ANEMIA, UNSPECIFIED IRON DEFICIENCY ANEMIA TYPE: ICD-10-CM

## 2024-05-09 DIAGNOSIS — E78.5 HYPERLIPIDEMIA, UNSPECIFIED HYPERLIPIDEMIA TYPE: ICD-10-CM

## 2024-05-09 DIAGNOSIS — I10 ESSENTIAL HYPERTENSION: ICD-10-CM

## 2024-05-09 DIAGNOSIS — N18.32 STAGE 3B CHRONIC KIDNEY DISEASE: ICD-10-CM

## 2024-05-09 LAB
BACTERIA #/AREA URNS AUTO: ABNORMAL /HPF
BILIRUB UR QL STRIP: NEGATIVE
CLARITY UR REFRACT.AUTO: CLEAR
COLOR UR AUTO: YELLOW
GLUCOSE UR QL STRIP: NEGATIVE
HGB UR QL STRIP: NEGATIVE
KETONES UR QL STRIP: NEGATIVE
LEUKOCYTE ESTERASE UR QL STRIP: ABNORMAL
MICROSCOPIC COMMENT: ABNORMAL
NITRITE UR QL STRIP: NEGATIVE
PH UR STRIP: 6 [PH] (ref 5–8)
PROT UR QL STRIP: NEGATIVE
RBC #/AREA URNS AUTO: 2 /HPF (ref 0–4)
SP GR UR STRIP: 1.01 (ref 1–1.03)
SQUAMOUS #/AREA URNS AUTO: 6 /HPF
URN SPEC COLLECT METH UR: ABNORMAL
WBC #/AREA URNS AUTO: 17 /HPF (ref 0–5)
WBC CLUMPS UR QL AUTO: ABNORMAL

## 2024-05-09 PROCEDURE — 81001 URINALYSIS AUTO W/SCOPE: CPT | Mod: HCNC | Performed by: HOSPITALIST

## 2024-05-15 ENCOUNTER — HOSPITAL ENCOUNTER (OUTPATIENT)
Dept: RADIOLOGY | Facility: HOSPITAL | Age: 84
Discharge: HOME | End: 2024-05-15
Attending: HOSPITALIST
Payer: MEDICARE

## 2024-05-15 ENCOUNTER — OFFICE VISIT (OUTPATIENT)
Dept: INTERNAL MEDICINE | Facility: CLINIC | Age: 84
End: 2024-05-15
Payer: MEDICARE

## 2024-05-15 VITALS
DIASTOLIC BLOOD PRESSURE: 60 MMHG | WEIGHT: 145.31 LBS | TEMPERATURE: 98 F | RESPIRATION RATE: 17 BRPM | HEART RATE: 80 BPM | HEIGHT: 64 IN | BODY MASS INDEX: 24.81 KG/M2 | OXYGEN SATURATION: 99 % | SYSTOLIC BLOOD PRESSURE: 130 MMHG

## 2024-05-15 DIAGNOSIS — M54.50 CHRONIC BILATERAL LOW BACK PAIN WITHOUT SCIATICA: ICD-10-CM

## 2024-05-15 DIAGNOSIS — E55.9 VITAMIN D DEFICIENCY: ICD-10-CM

## 2024-05-15 DIAGNOSIS — G47.00 INSOMNIA, UNSPECIFIED TYPE: ICD-10-CM

## 2024-05-15 DIAGNOSIS — I10 ESSENTIAL HYPERTENSION: Primary | ICD-10-CM

## 2024-05-15 DIAGNOSIS — M48.061 SPINAL STENOSIS, LUMBAR REGION, WITHOUT NEUROGENIC CLAUDICATION: ICD-10-CM

## 2024-05-15 DIAGNOSIS — M43.10 ACQUIRED SPONDYLOLISTHESIS: ICD-10-CM

## 2024-05-15 DIAGNOSIS — G89.29 CHRONIC BILATERAL LOW BACK PAIN WITHOUT SCIATICA: ICD-10-CM

## 2024-05-15 DIAGNOSIS — R41.3 MEMORY LOSS: ICD-10-CM

## 2024-05-15 DIAGNOSIS — D50.9 IRON DEFICIENCY ANEMIA, UNSPECIFIED IRON DEFICIENCY ANEMIA TYPE: ICD-10-CM

## 2024-05-15 PROCEDURE — 99999 PR PBB SHADOW E&M-EST. PATIENT-LVL IV: CPT | Mod: PBBFAC,HCNC,, | Performed by: HOSPITALIST

## 2024-05-15 PROCEDURE — 99214 OFFICE O/P EST MOD 30 MIN: CPT | Mod: HCNC,S$GLB,, | Performed by: HOSPITALIST

## 2024-05-15 PROCEDURE — 1160F RVW MEDS BY RX/DR IN RCRD: CPT | Mod: HCNC,CPTII,S$GLB, | Performed by: HOSPITALIST

## 2024-05-15 PROCEDURE — 1159F MED LIST DOCD IN RCRD: CPT | Mod: HCNC,CPTII,S$GLB, | Performed by: HOSPITALIST

## 2024-05-15 PROCEDURE — 72110 X-RAY EXAM L-2 SPINE 4/>VWS: CPT | Mod: 26,HCNC,, | Performed by: RADIOLOGY

## 2024-05-15 PROCEDURE — 72110 X-RAY EXAM L-2 SPINE 4/>VWS: CPT | Mod: TC,HCNC,PO

## 2024-05-15 PROCEDURE — G2211 COMPLEX E/M VISIT ADD ON: HCPCS | Mod: HCNC,S$GLB,, | Performed by: HOSPITALIST

## 2024-05-15 PROCEDURE — 1101F PT FALLS ASSESS-DOCD LE1/YR: CPT | Mod: HCNC,CPTII,S$GLB, | Performed by: HOSPITALIST

## 2024-05-15 PROCEDURE — 3288F FALL RISK ASSESSMENT DOCD: CPT | Mod: HCNC,CPTII,S$GLB, | Performed by: HOSPITALIST

## 2024-05-15 NOTE — PROGRESS NOTES
"Subjective:     @Patient ID: Tere Velasco is a 84 y.o. female.    Chief Complaint: Follow-up (6 mo)    HPI    83 y.o. female here for f/u:       1. HTN: hctz 12.5 mg qday and losartan 50 mg qday  2. HLD: pravastatin 40 mg qday  3. Anxiety and depression: on wellbutrin 150 mg, cymbalta 20 mg. Reports her   2020. Since then has been lonely but has support from friends. Her 2 biological kids (daughter in [place], son in [facility]) and 2 stepchildren ([facility] and [place]) that live out of state.  Has a dog that she loves.    4. Insomnia: on Lunesta to 2 mg qhs.   5. RLS:  on Horizant 600 mg qhs   6. GERD: on pepcid   7. TRU on cpap : reports has not used cpap in years as does not like the fit of the mask   8. Memory loss:  Reports 6 weeks ago went to a friend's house with her son and did not remember spending the afternoon with the friend.  She reports that concerns her.  Since her mother had dementia     Review of Systems   Constitutional:  Negative for chills and fever.   Respiratory:  Negative for shortness of breath.    Musculoskeletal:  Positive for back pain.   Psychiatric/Behavioral:  Negative for agitation.      Past medical history, surgical history, and family medical history reviewed and updated as appropriate.    Medications and allergies reviewed.     Objective:     Vitals:    05/15/24 1355   BP: 130/60   BP Location: Left arm   Patient Position: Sitting   BP Method: Medium (Manual)   Pulse: 80   Resp: 17   Temp: 97.6 °F (36.4 °C)   TempSrc: Temporal   SpO2: 99%   Weight: 65.9 kg (145 lb 4.5 oz)   Height: 5' 4" (1.626 m)     Body mass index is 24.94 kg/m².  Physical Exam  Constitutional:       Appearance: Normal appearance.   HENT:      Head: Normocephalic and atraumatic.   Eyes:      General:         Right eye: No discharge.         Left eye: No discharge.      Conjunctiva/sclera: Conjunctivae normal.   Cardiovascular:      Rate and Rhythm: Normal rate and regular rhythm.      Heart " sounds: No murmur heard.  Pulmonary:      Effort: Pulmonary effort is normal.      Breath sounds: Normal breath sounds.   Musculoskeletal:      Cervical back: Normal range of motion and neck supple.      Right lower leg: No edema.      Left lower leg: No edema.   Skin:     General: Skin is warm and dry.   Neurological:      Mental Status: She is alert and oriented to person, place, and time.   Psychiatric:         Mood and Affect: Mood normal.         Behavior: Behavior normal.         Lab Results   Component Value Date    WBC 7.44 05/09/2024    HGB 10.9 (L) 05/09/2024    HCT 35.5 (L) 05/09/2024     05/09/2024    CHOL 232 (H) 05/09/2024    TRIG 108 05/09/2024    HDL 60 05/09/2024    ALT 15 05/09/2024    AST 20 05/09/2024     05/09/2024    K 4.2 05/09/2024     05/09/2024    CREATININE 1.4 05/09/2024    BUN 19 05/09/2024    CO2 24 05/09/2024    TSH 2.956 05/09/2024    INR 1.8 (A) 01/30/2014    HGBA1C 5.5 08/21/2014       Assessment:     1. Essential hypertension    2. Iron deficiency anemia, unspecified iron deficiency anemia type    3. Vitamin D deficiency    4. Insomnia, unspecified type    5. Memory loss    6. Chronic bilateral low back pain without sciatica    7. Acquired spondylolisthesis    8. Spinal stenosis, lumbar region, without neurogenic claudication      Plan:   Tere was seen today for follow-up.    Diagnoses and all orders for this visit:    Essential hypertension  - Stable. Continue home meds     -     Comprehensive Metabolic Panel; Future  -     CBC Auto Differential; Future  -     Lipid Panel; Future  -     TSH; Future  -     Urinalysis; Future  -     Iron and TIBC; Future  -     Ferritin; Future  -     Transferrin; Future  -     Vitamin B12; Future  -     Folate; Future  -     Vitamin D; Future  -     Protein/Creatinine Ratio, Urine; Future    Iron deficiency anemia, unspecified iron deficiency anemia type  - chronic.  Counseled to restart iron supplement and eat foods rich in  iron  -     Comprehensive Metabolic Panel; Future  -     CBC Auto Differential; Future  -     Lipid Panel; Future  -     TSH; Future  -     Urinalysis; Future  -     Iron and TIBC; Future  -     Ferritin; Future  -     Transferrin; Future  -     Vitamin B12; Future  -     Folate; Future  -     Vitamin D; Future  -     Protein/Creatinine Ratio, Urine; Future    Vitamin D deficiency  - Stable. Continue home meds     -     Comprehensive Metabolic Panel; Future  -     CBC Auto Differential; Future  -     Lipid Panel; Future  -     TSH; Future  -     Urinalysis; Future  -     Iron and TIBC; Future  -     Ferritin; Future  -     Transferrin; Future  -     Vitamin B12; Future  -     Folate; Future  -     Vitamin D; Future  -     Protein/Creatinine Ratio, Urine; Future    Insomnia, unspecified type  - Stable. Continue home meds     -     Comprehensive Metabolic Panel; Future  -     CBC Auto Differential; Future  -     Lipid Panel; Future  -     TSH; Future  -     Urinalysis; Future  -     Iron and TIBC; Future  -     Ferritin; Future  -     Transferrin; Future  -     Vitamin B12; Future  -     Folate; Future  -     Vitamin D; Future  -     Protein/Creatinine Ratio, Urine; Future    Memory loss  New.  Patient declines to do MRI of the brain at this time..  She did have a CT scan back in March which showed vascular disease.  Patient will notify MD if symptoms worsen has may need to have MRI and neurology referral.  Will also follow up blood work at next appointment  -     Comprehensive Metabolic Panel; Future  -     CBC Auto Differential; Future  -     Lipid Panel; Future  -     TSH; Future  -     Urinalysis; Future  -     Iron and TIBC; Future  -     Ferritin; Future  -     Transferrin; Future  -     Vitamin B12; Future  -     Folate; Future  -     Vitamin D; Future  -     Protein/Creatinine Ratio, Urine; Future    Chronic bilateral low back pain without sciatica  - chronic.  Read check back x-ray and referral to pain  management  -     X-Ray Lumbar Spine 5 View; Future  -     Ambulatory referral/consult to Pain Clinic; Future  -     Comprehensive Metabolic Panel; Future  -     CBC Auto Differential; Future  -     Lipid Panel; Future  -     TSH; Future  -     Urinalysis; Future  -     Iron and TIBC; Future  -     Ferritin; Future  -     Transferrin; Future  -     Vitamin B12; Future  -     Folate; Future  -     Vitamin D; Future  -     Protein/Creatinine Ratio, Urine; Future    Acquired spondylolisthesis  - see back pain  -     Ambulatory referral/consult to Pain Clinic; Future  -     Comprehensive Metabolic Panel; Future  -     CBC Auto Differential; Future  -     Lipid Panel; Future  -     TSH; Future  -     Urinalysis; Future  -     Iron and TIBC; Future  -     Ferritin; Future  -     Transferrin; Future  -     Vitamin B12; Future  -     Folate; Future  -     Vitamin D; Future  -     Protein/Creatinine Ratio, Urine; Future    Spinal stenosis, lumbar region, without neurogenic claudication  - see back pain  -     Ambulatory referral/consult to Pain Clinic; Future  -     Comprehensive Metabolic Panel; Future  -     CBC Auto Differential; Future  -     Lipid Panel; Future  -     TSH; Future  -     Urinalysis; Future  -     Iron and TIBC; Future  -     Ferritin; Future  -     Transferrin; Future  -     Vitamin B12; Future  -     Folate; Future  -     Vitamin D; Future  -     Protein/Creatinine Ratio, Urine; Future      Rtc 6 months     Rosy Santos MD  Internal Medicine    5/15/2024

## 2024-05-16 ENCOUNTER — TELEPHONE (OUTPATIENT)
Dept: INTERNAL MEDICINE | Facility: CLINIC | Age: 84
End: 2024-05-16
Payer: MEDICARE

## 2024-05-16 NOTE — TELEPHONE ENCOUNTER
Her son took her to visit a friend she was their for 3 hrs and she dose not remember going .  Her kids were in town to celebrate her birthday and dose not remember going out to eat at [employer]. The only thing she remember about her kids being in town is she gave her daughter a dog kinnel

## 2024-05-16 NOTE — TELEPHONE ENCOUNTER
----- Message from Lily Gary sent at 5/16/2024  3:39 PM CDT -----  Contact: [phone]  [email or web address]     Patient is calling for Medical Advice regarding:was seen yesterday     How long has patient had these symptoms:    Pharmacy name and phone#:    Would like response via Iumt:  no     Comments:  She states she was told if she had anymore instances where she forgets things to call and let the dr know and she states she does not remember her kids taking her out to eat for her birthday in February please give return call

## 2024-05-20 ENCOUNTER — OFFICE VISIT (OUTPATIENT)
Dept: PAIN MEDICINE | Facility: CLINIC | Age: 84
End: 2024-05-20
Payer: MEDICARE

## 2024-05-20 VITALS
WEIGHT: 143.63 LBS | HEART RATE: 90 BPM | DIASTOLIC BLOOD PRESSURE: 80 MMHG | SYSTOLIC BLOOD PRESSURE: 154 MMHG | HEIGHT: 64 IN | BODY MASS INDEX: 24.52 KG/M2

## 2024-05-20 DIAGNOSIS — M47.816 LUMBAR FACET ARTHROPATHY: ICD-10-CM

## 2024-05-20 DIAGNOSIS — M47.9 SPONDYLOSIS: Primary | ICD-10-CM

## 2024-05-20 DIAGNOSIS — M54.50 CHRONIC BILATERAL LOW BACK PAIN WITHOUT SCIATICA: ICD-10-CM

## 2024-05-20 DIAGNOSIS — G89.29 CHRONIC BILATERAL LOW BACK PAIN WITHOUT SCIATICA: ICD-10-CM

## 2024-05-20 DIAGNOSIS — M48.061 SPINAL STENOSIS, LUMBAR REGION, WITHOUT NEUROGENIC CLAUDICATION: ICD-10-CM

## 2024-05-20 DIAGNOSIS — M43.10 ACQUIRED SPONDYLOLISTHESIS: ICD-10-CM

## 2024-05-20 PROCEDURE — 1125F AMNT PAIN NOTED PAIN PRSNT: CPT | Mod: HCNC,CPTII,S$GLB, | Performed by: NURSE PRACTITIONER

## 2024-05-20 PROCEDURE — 99214 OFFICE O/P EST MOD 30 MIN: CPT | Mod: HCNC,S$GLB,, | Performed by: NURSE PRACTITIONER

## 2024-05-20 PROCEDURE — 1159F MED LIST DOCD IN RCRD: CPT | Mod: HCNC,CPTII,S$GLB, | Performed by: NURSE PRACTITIONER

## 2024-05-20 PROCEDURE — 1160F RVW MEDS BY RX/DR IN RCRD: CPT | Mod: HCNC,CPTII,S$GLB, | Performed by: NURSE PRACTITIONER

## 2024-05-20 PROCEDURE — 99999 PR PBB SHADOW E&M-EST. PATIENT-LVL III: CPT | Mod: PBBFAC,HCNC,, | Performed by: NURSE PRACTITIONER

## 2024-05-20 NOTE — PROGRESS NOTES
[facility]  Patient Evaluation    Referred by: Dr Rosy Santos  Reason for referral: Lumbar Region    CC:   Chief Complaint   Patient presents with    Low-back Pain           5/20/2024     2:17 PM 10/5/2022     1:21 PM 3/10/2021     2:42 PM   Last 3 PDI Scores   Pain Disability Index (PDI) 50 53 47       Interval History 5/20/2024:    84-year-old female that presents today for a follow-up appointment she is a former patient of Dr. Siddiqui she reports ongoing chronic low back pain for years.  Pain begins in the low back just above her belt line with mild radiation into her buttocks.  Pain is constant pain is worse when she stands and performs ADLs rest and sitting mitigate her symptoms.  She denies any radicular symptoms denies any paresthesia like symptoms denies any shooting pain in either leg.  She was previously provided facet lumbar injections 2 years ago however she can not recall if she received relief from these injections she states today that she did not but she is unsure.   Upon review of her recent x-ray she has disc space narrowing noted throughout the lumbar spine. There are marginal osteophytes. There is facet arthropathy within the lower lumbar spine and lumbosacral junction.  She also has a history of fibromyalgia she would like to be considered for potential pain intervention injections.  She denies any recent incident or trauma denies profound weakness denies any bowel bladder dysfunction at this time.      Interval Update:  10/5/2022 - Ms. Velasco is following up for low back pain.   She reports the following medication side effects: none.  Pain is currently rate 2/10 with a weekly range 3-4/10.  It is described as Aching.   Pain is worsened by standing and walking and improved by resting and sitting.     HPI:   Tere Velasco is a 84 y.o. female with a reported history of diffuse arthritis status post bilateral knee replacement, fibromyalgia, scoliosis who  presents complaining of chronic low back pain has become severe.  Patient reports limited ability to stand or walk due to low back pain.    Location: low back   Onset: several years  Current Pain Score: 4/10  Daily Pain of Range: 3-9/10  Quality: Aching  Radiation: None  Worsened by: standing and walking for more than a few minutes  Improved by: rest and sitting    Previous Therapies:  PT/OT: Yes, 3 yrs ago  HEP: Yes, 3 yrs ago but stopped performing it      Pain Interventions:  -04/27/2021 Bilateral L4-5 and L5-S1 Lumbar Facet Injection-  Dr. Siddiqui  - Bilateral Lumbar facet injection 2013  Surgery: Bilateral TKA.  No back surgery.  Medications:   - NSAIDS: unable to use due to CKD  - MSK Relaxants:   - TCAs:   - SNRIs:   - Topicals:   - Anticonvulsants:  - Opioids:     Current Pain Medications:  duloxtine  Gabapentin (Horizant)    Review of Systems:  Review of Systems   Constitutional:  Negative for chills and fever.   HENT:  Negative for nosebleeds.    Eyes:  Negative for blurred vision and pain.   Respiratory:  Negative for hemoptysis.    Cardiovascular:  Negative for chest pain and palpitations.   Gastrointestinal:  Negative for heartburn, nausea and vomiting.   Genitourinary:  Negative for dysuria and hematuria.   Musculoskeletal:  Positive for back pain, joint pain and myalgias.   Skin:  Negative for rash.   Neurological:  Negative for seizures and loss of consciousness.   Endo/Heme/Allergies:  Does not bruise/bleed easily.   Psychiatric/Behavioral:  Negative for hallucinations.      History:    Current Outpatient Medications:     buPROPion (WELLBUTRIN SR) 150 MG TBSR 12 hr tablet, TAKE ONE TABLET BY MOUTH TWICE A DAY, Disp: 180 tablet, Rfl: 3    DULoxetine (CYMBALTA) 20 MG capsule, TAKE ONE CAPSULE BY MOUTH TWICE A DAY, Disp: 180 capsule, Rfl: 3    eszopiclone (LUNESTA) 2 MG Tab, TAKE ONE TABLET BY MOUTH AT BEDTIME AS NEEDED, Disp: 90 tablet, Rfl: 1    famotidine (PEPCID) 20 MG tablet, TAKE ONE TABLET BY  MOUTH EVERY DAY, Disp: 90 tablet, Rfl: 3    HORIZANT 600 mg TbSR, TAKE ONE TABLET BY MOUTH EVERY EVENING, Disp: 90 tablet, Rfl: 2    hydroCHLOROthiazide (HYDRODIURIL) 12.5 MG Tab, Take 1 tablet (12.5 mg total) by mouth once daily., Disp: 90 tablet, Rfl: 3    losartan (COZAAR) 50 MG tablet, TAKE ONE TABLET BY MOUTH EVERY DAY BEFORE BREAKFAST, Disp: 90 tablet, Rfl: 3    vit A/vit C/vit E/zinc/copper (ICAPS AREDS ORAL), Take by mouth., Disp: , Rfl:     ferrous gluconate (FERGON) 324 MG tablet, ferrous gluconate 324 mg (38 mg iron) tablet (Patient not taking: Reported on 5/15/2024), Disp: , Rfl:     pravastatin (PRAVACHOL) 80 MG tablet, Take 1 tablet (80 mg total) by mouth every evening. (Patient not taking: Reported on 5/15/2024), Disp: 90 tablet, Rfl: 3    UNABLE TO FIND, Relief Factor---1 packet per day (Patient not taking: Reported on 5/15/2024), Disp: , Rfl:     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)    Cataract     removed from both eyes    CKD (chronic kidney disease) stage 3, GFR 30-59 ml/min     Clotting disorder     when knee was replaced    Colitis     Degenerative disc disease     Depression     Fibromyalgia     GERD (gastroesophageal reflux disease)     Hyperlipidemia     Hypertension     Irritable bowel syndrome     TRU (obstructive sleep apnea)     RLS (restless legs syndrome)     S/P knee replacement 1/13/2014       Past Surgical History:   Procedure Laterality Date    APPENDECTOMY      BREAST BIOPSY      BREAST LUMPECTOMY      right side    BREAST SURGERY      CHOLECYSTECTOMY      COLONOSCOPY N/A 12/2/2016    Procedure: COLONOSCOPY;  Surgeon: Ori Cope MD;  Location: [facility] ([address] [place] [address]);  Service: Endoscopy;  Laterality: N/A;  PM prep    EYE SURGERY Bilateral     cataract    HYSTERECTOMY  1977    uterine pain    INJECTION OF FACET JOINT Bilateral 4/27/2021    Procedure: Facet joint  injection-lumbar--Bilateral L4-5, L5-S1;  Surgeon: Kuldip Siddiqui Jr., MD;  Location: [facility];  Service: Pain Management;  Laterality: Bilateral;    JOINT REPLACEMENT      knees replaced bilaterally    KNEE SURGERY Bilateral     RECTAL SURGERY      rectocele    TONSILLECTOMY         Family History   Problem Relation Name Age of Onset    Dementia Mother          age 92    Heart disease Father          age 58    Restless legs syndrome Daughter Jovi     Thyroid disease Daughter Jovi     Hypertension Son Moshe     Alcohol abuse Son Moshe     Ovarian cancer Paternal Grandmother      Breast cancer Paternal Grandmother      Cancer Paternal Grandmother          ovarian    Ovarian cancer Cousin      Cancer Cousin  40        colon ca    Breast cancer Paternal Aunt      Skin cancer Neg Hx      Melanoma Neg Hx      Colon cancer Neg Hx      Esophageal cancer Neg Hx      Stomach cancer Neg Hx      Irritable bowel syndrome Neg Hx      Crohn's disease Neg Hx      Ulcerative colitis Neg Hx      Celiac disease Neg Hx         Social History     Socioeconomic History    Marital status:    Tobacco Use    Smoking status: Never     Passive exposure: Never    Smokeless tobacco: Never   Substance and Sexual Activity    Alcohol use: Not Currently    Drug use: No    Sexual activity: Not Currently     Partners: Male     Birth control/protection: Post-menopausal     Social Determinants of Health     Financial Resource Strain: Low Risk  (1/19/2023)    Overall Financial Resource Strain (CARDIA)     Difficulty of Paying Living Expenses: Not very hard   Food Insecurity: No Food Insecurity (4/26/2021)    Hunger Vital Sign     Worried About Running Out of Food in the Last Year: Never true     Ran Out of Food in the Last Year: Never true   Transportation Needs: No Transportation Needs (1/19/2023)    PRAPARE - Transportation     Lack of Transportation (Medical): No     Lack of Transportation (Non-Medical): No   Physical Activity:  "Inactive (4/26/2021)    Exercise Vital Sign     Days of Exercise per Week: 0 days     Minutes of Exercise per Session: 0 min   Stress: Stress Concern Present (1/19/2023)    [employer] - Occupational Stress Questionnaire     Feeling of Stress : Rather much   Housing Stability: Unknown (1/19/2023)    Housing Stability Vital Sign     Unable to Pay for Housing in the Last Year: No     Unstable Housing in the Last Year: No       Review of patient's allergies indicates:   Allergen Reactions    Demerol [meperidine]        Physical Exam:  Vitals:    05/20/24 1420   BP: (!) 154/80   Pulse: 90   Weight: 65.2 kg (143 lb 10.1 oz)   Height: 5' 4" (1.626 m)   PainSc:   7   PainLoc: Back     General    Nursing note and vitals reviewed.  Constitutional: She is oriented to person, place, and time. She appears well-developed and well-nourished. No distress.   HENT:   Head: Normocephalic and atraumatic.   Nose: Nose normal.   Eyes: Conjunctivae and EOM are normal. Pupils are equal, round, and reactive to light. Right eye exhibits no discharge. Left eye exhibits no discharge. No scleral icterus.   Neck: No JVD present.   Cardiovascular:  Intact distal pulses.            Pulmonary/Chest: Effort normal. No respiratory distress.   Abdominal: She exhibits no distension.   Neurological: She is alert and oriented to person, place, and time. Coordination normal.   Psychiatric: She has a normal mood and affect. Her behavior is normal. Judgment and thought content normal.     General Musculoskeletal Exam   Gait: normal     Back (L-Spine & T-Spine) / Neck (C-Spine) Exam     Tenderness Right paramedian tenderness of the Lower L-Spine. Left paramedian tenderness of the Lower L-Spine.     Back (L-Spine & T-Spine) Range of Motion   Back extension: facet loading is positive and exacerabtes/reproduces the patient's typical low back pain    Back flexion: limited ROM but partial relief of low back pain noted.     Spinal " Sensation   Right Side Sensation  L-Spine Level: normal  Left Side Sensation  L-Spine Level: normal    Other   She has scoliosis .      Muscle Strength   Right Lower Extremity   Hip Flexion: 5/5   Hip Extensors: 5/5  Quadriceps:  5/5   Hamstrin/5   Gastrocsoleus:  5/5   Left Lower Extremity   Hip Flexion: 5/5   Hip Extensors: 5/5  Quadriceps:  5/5   Hamstrin/5   Gastrocsoleus:  5/5     Reflexes     Left Side  Achilles:  2+  Quadriceps:  2+    Right Side   Achilles:  2+  Quadriceps:  2+    Imaging:  X-ray lumbar spine from  shows substantial thoracolumbar scoliosis.  There is multilevel degenerative disc disease and facet arthropathy most notably at L4-5 and L5-S1, bilaterally.    Labs:  BMP  Lab Results   Component Value Date     2024    K 4.2 2024     2024    CO2 24 2024    BUN 19 2024    CREATININE 1.4 2024    CALCIUM 9.6 2024    ANIONGAP 8 2024    ESTGFRAFRICA 39.8 (A) 2022    ESTGFRAFRICA 39.8 (A) 2022    EGFRNONAA 34.5 (A) 2022    EGFRNONAA 34.5 (A) 2022     Lab Results   Component Value Date    ALT 15 2024    AST 20 2024    ALKPHOS 49 (L) 2024    BILITOT 0.4 2024       Assessment:  Problem List Items Addressed This Visit          Neuro    Spinal stenosis, lumbar region, without neurogenic claudication       Orthopedic    Acquired spondylolisthesis    Chronic bilateral low back pain without sciatica       3/10/21 - Tere Zackery Velasco is a 84 y.o. female with chronic low back pain due to degenerative joint disease with contribution from scoliosis and core muscle weakness.  I recommended a combination of therapies including physical therapy and facet joint injections for the low back.  Due to the severity of her scoliosis, patient is unlikely to ever be pain free; however, I believe that through a combination of facet injections of physical therapy a realistic target is 50-75% reduction in  pain.  I recommend continuation of the current medications with the addition of Tylenol.  Patient appears to understand the necessity for avoiding NSAIDs, due to her chronic kidney disease.    10/5/22 - Tere Velasco is a 84 y.o. female with chronic lower back pain and scoliosis. She failed to experience relief from facet injections that were well placed.  She states that she does not do home exercises because she wasn't sure how it would help her back pain.  She completed physical therapy, however her benefits were short lived.  At this point, she would benefit form home exercise program with core strengthening.  She reports pain of 2/10 today which is representative of her daily experience of pain.  At age 82 yrs with substantial scoliosis, advanced DDD, and a sedentary lifestyle, she was counseled that her level of pain (2/10) should be viewed as reasonable; however, if she would like further redcutions in pain she must commit to daily exercise.  Her remain pain is most likely fibromyalgia.      05/20/2024-Tere Velasco is a 84 y.o. female who  has a past medical history of Allergy, Anemia, Anxiety, Arthritis, Back pain, Breast disorder, Cataract, CKD (chronic kidney disease) stage 3, GFR 30-59 ml/min, Clotting disorder, Colitis, Degenerative disc disease, Depression, Fibromyalgia, GERD (gastroesophageal reflux disease), Hyperlipidemia, Hypertension, Irritable bowel syndrome, TRU (obstructive sleep apnea), RLS (restless legs syndrome), and S/P knee replacement (1/13/2014).  By history and examination this patient has chronic low back pain without radiculopathy.  The underlying cause is  scoliosis, facet arthritis, degenerative disc disease, and foraminal stenosis.  Pathology is confirmed by imaging.  We discussed the underlying diagnoses and multiple treatment options including non-opioid medications, interventional procedures, physical therapy, home exercise, core muscle enhancement, and activity  modification.  The risks and benefits of each treatment option were discussed and all questions were answered.        : NA    Treatment Plan:   Procedures:  scheduled for diagnostic bilateral lumbar MBB targeting L3, L4 and L5 x2 and RFA if appropriate  PT/OT/HEP:  consider returning to physical therapy.  We counseled her on the importance of a quality HEP for the long term management of her lower back pain.  Medications:   Start Tylenol extra-strength a 1000 t.I.d. not to exceed 3000 mg in a 24 hour.  Labs: reviewed and medications are appropriately dosed for current hepatorenal function.  Imaging: none    Follow Up:   1-2 days following 1st diagnostic lumbar MBB    CODY Gill  Interventional Pain Management    Disclaimer: This note was partly generated using dictation software which may occasionally result in transcription errors.

## 2024-05-20 NOTE — H&P (VIEW-ONLY)
[facility]  Patient Evaluation    Referred by: Dr Rosy Santos  Reason for referral: Lumbar Region    CC:   Chief Complaint   Patient presents with    Low-back Pain           5/20/2024     2:17 PM 10/5/2022     1:21 PM 3/10/2021     2:42 PM   Last 3 PDI Scores   Pain Disability Index (PDI) 50 53 47       Interval History 5/20/2024:    84-year-old female that presents today for a follow-up appointment she is a former patient of Dr. Siddiqui she reports ongoing chronic low back pain for years.  Pain begins in the low back just above her belt line with mild radiation into her buttocks.  Pain is constant pain is worse when she stands and performs ADLs rest and sitting mitigate her symptoms.  She denies any radicular symptoms denies any paresthesia like symptoms denies any shooting pain in either leg.  She was previously provided facet lumbar injections 2 years ago however she can not recall if she received relief from these injections she states today that she did not but she is unsure.   Upon review of her recent x-ray she has disc space narrowing noted throughout the lumbar spine. There are marginal osteophytes. There is facet arthropathy within the lower lumbar spine and lumbosacral junction.  She also has a history of fibromyalgia she would like to be considered for potential pain intervention injections.  She denies any recent incident or trauma denies profound weakness denies any bowel bladder dysfunction at this time.      Interval Update:  10/5/2022 - Ms. Velasco is following up for low back pain.   She reports the following medication side effects: none.  Pain is currently rate 2/10 with a weekly range 3-4/10.  It is described as Aching.   Pain is worsened by standing and walking and improved by resting and sitting.     HPI:   Tere Velasco is a 84 y.o. female with a reported history of diffuse arthritis status post bilateral knee replacement, fibromyalgia, scoliosis who  presents complaining of chronic low back pain has become severe.  Patient reports limited ability to stand or walk due to low back pain.    Location: low back   Onset: several years  Current Pain Score: 4/10  Daily Pain of Range: 3-9/10  Quality: Aching  Radiation: None  Worsened by: standing and walking for more than a few minutes  Improved by: rest and sitting    Previous Therapies:  PT/OT: Yes, 3 yrs ago  HEP: Yes, 3 yrs ago but stopped performing it      Pain Interventions:  -04/27/2021 Bilateral L4-5 and L5-S1 Lumbar Facet Injection-  Dr. Siddiqui  - Bilateral Lumbar facet injection 2013  Surgery: Bilateral TKA.  No back surgery.  Medications:   - NSAIDS: unable to use due to CKD  - MSK Relaxants:   - TCAs:   - SNRIs:   - Topicals:   - Anticonvulsants:  - Opioids:     Current Pain Medications:  duloxtine  Gabapentin (Horizant)    Review of Systems:  Review of Systems   Constitutional:  Negative for chills and fever.   HENT:  Negative for nosebleeds.    Eyes:  Negative for blurred vision and pain.   Respiratory:  Negative for hemoptysis.    Cardiovascular:  Negative for chest pain and palpitations.   Gastrointestinal:  Negative for heartburn, nausea and vomiting.   Genitourinary:  Negative for dysuria and hematuria.   Musculoskeletal:  Positive for back pain, joint pain and myalgias.   Skin:  Negative for rash.   Neurological:  Negative for seizures and loss of consciousness.   Endo/Heme/Allergies:  Does not bruise/bleed easily.   Psychiatric/Behavioral:  Negative for hallucinations.      History:    Current Outpatient Medications:     buPROPion (WELLBUTRIN SR) 150 MG TBSR 12 hr tablet, TAKE ONE TABLET BY MOUTH TWICE A DAY, Disp: 180 tablet, Rfl: 3    DULoxetine (CYMBALTA) 20 MG capsule, TAKE ONE CAPSULE BY MOUTH TWICE A DAY, Disp: 180 capsule, Rfl: 3    eszopiclone (LUNESTA) 2 MG Tab, TAKE ONE TABLET BY MOUTH AT BEDTIME AS NEEDED, Disp: 90 tablet, Rfl: 1    famotidine (PEPCID) 20 MG tablet, TAKE ONE TABLET BY  MOUTH EVERY DAY, Disp: 90 tablet, Rfl: 3    HORIZANT 600 mg TbSR, TAKE ONE TABLET BY MOUTH EVERY EVENING, Disp: 90 tablet, Rfl: 2    hydroCHLOROthiazide (HYDRODIURIL) 12.5 MG Tab, Take 1 tablet (12.5 mg total) by mouth once daily., Disp: 90 tablet, Rfl: 3    losartan (COZAAR) 50 MG tablet, TAKE ONE TABLET BY MOUTH EVERY DAY BEFORE BREAKFAST, Disp: 90 tablet, Rfl: 3    vit A/vit C/vit E/zinc/copper (ICAPS AREDS ORAL), Take by mouth., Disp: , Rfl:     ferrous gluconate (FERGON) 324 MG tablet, ferrous gluconate 324 mg (38 mg iron) tablet (Patient not taking: Reported on 5/15/2024), Disp: , Rfl:     pravastatin (PRAVACHOL) 80 MG tablet, Take 1 tablet (80 mg total) by mouth every evening. (Patient not taking: Reported on 5/15/2024), Disp: 90 tablet, Rfl: 3    UNABLE TO FIND, Relief Factor---1 packet per day (Patient not taking: Reported on 5/15/2024), Disp: , Rfl:     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)    Cataract     removed from both eyes    CKD (chronic kidney disease) stage 3, GFR 30-59 ml/min     Clotting disorder     when knee was replaced    Colitis     Degenerative disc disease     Depression     Fibromyalgia     GERD (gastroesophageal reflux disease)     Hyperlipidemia     Hypertension     Irritable bowel syndrome     TRU (obstructive sleep apnea)     RLS (restless legs syndrome)     S/P knee replacement 1/13/2014       Past Surgical History:   Procedure Laterality Date    APPENDECTOMY      BREAST BIOPSY      BREAST LUMPECTOMY      right side    BREAST SURGERY      CHOLECYSTECTOMY      COLONOSCOPY N/A 12/2/2016    Procedure: COLONOSCOPY;  Surgeon: Ori Cope MD;  Location: [facility] ([address] [place] [address]);  Service: Endoscopy;  Laterality: N/A;  PM prep    EYE SURGERY Bilateral     cataract    HYSTERECTOMY  1977    uterine pain    INJECTION OF FACET JOINT Bilateral 4/27/2021    Procedure: Facet joint  injection-lumbar--Bilateral L4-5, L5-S1;  Surgeon: Kuldip Siddiqui Jr., MD;  Location: [facility];  Service: Pain Management;  Laterality: Bilateral;    JOINT REPLACEMENT      knees replaced bilaterally    KNEE SURGERY Bilateral     RECTAL SURGERY      rectocele    TONSILLECTOMY         Family History   Problem Relation Name Age of Onset    Dementia Mother          age 92    Heart disease Father          age 58    Restless legs syndrome Daughter Jovi     Thyroid disease Daughter Jovi     Hypertension Son Moshe     Alcohol abuse Son Moshe     Ovarian cancer Paternal Grandmother      Breast cancer Paternal Grandmother      Cancer Paternal Grandmother          ovarian    Ovarian cancer Cousin      Cancer Cousin  40        colon ca    Breast cancer Paternal Aunt      Skin cancer Neg Hx      Melanoma Neg Hx      Colon cancer Neg Hx      Esophageal cancer Neg Hx      Stomach cancer Neg Hx      Irritable bowel syndrome Neg Hx      Crohn's disease Neg Hx      Ulcerative colitis Neg Hx      Celiac disease Neg Hx         Social History     Socioeconomic History    Marital status:    Tobacco Use    Smoking status: Never     Passive exposure: Never    Smokeless tobacco: Never   Substance and Sexual Activity    Alcohol use: Not Currently    Drug use: No    Sexual activity: Not Currently     Partners: Male     Birth control/protection: Post-menopausal     Social Determinants of Health     Financial Resource Strain: Low Risk  (1/19/2023)    Overall Financial Resource Strain (CARDIA)     Difficulty of Paying Living Expenses: Not very hard   Food Insecurity: No Food Insecurity (4/26/2021)    Hunger Vital Sign     Worried About Running Out of Food in the Last Year: Never true     Ran Out of Food in the Last Year: Never true   Transportation Needs: No Transportation Needs (1/19/2023)    PRAPARE - Transportation     Lack of Transportation (Medical): No     Lack of Transportation (Non-Medical): No   Physical Activity:  "Inactive (4/26/2021)    Exercise Vital Sign     Days of Exercise per Week: 0 days     Minutes of Exercise per Session: 0 min   Stress: Stress Concern Present (1/19/2023)    [employer] [place] [employer] - Occupational Stress Questionnaire     Feeling of Stress : Rather much   Housing Stability: Unknown (1/19/2023)    Housing Stability Vital Sign     Unable to Pay for Housing in the Last Year: No     Unstable Housing in the Last Year: No       Review of patient's allergies indicates:   Allergen Reactions    Demerol [meperidine]        Physical Exam:  Vitals:    05/20/24 1420   BP: (!) 154/80   Pulse: 90   Weight: 65.2 kg (143 lb 10.1 oz)   Height: 5' 4" (1.626 m)   PainSc:   7   PainLoc: Back     General    Nursing note and vitals reviewed.  Constitutional: She is oriented to person, place, and time. She appears well-developed and well-nourished. No distress.   HENT:   Head: Normocephalic and atraumatic.   Nose: Nose normal.   Eyes: Conjunctivae and EOM are normal. Pupils are equal, round, and reactive to light. Right eye exhibits no discharge. Left eye exhibits no discharge. No scleral icterus.   Neck: No JVD present.   Cardiovascular:  Intact distal pulses.            Pulmonary/Chest: Effort normal. No respiratory distress.   Abdominal: She exhibits no distension.   Neurological: She is alert and oriented to person, place, and time. Coordination normal.   Psychiatric: She has a normal mood and affect. Her behavior is normal. Judgment and thought content normal.     General Musculoskeletal Exam   Gait: normal     Back (L-Spine & T-Spine) / Neck (C-Spine) Exam     Tenderness Right paramedian tenderness of the Lower L-Spine. Left paramedian tenderness of the Lower L-Spine.     Back (L-Spine & T-Spine) Range of Motion   Back extension: facet loading is positive and exacerabtes/reproduces the patient's typical low back pain    Back flexion: limited ROM but partial relief of low back pain noted.     Spinal " Sensation   Right Side Sensation  L-Spine Level: normal  Left Side Sensation  L-Spine Level: normal    Other   She has scoliosis .      Muscle Strength   Right Lower Extremity   Hip Flexion: 5/5   Hip Extensors: 5/5  Quadriceps:  5/5   Hamstrin/5   Gastrocsoleus:  5/5   Left Lower Extremity   Hip Flexion: 5/5   Hip Extensors: 5/5  Quadriceps:  5/5   Hamstrin/5   Gastrocsoleus:  5/5     Reflexes     Left Side  Achilles:  2+  Quadriceps:  2+    Right Side   Achilles:  2+  Quadriceps:  2+    Imaging:  X-ray lumbar spine from  shows substantial thoracolumbar scoliosis.  There is multilevel degenerative disc disease and facet arthropathy most notably at L4-5 and L5-S1, bilaterally.    Labs:  BMP  Lab Results   Component Value Date     2024    K 4.2 2024     2024    CO2 24 2024    BUN 19 2024    CREATININE 1.4 2024    CALCIUM 9.6 2024    ANIONGAP 8 2024    ESTGFRAFRICA 39.8 (A) 2022    ESTGFRAFRICA 39.8 (A) 2022    EGFRNONAA 34.5 (A) 2022    EGFRNONAA 34.5 (A) 2022     Lab Results   Component Value Date    ALT 15 2024    AST 20 2024    ALKPHOS 49 (L) 2024    BILITOT 0.4 2024       Assessment:  Problem List Items Addressed This Visit          Neuro    Spinal stenosis, lumbar region, without neurogenic claudication       Orthopedic    Acquired spondylolisthesis    Chronic bilateral low back pain without sciatica       3/10/21 - Tere Zackery Velasco is a 84 y.o. female with chronic low back pain due to degenerative joint disease with contribution from scoliosis and core muscle weakness.  I recommended a combination of therapies including physical therapy and facet joint injections for the low back.  Due to the severity of her scoliosis, patient is unlikely to ever be pain free; however, I believe that through a combination of facet injections of physical therapy a realistic target is 50-75% reduction in  pain.  I recommend continuation of the current medications with the addition of Tylenol.  Patient appears to understand the necessity for avoiding NSAIDs, due to her chronic kidney disease.    10/5/22 - Tere Velasco is a 84 y.o. female with chronic lower back pain and scoliosis. She failed to experience relief from facet injections that were well placed.  She states that she does not do home exercises because she wasn't sure how it would help her back pain.  She completed physical therapy, however her benefits were short lived.  At this point, she would benefit form home exercise program with core strengthening.  She reports pain of 2/10 today which is representative of her daily experience of pain.  At age 82 yrs with substantial scoliosis, advanced DDD, and a sedentary lifestyle, she was counseled that her level of pain (2/10) should be viewed as reasonable; however, if she would like further redcutions in pain she must commit to daily exercise.  Her remain pain is most likely fibromyalgia.      05/20/2024-Tere Velasco is a 84 y.o. female who  has a past medical history of Allergy, Anemia, Anxiety, Arthritis, Back pain, Breast disorder, Cataract, CKD (chronic kidney disease) stage 3, GFR 30-59 ml/min, Clotting disorder, Colitis, Degenerative disc disease, Depression, Fibromyalgia, GERD (gastroesophageal reflux disease), Hyperlipidemia, Hypertension, Irritable bowel syndrome, TRU (obstructive sleep apnea), RLS (restless legs syndrome), and S/P knee replacement (1/13/2014).  By history and examination this patient has chronic low back pain without radiculopathy.  The underlying cause is  scoliosis, facet arthritis, degenerative disc disease, and foraminal stenosis.  Pathology is confirmed by imaging.  We discussed the underlying diagnoses and multiple treatment options including non-opioid medications, interventional procedures, physical therapy, home exercise, core muscle enhancement, and activity  modification.  The risks and benefits of each treatment option were discussed and all questions were answered.        : NA    Treatment Plan:   Procedures:  scheduled for diagnostic bilateral lumbar MBB targeting L3, L4 and L5 x2 and RFA if appropriate  PT/OT/HEP:  consider returning to physical therapy.  We counseled her on the importance of a quality HEP for the long term management of her lower back pain.  Medications:   Start Tylenol extra-strength a 1000 t.I.d. not to exceed 3000 mg in a 24 hour.  Labs: reviewed and medications are appropriately dosed for current hepatorenal function.  Imaging: none    Follow Up:   1-2 days following 1st diagnostic lumbar MBB    CODY Gill  Interventional Pain Management    Disclaimer: This note was partly generated using dictation software which may occasionally result in transcription errors.

## 2024-05-21 ENCOUNTER — TELEPHONE (OUTPATIENT)
Dept: PAIN MEDICINE | Facility: CLINIC | Age: 84
End: 2024-05-21
Payer: MEDICARE

## 2024-05-21 NOTE — TELEPHONE ENCOUNTER
----- Message from CONSTANTINE Acosta sent at 2024  4:19 PM CDT -----  Regarding: Order for MARTINEZ BOYD    Patient Name: MARTINEZ BOYD([id number])  Sex: Female  : 1940      PCP: JENS KHAN    Center: [facility] CENTRAL BILLING OFFICE     Level of Service:36582     [place] OFFICE/OUTPT VISIT, EST, LEVL IV, 30-39 MIN    Types of orders made on 2024: Outpatient Referral, Procedure Request    Order Date:2024    Ordering User:PANTERA SPARROW [[id number]]  Encounter Provider:Pantera Sparrow FNP [[id number]]  Authorizing Provider: Pantera Sparrow FNP [[id number]]  Supervising Provider:AMILCAR BILL [[id number]]  Type of Supervision:Collaborating Physician  Department:[facility]EMENT[[id number]]    Common Order Information  Procedure -> Medial Branch Block (Specify level and laterality) Cmt: L3,4,5             Bilaterally    Pre-op Diagnosis   -> Lumbar spondylosis       -> Chronic pain syndrome     Order Specific Information  Order: Procedure Request Order for Pain Management [Custom: [id number]]  Order #:          [id number]: 1 FUTURE    Priority: Routine  Class: Clinic Performed    Future Order Information      Expires on:2025            Expected by:2024                   Associated Diagnoses      M54.50, G89.29 Chronic bi  lateral low back pain without sciatica      M47.9 Spondylosis      M47.816 Lumbar facet arthropathy      Physician -> Ale         Is patient on anti-coagulants? -> No         Facility Name: -> [place]         Follow-up: -> 2 weeks           Priority: Routine  Class: Clinic Performed    Future Order Information      Expires on:2025            Expected by:2024                   Associated Diagnoses        M54.50, G89.29 Chronic bilateral low back pain without sciatica      M47.9 Spondylosis      M47.816 Lumbar facet arthropathy      Procedure -> Medial Branch Block (Specify level and laterality) Cmt: L3,4,5                 Bilaterally        Physician ->  Ale         Is patient on anti-coagulants? -> No         Pre-op Diagnosis -> Lumbar spondylosis           -> Chronic pain syndrome         Facility Name: -> [place]         Follow-up: ->   2 weeks

## 2024-05-22 ENCOUNTER — TELEPHONE (OUTPATIENT)
Dept: PAIN MEDICINE | Facility: CLINIC | Age: 84
End: 2024-05-22
Payer: MEDICARE

## 2024-05-22 DIAGNOSIS — M47.9 SPONDYLOSIS: Primary | ICD-10-CM

## 2024-05-22 DIAGNOSIS — M47.897 OTHER SPONDYLOSIS, LUMBOSACRAL REGION: ICD-10-CM

## 2024-05-22 NOTE — TELEPHONE ENCOUNTER
----- Message from CONSTANTINE Acosta sent at 2024  4:19 PM CDT -----  Regarding: Order for MARTINEZ BOYD    Patient Name: MARTINEZ BOYD([id number])  Sex: Female  : 1940      PCP: JENS KHAN    Center: [facility] CENTRAL BILLING OFFICE     Level of Service:90386     [place] OFFICE/OUTPT VISIT, EST, LEVL IV, 30-39 MIN    Types of orders made on 2024: Outpatient Referral, Procedure Request    Order Date:2024    Ordering User:PANTERA SPARROW [[id number]]  Encounter Provider:Pantera Sparrow FNP [[id number]]  Authorizing Provider: Pantera Sparrow FNP [[id number]]  Supervising Provider:AMILCAR BILL [[id number]]  Type of Supervision:Collaborating Physician  Department:[facility] PAIN MANAGEMENT[[id number]]    Common Order Information  Procedure -> Medial Branch Block (Specify level and laterality) Cmt: L3,4,5             Bilaterally    Pre-op Diagnosis   -> Lumbar spondylosis       -> Chronic pain syndrome     Order Specific Information  Order: Procedure Request Order for Pain Management [Custom: [id number]]  Order #:          [id number]: 1 FUTURE    Priority: Routine  Class: Clinic Performed    Future Order Information      Expires on:2025            Expected by:2024                   Associated Diagnoses      M54.50, G89.29 Chronic bi  lateral low back pain without sciatica      M47.9 Spondylosis      M47.816 Lumbar facet arthropathy      Physician -> Ale         Is patient on anti-coagulants? -> No         Facility Name: -> [place]         Follow-up: -> 2 weeks           Priority: Routine  Class: Clinic Performed    Future Order Information      Expires on:2025            Expected by:2024                   Associated Diagnoses        M54.50, G89.29 Chronic bilateral low back pain without sciatica      M47.9 Spondylosis      M47.816 Lumbar facet arthropathy      Procedure -> Medial Branch Block (Specify level and laterality) Cmt: L3,4,5                 Bilaterally        Physician ->  Ale         Is patient on anti-coagulants? -> No         Pre-op Diagnosis -> Lumbar spondylosis           -> Chronic pain syndrome         Facility Name: -> [place]         Follow-up: ->   2 weeks

## 2024-05-22 NOTE — TELEPHONE ENCOUNTER
----- Message from CONSTANTINE Acosta sent at 2024  4:19 PM CDT -----  Regarding: Order for MARTINEZ BOYD    Patient Name: MARTINEZ BOYD([id number])  Sex: Female  : 1940      PCP: JENS KHAN    Center: [facility] CENTRAL BILLING OFFICE     Level of Service:56347     [place] OFFICE/OUTPT VISIT, EST, LEVL IV, 30-39 MIN    Types of orders made on 2024: Outpatient Referral, Procedure Request    Order Date:2024    Ordering User:PANTERA SPARROW [[id number]]  Encounter Provider:Pantera Sparrow FNP [[id number]]  Authorizing Provider: Pantera Sparrow FNP [[id number]]  Supervising Provider:AMILCAR BILL [[id number]]  Type of Supervision:Collaborating Physician  Department:[facility] PAIN MANAGEMENT[[id number]]    Common Order Information  Procedure -> Medial Branch Block (Specify level and laterality) Cmt: L3,4,5             Bilaterally    Pre-op Diagnosis   -> Lumbar spondylosis       -> Chronic pain syndrome     Order Specific Information  Order: Procedure Request Order for Pain Management [Custom: [id number]]  Order #:          [id number]: 1 FUTURE    Priority: Routine  Class: Clinic Performed    Future Order Information      Expires on:2025            Expected by:2024                   Associated Diagnoses      M54.50, G89.29 Chronic bi  lateral low back pain without sciatica      M47.9 Spondylosis      M47.816 Lumbar facet arthropathy      Physician -> Ale         Is patient on anti-coagulants? -> No         Facility Name: -> [place]         Follow-up: -> 2 weeks           Priority: Routine  Class: Clinic Performed    Future Order Information      Expires on:2025            Expected by:2024                   Associated Diagnoses        M54.50, G89.29 Chronic bilateral low back pain without sciatica      M47.9 Spondylosis      M47.816 Lumbar facet arthropathy      Procedure -> Medial Branch Block (Specify level and laterality) Cmt: L3,4,5                 Bilaterally        Physician ->  Ale         Is patient on anti-coagulants? -> No         Pre-op Diagnosis -> Lumbar spondylosis           -> Chronic pain syndrome         Facility Name: -> [place]         Follow-up: ->   2 weeks

## 2024-05-30 ENCOUNTER — TELEPHONE (OUTPATIENT)
Dept: INTERNAL MEDICINE | Facility: CLINIC | Age: 84
End: 2024-05-30
Payer: MEDICARE

## 2024-06-05 ENCOUNTER — TELEPHONE (OUTPATIENT)
Dept: PAIN MEDICINE | Facility: CLINIC | Age: 84
End: 2024-06-05
Payer: MEDICARE

## 2024-06-06 DIAGNOSIS — N18.32 STAGE 3B CHRONIC KIDNEY DISEASE: Primary | ICD-10-CM

## 2024-06-10 ENCOUNTER — TELEPHONE (OUTPATIENT)
Dept: NEPHROLOGY | Facility: CLINIC | Age: 84
End: 2024-06-10
Payer: MEDICARE

## 2024-06-10 NOTE — PRE-PROCEDURE INSTRUCTIONS
Patient reviewed on 6/10/2024.  Okay to proceed at [facility]. The following pre-procedure instructions and arrival time have been reviewed with patient via phone and sent to patient portal for review.  Patient verbalized an understanding.  Pt to be accompanied by granddaughter day of procedure as responsible .      Dear Tere ,     You are scheduled for a procedure with Dr. Aguilera on 6/12/2024.    Your scheduled arrival time is 12:00 noon.  This arrival time is roughly 1 hour before your anticipated procedure time to allow sufficient time for pre-op..    Please wear comfortable clothes. You will be placed in a gown for your procedure.  Please do not wear a dress.  This procedure will take place at the [facility] at the corner of [facility] and [facility].  It is in the [facility] next to [facility].  The address is:     [address] [place] [address].  [place] [id number]     After entering the building, you will proceed to the second floor where you can check in with registration. You should take any medications that you routinely take for blood pressure, heart medications, thyroid, cholesterol, etc.      The fasting restrictions are dependent on whether or not you are receiving sedation.  Sedation is not available for all procedures.      Your fasting instructions are as follow:  Oral Sedation. You do not need to fast before this procedure.  You can eat and drink like normal.  You CANNOT drive yourself and must have a .     If you are on blood thinners, you need to follow the anticoagulation instructions that had been discussed previously.  You should only stop the blood thinners if it was approved by your primary care physician or your cardiologist.  In the event that you are not able to stop your blood thinners, a blood thinner was not listed on your medication list, or we were not able to get clearance from your cardiologist, then the procedure may have to be  postponed/canceled.      IF you were told to stop your blood thinners, this is how long you should generally hold some of the more common ones.  Remember that stopping blood thinners is only necessary for certain procedures. If you are unsure of your instructions, please call us.   Aspirin - 5 days  Plavix/Clopidogrel - 7 days  Warfarin / Coumadin - 5 days  Eliquis - 3 days  Pradaxa/Dabigatran - 4 days  Xarelto/Rivaroxaban - 3 days     If you are a diabetic, do not take your medication if you will be fasting, but bring it with you. Please plan on being here for roughly 3 hours.     Please call us if you have been sick (running fever, having any flu-like symptoms) or have been taking antibiotics in the past 2 weeks or had any outpatient procedures other than with us (colonoscopy, endoscopy, OBGYN, dental, etc.). If you have been previously COVID positive, you will need to hold off on your procedure until you are symptom free for 10 days. If you did not have any symptoms, you can have your procedure 10 days from your positive test result.       *HOLD ALL VITAMINS, MINERALS, HERBS (INCLUDING HERBAL TEAS) AND SUPPLEMENTS  *SHOWER WITH ANTIBACTERIAL SOAP (EX. DIAL) NIGHT BEFORE AND MORNING OF PROCEDURE  *DO NOT APPLY ANY LOTIONS, OILS, POWDERS, PERFUME/COLOGNE, OINTMENTS, GELS, CREAMS, MAKEUP OR DEODORANT TO YOUR SKIN MORNING OF PROCEDURE  *LEAVE JEWELRY AND ANY VALUABLES AT HOME  *WEAR LOOSE COMFORTABLE CLOTHING (PREFERABLY A BUTTON UP SHIRT)     Please reply to this message as receipt of delivery.     Thank you,  [facility] &  [place], LPN [facility]  Pre-Admit

## 2024-06-12 ENCOUNTER — HOSPITAL ENCOUNTER (OUTPATIENT)
Facility: HOSPITAL | Age: 84
Discharge: HOME | End: 2024-06-12
Attending: STUDENT IN AN ORGANIZED HEALTH CARE EDUCATION/TRAINING PROGRAM | Admitting: STUDENT IN AN ORGANIZED HEALTH CARE EDUCATION/TRAINING PROGRAM
Payer: MEDICARE

## 2024-06-12 VITALS
SYSTOLIC BLOOD PRESSURE: 146 MMHG | TEMPERATURE: 99 F | HEART RATE: 75 BPM | OXYGEN SATURATION: 98 % | HEIGHT: 65 IN | BODY MASS INDEX: 24.16 KG/M2 | DIASTOLIC BLOOD PRESSURE: 77 MMHG | WEIGHT: 145 LBS | RESPIRATION RATE: 18 BRPM

## 2024-06-12 DIAGNOSIS — G89.29 CHRONIC PAIN: ICD-10-CM

## 2024-06-12 DIAGNOSIS — M47.816 LUMBAR SPONDYLOSIS: Primary | ICD-10-CM

## 2024-06-12 PROCEDURE — 64494 INJ PARAVERT F JNT L/S 2 LEV: CPT | Mod: 50 | Performed by: STUDENT IN AN ORGANIZED HEALTH CARE EDUCATION/TRAINING PROGRAM

## 2024-06-12 PROCEDURE — 63600175 PHARM REV CODE 636 W HCPCS: Mod: JZ,JG | Performed by: STUDENT IN AN ORGANIZED HEALTH CARE EDUCATION/TRAINING PROGRAM

## 2024-06-12 PROCEDURE — 64494 INJ PARAVERT F JNT L/S 2 LEV: CPT | Mod: 50,HCNC,, | Performed by: STUDENT IN AN ORGANIZED HEALTH CARE EDUCATION/TRAINING PROGRAM

## 2024-06-12 PROCEDURE — 64493 INJ PARAVERT F JNT L/S 1 LEV: CPT | Mod: 50,HCNC,, | Performed by: STUDENT IN AN ORGANIZED HEALTH CARE EDUCATION/TRAINING PROGRAM

## 2024-06-12 PROCEDURE — 25000003 PHARM REV CODE 250: Performed by: STUDENT IN AN ORGANIZED HEALTH CARE EDUCATION/TRAINING PROGRAM

## 2024-06-12 PROCEDURE — 64493 INJ PARAVERT F JNT L/S 1 LEV: CPT | Mod: 50 | Performed by: STUDENT IN AN ORGANIZED HEALTH CARE EDUCATION/TRAINING PROGRAM

## 2024-06-12 RX ORDER — LIDOCAINE HYDROCHLORIDE 20 MG/ML
INJECTION, SOLUTION EPIDURAL; INFILTRATION; INTRACAUDAL; PERINEURAL
Status: DISCONTINUED | OUTPATIENT
Start: 2024-06-12 | End: 2024-06-12 | Stop reason: HOSPADM

## 2024-06-12 RX ORDER — ALPRAZOLAM 0.5 MG/1
0.5 TABLET, ORALLY DISINTEGRATING ORAL
Status: DISCONTINUED | OUTPATIENT
Start: 2024-06-12 | End: 2024-06-12 | Stop reason: HOSPADM

## 2024-06-12 RX ORDER — BUPIVACAINE HYDROCHLORIDE 2.5 MG/ML
INJECTION, SOLUTION EPIDURAL; INFILTRATION; INTRACAUDAL
Status: DISCONTINUED | OUTPATIENT
Start: 2024-06-12 | End: 2024-06-12 | Stop reason: HOSPADM

## 2024-06-12 RX ADMIN — ALPRAZOLAM 0.5 MG: 0.5 TABLET, ORALLY DISINTEGRATING ORAL at 12:06

## 2024-06-12 NOTE — DISCHARGE INSTRUCTIONS
Home Care Instructions Pain Management:     1.  DIET:     You may resume your normal diet today.     2.  BATHING:     You may shower with luke warm water.     3.  DRESSING:     You may remove your bandage today.     4.  ACTIVITY LEVEL:       You may resume your normal activities 24 hours after your procedure.     5.  MEDICATIONS:     You may resume your normal medications today.     6.  SPECIAL INSTRUCTIONS:     No heat to the injection site for 24 hours including bath or shower, heating pad, moist heat or hot tubs.     Use an ice pack to the injection site for any pain or discomfort.  Apply ice packs for 20 minute intervals as needed.     If you have received any sedatives by mouth today, you can not drive for 12 hours.     If you have received sedation through an IV, you can not drive for 24 hours.     PLEASE CALL YOUR DOCTOR FOR THE FOLLOWIN.  Redness or swelling around the injection site.  2.  Fever of 101 degrees.  3.  Drainage (pus) from the injection site.  4.  For any continuous bleeding (some dried blood over the incision is normal.)     FOR EMERGENCIES:     If any unusual problems or difficulties occur during clinic hours, call [phone] or dial [phone].     Follow up with with your physician in 2-3 weeks.

## 2024-06-12 NOTE — PLAN OF CARE
Discharge instructions given to patient,  verbalized understanding of all.  VSS, denies n/v and tolerating PO, rates pain level tolerable.Family notified for Patient discharge home.

## 2024-06-12 NOTE — OP NOTE
Diagnostic Lumbar Medial Branch Block Under Fluoroscopy    The procedure, risks, benefits, and options were discussed with the patient. There are no contraindications to the procedure. The patent expressed understanding and agreed to the procedure. Informed written consent was obtained prior to the start of the procedure and can be found in the patient's chart.    PATIENT NAME: Tere Velasco   MRN: [id number]     DATE OF PROCEDURE: 06/12/2024                                           PROCEDURE:  Diagnostic Bilateral L3, L4, and L5 Lumbar Medial Branch Block under Fluoroscopy    PRE-OP DIAGNOSIS: Spondylosis [M47.9]  Other spondylosis, lumbosacral region [M47.897] Lumbar spondylosis [M47.816]    POST-OP DIAGNOSIS: Same    PHYSICIAN: Siri Aguilera DO    ASSISTANTS: None    MEDICATIONS INJECTED:  Bupivicaine 0.25%    LOCAL ANESTHETIC INJECTED:   Xylocaine 2%    SEDATION: None    ESTIMATED BLOOD LOSS:  None    COMPLICATIONS:  None.    INTERVAL HISTORY: Patient has clinical and imaging findings suggestive of facet mediated pain.    TECHNIQUE: Time-out was performed to identify the patient and procedure to be performed. With the patient laying in a prone position, the surgical area was prepped and draped in the usual sterile fashion using ChloraPrep and fenestrated drape. The levels were determined under fluoroscopic guidance. Skin anesthesia was achieved by injecting Lidocaine 2% over the injection sites. A 22 gauge, 3.5 inch needle was introduced into the medial branch nerves at the junctions of the superior articular process and the transverse processes of the targeted sites using AP, lateral and/or contralateral oblique fluoroscopic imaging. After negative aspiration for blood or CSF was confirmed, 1 mL of the anesthetic listed above was then slowly injected at each site. The needles were removed and bleeding was nil. A sterile dressing was applied. No specimens collected. The patient tolerated the procedure  well.      The patient was monitored after the procedure in the recovery area. They were given post-procedure and discharge instructions to follow at home. The patient was discharged in a stable condition.    Siri Aguilera DO

## 2024-06-12 NOTE — PLAN OF CARE
Pain pre-procedure, checklist and tasks completed, VSS, pt has no c/o voiced, no needs requested, time allotted for questions/ concerns, bed low and locked, siderails up, call light given to pt

## 2024-06-12 NOTE — DISCHARGE SUMMARY
Discharge Note  Short Stay      SUMMARY     Admit Date: 6/12/2024    Attending Physician: Siri Aguilera      Discharge Physician: Siri Aguilera      Discharge Date: 6/12/2024 1:18 PM    Procedure(s) (LRB):  Medial Branch Block #1 B/L  L3,4,5 (Bilateral)    Final Diagnosis: Spondylosis [M47.9]  Other spondylosis, lumbosacral region [M47.897]    Disposition: Home or self care    Patient Instructions:   Current Discharge Medication List        CONTINUE these medications which have NOT CHANGED    Details   buPROPion (WELLBUTRIN SR) 150 MG TBSR 12 hr tablet TAKE ONE TABLET BY MOUTH TWICE A DAY  Qty: 180 tablet, Refills: 3    Associated Diagnoses: Anxiety      DULoxetine (CYMBALTA) 20 MG capsule TAKE ONE CAPSULE BY MOUTH TWICE A DAY  Qty: 180 capsule, Refills: 3    Associated Diagnoses: Fibromyalgia      eszopiclone (LUNESTA) 2 MG Tab TAKE ONE TABLET BY MOUTH AT BEDTIME AS NEEDED  Qty: 90 tablet, Refills: 1    Associated Diagnoses: Insomnia, unspecified type      famotidine (PEPCID) 20 MG tablet TAKE ONE TABLET BY MOUTH EVERY DAY  Qty: 90 tablet, Refills: 3    Associated Diagnoses: Gastroesophageal reflux disease, unspecified whether esophagitis present      HORIZANT 600 mg TbSR TAKE ONE TABLET BY MOUTH EVERY EVENING  Qty: 90 tablet, Refills: 2    Associated Diagnoses: RLS (restless legs syndrome)      hydroCHLOROthiazide (HYDRODIURIL) 12.5 MG Tab Take 1 tablet (12.5 mg total) by mouth once daily.  Qty: 90 tablet, Refills: 3    Comments: .      losartan (COZAAR) 50 MG tablet TAKE ONE TABLET BY MOUTH EVERY DAY BEFORE BREAKFAST  Qty: 90 tablet, Refills: 3    Comments: .  Associated Diagnoses: Renovascular hypertension      vit A/vit C/vit E/zinc/copper (ICAPS AREDS ORAL) Take by mouth.      ferrous gluconate (FERGON) 324 MG tablet ferrous gluconate 324 mg (38 mg iron) tablet      pravastatin (PRAVACHOL) 80 MG tablet Take 1 tablet (80 mg total) by mouth every evening.  Qty: 90 tablet, Refills: 3    Associated  Diagnoses: Hyperlipidemia, unspecified hyperlipidemia type      UNABLE TO FIND Relief Factor---1 packet per day                 Discharge Diagnosis: Spondylosis [M47.9]  Other spondylosis, lumbosacral region [M47.897]  Condition on Discharge: Stable with no complications to procedure   Diet on Discharge: Same as before.  Activity: as per instruction sheet.  Discharge to: Home with a responsible adult.  Follow up: 2-4 weeks       Please call my office or pager at [phone] if experienced any weakness or loss of sensation, fever > 101.5, pain uncontrolled with oral medications, persistent nausea/vomiting/or diarrhea, redness or drainage from the incisions, or any other worrisome concerns. If physician on call was not reached or could not communicate with our office for any reason please go to the nearest emergency department

## 2024-06-17 ENCOUNTER — TELEPHONE (OUTPATIENT)
Dept: NEUROLOGY | Facility: CLINIC | Age: 84
End: 2024-06-17
Payer: MEDICARE

## 2024-06-17 ENCOUNTER — TELEPHONE (OUTPATIENT)
Dept: PAIN MEDICINE | Facility: CLINIC | Age: 84
End: 2024-06-17
Payer: MEDICARE

## 2024-06-18 ENCOUNTER — TELEPHONE (OUTPATIENT)
Dept: PAIN MEDICINE | Facility: CLINIC | Age: 84
End: 2024-06-18
Payer: MEDICARE

## 2024-06-18 DIAGNOSIS — M47.816 LUMBAR SPONDYLOSIS: Primary | ICD-10-CM

## 2024-06-18 NOTE — PROGRESS NOTES
Patient reports >80% relief following first diagnostic Bilateral L3, L4, and L5 MBB.  I will schedule the patient for a second diagnostic block.     Siri Aguilera, DO   Interventional Pain Management

## 2024-06-18 NOTE — TELEPHONE ENCOUNTER
Spoke with pt. She stated that she received 80% relief from the procedure. No further concerns was expressed. Call ended. ----- Message from María Tirado sent at 6/18/2024 10:03 AM CDT -----  Type:  Patient Returning Call    Who Called:Pt   Who Left Message for Patient:Julissa   Does the patient know what this is regarding?:  Would the patient rather a call back or a response via [facility]? Call back   Best Call Back Number: [phone]  Additional Information:

## 2024-06-19 ENCOUNTER — TELEPHONE (OUTPATIENT)
Dept: PAIN MEDICINE | Facility: CLINIC | Age: 84
End: 2024-06-19
Payer: MEDICARE

## 2024-06-19 DIAGNOSIS — M47.816 LUMBAR SPONDYLOSIS: Primary | ICD-10-CM

## 2024-06-19 NOTE — TELEPHONE ENCOUNTER
----- Message from Bob De Guzman MA sent at 6/19/2024  1:13 PM CDT -----  Regarding: FW: Call back  Contact: [phone]  Please advise.....  ----- Message -----  From: Letitia Choudhury  Sent: 6/19/2024  11:52 AM CDT  To: [facility]  Subject: Call back                                        Who Called: PT     Patient is calling to reschedule her procedure. Please advice

## 2024-06-19 NOTE — TELEPHONE ENCOUNTER
----- Message from Amilcar Aguilera DO sent at 2024  4:28 PM CDT -----  Regarding: Order for MARTINEZ BOYD    Patient Name: MARTINEZ BOYD([id number])  Sex: Female  : 1940      PCP: JENS KHAN    Center: [facility] CENTRAL BILLING OFFICE     Types of orders made on 2024: Procedure Request    Order Date:2024  Ordering User:AMILCAR AGUILERA [[id number]]  Encounter Provider:Amilcar Aguilera DO [[id number]]  Z  1 Authorizing Provider: Amilcar Aguilera DO [[id number]]  Department:[facility] PAIN MANAGEMENT[[id number]]    Common Order Information  Procedure -> Medial Branch Block (Specify level and laterality) Cmt: #2 MBB L3,             L4, L5    Pre-op Diagnosis -> Lumbar spondylosis     Order Specific Information  Order: Procedure Order to Pain Management [Custom: [id number]]  Order #:          [id number]: 1 FUTURE    Priority: Rou  bonifacio  Class: Clinic Performed    Future Order Information      Expires on:2025            Expected by:2024                   Associated Diagnoses      M47.816 Lumbar spondylosis      Physician -> slava         Facility Name: -> [facility]           Priority: Routine  Class: Clinic Performed    Future Order Information      Expires on:2025            Expected by:2024                     Associated Diagnoses      M47.816 Lumbar spondylosis      Procedure -> Medial Branch Block (Specify level and laterality) Cmt: #2 MBB                 L3, L4, L5        Physician -> slava         Pre-op Diagnosis -> Lumbar spondylosis         Facility Name: -> [facility]

## 2024-06-19 NOTE — TELEPHONE ENCOUNTER
Spoke with pt in regards to Dr. Aguilera message. Pt stated her understanding. Call ended. ----- Message from Siri Aguilera DO sent at 6/18/2024  4:26 PM CDT -----  Regarding: RE: MBB Procedure  Please let her know I am placing the orders for her 2nd diagnostic block.  If she gets relief after this 2nd block we can proceed with the ablation procedure.  ----- Message -----  From: Elliott Whiteside MA  Sent: 6/18/2024   3:11 PM CDT  To: Siri Aguilera DO  Subject: MBB Procedure                                    Spoke with pt. She stated that she received 80% relief from the procedure. No further concerns was expressed. Call ended.

## 2024-06-28 ENCOUNTER — TELEPHONE (OUTPATIENT)
Dept: PAIN MEDICINE | Facility: CLINIC | Age: 84
End: 2024-06-28
Payer: MEDICARE

## 2024-06-28 DIAGNOSIS — D63.1 ANEMIA OF CHRONIC RENAL FAILURE, STAGE 3B: Primary | ICD-10-CM

## 2024-06-28 DIAGNOSIS — N18.32 ANEMIA OF CHRONIC RENAL FAILURE, STAGE 3B: Primary | ICD-10-CM

## 2024-07-02 ENCOUNTER — TELEPHONE (OUTPATIENT)
Dept: PAIN MEDICINE | Facility: CLINIC | Age: 84
End: 2024-07-02
Payer: MEDICARE

## 2024-07-05 NOTE — PRE-PROCEDURE INSTRUCTIONS
Unable to reach pt via phone.  Left voicemail with arrival time also informing pt of need for responsible  accompaniment and instructing pt to follow pre-procedure instructions provided via [facility] portal.  The following message was sent to pt's portal.      Dear Tere ,     Please read over the following pre-procedure instructions in it's entirety as there is helpful information here to get you well prepared for your upcoming procedure.              You are scheduled for a procedure with Dr. Aguilera on 7/9/2024.    Your scheduled arrival time is 1 pm.  This arrival time is roughly 1 hour before your anticipated procedure time to allow sufficient time for pre-op..    Please wear comfortable clothes. You will be placed in a gown for your procedure.  Please do not wear a dress.  This procedure will take place at the [facility] at the corner of [facility].  [facility] [place] [facility] to [facility].  The address is:     [address] [place] [address].  [place] [address]     After entering the building, you will proceed to the second floor where you can check in with registration. You should take any medications that you routinely take for blood pressure, heart medications, thyroid, cholesterol, etc.      The fasting restrictions are dependent on whether or not you are receiving sedation.  Sedation is not available for all procedures.      Your fasting instructions are as follow:  Oral Sedation. You do not need to fast before this procedure.  You can eat and drink like normal.  You CANNOT drive yourself and must have a .     If you are on blood thinners, you need to follow the anticoagulation instructions that had been discussed previously.  You should only stop the blood thinners if it was approved by your primary care physician or your cardiologist.  In the event that you are not able to stop your blood thinners, a blood thinner was not listed on your  medication list, or we were not able to get clearance from your cardiologist, then the procedure may have to be postponed/canceled.      IF you were told to stop your blood thinners, this is how long you should generally hold some of the more common ones.  Remember that stopping blood thinners is only necessary for certain procedures. If you are unsure of your instructions, please call us.   Aspirin - 5 days  Plavix/Clopidogrel - 7 days  Warfarin / Coumadin - 5 days  Eliquis - 3 days  Pradaxa/Dabigatran - 4 days  Xarelto/Rivaroxaban - 3 days     If you are a diabetic, do not take your medication if you will be fasting, but bring it with you. Please plan on being here for roughly 3 hours.     Please call us if you have been sick (running fever, having any flu-like symptoms) or have been taking ANTIBIOTICS in the past 2 weeks or had any outpatient procedures other than with us (colonoscopy, endoscopy, OBGYN, dental, etc.).     If you have been previously COVID positive, you will need to hold off on your procedure until you are symptom free for 10 days. If you did not have any symptoms, you can have your procedure 10 days from your positive test result.       On the morning of your procedure:  *HOLD ALL VITAMINS, MINERALS, HERBS (INCLUDING HERBAL TEAS) AND SUPPLEMENTS  *SHOWER WITH ANTIBACTERIAL SOAP (EX. DIAL) NIGHT BEFORE AND MORNING OF PROCEDURE  *DO NOT APPLY ANY LOTIONS, OILS, POWDERS, PERFUME/COLOGNE, OINTMENTS, GELS, CREAMS, MAKEUP OR DEODORANT TO YOUR SKIN MORNING OF PROCEDURE  *LEAVE JEWELRY AND ANY VALUABLES AT HOME  *WEAR LOOSE COMFORTABLE CLOTHING (PREFERABLY A BUTTON UP SHIRT)     Please reply to this message as receipt of delivery.     Thank you,  [facility]  [place], LPN [facility]  Pre-Admit

## 2024-07-09 ENCOUNTER — HOSPITAL ENCOUNTER (OUTPATIENT)
Facility: HOSPITAL | Age: 84
Discharge: HOME | End: 2024-07-09
Attending: STUDENT IN AN ORGANIZED HEALTH CARE EDUCATION/TRAINING PROGRAM | Admitting: STUDENT IN AN ORGANIZED HEALTH CARE EDUCATION/TRAINING PROGRAM
Payer: MEDICARE

## 2024-07-09 VITALS
HEART RATE: 71 BPM | RESPIRATION RATE: 16 BRPM | OXYGEN SATURATION: 98 % | SYSTOLIC BLOOD PRESSURE: 144 MMHG | DIASTOLIC BLOOD PRESSURE: 71 MMHG | TEMPERATURE: 98 F

## 2024-07-09 DIAGNOSIS — M47.816 LUMBAR SPONDYLOSIS: Primary | ICD-10-CM

## 2024-07-09 DIAGNOSIS — G89.29 CHRONIC PAIN: ICD-10-CM

## 2024-07-09 PROCEDURE — 63600175 PHARM REV CODE 636 W HCPCS: Mod: JZ,JG | Performed by: STUDENT IN AN ORGANIZED HEALTH CARE EDUCATION/TRAINING PROGRAM

## 2024-07-09 PROCEDURE — 64493 INJ PARAVERT F JNT L/S 1 LEV: CPT | Mod: 50 | Performed by: STUDENT IN AN ORGANIZED HEALTH CARE EDUCATION/TRAINING PROGRAM

## 2024-07-09 PROCEDURE — 64494 INJ PARAVERT F JNT L/S 2 LEV: CPT | Mod: 50 | Performed by: STUDENT IN AN ORGANIZED HEALTH CARE EDUCATION/TRAINING PROGRAM

## 2024-07-09 PROCEDURE — 25000003 PHARM REV CODE 250: Performed by: STUDENT IN AN ORGANIZED HEALTH CARE EDUCATION/TRAINING PROGRAM

## 2024-07-09 PROCEDURE — 64494 INJ PARAVERT F JNT L/S 2 LEV: CPT | Mod: 50,HCNC,, | Performed by: STUDENT IN AN ORGANIZED HEALTH CARE EDUCATION/TRAINING PROGRAM

## 2024-07-09 PROCEDURE — 64493 INJ PARAVERT F JNT L/S 1 LEV: CPT | Mod: 50,HCNC,, | Performed by: STUDENT IN AN ORGANIZED HEALTH CARE EDUCATION/TRAINING PROGRAM

## 2024-07-09 RX ORDER — BUPIVACAINE HYDROCHLORIDE 2.5 MG/ML
INJECTION, SOLUTION EPIDURAL; INFILTRATION; INTRACAUDAL
Status: DISCONTINUED | OUTPATIENT
Start: 2024-07-09 | End: 2024-07-09 | Stop reason: HOSPADM

## 2024-07-09 RX ORDER — LIDOCAINE HYDROCHLORIDE 20 MG/ML
INJECTION, SOLUTION EPIDURAL; INFILTRATION; INTRACAUDAL; PERINEURAL
Status: DISCONTINUED | OUTPATIENT
Start: 2024-07-09 | End: 2024-07-09 | Stop reason: HOSPADM

## 2024-07-09 RX ORDER — ALPRAZOLAM 0.5 MG/1
0.5 TABLET, ORALLY DISINTEGRATING ORAL
Status: DISCONTINUED | OUTPATIENT
Start: 2024-07-09 | End: 2024-07-09 | Stop reason: HOSPADM

## 2024-07-09 RX ADMIN — ALPRAZOLAM 0.5 MG: 0.5 TABLET, ORALLY DISINTEGRATING ORAL at 01:07

## 2024-07-09 NOTE — H&P
HPI  Patient presenting for Procedure(s) (LRB):  #2 MBB L3, L4, L5 B/L (Bilateral)     Patient on Anti-coagulation No    No health changes since previous encounter    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)    Cataract     removed from both eyes    CKD (chronic kidney disease) stage 3, GFR 30-59 ml/min     Clotting disorder     when knee was replaced    Colitis     Degenerative disc disease     Depression     Fibromyalgia     GERD (gastroesophageal reflux disease)     Hyperlipidemia     Hypertension     Irritable bowel syndrome     TRU (obstructive sleep apnea)     RLS (restless legs syndrome)     S/P knee replacement 1/13/2014     Past Surgical History:   Procedure Laterality Date    APPENDECTOMY      BREAST BIOPSY      BREAST LUMPECTOMY      right side    BREAST SURGERY      CHOLECYSTECTOMY      COLONOSCOPY N/A 12/2/2016    Procedure: COLONOSCOPY;  Surgeon: Ori Cope MD;  Location: [facility] ENDO [address]);  Service: Endoscopy;  Laterality: N/A;  PM prep    EYE SURGERY Bilateral     cataract    HYSTERECTOMY  1977    uterine pain    INJECTION OF ANESTHETIC AGENT AROUND MEDIAL BRANCH NERVES INNERVATING LUMBAR FACET JOINT Bilateral 6/12/2024    Procedure: Medial Branch Block #1 B/L  L3,4,5;  Surgeon: Siri Aguilera DO;  Location: [facility] PAIN MANAGEMENT;  Service: Pain Management;  Laterality: Bilateral;  oral sed no ac    INJECTION OF FACET JOINT Bilateral 4/27/2021    Procedure: Facet joint injection-lumbar--Bilateral L4-5, L5-S1;  Surgeon: Kuldip Siddiqui Jr., MD;  Location: [facility] PAIN MGT;  Service: Pain Management;  Laterality: Bilateral;    JOINT REPLACEMENT      knees replaced bilaterally    KNEE SURGERY Bilateral     RECTAL SURGERY      rectocele    TONSILLECTOMY       Review of patient's allergies indicates:   Allergen Reactions    Demerol [meperidine]       No current facility-administered  medications for this encounter.       PMHx, PSHx, Allergies, Medications reviewed in epic    ROS negative except pain complaints in HPI    OBJECTIVE:    There were no vitals taken for this visit.    PHYSICAL EXAMINATION:    GENERAL: Well appearing, in no acute distress, alert and oriented x3.  PSYCH:  Mood and affect appropriate.  SKIN: Skin color, texture, turgor normal, no rashes or lesions which will impact the procedure.  CV: RRR with palpation of the radial artery.  PULM: No evidence of respiratory difficulty, symmetric chest rise. Clear to auscultation.  NEURO: Cranial nerves grossly intact.    Plan:    Proceed with procedure as planned Procedure(s) (LRB):  #2 MBB L3, L4, L5 B/L (Bilateral)    Siri Aguilera  07/09/2024

## 2024-07-09 NOTE — DISCHARGE INSTRUCTIONS
Home Care Instructions Pain Management:    1.  DIET:    You may resume your normal diet today.    2.  BATHING:    You may shower with luke warm water.    3.  DRESSING:    You may remove your bandage today.    4.  ACTIVITY LEVEL:      You may resume your normal activities 24 hours after your procedure.    5.  MEDICATIONS:    You may resume your normal medications today.    6.  SPECIAL INSTRUCTIONS:    No heat to the injection site for 24 hours including bath or shower, heating pad, moist heat or hot tubs.    Use an ice pack to the injection site for any pain or discomfort.  Apply ice packs for 20 minute intervals as needed.    If you have received any sedatives by mouth today, you can not drive for 12 hours.    If you have received sedation through an IV, you can not drive for 24 hours.    PLEASE CALL YOUR DOCTOR FOR THE FOLLOWIN.  Redness or swelling around the injection site.  2.  Fever of 101 degrees.  3.  Drainage (pus) from the injection site.  4.  For any continuous bleeding (some dried blood over the incision is normal.)    FOR EMERGENCIES:    If any unusual problems or difficulties occur during clinic hours, call [phone] or dial [phone].    Follow up with with your physician in 2-3 weeks.

## 2024-07-09 NOTE — PLAN OF CARE
Tere Velasco has met all discharge criteria from Phase II. Vital Signs are stable, ambulating  without difficulty. Discharge instructions given, patient verbalized understanding. Discharged from facility via wheelchair in stable condition.

## 2024-07-09 NOTE — OP NOTE
Diagnostic Lumbar Medial Branch Block Under Fluoroscopy    The procedure, risks, benefits, and options were discussed with the patient. There are no contraindications to the procedure. The patent expressed understanding and agreed to the procedure. Informed written consent was obtained prior to the start of the procedure and can be found in the patient's chart.    PATIENT NAME: Tere Velasco   MRN: [id number]     DATE OF PROCEDURE: 07/09/2024                                           PROCEDURE:  Diagnostic Bilateral L3, L4, and L5 Lumbar Medial Branch Block under Fluoroscopy    PRE-OP DIAGNOSIS: Lumbar spondylosis [M47.816] Lumbar spondylosis [M47.816]    POST-OP DIAGNOSIS: Same    PHYSICIAN: Siri Aguilera DO    ASSISTANTS: None    MEDICATIONS INJECTED:  Bupivicaine 0.25%    LOCAL ANESTHETIC INJECTED:   Xylocaine 2%    SEDATION: None    ESTIMATED BLOOD LOSS:  None    COMPLICATIONS:  None.    INTERVAL HISTORY: Patient has clinical and imaging findings suggestive of facet mediated pain. Patient had a previous diagnostic block performed with at least 80% relief in pain and/or at least 50% improvement in the ability to perform previously painful movements and ADLs for the expected duration of the local anesthetic utilized.    TECHNIQUE: Time-out was performed to identify the patient and procedure to be performed. With the patient laying in a prone position, the surgical area was prepped and draped in the usual sterile fashion using ChloraPrep and fenestrated drape. The levels were determined under fluoroscopic guidance. Skin anesthesia was achieved by injecting Lidocaine 2% over the injection sites. A 22 gauge, 3.5 inch needle was introduced into the medial branch nerves at the junctions of the superior articular process and the transverse processes of the targeted sites using AP, lateral and/or contralateral oblique fluoroscopic imaging. After negative aspiration for blood or CSF was confirmed, 1 mL of the anesthetic  listed above was then slowly injected at each site. The needles were removed and bleeding was nil. A sterile dressing was applied. No specimens collected. The patient tolerated the procedure well.      The patient was monitored after the procedure in the recovery area. They were given post-procedure and discharge instructions to follow at home. The patient was discharged in a stable condition.      Siri Aguilera DO

## 2024-07-09 NOTE — DISCHARGE SUMMARY
Discharge Note  Short Stay      SUMMARY     Admit Date: 7/9/2024    Attending Physician: Siri Aguilera      Discharge Physician: Siri Aguilera      Discharge Date: 7/9/2024 2:46 PM    Procedure(s) (LRB):  #2 MBB L3, L4, L5 B/L (Bilateral)    Final Diagnosis: Lumbar spondylosis [M47.816]    Disposition: Home or self care    Patient Instructions:   Current Discharge Medication List        CONTINUE these medications which have NOT CHANGED    Details   buPROPion (WELLBUTRIN SR) 150 MG TBSR 12 hr tablet TAKE ONE TABLET BY MOUTH TWICE A DAY  Qty: 180 tablet, Refills: 3    Associated Diagnoses: Anxiety      DULoxetine (CYMBALTA) 20 MG capsule TAKE ONE CAPSULE BY MOUTH TWICE A DAY  Qty: 180 capsule, Refills: 3    Associated Diagnoses: Fibromyalgia      eszopiclone (LUNESTA) 2 MG Tab TAKE ONE TABLET BY MOUTH AT BEDTIME AS NEEDED  Qty: 90 tablet, Refills: 1    Associated Diagnoses: Insomnia, unspecified type      famotidine (PEPCID) 20 MG tablet TAKE ONE TABLET BY MOUTH EVERY DAY  Qty: 90 tablet, Refills: 3    Associated Diagnoses: Gastroesophageal reflux disease, unspecified whether esophagitis present      HORIZANT 600 mg TbSR TAKE ONE TABLET BY MOUTH EVERY EVENING  Qty: 90 tablet, Refills: 2    Associated Diagnoses: RLS (restless legs syndrome)      hydroCHLOROthiazide (HYDRODIURIL) 12.5 MG Tab Take 1 tablet (12.5 mg total) by mouth once daily.  Qty: 90 tablet, Refills: 3    Comments: .      losartan (COZAAR) 50 MG tablet TAKE ONE TABLET BY MOUTH EVERY DAY BEFORE BREAKFAST  Qty: 90 tablet, Refills: 3    Comments: .  Associated Diagnoses: Renovascular hypertension      vit A/vit C/vit E/zinc/copper (ICAPS AREDS ORAL) Take by mouth.      ferrous gluconate (FERGON) 324 MG tablet ferrous gluconate 324 mg (38 mg iron) tablet      pravastatin (PRAVACHOL) 80 MG tablet Take 1 tablet (80 mg total) by mouth every evening.  Qty: 90 tablet, Refills: 3    Associated Diagnoses: Hyperlipidemia, unspecified hyperlipidemia type       UNABLE TO FIND Relief Factor---1 packet per day                 Discharge Diagnosis: Lumbar spondylosis [M47.816]  Condition on Discharge: Stable with no complications to procedure   Diet on Discharge: Same as before.  Activity: as per instruction sheet.  Discharge to: Home with a responsible adult.  Follow up: 2-4 weeks       Please call my office or pager at [phone] if experienced any weakness or loss of sensation, fever > 101.5, pain uncontrolled with oral medications, persistent nausea/vomiting/or diarrhea, redness or drainage from the incisions, or any other worrisome concerns. If physician on call was not reached or could not communicate with our office for any reason please go to the nearest emergency department

## 2024-07-09 NOTE — PLAN OF CARE
Pt in preop bay 14, VSS. Pt denies any open wounds on body or the use of any immunizations or antibiotics in the past 2 weeks. Pt ready to roll.

## 2024-07-10 ENCOUNTER — TELEPHONE (OUTPATIENT)
Dept: PAIN MEDICINE | Facility: CLINIC | Age: 84
End: 2024-07-10
Payer: MEDICARE

## 2024-07-12 ENCOUNTER — TELEPHONE (OUTPATIENT)
Dept: PAIN MEDICINE | Facility: CLINIC | Age: 84
End: 2024-07-12
Payer: MEDICARE

## 2024-07-12 NOTE — TELEPHONE ENCOUNTER
Called pt. back to ask if she got 80% or more relief  from MBB, No answer, lvm  ----- Message from Elliott Whiteside MA sent at 7/12/2024  4:39 PM CDT -----    ----- Message -----  From: María Tirado  Sent: 7/12/2024   4:02 PM CDT  To: [facility] Staff    Type:  Patient Returning Call    Who Called:Pt   Who Left Message for Patient:Julissa   Does the patient know what this is regarding?:  Would the patient rather a call back or a response via [facility]? Call back  Best Call Back Number:[phone]  Additional Information:

## 2024-07-16 ENCOUNTER — TELEPHONE (OUTPATIENT)
Dept: PAIN MEDICINE | Facility: CLINIC | Age: 84
End: 2024-07-16
Payer: MEDICARE

## 2024-07-16 DIAGNOSIS — M47.816 LUMBAR SPONDYLOSIS: Primary | ICD-10-CM

## 2024-07-16 NOTE — PROGRESS NOTES
Patient reports >80% relief following second diagnostic Bilateral L3, L4, and L5 MBB.  I will schedule the patient for the RFA procedure.     Siri Aguilera, DO   Interventional Pain Management

## 2024-07-16 NOTE — TELEPHONE ENCOUNTER
----- Message from Amilcar Aguilera DO sent at 2024  4:04 PM CDT -----  Regarding: Order for MARTINEZ BOYD    Patient Name: MARTINEZ BOYD([id number])  Sex: Female  : 1940      PCP: JENS KHAN    Center: [facility] CENTRAL BILLING OFFICE     Types of orders made on 2024: Procedure Request    Order Date:2024  Ordering User:AMILCAR AGUILERA [[id number]]  Encounter Provider:Amilcar Aguilera DO [[id number]]  Z  1 Authorizing Provider: Amilcar Aguilera DO [[id number]]  Department:[facility] PAIN MANAGEMENT[[id number]]    Common Order Information  Procedure -> Radiofrequency Ablation (Specify level and laterality) Cmt: B/l             L3, L4, L5    Pre-op Diagnosis -> Lumbar spondylosis     Order Specific Information  Order: Procedure Order to Pain Management [Custom: [id number]]  Order #:          [id number]: 1 FUTURE    Priority: Ro  utine  Class: Clinic Performed    Future Order Information      Expires on:2025            Expected by:2024                   Associated Diagnoses      M47.816 Lumbar spondylosis      Physician -> Ale         Facility Name: -> [facility]           Priority: Routine  Class: Clinic Performed    Future Order Information      Expires on:2025            Expected by:                   Associated Diagnoses      M47.816 Lumbar spondylosis      Procedure -> Radiofrequency Ablation (Specify level and laterality) Cmt:                 B/l L3, L4, L5        Physician -> Ale         Pre-op Diagnosis -> Lumbar spondylosis         Facility Name: -> [facility]

## 2024-07-24 ENCOUNTER — TELEPHONE (OUTPATIENT)
Dept: PAIN MEDICINE | Facility: CLINIC | Age: 84
End: 2024-07-24
Payer: MEDICARE

## 2024-07-29 NOTE — PRE-PROCEDURE INSTRUCTIONS
Unable to reach pt via phone.  Left voicemail with arrival time also informing pt of need for responsible  accompaniment and instructing pt to follow pre-procedure instructions provided via [facility] portal.  The following message was sent to pt's portal.      Dear Tere ,     Please read over the following pre-procedure instructions in it's entirety as there is helpful information here to get you well prepared for your upcoming procedure.              You are scheduled for a procedure with Dr. Aguilera on 7/31/2024.    Your scheduled arrival time is 9:40 am.  This arrival time is roughly 1 hour before your anticipated procedure time to allow sufficient time for pre-op..    Please wear comfortable clothes. You will be placed in a gown for your procedure.  Please do not wear a dress.  This procedure will take place at the [facility] at the corner of [facility] and [facility].  It is in the [facility] next to [facility].  The address is:     [address] [place] [address].  [place] [address]     After entering the building, you will proceed to the second floor where you can check in with registration. You should take any medications that you routinely take for blood pressure, heart medications, thyroid, cholesterol, etc.      The fasting restrictions are dependent on whether or not you are receiving sedation.  Sedation is not available for all procedures.      Your fasting instructions are as follow:  IV sedation. You should not eat for 8 hours and can only drink clear liquids (water or black coffee without cream/sugar) up until 2 hours before your scheduled time.  You CANNOT drive yourself and must have a .     If you are on blood thinners, you need to follow the anticoagulation instructions that had been discussed previously.  You should only stop the blood thinners if it was approved by your primary care physician or your cardiologist.  In the event that you are not able  to stop your blood thinners, a blood thinner was not listed on your medication list, or we were not able to get clearance from your cardiologist, then the procedure may have to be postponed/canceled.      IF you were told to stop your blood thinners, this is how long you should generally hold some of the more common ones.  Remember that stopping blood thinners is only necessary for certain procedures. If you are unsure of your instructions, please call us.   Aspirin - 5 days  Plavix/Clopidogrel - 7 days  Warfarin / Coumadin - 5 days  Eliquis - 3 days  Pradaxa/Dabigatran - 4 days  Xarelto/Rivaroxaban - 3 days     If you are a diabetic, do not take your medication if you will be fasting, but bring it with you. Please plan on being here for roughly 3 hours.     Please call us if you have been sick (running fever, having any flu-like symptoms) or have been taking ANTIBIOTICS in the past 2 weeks or had any outpatient procedures other than with us (colonoscopy, endoscopy, OBGYN, dental, etc.).     If you have been previously COVID positive, you will need to hold off on your procedure until you are symptom free for 10 days. If you did not have any symptoms, you can have your procedure 10 days from your positive test result.       On the morning of your procedure:  *HOLD ALL VITAMINS, MINERALS, HERBS (INCLUDING HERBAL TEAS) AND SUPPLEMENTS  *SHOWER WITH ANTIBACTERIAL SOAP (EX. DIAL) NIGHT BEFORE AND MORNING OF PROCEDURE  *DO NOT APPLY ANY LOTIONS, OILS, POWDERS, PERFUME/COLOGNE, OINTMENTS, GELS, CREAMS, MAKEUP OR DEODORANT TO YOUR SKIN MORNING OF PROCEDURE  *LEAVE JEWELRY AND ANY VALUABLES AT HOME  *WEAR LOOSE COMFORTABLE CLOTHING (PREFERABLY A BUTTON UP SHIRT)     Please reply to this message as receipt of delivery.     Thank you,  [facility]  [place], LPN [facility]  Pre-Admit

## 2024-07-30 ENCOUNTER — TELEPHONE (OUTPATIENT)
Dept: PAIN MEDICINE | Facility: CLINIC | Age: 84
End: 2024-07-30
Payer: MEDICARE

## 2024-07-31 ENCOUNTER — TELEPHONE (OUTPATIENT)
Dept: PAIN MEDICINE | Facility: CLINIC | Age: 84
End: 2024-07-31
Payer: MEDICARE

## 2024-07-31 ENCOUNTER — HOSPITAL ENCOUNTER (EMERGENCY)
Facility: HOSPITAL | Age: 84
Discharge: HOME | End: 2024-07-31
Attending: EMERGENCY MEDICINE
Payer: MEDICARE

## 2024-07-31 VITALS
HEART RATE: 77 BPM | HEIGHT: 65 IN | DIASTOLIC BLOOD PRESSURE: 52 MMHG | BODY MASS INDEX: 24.16 KG/M2 | SYSTOLIC BLOOD PRESSURE: 111 MMHG | TEMPERATURE: 98 F | OXYGEN SATURATION: 95 % | RESPIRATION RATE: 20 BRPM | WEIGHT: 145 LBS
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
## 2024-07-31 PROCEDURE — 99284 EMERGENCY DEPT VISIT MOD MDM: CPT | Mod: 25,HCNC

## 2024-07-31 NOTE — DISCHARGE INSTRUCTIONS
Home Care Instructions Pain Management:    1.  DIET:    You may resume your normal diet today.    2.  BATHING:    You may shower with luke warm water.    3.  DRESSING:    You may remove your bandage today.    4.  ACTIVITY LEVEL:      You may resume your normal activities 24 hours after your procedure.    5.  MEDICATIONS:    You may resume your normal medications today.    6.  SPECIAL INSTRUCTIONS:    No heat to the injection site for 24 hours including bath or shower, heating pad, moist heat or hot tubs.    Use an ice pack to the injection site for any pain or discomfort.  Apply ice packs for 20 minute intervals as needed.    If you have received any sedatives by mouth today, you can not drive for 12 hours.    If you have received sedation through an IV, you can not drive for 24 hours.    PLEASE CALL YOUR DOCTOR FOR THE FOLLOWIN.  Redness or swelling around the injection site.  2.  Fever of 101 degrees.  3.  Drainage (pus) from the injection site.  4.  For any continuous bleeding (some dried blood over the incision is normal.)    FOR EMERGENCIES:    If any unusual problems or difficulties occur during clinic hours, call [phone] or dial [phone].    Follow up with with your physician in 2-3 weeks.

## 2024-07-31 NOTE — H&P
Patient reports she had a fall, hitting her head.  Recommending that the patient be evaluated in the ED. We will reschedule today's procedure.    Siri Aguilera, DO   Interventional Pain Management

## 2024-08-01 ENCOUNTER — PATIENT OUTREACH (OUTPATIENT)
Dept: EMERGENCY MEDICINE | Facility: HOSPITAL | Age: 84
End: 2024-08-01
Payer: MEDICARE

## 2024-08-09 ENCOUNTER — LAB VISIT (OUTPATIENT)
Dept: LAB | Facility: HOSPITAL | Age: 84
End: 2024-08-09
Attending: HOSPITALIST
Payer: MEDICARE

## 2024-08-09 ENCOUNTER — OFFICE VISIT (OUTPATIENT)
Dept: INTERNAL MEDICINE | Facility: CLINIC | Age: 84
End: 2024-08-09
Payer: MEDICARE

## 2024-08-09 VITALS
WEIGHT: 144.19 LBS | SYSTOLIC BLOOD PRESSURE: 130 MMHG | BODY MASS INDEX: 24.02 KG/M2 | OXYGEN SATURATION: 95 % | TEMPERATURE: 98 F | DIASTOLIC BLOOD PRESSURE: 64 MMHG | RESPIRATION RATE: 14 BRPM | HEART RATE: 75 BPM | HEIGHT: 65 IN

## 2024-08-09 DIAGNOSIS — R20.0 NUMBNESS OF RIGHT HAND: Primary | ICD-10-CM

## 2024-08-09 DIAGNOSIS — I10 ESSENTIAL HYPERTENSION: ICD-10-CM

## 2024-08-09 DIAGNOSIS — D63.1 ANEMIA OF CHRONIC RENAL FAILURE, STAGE 3B: ICD-10-CM

## 2024-08-09 DIAGNOSIS — N18.32 ANEMIA OF CHRONIC RENAL FAILURE, STAGE 3B: ICD-10-CM

## 2024-08-09 LAB
25(OH)D3+25(OH)D2 SERPL-MCNC: 33 NG/ML (ref 30–96)
ALBUMIN SERPL BCP-MCNC: 3.5 G/DL (ref 3.5–5.2)
ANION GAP SERPL CALC-SCNC: 9 MMOL/L (ref 8–16)
BASOPHILS # BLD AUTO: 0.09 K/UL (ref 0–0.2)
BASOPHILS NFR BLD: 1.2 % (ref 0–1.9)
BUN SERPL-MCNC: 21 MG/DL (ref 8–23)
CALCIUM SERPL-MCNC: 9.6 MG/DL (ref 8.7–10.5)
CHLORIDE SERPL-SCNC: 107 MMOL/L (ref 95–110)
CO2 SERPL-SCNC: 23 MMOL/L (ref 23–29)
CREAT SERPL-MCNC: 1.4 MG/DL (ref 0.5–1.4)
DIFFERENTIAL METHOD BLD: ABNORMAL
EOSINOPHIL # BLD AUTO: 0.2 K/UL (ref 0–0.5)
EOSINOPHIL NFR BLD: 2.1 % (ref 0–8)
ERYTHROCYTE [DISTWIDTH] IN BLOOD BY AUTOMATED COUNT: 16 % (ref 11.5–14.5)
EST. GFR  (NO RACE VARIABLE): 36.6 ML/MIN/1.73 M^2
GLUCOSE SERPL-MCNC: 98 MG/DL (ref 70–110)
HCT VFR BLD AUTO: 35.5 % (ref 37–48.5)
HGB BLD-MCNC: 10.7 G/DL (ref 12–16)
IMM GRANULOCYTES # BLD AUTO: 0.01 K/UL (ref 0–0.04)
IMM GRANULOCYTES NFR BLD AUTO: 0.1 % (ref 0–0.5)
LYMPHOCYTES # BLD AUTO: 1.9 K/UL (ref 1–4.8)
LYMPHOCYTES NFR BLD: 23.9 % (ref 18–48)
MAGNESIUM SERPL-MCNC: 2.4 MG/DL (ref 1.6–2.6)
MCH RBC QN AUTO: 27.1 PG (ref 27–31)
MCHC RBC AUTO-ENTMCNC: 30.1 G/DL (ref 32–36)
MCV RBC AUTO: 90 FL (ref 82–98)
MONOCYTES # BLD AUTO: 0.8 K/UL (ref 0.3–1)
MONOCYTES NFR BLD: 10.9 % (ref 4–15)
NEUTROPHILS # BLD AUTO: 4.8 K/UL (ref 1.8–7.7)
NEUTROPHILS NFR BLD: 61.8 % (ref 38–73)
NRBC BLD-RTO: 0 /100 WBC
PHOSPHATE SERPL-MCNC: 3.3 MG/DL (ref 2.7–4.5)
PLATELET # BLD AUTO: 297 K/UL (ref 150–450)
PMV BLD AUTO: 11.1 FL (ref 9.2–12.9)
POTASSIUM SERPL-SCNC: 4.5 MMOL/L (ref 3.5–5.1)
PTH-INTACT SERPL-MCNC: 69.5 PG/ML (ref 9–77)
RBC # BLD AUTO: 3.95 M/UL (ref 4–5.4)
SODIUM SERPL-SCNC: 139 MMOL/L (ref 136–145)
WBC # BLD AUTO: 7.73 K/UL (ref 3.9–12.7)

## 2024-08-09 PROCEDURE — 83735 ASSAY OF MAGNESIUM: CPT | Mod: HCNC | Performed by: INTERNAL MEDICINE

## 2024-08-09 PROCEDURE — 1100F PTFALLS ASSESS-DOCD GE2>/YR: CPT | Mod: HCNC,CPTII,S$GLB, | Performed by: HOSPITALIST

## 2024-08-09 PROCEDURE — 80069 RENAL FUNCTION PANEL: CPT | Mod: HCNC | Performed by: INTERNAL MEDICINE

## 2024-08-09 PROCEDURE — 99999 PR PBB SHADOW E&M-EST. PATIENT-LVL IV: CPT | Mod: PBBFAC,HCNC,, | Performed by: HOSPITALIST

## 2024-08-09 PROCEDURE — 85025 COMPLETE CBC W/AUTO DIFF WBC: CPT | Mod: HCNC | Performed by: INTERNAL MEDICINE

## 2024-08-09 PROCEDURE — 82306 VITAMIN D 25 HYDROXY: CPT | Mod: HCNC | Performed by: INTERNAL MEDICINE

## 2024-08-09 PROCEDURE — 36415 COLL VENOUS BLD VENIPUNCTURE: CPT | Mod: HCNC,PO | Performed by: INTERNAL MEDICINE

## 2024-08-09 PROCEDURE — 99213 OFFICE O/P EST LOW 20 MIN: CPT | Mod: HCNC,S$GLB,, | Performed by: HOSPITALIST

## 2024-08-09 PROCEDURE — 3288F FALL RISK ASSESSMENT DOCD: CPT | Mod: HCNC,CPTII,S$GLB, | Performed by: HOSPITALIST

## 2024-08-09 PROCEDURE — 83970 ASSAY OF PARATHORMONE: CPT | Mod: HCNC | Performed by: INTERNAL MEDICINE

## 2024-08-09 NOTE — PROGRESS NOTES
"Subjective:     @Patient ID: Tere Velasco is a 84 y.o. female.    Chief Complaint: Follow-up    HPI  83 y.o. female here for f/u of bp and R hand numbness. Reports has upcoming appt with neurology.     Review of Systems   Neurological:  Positive for numbness.     Past medical history, surgical history, and family medical history reviewed and updated as appropriate.    Medications and allergies reviewed.     Objective:     Vitals:    08/09/24 1340   BP: 130/64   BP Location: Right arm   Patient Position: Sitting   BP Method: Medium (Manual)   Pulse: 75   Resp: 14   Temp: 98.4 °F (36.9 °C)   TempSrc: Temporal   SpO2: 95%   Weight: 65.4 kg (144 lb 2.9 oz)   Height: 5' 5" (1.651 m)     There is no height or weight on file to calculate BMI.  Physical Exam  Constitutional:       Appearance: Normal appearance.   HENT:      Head: Normocephalic and atraumatic.   Eyes:      General:         Right eye: No discharge.         Left eye: No discharge.      Conjunctiva/sclera: Conjunctivae normal.   Pulmonary:      Effort: Pulmonary effort is normal.   Musculoskeletal:         General: Normal range of motion.      Cervical back: Normal range of motion and neck supple.   Skin:     General: Skin is warm and dry.   Neurological:      Mental Status: She is alert and oriented to person, place, and time.   Psychiatric:         Mood and Affect: Mood normal.         Behavior: Behavior normal.         Lab Results   Component Value Date    WBC 7.44 05/09/2024    HGB 10.9 (L) 05/09/2024    HCT 35.5 (L) 05/09/2024     05/09/2024    CHOL 232 (H) 05/09/2024    TRIG 108 05/09/2024    HDL 60 05/09/2024    ALT 15 05/09/2024    AST 20 05/09/2024     05/09/2024    K 4.2 05/09/2024     05/09/2024    CREATININE 1.4 05/09/2024    BUN 19 05/09/2024    CO2 24 05/09/2024    TSH 2.956 05/09/2024    INR 1.8 (A) 01/30/2014    HGBA1C 5.5 08/21/2014       Assessment:     1. Numbness of right hand    2. Essential hypertension  "     Plan:   Tere was seen today for follow-up.    Diagnoses and all orders for this visit:    Numbness of right hand  - new. Pt has upcoming appt with neuro    Essential hypertension  - Stable. Continue home meds            Rosy Santos MD  Internal Medicine    8/9/2024

## 2024-08-12 ENCOUNTER — TELEPHONE (OUTPATIENT)
Dept: INTERNAL MEDICINE | Facility: CLINIC | Age: 84
End: 2024-08-12
Payer: MEDICARE

## 2024-08-12 DIAGNOSIS — R20.0 NUMBNESS OF EXTREMITY: Primary | ICD-10-CM

## 2024-08-12 NOTE — TELEPHONE ENCOUNTER
----- Message from Alana Ruiz sent at 8/12/2024 10:24 AM CDT -----  Contact: Patient [phone]  Patient reports numbness in both feet now as well as hands - she wants to know if she should see neurologist prior to procedure on 08/26. [phone]

## 2024-08-12 NOTE — TELEPHONE ENCOUNTER
Pt was seen 8/9    Patient reports numbness in both feet now as well as hands - she wants to know if she should see neurologist prior to procedure on 08/26?

## 2024-08-13 NOTE — TELEPHONE ENCOUNTER
Spoke to patient and she already schedule with neuro for next week .  She thank us for getting so quickly

## 2024-08-19 ENCOUNTER — TELEPHONE (OUTPATIENT)
Dept: PAIN MEDICINE | Facility: CLINIC | Age: 84
End: 2024-08-19
Payer: MEDICARE

## 2024-08-19 NOTE — TELEPHONE ENCOUNTER
----- Message from Julissa Yin MA sent at 8/19/2024  1:28 PM CDT -----  Regarding: FW: Returning Call  Contact: [phone]  Returning your call.  ----- Message -----  From: Robin Ibarra  Sent: 8/19/2024  12:33 PM CDT  To: [facility] Staff  Subject: Returning Call                                   Type:  Patient Returning Call    Who Called: Tere Velasco     Who Left Message for Patient:  Barbara       Does the patient know what this is regarding?: Procedure     Would the patient rather a call back or a response via [facility]?  Call     Best Call Back Number: [phone]

## 2024-08-21 ENCOUNTER — TELEPHONE (OUTPATIENT)
Dept: NEUROLOGY | Facility: CLINIC | Age: 84
End: 2024-08-21
Payer: MEDICARE

## 2024-08-21 ENCOUNTER — OFFICE VISIT (OUTPATIENT)
Dept: NEPHROLOGY | Facility: CLINIC | Age: 84
End: 2024-08-21
Payer: MEDICARE

## 2024-08-21 VITALS
WEIGHT: 143.31 LBS | DIASTOLIC BLOOD PRESSURE: 78 MMHG | HEART RATE: 95 BPM | SYSTOLIC BLOOD PRESSURE: 149 MMHG | BODY MASS INDEX: 23.85 KG/M2 | OXYGEN SATURATION: 99 %

## 2024-08-21 DIAGNOSIS — N18.32 STAGE 3B CHRONIC KIDNEY DISEASE: Primary | ICD-10-CM

## 2024-08-21 DIAGNOSIS — N25.81 SECONDARY HYPERPARATHYROIDISM OF RENAL ORIGIN: ICD-10-CM

## 2024-08-21 DIAGNOSIS — D50.9 IRON DEFICIENCY ANEMIA, UNSPECIFIED IRON DEFICIENCY ANEMIA TYPE: ICD-10-CM

## 2024-08-21 DIAGNOSIS — I10 ESSENTIAL HYPERTENSION: ICD-10-CM

## 2024-08-21 PROCEDURE — 99214 OFFICE O/P EST MOD 30 MIN: CPT | Mod: HCNC,S$GLB,, | Performed by: INTERNAL MEDICINE

## 2024-08-21 PROCEDURE — 1159F MED LIST DOCD IN RCRD: CPT | Mod: HCNC,CPTII,S$GLB, | Performed by: INTERNAL MEDICINE

## 2024-08-21 PROCEDURE — 1160F RVW MEDS BY RX/DR IN RCRD: CPT | Mod: HCNC,CPTII,S$GLB, | Performed by: INTERNAL MEDICINE

## 2024-08-21 PROCEDURE — 1101F PT FALLS ASSESS-DOCD LE1/YR: CPT | Mod: HCNC,CPTII,S$GLB, | Performed by: INTERNAL MEDICINE

## 2024-08-21 PROCEDURE — 1125F AMNT PAIN NOTED PAIN PRSNT: CPT | Mod: HCNC,CPTII,S$GLB, | Performed by: INTERNAL MEDICINE

## 2024-08-21 PROCEDURE — 99999 PR PBB SHADOW E&M-EST. PATIENT-LVL IV: CPT | Mod: PBBFAC,HCNC,, | Performed by: INTERNAL MEDICINE

## 2024-08-21 PROCEDURE — G2211 COMPLEX E/M VISIT ADD ON: HCPCS | Mod: HCNC,S$GLB,, | Performed by: INTERNAL MEDICINE

## 2024-08-21 PROCEDURE — 3288F FALL RISK ASSESSMENT DOCD: CPT | Mod: HCNC,CPTII,S$GLB, | Performed by: INTERNAL MEDICINE

## 2024-08-21 RX ORDER — FERROUS SULFATE 325(65) MG
325 TABLET ORAL EVERY OTHER DAY
Qty: 45 TABLET | Refills: 4 | Status: SHIPPED | OUTPATIENT
Start: 2024-08-21

## 2024-08-21 NOTE — PATIENT INSTRUCTIONS
Please measure you blood pressure, target < 140 systolic, let me know if above that,  Get US of renal artery.

## 2024-08-21 NOTE — TELEPHONE ENCOUNTER
Staff left patient a vm regarding appointment physician will not be in clinic appointment needs rescheduling

## 2024-08-21 NOTE — PROGRESS NOTES
Patient ID: Tere Velasco is a 84 y.o. White female who presents for follow-up visit for her Chronic Kidney Disease stage G3b/A1.    HPI:  Tere Velasco is a 84 y.o. White female with renovascular hypertension since 2005, TRU on CPAP, OA s/p bilateral knee replacements, DLD, and CKD stage 3 who presents today for follow-up. CKD is suspected to be 2/2 HTN and chronic NSAID use. No history of kidney stones, no family history of kidney disease. She also has a h/o IBS. Suffers from significant back pain.          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)    Cataract     removed from both eyes    CKD (chronic kidney disease) stage 3, GFR 30-59 ml/min     Clotting disorder     when knee was replaced    Colitis     Degenerative disc disease     Depression     Fibromyalgia     GERD (gastroesophageal reflux disease)     Hyperlipidemia     Hypertension     Irritable bowel syndrome     TRU (obstructive sleep apnea)     RLS (restless legs syndrome)     S/P knee replacement 1/13/2014       Family History   Problem Relation Name Age of Onset    Dementia Mother          age 92    Heart disease Father          age 58    Restless legs syndrome Daughter Jovi     Thyroid disease Daughter Jovi     Hypertension Son Moshe     Alcohol abuse Son Moshe     Ovarian cancer Paternal Grandmother      Breast cancer Paternal Grandmother      Cancer Paternal Grandmother          ovarian    Ovarian cancer Cousin      Cancer Cousin  40        colon ca    Breast cancer Paternal Aunt      Skin cancer Neg Hx      Melanoma Neg Hx      Colon cancer Neg Hx      Esophageal cancer Neg Hx      Stomach cancer Neg Hx      Irritable bowel syndrome Neg Hx      Crohn's disease Neg Hx      Ulcerative colitis Neg Hx      Celiac disease Neg Hx         Past Surgical History:   Procedure Laterality Date    APPENDECTOMY      BREAST  BIOPSY      BREAST LUMPECTOMY      right side    BREAST SURGERY      CHOLECYSTECTOMY      COLONOSCOPY N/A 12/2/2016    Procedure: COLONOSCOPY;  Surgeon: Ori Cope MD;  Location: [facility] ([address] [place] [address]);  Service: Endoscopy;  Laterality: N/A;  PM prep    EYE SURGERY Bilateral     cataract    HYSTERECTOMY  1977    uterine pain    INJECTION OF ANESTHETIC AGENT AROUND MEDIAL BRANCH NERVES INNERVATING LUMBAR FACET JOINT Bilateral 6/12/2024    Procedure: Medial Branch Block #1 B/L  L3,4,5;  Surgeon: Siri Aguilera DO;  Location: [facility];  Service: Pain Management;  Laterality: Bilateral;  oral sed no ac    INJECTION OF ANESTHETIC AGENT AROUND MEDIAL BRANCH NERVES INNERVATING LUMBAR FACET JOINT Bilateral 7/9/2024    Procedure: #2 MBB L3, L4, L5 B/L;  Surgeon: Siri Aguilera DO;  Location: [facility];  Service: Pain Management;  Laterality: Bilateral;  oral    INJECTION OF FACET JOINT Bilateral 4/27/2021    Procedure: Facet joint injection-lumbar--Bilateral L4-5, L5-S1;  Surgeon: Kuldip Siddiqui Jr., MD;  Location: [facility] T;  Service: Pain Management;  Laterality: Bilateral;    JOINT REPLACEMENT      knees replaced bilaterally    KNEE SURGERY Bilateral     RECTAL SURGERY      rectocele    TONSILLECTOMY           Patient's medical, family, surgical, and medication hx reviewed.    Review of Systems    Constitutional: Negative for chills, diaphoresis, fever and weight loss.   HENT: Negative for nosebleeds and tinnitus.    Eyes: Negative for blurred vision, double vision and photophobia.   Respiratory: Negative for cough and shortness of breath.    Cardiovascular: . Negative for chest pain, palpitations, swelling orthopnea and PND.   Gastrointestinal: Negative for abdominal pain, diarrhea, constipation nausea and vomiting.   Genitourinary: Negative for dysuria, flank pain, frequency, hematuria and urgency.   Musculoskeletal: positive for severe back pain, negative falls,   myalgias and neck  pain.   Skin: Negative.    Neurological: Negative for dizziness, tingling, tremors, sensory change, speech change, focal weakness, seizures, loss of consciousness, weakness and headaches.   Endo/Heme/Allergies: Negative for environmental allergies and polydipsia. Does not bruise/bleed easily.   Psychiatric/Behavioral: Negative for depression, hallucinations, memory loss, substance abuse and suicidal ideas. The patient is not nervous/anxious but does have insomnia.      Physical Exam:  General: Well developed, well nourished in NAD  HEENT: Conjunctiva clear; Oropharynx clear  Neck: No JVD noted, Supple  CV- Normal S1, S2 with no murmurs,gallops,rubs  Resp- Lungs CTA Bilaterally, Unlabored  Abdomen- NTND, BS normoactive x4 quads, soft  Extrem- No cyanosis, clubbing, edema.  Skin- No rashes, lesions, ulcers  PSYCH: Oriented x3      Assessment:         ICD-10-CM ICD-9-CM    1. Stage 3b chronic kidney disease  N18.32 585.3       2. Secondary hyperparathyroidism of renal origin  N25.81 588.81       3. Essential hypertension  I10 401.9              Plan:   1. CKD stage G3b/A1 (Creat 1.4), stable   CKD: stage III 2/2 longstanding HTN/nephrosclerosis and prior chronic NSAID use.   JAYA/SPEP 2009 normal.    Right kidney smaller than the left, SUKHDEV?    Will get doppler US of renal artery to evaluate for stenosis and also to check on the cysts.    Ernal US 11/22   1. Symmetric diffusely increased renal cortical echogenicity and borderline elevated resistive indices bilaterally, nonspecific indicators of chronic medical renal disease.  2. 1.1-cm simple appearing right superior pole cortical cystic lesion, not significantly changed.     Clinical correlation and follow-up advised.  Lab Results   Component Value Date    CREATININE 1.4 08/09/2024     Urine Protein:   Prot/Creat Ratio, Urine   Date Value Ref Range Status   08/09/2024 0.10 0.00 - 0.20 Final   04/21/2023 Unable to calculate 0.00 - 0.20 Final   11/10/2022 0.10 0.00 - 0.20  Final     Acid-Base:   Lab Results   Component Value Date     08/09/2024    K 4.5 08/09/2024    CO2 23 08/09/2024     2. HTN: Blood pressures not optimal today, Patient admits to not always take her pills, will do so from now on.   Will continue to monitor in subsequent visits.    3. CKD-MBD:   educated to re-start vitamin D 1000 units daily.           Lab Results   Component Value Date    PTH 69.5 08/09/2024    CALCIUM 9.6 08/09/2024    PHOS 3.3 08/09/2024     4. Anemia: iron deficiency!   Will continue to monitor with labs and subsequent visits.  - will give her iron supplements, she would not want a colonoscopy or procedure.    Lab Results   Component Value Date    HGB 10.7 (L) 08/09/2024     Lab Results   Component Value Date    IRON 57 05/09/2024    TIBC 448 05/09/2024    FERRITIN 27 05/09/2024     5. Lipid management:   Patient on statin therapy.    Lab Results   Component Value Date    LDLCALC 150.4 05/09/2024        Please see me back in my clinic in 12 month with blood work   Ultrasound for renal artery stenosis now.

## 2024-08-22 ENCOUNTER — TELEPHONE (OUTPATIENT)
Dept: PAIN MEDICINE | Facility: CLINIC | Age: 84
End: 2024-08-22
Payer: MEDICARE

## 2024-08-22 ENCOUNTER — TELEPHONE (OUTPATIENT)
Dept: NEUROLOGY | Facility: CLINIC | Age: 84
End: 2024-08-22
Payer: MEDICARE

## 2024-08-22 NOTE — TELEPHONE ENCOUNTER
----- Message from Alesha Reagan sent at 8/21/2024  4:22 PM CDT -----  Regarding: missed call  Type:  Patient Returning Call    Who Called: pt  Who Left Message for Patient: Zora Mac   Does the patient know what this is regarding?:   Would the patient rather a call back or a response via [facility]? call  Best Call Back Number: [phone]   Additional Information:

## 2024-08-22 NOTE — TELEPHONE ENCOUNTER
Left patient a vm in regards to rescheduling her appointment with Dr Kate being he isn't in clinic on Friday morning

## 2024-08-22 NOTE — TELEPHONE ENCOUNTER
CALLED PT. IN REFERENCE TO MESSAGE NO ANSWER, LVM  ----- Message from Letitia Choudhury sent at 8/22/2024  1:47 PM CDT -----  Regarding: Call Back  Contact: [phone]  Who Called: PT     Patient called in requesting to speak with you. Did not specify why. Please advise.

## 2024-08-22 NOTE — TELEPHONE ENCOUNTER
----- Message from Julissa Yin MA sent at 8/22/2024  8:50 AM CDT -----  Regarding: FW: call back request  Please advise.  ----- Message -----  From: Carlee Fam  Sent: 8/22/2024   8:05 AM CDT  To: [facility] Staff  Subject: call back request                                Name of caller: blas       What is the requesting detail: pt is requesting a call back. States she is afraid to have her procedure done with having clearance from a neurologist. Please advise       Can the clinic reply by [facility]:       What number to call back: [phone]

## 2024-08-22 NOTE — PRE-PROCEDURE INSTRUCTIONS
Pt states having some neurological issues and has yet to see her Neurologist, states she spoke with someone in Dr. Aguilera's office and was told they would get back with her.  Pt is unsure if she can proceed with scheduled procedure and awaiting call from provider's office.  Provider's office made aware of pt's concerns.  Will follow up with pt upon receiving instructions from Dr. Aguilera's office.

## 2024-08-26 ENCOUNTER — TELEPHONE (OUTPATIENT)
Dept: NEUROLOGY | Facility: CLINIC | Age: 84
End: 2024-08-26
Payer: MEDICARE

## 2024-08-26 NOTE — TELEPHONE ENCOUNTER
"Recv'd message from Thelma Wilson "I hope you have been doing well! If you don't mind, I will be including you in a high priority Neuro InBasket message because it is related to a [employer] grievance. This is for MRN [id number]. Just a heads up, thank you so much"    Offered next available appointment this Wednesday at 8 am with Dr Wilder. Pt accepted next available, verbalized understanding, and repeated back date/time/location of scheduled appointment.       "

## 2024-09-06 ENCOUNTER — HOSPITAL ENCOUNTER (OUTPATIENT)
Dept: RADIOLOGY | Facility: HOSPITAL | Age: 84
Discharge: HOME | End: 2024-09-06
Attending: INTERNAL MEDICINE
Payer: MEDICARE

## 2024-09-06 DIAGNOSIS — D50.9 IRON DEFICIENCY ANEMIA, UNSPECIFIED IRON DEFICIENCY ANEMIA TYPE: ICD-10-CM

## 2024-09-06 PROCEDURE — 76770 US EXAM ABDO BACK WALL COMP: CPT | Mod: TC

## 2024-09-06 PROCEDURE — 93975 VASCULAR STUDY: CPT | Mod: 26,,, | Performed by: RADIOLOGY

## 2024-09-06 PROCEDURE — 76770 US EXAM ABDO BACK WALL COMP: CPT | Mod: 26,59,, | Performed by: RADIOLOGY

## 2024-09-25 ENCOUNTER — OFFICE VISIT (OUTPATIENT)
Dept: NEUROLOGY | Facility: CLINIC | Age: 84
End: 2024-09-25
Payer: MEDICARE

## 2024-09-25 VITALS
BODY MASS INDEX: 23.88 KG/M2 | DIASTOLIC BLOOD PRESSURE: 81 MMHG | SYSTOLIC BLOOD PRESSURE: 156 MMHG | WEIGHT: 143.31 LBS | HEIGHT: 65 IN | HEART RATE: 82 BPM

## 2024-09-25 DIAGNOSIS — R20.0 NUMBNESS OF EXTREMITY: ICD-10-CM

## 2024-09-25 DIAGNOSIS — K21.9 GASTROESOPHAGEAL REFLUX DISEASE, UNSPECIFIED WHETHER ESOPHAGITIS PRESENT: ICD-10-CM

## 2024-09-25 PROCEDURE — 1100F PTFALLS ASSESS-DOCD GE2>/YR: CPT | Mod: HCNC,CPTII,S$GLB, | Performed by: PSYCHIATRY & NEUROLOGY

## 2024-09-25 PROCEDURE — 99204 OFFICE O/P NEW MOD 45 MIN: CPT | Mod: HCNC,S$GLB,, | Performed by: PSYCHIATRY & NEUROLOGY

## 2024-09-25 PROCEDURE — 1159F MED LIST DOCD IN RCRD: CPT | Mod: HCNC,CPTII,S$GLB, | Performed by: PSYCHIATRY & NEUROLOGY

## 2024-09-25 PROCEDURE — 1125F AMNT PAIN NOTED PAIN PRSNT: CPT | Mod: HCNC,CPTII,S$GLB, | Performed by: PSYCHIATRY & NEUROLOGY

## 2024-09-25 PROCEDURE — 99999 PR PBB SHADOW E&M-EST. PATIENT-LVL III: CPT | Mod: PBBFAC,HCNC,, | Performed by: PSYCHIATRY & NEUROLOGY

## 2024-09-25 PROCEDURE — 3288F FALL RISK ASSESSMENT DOCD: CPT | Mod: HCNC,CPTII,S$GLB, | Performed by: PSYCHIATRY & NEUROLOGY

## 2024-09-25 NOTE — PROGRESS NOTES
Tere Velasco is a 84 y.o. year old female that  presents with chief complain of R hand numbness/tingling.     HPI:  Mrs Velasco is a 84 y.o.female with renovascular hypertension since 2005, hyperlipidemia, TRU no compliant with CPAP, OA s/p bilateral knee replacements, DLD, CKD stage 3, and fibromyalgia who endorses a long standing history of non daily numbness/tingling of the whole hand that occurs when she is playing games in her phone or leaning her R elbow in a hard surface. There is not associated R hand pain or weakness. She stressed the fact that her symptoms remain localized to the R hand.  Stated that she is scheduled to undergo a low back procedure and wants to make sure that she is not having stroke like symptoms or that the anesthesia will not put her on any danger.  No HA, vertigo, double vision, difficulty swallowing, slurred speech, focal weakness, imbalance, falls, tremors, language or speech impairment.     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)    Cataract     removed from both eyes    CKD (chronic kidney disease) stage 3, GFR 30-59 ml/min     Clotting disorder     when knee was replaced    Colitis     Degenerative disc disease     Depression     Fibromyalgia     GERD (gastroesophageal reflux disease)     Hyperlipidemia     Hypertension     Irritable bowel syndrome     TRU (obstructive sleep apnea)     RLS (restless legs syndrome)     S/P knee replacement 1/13/2014     Social History     Socioeconomic History    Marital status:    Tobacco Use    Smoking status: Never     Passive exposure: Never    Smokeless tobacco: Never   Substance and Sexual Activity    Alcohol use: Not Currently    Drug use: No    Sexual activity: Not Currently     Partners: Male     Birth control/protection: Post-menopausal     Social Determinants of Health     Financial Resource Strain: Low Risk   (1/19/2023)    Overall Financial Resource Strain (CARDIA)     Difficulty of Paying Living Expenses: Not very hard   Food Insecurity: No Food Insecurity (4/26/2021)    Hunger Vital Sign     Worried About Running Out of Food in the Last Year: Never true     Ran Out of Food in the Last Year: Never true   Transportation Needs: No Transportation Needs (1/19/2023)    PRAPARE - Transportation     Lack of Transportation (Medical): No     Lack of Transportation (Non-Medical): No   Physical Activity: Inactive (4/26/2021)    Exercise Vital Sign     Days of Exercise per Week: 0 days     Minutes of Exercise per Session: 0 min   Stress: Stress Concern Present (1/19/2023)    [employer] [place] [employer] - Occupational Stress Questionnaire     Feeling of Stress : Rather much   Housing Stability: Unknown (1/19/2023)    Housing Stability Vital Sign     Unable to Pay for Housing in the Last Year: No     Unstable Housing in the Last Year: No     Past Surgical History:   Procedure Laterality Date    APPENDECTOMY      BREAST BIOPSY      BREAST LUMPECTOMY      right side    BREAST SURGERY      CHOLECYSTECTOMY      COLONOSCOPY N/A 12/2/2016    Procedure: COLONOSCOPY;  Surgeon: Ori Cope MD;  Location: [address]);  Service: Endoscopy;  Laterality: N/A;  PM prep    EYE SURGERY Bilateral     cataract    HYSTERECTOMY  1977    uterine pain    INJECTION OF ANESTHETIC AGENT AROUND MEDIAL BRANCH NERVES INNERVATING LUMBAR FACET JOINT Bilateral 6/12/2024    Procedure: Medial Branch Block #1 B/L  L3,4,5;  Surgeon: Siri Aguilera DO;  Location: [facility];  Service: Pain Management;  Laterality: Bilateral;  oral sed no ac    INJECTION OF ANESTHETIC AGENT AROUND MEDIAL BRANCH NERVES INNERVATING LUMBAR FACET JOINT Bilateral 7/9/2024    Procedure: #2 MBB L3, L4, L5 B/L;  Surgeon: Siri Aguilera DO;  Location: [facility];  Service: Pain Management;  Laterality: Bilateral;  oral    INJECTION OF FACET  JOINT Bilateral 4/27/2021    Procedure: Facet joint injection-lumbar--Bilateral L4-5, L5-S1;  Surgeon: Kuldip Siddiqui Jr., MD;  Location: [facility];  Service: Pain Management;  Laterality: Bilateral;    JOINT REPLACEMENT      knees replaced bilaterally    KNEE SURGERY Bilateral     RECTAL SURGERY      rectocele    TONSILLECTOMY       Family History   Problem Relation Name Age of Onset    Dementia Mother          age 92    Heart disease Father          age 58    Restless legs syndrome Daughter Jovi     Thyroid disease Daughter Jovi     Hypertension Son Moshe     Alcohol abuse Son Moshe     Ovarian cancer Paternal Grandmother      Breast cancer Paternal Grandmother      Cancer Paternal Grandmother          ovarian    Ovarian cancer Cousin      Cancer Cousin  40        colon ca    Breast cancer Paternal Aunt      Skin cancer Neg Hx      Melanoma Neg Hx      Colon cancer Neg Hx      Esophageal cancer Neg Hx      Stomach cancer Neg Hx      Irritable bowel syndrome Neg Hx      Crohn's disease Neg Hx      Ulcerative colitis Neg Hx      Celiac disease Neg Hx             Review of Systems  General ROS: negative for chills, fever or weight loss  Psychological ROS: negative for hallucination, depression or suicidal ideation  Ophthalmic ROS: negative for blurry vision, photophobia or eye pain  ENT ROS: negative for epistaxis, sore throat or rhinorrhea  Respiratory ROS: no cough, shortness of breath, or wheezing  Cardiovascular ROS: no chest pain or dyspnea on exertion  Gastrointestinal ROS: no abdominal pain, change in bowel habits, or black/ bloody stools  Genito-Urinary ROS: no dysuria, trouble voiding, or hematuria  Musculoskeletal ROS: negative for gait disturbance or muscular weakness  Neurological ROS: no syncope or seizures; no ataxia  Dermatological ROS: negative for pruritis, rash and jaundice      Physical Exam:  BP (!) 156/81 (BP Location: Right arm, Patient Position: Sitting, BP Method: Large  "(Automatic))   Pulse 82   Ht 5' 5" (1.651 m)   Wt 65 kg (143 lb 4.8 oz)   BMI 23.85 kg/m²   General appearance: alert, cooperative, no distress  Constitutional:Oriented to person, place, and time.appears well-developed and well-nourished.   HEENT: Normocephalic, atraumatic, neck symmetrical, no nasal discharge   Eyes: conjunctivae/corneas clear, PERRL, EOM's intact  Lungs: clear to auscultation bilaterally, no dullness to percussion bilaterally  Heart: regular rate and rhythm without rub; no displacement of the PMI   Abdomen: soft, non-tender; bowel sounds normoactive; no organomegaly  Extremities: extremities symmetric; no clubbing, cyanosis, or edema  Integument: Skin color, texture, turgor normal; no rashes; hair distrubution normal  Neurologic:  Mental status: alert and awake, oriented x 4, comprehension, naming, and repetition intact. No right to left confusion. Performs serial 7's without difficulty .No dysarthria.  CN 2-12: pupils 4 mm bilaterally, reactive to light. Fundi without papilledema. Visual fields full to confrontation. EOM full without nystagmus. Face sensation normal in all distributions. Face symmetric. Hearing grossly intact. Palate elevates well. Tongue midline without atrophy or fasciculations.  Motor: 5/5 all over  Sensory: intact in all modalities.  DTR's: 2+ all over.  Plantars: no tested.  Coordination: finger to nose and heel-knee-shin intact.  Gait: no ataxia or bradykinesia     LABS:    Complete Blood Count  Lab Results   Component Value Date    RBC 3.95 (L) 08/09/2024    HGB 10.7 (L) 08/09/2024    HCT 35.5 (L) 08/09/2024    MCV 90 08/09/2024    MCH 27.1 08/09/2024    MCHC 30.1 (L) 08/09/2024    RDW 16.0 (H) 08/09/2024     08/09/2024    MPV 11.1 08/09/2024    GRAN 4.8 08/09/2024    GRAN 61.8 08/09/2024    LYMPH 1.9 08/09/2024    LYMPH 23.9 08/09/2024    MONO 0.8 08/09/2024    MONO 10.9 08/09/2024    EOS 0.2 08/09/2024    BASO 0.09 08/09/2024    EOSINOPHIL 2.1 08/09/2024    " BASOPHIL 1.2 08/09/2024    DIFFMETHOD Automated 08/09/2024       Comprehensive Metabolic Panel  Lab Results   Component Value Date    GLU 98 08/09/2024    BUN 21 08/09/2024    CREATININE 1.4 08/09/2024     08/09/2024    K 4.5 08/09/2024     08/09/2024    PROT 7.1 05/09/2024    ALBUMIN 3.5 08/09/2024    BILITOT 0.4 05/09/2024    AST 20 05/09/2024    ALKPHOS 49 (L) 05/09/2024    CO2 23 08/09/2024    ALT 15 05/09/2024    ANIONGAP 9 08/09/2024    EGFRNONAA 34.5 (A) 03/09/2022    EGFRNONAA 34.5 (A) 03/09/2022    ESTGFRAFRICA 39.8 (A) 03/09/2022    ESTGFRAFRICA 39.8 (A) 03/09/2022       TSH  Lab Results   Component Value Date    TSH 2.956 05/09/2024         Assessment: pleasant 84 y.o.female with renovascular hypertension since 2005, hyperlipidemia, TRU no compliant with CPAP, OA s/p bilateral knee replacements, DLD, CKD stage 3, and fibromyalgia, presents for evaluation of chronic non daily R hand paresthesias with the pattern described above.  Neuro exam is unremarkable.  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  Reassured Mrs Velasco that her symptoms are not stroke related and shouldn't cause any trouble with her upcoming elective lumbar spine procedure.          ICD-10-CM ICD-9-CM    1. Numbness of extremity  R20.0 782.0 Ambulatory referral/consult to Neurology        The encounter diagnosis was Numbness of extremity.      Plan:  1) R hand paresthesias: as above  2) Renovascular hypertension  3) Hyperlipidemia  4) TRU no compliant with CPAP  5) OA s/p bilateral knee replacements  6) DLD  7) CKD stage 3  8) Fibromyalgia,  No orders of the defined types were placed in this encounter.          Cruz Palomino MD

## 2024-09-25 NOTE — TELEPHONE ENCOUNTER
No care due was identified.  [facility] Embedded Care Due Messages. Reference number: [id number].   9/25/2024 4:50:08 PM CDT

## 2024-09-26 RX ORDER — FAMOTIDINE 20 MG/1
20 TABLET, FILM COATED ORAL DAILY
Qty: 90 TABLET | Refills: 3 | Status: SHIPPED | OUTPATIENT
Start: 2024-09-26

## 2024-09-26 NOTE — TELEPHONE ENCOUNTER
Refill Decision Note   Tere Velasco  is requesting a refill authorization.  Brief Assessment and Rationale for Refill:  Approve     Medication Therapy Plan:  No dose adjustment recommended per renal fxn.       Pharmacist review requested: Yes   Extended chart review required: Yes   Comments:     Note composed:12:03 PM 09/26/2024

## 2024-09-30 ENCOUNTER — TELEPHONE (OUTPATIENT)
Dept: PAIN MEDICINE | Facility: CLINIC | Age: 84
End: 2024-09-30
Payer: MEDICARE

## 2024-09-30 NOTE — TELEPHONE ENCOUNTER
----- Message from Derek Costa sent at 9/27/2024  1:16 PM CDT -----  Please assist.  ----- Message -----  From: Kelsey Richter  Sent: 9/27/2024  12:11 PM CDT  To: [facility] Staff    Type:  Needs Medical Advice/procedure questions     Who Called: pt    Would the patient rather a call back or a response via [facility]? Call   Best Call Back Number: [phone]  Additional Information: pt requesting a call back from the office for procedure.

## 2024-09-30 NOTE — PRE-PROCEDURE INSTRUCTIONS
Patient reviewed on 9/30/2024.  Okay to proceed at [place]. The following pre-procedure instructions and arrival time have been reviewed with patient via phone and sent to patient portal for review.  Patient verbalized an understanding.  Pt to be accompanied by Debbie Gutierrez day of procedure as responsible .    Dear Tere,     Please read over the following pre-procedure instructions in it's entirety as there is helpful information here to get you well prepared for your upcoming procedure.     You are scheduled for a procedure with Dr. Aguilera on 10/2/2024.     [facility] of [facility]. It is in the [place] [employer] [place] next to [facility]. The address is: [address] [place] [address]. Take the elevator to the 2nd floor.       Registration check in time: 7:00 am  Procedure scheduled for time: 8:30 am     If you are receiving sedation, you CANNOT drive yourself and must have a responsible friend or family member (no rideshare) to drive you home.        You should take any medications that you routinely take for blood pressure, heart medications, thyroid, cholesterol, etc.      The fasting restrictions are dependent on whether or not you are receiving sedation. Sedation is not available for all procedures.      Your fasting instructions are as follow:  IV sedation. You should not eat for 8 hours and can only drink clear liquids (water or black coffee without cream/sugar) up until 2 hours before your scheduled time.  You CANNOT drive yourself and must have a .        If you are on blood thinners, you need to follow the anticoagulation instructions that had been discussed previously. You should only stop the blood thinners if it was approved by your primary care physician or your cardiologist. In the event that you are not able to stop your blood thinners, a blood thinner was not listed on your medication list, or we were not able to get clearance from your  cardiologist, then the procedure may have to be postponed/canceled.      IF you were told to stop your blood thinners, this is how long you should generally hold some of the more common ones. Remember that stopping blood thinners is only necessary for certain procedures. If you are unsure of your instructions, please call us.   Aspirin - 5 days  Plavix/Clopidogrel - 7 days  Warfarin / Coumadin - 5 days  Eliquis - 3 days  Pradaxa/Dabigatran - 4 days  Xarelto/Rivaroxaban - 3 days     If you are a diabetic, do not take your medication if you will be fasting, but bring it with you. Please plan on being here for roughly 2-3 hours.     Please call us if you have been sick (running fever, having any flu-like symptoms) or have been taking ANTIBIOTICS in the past 2 weeks or had any outpatient procedures other than with us (colonoscopy, endoscopy, OBGYN, dental, etc.).      If you have been previously COVID positive, you will need to hold off on your procedure until you are symptom free for 10 days. If you did not have any symptoms, you can have your procedure 10 days from your positive test result.         On the morning of your procedure:  *HOLD ALL VITAMINS, MINERALS, HERBS (INCLUDING HERBAL TEAS) AND SUPPLEMENTS  *SHOWER WITH ANTIBACTERIAL SOAP (EX. DIAL) NIGHT BEFORE AND MORNING OF PROCEDURE  *DO NOT APPLY ANY LOTIONS, OILS, POWDERS, PERFUME/COLOGNE, OINTMENTS, GELS, CREAMS, MAKEUP OR DEODORANT TO YOUR SKIN MORNING OF PROCEDURE  *LEAVE JEWELRY AND ANY VALUABLES AT HOME  *WEAR LOOSE COMFORTABLE CLOTHING         Please reply to this portal message as receipt of delivery.     Thank you,  [facility]  [place], LPN [facility]  Pre-Admit

## 2024-10-01 ENCOUNTER — PATIENT MESSAGE (OUTPATIENT)
Dept: INTERNAL MEDICINE | Facility: CLINIC | Age: 84
End: 2024-10-01
Payer: MEDICARE

## 2024-10-01 DIAGNOSIS — G47.00 INSOMNIA, UNSPECIFIED TYPE: ICD-10-CM

## 2024-10-01 NOTE — TELEPHONE ENCOUNTER
No care due was identified.  [facility] Embedded Care Due Messages. Reference number: [id number].   10/01/2024 12:10:27 AM CDT

## 2024-10-02 ENCOUNTER — HOSPITAL ENCOUNTER (OUTPATIENT)
Facility: HOSPITAL | Age: 84
Discharge: HOME | End: 2024-10-02
Attending: STUDENT IN AN ORGANIZED HEALTH CARE EDUCATION/TRAINING PROGRAM | Admitting: STUDENT IN AN ORGANIZED HEALTH CARE EDUCATION/TRAINING PROGRAM
Payer: MEDICARE

## 2024-10-02 VITALS
HEART RATE: 80 BPM | OXYGEN SATURATION: 97 % | HEIGHT: 65 IN | RESPIRATION RATE: 16 BRPM | BODY MASS INDEX: 24.49 KG/M2 | WEIGHT: 147 LBS | DIASTOLIC BLOOD PRESSURE: 57 MMHG | SYSTOLIC BLOOD PRESSURE: 121 MMHG | TEMPERATURE: 98 F

## 2024-10-02 DIAGNOSIS — G89.29 CHRONIC PAIN: ICD-10-CM

## 2024-10-02 DIAGNOSIS — M47.816 LUMBAR SPONDYLOSIS: Primary | ICD-10-CM

## 2024-10-02 PROCEDURE — 64636 DESTROY L/S FACET JNT ADDL: CPT | Mod: 50 | Performed by: STUDENT IN AN ORGANIZED HEALTH CARE EDUCATION/TRAINING PROGRAM

## 2024-10-02 PROCEDURE — 64635 DESTROY LUMB/SAC FACET JNT: CPT | Mod: 50,HCNC,, | Performed by: STUDENT IN AN ORGANIZED HEALTH CARE EDUCATION/TRAINING PROGRAM

## 2024-10-02 PROCEDURE — 99152 MOD SED SAME PHYS/QHP 5/>YRS: CPT | Mod: HCNC,,, | Performed by: STUDENT IN AN ORGANIZED HEALTH CARE EDUCATION/TRAINING PROGRAM

## 2024-10-02 PROCEDURE — 64636 DESTROY L/S FACET JNT ADDL: CPT | Mod: 50,HCNC,, | Performed by: STUDENT IN AN ORGANIZED HEALTH CARE EDUCATION/TRAINING PROGRAM

## 2024-10-02 PROCEDURE — 64635 DESTROY LUMB/SAC FACET JNT: CPT | Mod: 50 | Performed by: STUDENT IN AN ORGANIZED HEALTH CARE EDUCATION/TRAINING PROGRAM

## 2024-10-02 PROCEDURE — 63600175 PHARM REV CODE 636 W HCPCS: Mod: JZ,JG | Performed by: STUDENT IN AN ORGANIZED HEALTH CARE EDUCATION/TRAINING PROGRAM

## 2024-10-02 PROCEDURE — 99153 MOD SED SAME PHYS/QHP EA: CPT | Performed by: STUDENT IN AN ORGANIZED HEALTH CARE EDUCATION/TRAINING PROGRAM

## 2024-10-02 PROCEDURE — 99152 MOD SED SAME PHYS/QHP 5/>YRS: CPT | Performed by: STUDENT IN AN ORGANIZED HEALTH CARE EDUCATION/TRAINING PROGRAM

## 2024-10-02 RX ORDER — SODIUM CHLORIDE 9 MG/ML
INJECTION, SOLUTION INTRAVENOUS CONTINUOUS
Status: DISCONTINUED | OUTPATIENT
Start: 2024-10-02 | End: 2024-10-02 | Stop reason: HOSPADM

## 2024-10-02 RX ORDER — SODIUM CHLORIDE 9 MG/ML
500 INJECTION, SOLUTION INTRAVENOUS CONTINUOUS
Status: DISCONTINUED | OUTPATIENT
Start: 2024-10-02 | End: 2024-10-02 | Stop reason: HOSPADM

## 2024-10-02 RX ORDER — DEXAMETHASONE SODIUM PHOSPHATE 10 MG/ML
INJECTION INTRAMUSCULAR; INTRAVENOUS
Status: DISCONTINUED | OUTPATIENT
Start: 2024-10-02 | End: 2024-10-02 | Stop reason: HOSPADM

## 2024-10-02 RX ORDER — BUPIVACAINE HYDROCHLORIDE 2.5 MG/ML
INJECTION, SOLUTION EPIDURAL; INFILTRATION; INTRACAUDAL
Status: DISCONTINUED | OUTPATIENT
Start: 2024-10-02 | End: 2024-10-02 | Stop reason: HOSPADM

## 2024-10-02 RX ORDER — FENTANYL CITRATE 50 UG/ML
INJECTION, SOLUTION INTRAMUSCULAR; INTRAVENOUS
Status: DISCONTINUED | OUTPATIENT
Start: 2024-10-02 | End: 2024-10-02 | Stop reason: HOSPADM

## 2024-10-02 RX ORDER — LIDOCAINE HYDROCHLORIDE 20 MG/ML
INJECTION, SOLUTION EPIDURAL; INFILTRATION; INTRACAUDAL; PERINEURAL
Status: DISCONTINUED | OUTPATIENT
Start: 2024-10-02 | End: 2024-10-02 | Stop reason: HOSPADM

## 2024-10-02 RX ORDER — LIDOCAINE HYDROCHLORIDE 10 MG/ML
1 INJECTION, SOLUTION EPIDURAL; INFILTRATION; INTRACAUDAL; PERINEURAL ONCE
Status: DISCONTINUED | OUTPATIENT
Start: 2024-10-02 | End: 2024-10-02 | Stop reason: HOSPADM

## 2024-10-02 RX ORDER — MIDAZOLAM HYDROCHLORIDE 1 MG/ML
INJECTION INTRAMUSCULAR; INTRAVENOUS
Status: DISCONTINUED | OUTPATIENT
Start: 2024-10-02 | End: 2024-10-02 | Stop reason: HOSPADM

## 2024-10-02 NOTE — DISCHARGE SUMMARY
Discharge Note  Short Stay      SUMMARY     Admit Date: 10/2/2024    Attending Physician: Siri Aguilera      Discharge Physician: Siri Aguilera      Discharge Date: 10/2/2024 9:11 AM    Procedure(s) (LRB):  B/L L3,L4,L5 RFA (Bilateral)    Final Diagnosis: Lumbar spondylosis [M47.816]    Disposition: Home or self care    Patient Instructions:   Current Discharge Medication List        CONTINUE these medications which have NOT CHANGED    Details   buPROPion (WELLBUTRIN SR) 150 MG TBSR 12 hr tablet TAKE ONE TABLET BY MOUTH TWICE A DAY  Qty: 180 tablet, Refills: 3    Associated Diagnoses: Anxiety      DULoxetine (CYMBALTA) 20 MG capsule TAKE ONE CAPSULE BY MOUTH TWICE A DAY  Qty: 180 capsule, Refills: 3    Associated Diagnoses: Fibromyalgia      eszopiclone (LUNESTA) 2 MG Tab TAKE ONE TABLET BY MOUTH AT BEDTIME AS NEEDED  Qty: 90 tablet, Refills: 1    Associated Diagnoses: Insomnia, unspecified type      famotidine (PEPCID) 20 MG tablet Take 1 tablet (20 mg total) by mouth once daily.  Qty: 90 tablet, Refills: 3    Comments: Please inactivate all prior scripts with same name and strength including any scripts on hold.  Associated Diagnoses: Gastroesophageal reflux disease, unspecified whether esophagitis present      HORIZANT 600 mg TbSR TAKE ONE TABLET BY MOUTH EVERY EVENING  Qty: 90 tablet, Refills: 2    Associated Diagnoses: RLS (restless legs syndrome)      hydroCHLOROthiazide (HYDRODIURIL) 12.5 MG Tab Take 1 tablet (12.5 mg total) by mouth once daily.  Qty: 90 tablet, Refills: 3    Comments: .      losartan (COZAAR) 50 MG tablet TAKE ONE TABLET BY MOUTH EVERY DAY BEFORE BREAKFAST  Qty: 90 tablet, Refills: 3    Comments: .  Associated Diagnoses: Renovascular hypertension      vit A/vit C/vit E/zinc/copper (ICAPS AREDS ORAL) Take by mouth.      ferrous gluconate (FERGON) 324 MG tablet       ferrous sulfate (FEOSOL) 325 mg (65 mg iron) Tab tablet Take 1 tablet (325 mg total) by mouth every other day.  Qty: 45  tablet, Refills: 4      pravastatin (PRAVACHOL) 80 MG tablet Take 1 tablet (80 mg total) by mouth every evening.  Qty: 90 tablet, Refills: 3    Associated Diagnoses: Hyperlipidemia, unspecified hyperlipidemia type                 Discharge Diagnosis: Lumbar spondylosis [M47.816]  Condition on Discharge: Stable with no complications to procedure   Diet on Discharge: Same as before.  Activity: as per instruction sheet.  Discharge to: Home with a responsible adult.  Follow up: 2-4 weeks       Please call my office or pager at [phone] if experienced any weakness or loss of sensation, fever > 101.5, pain uncontrolled with oral medications, persistent nausea/vomiting/or diarrhea, redness or drainage from the incisions, or any other worrisome concerns. If physician on call was not reached or could not communicate with our office for any reason please go to the nearest emergency department

## 2024-10-02 NOTE — H&P
HPI  Patient presenting for Procedure(s) (LRB):  B/L L3,L4,L5 RFA (Bilateral)     Patient on Anti-coagulation No    No health changes since previous encounter    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)    Cataract     removed from both eyes    Chronic kidney disease, unspecified     CKD (chronic kidney disease) stage 3, GFR 30-59 ml/min     Clotting disorder     when knee was replaced    Colitis     Degenerative disc disease     Depression     Fibromyalgia     GERD (gastroesophageal reflux disease)     Hyperlipidemia     Hypertension     Irritable bowel syndrome     TRU (obstructive sleep apnea)     RLS (restless legs syndrome)     S/P knee replacement 01/13/2014     Past Surgical History:   Procedure Laterality Date    APPENDECTOMY      BREAST BIOPSY      BREAST LUMPECTOMY      right side    BREAST SURGERY      CHOLECYSTECTOMY      COLONOSCOPY N/A 12/2/2016    Procedure: COLONOSCOPY;  Surgeon: Ori Cope MD;  Location: [address]);  Service: Endoscopy;  Laterality: N/A;  PM prep    EYE SURGERY Bilateral     cataract    HYSTERECTOMY  1977    uterine pain    INJECTION OF ANESTHETIC AGENT AROUND MEDIAL BRANCH NERVES INNERVATING LUMBAR FACET JOINT Bilateral 6/12/2024    Procedure: Medial Branch Block #1 B/L  L3,4,5;  Surgeon: Siri Aguilera DO;  Location: [facility];  Service: Pain Management;  Laterality: Bilateral;  oral sed no ac    INJECTION OF ANESTHETIC AGENT AROUND MEDIAL BRANCH NERVES INNERVATING LUMBAR FACET JOINT Bilateral 7/9/2024    Procedure: #2 MBB L3, L4, L5 B/L;  Surgeon: Siri Aguilera DO;  Location: [facility]IN [facility];  Service: Pain Management;  Laterality: Bilateral;  oral    INJECTION OF FACET JOINT Bilateral 4/27/2021    Procedure: Facet joint injection-lumbar--Bilateral L4-5, L5-S1;  Surgeon: Kuldip Siddiqui Jr., MD;  Location: [facility];  Service:  "Pain Management;  Laterality: Bilateral;    JOINT REPLACEMENT      knees replaced bilaterally    KNEE SURGERY Bilateral     RECTAL SURGERY      rectocele    TONSILLECTOMY       Review of patient's allergies indicates:   Allergen Reactions    Demerol [meperidine]       Current Facility-Administered Medications   Medication    0.9%  NaCl infusion    0.9%  NaCl infusion    LIDOcaine (PF) 10 mg/ml (1%) injection 10 mg       PMHx, PSHx, Allergies, Medications reviewed in epic    ROS negative except pain complaints in HPI    OBJECTIVE:    BP (!) 166/72   Pulse 83   Temp 98.1 °F (36.7 °C) (Temporal)   Resp 19   Ht 5' 5" (1.651 m)   Wt 66.7 kg (147 lb)   SpO2 96%   Breastfeeding No   BMI 24.46 kg/m²     PHYSICAL EXAMINATION:    GENERAL: Well appearing, in no acute distress, alert and oriented x3.  PSYCH:  Mood and affect appropriate.  SKIN: Skin color, texture, turgor normal, no rashes or lesions which will impact the procedure.  CV: RRR with palpation of the radial artery.  PULM: No evidence of respiratory difficulty, symmetric chest rise. Clear to auscultation.  NEURO: Cranial nerves grossly intact.    Plan:    Proceed with procedure as planned Procedure(s) (LRB):  B/L L3,L4,L5 RFA (Bilateral)    Zane Ledesma  10/02/2024            "

## 2024-10-02 NOTE — OP NOTE
Therapeutic Lumbar Medial Branch Radiofrequency Ablation under Fluoroscopy     The procedure, risks, benefits, and options were discussed with the patient. There are no contraindications to the procedure. The patent expressed understanding and agreed to the procedure. Informed written consent was obtained prior to the start of the procedure and can be found in the patient's chart.        PATIENT NAME: Tere Velasco   MRN: [id number]     DATE OF PROCEDURE: 10/02/2024     PROCEDURE:  Bilateral L3, L4, and L5 Lumbar Radiofrequency Ablation under Fluoroscopy    PRE-OP DIAGNOSIS: Lumbar spondylosis [M47.816] Lumbar spondylosis [M47.816]    POST-OP DIAGNOSIS: Same    PHYSICIAN: Siri Aguilera DO    ASSISTANTS: Zane Ledesma MD Fellow      MEDICATIONS INJECTED:  Preservative-free Decadron 10mg with 9cc of Bupivicaine 0.25%    LOCAL ANESTHETIC INJECTED:   Xylocaine 2%    SEDATION: Versed 1mg and Fentanyl 100mcg                                                                                                                                                                                     Conscious sedation ordered by M.D. Patient re-evaluation prior to administration of conscious sedation. No changes noted in patient's status from initial evaluation. The patient's vital signs were monitored by RN and patient remained hemodynamically stable throughout the procedure.    Event Time In   Sedation Start 0830   Sedation End 0910       ESTIMATED BLOOD LOSS:  None    COMPLICATIONS:  None     INTERVAL HISTORY: Patient has clinical and imaging findings suggestive of facet mediated pain. Patients has completed 2 previous diagnostic medial branch blocks at specified levels with at least 80% relief for the expected duration of the local anesthetic utilized.    TECHNIQUE: Time-out was performed to identify the patient and procedure to be performed. With the patient laying in a prone position, the surgical area was prepped and draped in  the usual sterile fashion using ChloraPrep and fenestrated drape. The levels were determined under fluoroscopic guidance. Skin anesthesia was achieved by injecting Lidocaine 2% over the injection sites. A 20 gauge 10mm curved active tip needle was introduced to the anatomic local of the medial branch at each of the above levels using AP, lateral and/or contralateral oblique fluoroscopic imaging. Then sensory and motor testing was performed to confirm that the needle tips were in the correct location. After negative aspiration for blood or CSF was confirmed, 1 mL of the lidocaine 2% listed above was injected slowly at each site. This was followed by thermal lesioning at 80 degrees celsius for 90 seconds. That was followed by slowly injecting 1.5 mL of the medication mixture listed above at each site. The needles were removed and bleeding was nil. A sterile dressing was applied. No specimens collected. The patient tolerated the procedure well and did not have any procedure related motor deficit at the conclusion of the procedure.      The patient was monitored after the procedure in the recovery area. They were given post-procedure and discharge instructions to follow at home. The patient was discharged in a stable condition.    I reviewed and edited the fellow's note. I conducted my own interview and physical examination. I agree with the findings. I was present and supervising all critical portions of the procedure.    Siri Aguilera DO

## 2024-10-03 ENCOUNTER — TELEPHONE (OUTPATIENT)
Dept: PAIN MEDICINE | Facility: CLINIC | Age: 84
End: 2024-10-03
Payer: MEDICARE

## 2024-10-03 RX ORDER — ESZOPICLONE 2 MG/1
TABLET, FILM COATED ORAL
Qty: 90 TABLET | Refills: 1 | Status: SHIPPED | OUTPATIENT
Start: 2024-10-03

## 2024-10-03 NOTE — TELEPHONE ENCOUNTER
----- Message from Siri Aguilera sent at 10/3/2024  2:52 PM CDT -----  She can resume whenever she feels capable in doing so.  Thanks for checking!  ----- Message -----  From: Barbara Palacios  Sent: 10/3/2024   1:16 PM CDT  To: Siri Aguilera DO; Magali Aguilera,      Patient is asking when can she start her home exercises for her back.     Barbara Cramer  ----- Message -----  From: Elliott Whiteside MA  Sent: 10/3/2024  12:00 PM CDT  To: Magail Haile; Barbara Palacios      ----- Message -----  From: Kelsey Richter  Sent: 10/3/2024  11:59 AM CDT  To: [facility] Staff    Type:  Needs Medical Advice/surgery questions    Who Called: pt      Would the patient rather a call back or a response via [facility]? Call     Best Call Back Number: [phone]    Additional Information: pt requesting a call back to discuss surgery

## 2024-10-21 ENCOUNTER — OFFICE VISIT (OUTPATIENT)
Dept: PAIN MEDICINE | Facility: CLINIC | Age: 84
End: 2024-10-21
Payer: MEDICARE

## 2024-10-21 VITALS
WEIGHT: 149.56 LBS | HEIGHT: 65 IN | BODY MASS INDEX: 24.92 KG/M2 | HEART RATE: 83 BPM | DIASTOLIC BLOOD PRESSURE: 78 MMHG | SYSTOLIC BLOOD PRESSURE: 133 MMHG

## 2024-10-21 DIAGNOSIS — M25.50 DIFFUSE ARTHRALGIA: Primary | ICD-10-CM

## 2024-10-21 PROCEDURE — 1159F MED LIST DOCD IN RCRD: CPT | Mod: HCNC,CPTII,S$GLB, | Performed by: NURSE PRACTITIONER

## 2024-10-21 PROCEDURE — 99214 OFFICE O/P EST MOD 30 MIN: CPT | Mod: HCNC,S$GLB,, | Performed by: NURSE PRACTITIONER

## 2024-10-21 PROCEDURE — 99999 PR PBB SHADOW E&M-EST. PATIENT-LVL IV: CPT | Mod: PBBFAC,HCNC,, | Performed by: NURSE PRACTITIONER

## 2024-10-21 PROCEDURE — 1160F RVW MEDS BY RX/DR IN RCRD: CPT | Mod: HCNC,CPTII,S$GLB, | Performed by: NURSE PRACTITIONER

## 2024-10-21 PROCEDURE — 3288F FALL RISK ASSESSMENT DOCD: CPT | Mod: HCNC,CPTII,S$GLB, | Performed by: NURSE PRACTITIONER

## 2024-10-21 PROCEDURE — 1101F PT FALLS ASSESS-DOCD LE1/YR: CPT | Mod: HCNC,CPTII,S$GLB, | Performed by: NURSE PRACTITIONER

## 2024-10-21 NOTE — PROGRESS NOTES
[facility]  Patient Evaluation    Referred by: Dr Rosy Santos  Reason for referral: Lumbar Region    CC:   Chief Complaint   Patient presents with    Follow-up    Fibromyalgia           10/21/2024     1:21 PM 5/20/2024     2:17 PM 10/5/2022     1:21 PM   Last 3 PDI Scores   Pain Disability Index (PDI) 5 50 53       Interval history 10/21/2024:  84-year-old female that presents today for a follow-up appointment she is status post a bilateral lumbar RFA targeting L3, L4 and L5 on 10/02/2024 reporting 90% relief of her axial low back pain.  Patient reports her functionality has significantly improved since the lumbar RFA she is able to walk long distances without having to sit down and rest due to her low back pain.  She is still reporting fibromyalgia pain I discussed with her that an interventional pain we would typically do not treat this condition but I will refer her to Rheumatology for further evaluation.  Today she denies any new pain denies any profound weakness denies any bowel bladder dysfunction at this time she reports fibromyalgia pain as pain all over her body        Interval History 5/20/2024:    84-year-old female that presents today for a follow-up appointment she is a former patient of Dr. Siddiqui she reports ongoing chronic low back pain for years.  Pain begins in the low back just above her belt line with mild radiation into her buttocks.  Pain is constant pain is worse when she stands and performs ADLs rest and sitting mitigate her symptoms.  She denies any radicular symptoms denies any paresthesia like symptoms denies any shooting pain in either leg.  She was previously provided facet lumbar injections 2 years ago however she can not recall if she received relief from these injections she states today that she did not but she is unsure.   Upon review of her recent x-ray she has disc space narrowing noted throughout the lumbar spine. There are marginal  osteophytes. There is facet arthropathy within the lower lumbar spine and lumbosacral junction.  She also has a history of fibromyalgia she would like to be considered for potential pain intervention injections.  She denies any recent incident or trauma denies profound weakness denies any bowel bladder dysfunction at this time.      Interval Update:  10/5/2022 - Ms. Velasco is following up for low back pain.   She reports the following medication side effects: none.  Pain is currently rate 2/10 with a weekly range 3-4/10.  It is described as Aching.   Pain is worsened by standing and walking and improved by resting and sitting.     HPI:   Tere Velasco is a 84 y.o. female with a reported history of diffuse arthritis status post bilateral knee replacement, fibromyalgia, scoliosis who presents complaining of chronic low back pain has become severe.  Patient reports limited ability to stand or walk due to low back pain.    Location: low back   Onset: several years  Current Pain Score: 4/10  Daily Pain of Range: 3-9/10  Quality: Aching  Radiation: None  Worsened by: standing and walking for more than a few minutes  Improved by: rest and sitting    Previous Therapies:  PT/OT: Yes, 3 yrs ago  HEP: Yes, 3 yrs ago but stopped performing it      Pain Interventions:  -10/02/2024 bilateral RFA targeting L3, L4 and L5 90% relief of axial low back pain.  -04/27/2021 Bilateral L4-5 and L5-S1 Lumbar Facet Injection-  Dr. Siddiqui  - Bilateral Lumbar facet injection 2013  Surgery: Bilateral TKA.  No back surgery.  Medications:   - NSAIDS: unable to use due to CKD  - MSK Relaxants:   - TCAs:   - SNRIs:   - Topicals:   - Anticonvulsants:  - Opioids:     Current Pain Medications:  duloxtine  Gabapentin (Horizant)    Review of Systems:  Review of Systems   Constitutional:  Negative for chills and fever.   HENT:  Negative for nosebleeds.    Eyes:  Negative for blurred vision and pain.   Respiratory:  Negative for hemoptysis.     Cardiovascular:  Negative for chest pain and palpitations.   Gastrointestinal:  Negative for heartburn, nausea and vomiting.   Genitourinary:  Negative for dysuria and hematuria.   Musculoskeletal:  Positive for back pain, joint pain and myalgias.   Skin:  Negative for rash.   Neurological:  Negative for seizures and loss of consciousness.   Endo/Heme/Allergies:  Does not bruise/bleed easily.   Psychiatric/Behavioral:  Negative for hallucinations.      History:    Current Outpatient Medications:     buPROPion (WELLBUTRIN SR) 150 MG TBSR 12 hr tablet, TAKE ONE TABLET BY MOUTH TWICE A DAY, Disp: 180 tablet, Rfl: 3    DULoxetine (CYMBALTA) 20 MG capsule, TAKE ONE CAPSULE BY MOUTH TWICE A DAY, Disp: 180 capsule, Rfl: 3    eszopiclone (LUNESTA) 2 MG Tab, TAKE ONE TABLET BY MOUTH EVERY EVENING AT BEDTIME AS NEEDED, Disp: 90 tablet, Rfl: 1    famotidine (PEPCID) 20 MG tablet, Take 1 tablet (20 mg total) by mouth once daily., Disp: 90 tablet, Rfl: 3    ferrous gluconate (FERGON) 324 MG tablet, , Disp: , Rfl:     ferrous sulfate (FEOSOL) 325 mg (65 mg iron) Tab tablet, Take 1 tablet (325 mg total) by mouth every other day., Disp: 45 tablet, Rfl: 4    HORIZANT 600 mg TbSR, TAKE ONE TABLET BY MOUTH EVERY EVENING, Disp: 90 tablet, Rfl: 2    hydroCHLOROthiazide (HYDRODIURIL) 12.5 MG Tab, Take 1 tablet (12.5 mg total) by mouth once daily., Disp: 90 tablet, Rfl: 3    losartan (COZAAR) 50 MG tablet, TAKE ONE TABLET BY MOUTH EVERY DAY BEFORE BREAKFAST, Disp: 90 tablet, Rfl: 3    pravastatin (PRAVACHOL) 80 MG tablet, Take 1 tablet (80 mg total) by mouth every evening., Disp: 90 tablet, Rfl: 3    vit A/vit C/vit E/zinc/copper (ICAPS AREDS ORAL), Take by mouth., Disp: , Rfl:     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)    Cataract     removed from both eyes    Chronic kidney disease, unspecified     CKD (chronic kidney  disease) stage 3, GFR 30-59 ml/min     Clotting disorder     when knee was replaced    Colitis     Degenerative disc disease     Depression     Fibromyalgia     GERD (gastroesophageal reflux disease)     Hyperlipidemia     Hypertension     Irritable bowel syndrome     TRU (obstructive sleep apnea)     RLS (restless legs syndrome)     S/P knee replacement 01/13/2014       Past Surgical History:   Procedure Laterality Date    APPENDECTOMY      BREAST BIOPSY      BREAST LUMPECTOMY      right side    BREAST SURGERY      CHOLECYSTECTOMY      COLONOSCOPY N/A 12/2/2016    Procedure: COLONOSCOPY;  Surgeon: Ori Cope MD;  Location: [address]);  Service: Endoscopy;  Laterality: N/A;  PM prep    EYE SURGERY Bilateral     cataract    HYSTERECTOMY  1977    uterine pain    INJECTION OF ANESTHETIC AGENT AROUND MEDIAL BRANCH NERVES INNERVATING LUMBAR FACET JOINT Bilateral 6/12/2024    Procedure: Medial Branch Block #1 B/L  L3,4,5;  Surgeon: Siri Aguilera DO;  Location: [facility];  Service: Pain Management;  Laterality: Bilateral;  oral sed no ac    INJECTION OF ANESTHETIC AGENT AROUND MEDIAL BRANCH NERVES INNERVATING LUMBAR FACET JOINT Bilateral 7/9/2024    Procedure: #2 MBB L3, L4, L5 B/L;  Surgeon: Siri Aguilera DO;  Location: [facility];  Service: Pain Management;  Laterality: Bilateral;  oral    INJECTION OF FACET JOINT Bilateral 4/27/2021    Procedure: Facet joint injection-lumbar--Bilateral L4-5, L5-S1;  Surgeon: Kuldip Siddiqui Jr., MD;  Location: [facility];  Service: Pain Management;  Laterality: Bilateral;    JOINT REPLACEMENT      knees replaced bilaterally    KNEE SURGERY Bilateral     RADIOFREQUENCY ABLATION OF LUMBAR MEDIAL BRANCH NERVE AT SINGLE LEVEL Bilateral 10/2/2024    Procedure: B/L L3,L4,L5 RFA;  Surgeon: Siri Aguilera DO;  Location: [facility];  Service: Pain Management;  Laterality: Bilateral;  40 mins    RECTAL SURGERY      rectocele     TONSILLECTOMY         Family History   Problem Relation Name Age of Onset    Dementia Mother          age 92    Heart disease Father          age 58    Restless legs syndrome Daughter Jovi     Thyroid disease Daughter Jovi     Hypertension Son Moshe     Alcohol abuse Son Moshe     Ovarian cancer Paternal Grandmother      Breast cancer Paternal Grandmother      Cancer Paternal Grandmother          ovarian    Ovarian cancer Cousin      Cancer Cousin  40        colon ca    Breast cancer Paternal Aunt      Skin cancer Neg Hx      Melanoma Neg Hx      Colon cancer Neg Hx      Esophageal cancer Neg Hx      Stomach cancer Neg Hx      Irritable bowel syndrome Neg Hx      Crohn's disease Neg Hx      Ulcerative colitis Neg Hx      Celiac disease Neg Hx         Social History     Socioeconomic History    Marital status:    Tobacco Use    Smoking status: Never     Passive exposure: Never    Smokeless tobacco: Never   Substance and Sexual Activity    Alcohol use: Not Currently    Drug use: No    Sexual activity: Not Currently     Partners: Male     Birth control/protection: Post-menopausal     Social Drivers of Health     Financial Resource Strain: Low Risk  (1/19/2023)    Overall Financial Resource Strain (CARDIA)     Difficulty of Paying Living Expenses: Not very hard   Food Insecurity: No Food Insecurity (4/26/2021)    Hunger Vital Sign     Worried About Running Out of Food in the Last Year: Never true     Ran Out of Food in the Last Year: Never true   Transportation Needs: No Transportation Needs (1/19/2023)    PRAPARE - Transportation     Lack of Transportation (Medical): No     Lack of Transportation (Non-Medical): No   Physical Activity: Inactive (4/26/2021)    Exercise Vital Sign     Days of Exercise per Week: 0 days     Minutes of Exercise per Session: 0 min   Stress: Stress Concern Present (1/19/2023)    [employer] - Occupational Stress Questionnaire     Feeling of Stress :  "Rather much   Housing Stability: Unknown (2023)    Housing Stability Vital Sign     Unable to Pay for Housing in the Last Year: No     Unstable Housing in the Last Year: No       Review of patient's allergies indicates:   Allergen Reactions    Demerol [meperidine]        Physical Exam:  Vitals:    10/21/24 1311   BP: 133/78   Pulse: 83   Weight: 67.8 kg (149 lb 9.3 oz)   Height: 5' 5" (1.651 m)     General    Nursing note and vitals reviewed.  Constitutional: She is oriented to person, place, and time. She appears well-developed and well-nourished. No distress.   HENT:   Head: Normocephalic and atraumatic.   Nose: Nose normal.   Eyes: Conjunctivae and EOM are normal. Pupils are equal, round, and reactive to light. Right eye exhibits no discharge. Left eye exhibits no discharge. No scleral icterus.   Neck: No JVD present.   Cardiovascular:  Intact distal pulses.            Pulmonary/Chest: Effort normal. No respiratory distress.   Abdominal: She exhibits no distension.   Neurological: She is alert and oriented to person, place, and time. Coordination normal.   Psychiatric: She has a normal mood and affect. Her behavior is normal. Judgment and thought content normal.     General Musculoskeletal Exam   Gait: normal     Back (L-Spine & T-Spine) / Neck (C-Spine) Exam     Tenderness Right paramedian tenderness of the Lower L-Spine. Left paramedian tenderness of the Lower L-Spine.     Back (L-Spine & T-Spine) Range of Motion   Back extension: facet loading is positive and exacerabtes/reproduces the patient's typical low back pain    Back flexion: limited ROM but partial relief of low back pain noted.     Spinal Sensation   Right Side Sensation  L-Spine Level: normal  Left Side Sensation  L-Spine Level: normal    Other   She has scoliosis .      Muscle Strength   Right Lower Extremity   Hip Flexion: 5/5   Hip Extensors: 5/5  Quadriceps:  5/5   Hamstrin/5   Gastrocsoleus:  5/5   Left Lower Extremity   Hip Flexion: " 5/5   Hip Extensors: 5/5  Quadriceps:  5/5   Hamstrin/5   Gastrocsoleus:  5/5     Reflexes     Left Side  Achilles:  2+  Quadriceps:  2+    Right Side   Achilles:  2+  Quadriceps:  2+    Imaging:  X-ray lumbar spine from  shows substantial thoracolumbar scoliosis.  There is multilevel degenerative disc disease and facet arthropathy most notably at L4-5 and L5-S1, bilaterally.    Labs:  BMP  Lab Results   Component Value Date     2024    K 4.5 2024     2024    CO2 23 2024    BUN 21 2024    CREATININE 1.4 2024    CALCIUM 9.6 2024    ANIONGAP 9 2024    ESTGFRAFRICA 39.8 (A) 2022    ESTGFRAFRICA 39.8 (A) 2022    EGFRNONAA 34.5 (A) 2022    EGFRNONAA 34.5 (A) 2022     Lab Results   Component Value Date    ALT 15 2024    AST 20 2024    ALKPHOS 49 (L) 2024    BILITOT 0.4 2024       Assessment:  Problem List Items Addressed This Visit    None        3/10/21 - Tere Velasco is a 84 y.o. female with chronic low back pain due to degenerative joint disease with contribution from scoliosis and core muscle weakness.  I recommended a combination of therapies including physical therapy and facet joint injections for the low back.  Due to the severity of her scoliosis, patient is unlikely to ever be pain free; however, I believe that through a combination of facet injections of physical therapy a realistic target is 50-75% reduction in pain.  I recommend continuation of the current medications with the addition of Tylenol.  Patient appears to understand the necessity for avoiding NSAIDs, due to her chronic kidney disease.    10/5/22 - Tere Velasco is a 84 y.o. female with chronic lower back pain and scoliosis. She failed to experience relief from facet injections that were well placed.  She states that she does not do home exercises because she wasn't sure how it would help her back pain.  She completed  physical therapy, however her benefits were short lived.  At this point, she would benefit form home exercise program with core strengthening.  She reports pain of 2/10 today which is representative of her daily experience of pain.  At age 82 yrs with substantial scoliosis, advanced DDD, and a sedentary lifestyle, she was counseled that her level of pain (2/10) should be viewed as reasonable; however, if she would like further redcutions in pain she must commit to daily exercise.  Her remain pain is most likely fibromyalgia.      05/20/2024-Tere Velasco is a 84 y.o. female who  has a past medical history of Allergy, Anemia, Anxiety, Arthritis, Back pain, Breast disorder, Cataract, Chronic kidney disease, unspecified, CKD (chronic kidney disease) stage 3, GFR 30-59 ml/min, Clotting disorder, Colitis, Degenerative disc disease, Depression, Fibromyalgia, GERD (gastroesophageal reflux disease), Hyperlipidemia, Hypertension, Irritable bowel syndrome, TRU (obstructive sleep apnea), RLS (restless legs syndrome), and S/P knee replacement (01/13/2014).  By history and examination this patient has chronic low back pain without radiculopathy.  The underlying cause is  scoliosis, facet arthritis, degenerative disc disease, and foraminal stenosis.  Pathology is confirmed by imaging.  We discussed the underlying diagnoses and multiple treatment options including non-opioid medications, interventional procedures, physical therapy, home exercise, core muscle enhancement, and activity modification.  The risks and benefits of each treatment option were discussed and all questions were answered.        10/21/2024Toni Velasco is a 84 y.o. female who  has a past medical history of Allergy, Anemia, Anxiety, Arthritis, Back pain, Breast disorder, Cataract, Chronic kidney disease, unspecified, CKD (chronic kidney disease) stage 3, GFR 30-59 ml/min, Clotting disorder, Colitis, Degenerative disc disease, Depression,  Fibromyalgia, GERD (gastroesophageal reflux disease), Hyperlipidemia, Hypertension, Irritable bowel syndrome, TRU (obstructive sleep apnea), RLS (restless legs syndrome), and S/P knee replacement (01/13/2014).  By history and examination this patient has chronic low back pain without radiculopathy.  The underlying cause cause is facet arthritis, degenerative disc disease, foraminal stenosis, central canal stenosis, muscle dysfunction, and muscles strain.  Pathology is confirmed by imaging.  We discussed the underlying diagnoses and multiple treatment options including non-opioid medications, interventional procedures, physical therapy, home exercise, core muscle enhancement, and activity modification.  The risks and benefits of each treatment option were discussed and all questions were answered.        : NA    Treatment Plan:   Procedures:  none at this time. Can repeat Lumbar RFA L3,4,5 bilateral   PT/OT/HEP:  Start HE plan that was provided per PT. consider returning to physical therapy.  We counseled her on the importance of a quality HEP for the long term management of her lower back pain.  Medications:  Continue Tylenol extra-strength a 1000 t.I.d. not to exceed 3000 mg in a 24 hour.  Labs: reviewed and medications are appropriately dosed for current hepatorenal function.  Imaging: none  Referral- Consider referral to Rheumatology for diffuse arthralgia/fibromyalgia.     Follow Up:   3-4 months or sooner     CARY GillC  Interventional Pain Management    Disclaimer: This note was partly generated using dictation software which may occasionally result in transcription errors.

## 2024-10-30 DIAGNOSIS — G25.81 RLS (RESTLESS LEGS SYNDROME): ICD-10-CM

## 2024-10-31 RX ORDER — GABAPENTIN ENACARBIL 600 MG/1
1 TABLET, EXTENDED RELEASE ORAL NIGHTLY
Qty: 90 TABLET | Refills: 2 | Status: SHIPPED | OUTPATIENT
Start: 2024-10-31

## 2024-11-01 ENCOUNTER — TELEPHONE (OUTPATIENT)
Dept: INTERNAL MEDICINE | Facility: CLINIC | Age: 84
End: 2024-11-01
Payer: MEDICARE

## 2024-11-04 NOTE — TELEPHONE ENCOUNTER
----- Message from CONSTANTINE Gill sent at 11/4/2024  3:27 PM CST -----  Please send her a short term prescription of tramadol 50 mg b.i.d. for 14 days 28 tablets  ----- Message -----  From: Julissa Yin MA  Sent: 11/1/2024   2:50 PM CST  To: CONSTANTINE Acosta    Pt. says her lower back is hurting, she has fibromyalgia, she had RFA on 10/02/24, taking tylenol and aspirin. She would like something called if possible.  ----- Message -----  From: María Tirado  Sent: 11/1/2024   2:38 PM CDT  To: [facility] Staff    Type:  Patient Returning Call    Who Called:Pt   Would the patient rather a call back or a response via [facility]? Call back   Best Call Back Number: [phone]  Additional Information: requesting a call back ... Patient say she is  in pain

## 2024-11-05 RX ORDER — TRAMADOL HYDROCHLORIDE 50 MG/1
50 TABLET ORAL 2 TIMES DAILY PRN
Qty: 28 TABLET | Refills: 0 | Status: SHIPPED | OUTPATIENT
Start: 2024-11-05 | End: 2024-11-19

## 2024-11-08 ENCOUNTER — LAB VISIT (OUTPATIENT)
Dept: LAB | Facility: HOSPITAL | Age: 84
End: 2024-11-08
Attending: HOSPITALIST
Payer: MEDICARE

## 2024-11-08 DIAGNOSIS — M54.50 CHRONIC BILATERAL LOW BACK PAIN WITHOUT SCIATICA: ICD-10-CM

## 2024-11-08 DIAGNOSIS — D50.9 IRON DEFICIENCY ANEMIA, UNSPECIFIED IRON DEFICIENCY ANEMIA TYPE: ICD-10-CM

## 2024-11-08 DIAGNOSIS — G89.29 CHRONIC BILATERAL LOW BACK PAIN WITHOUT SCIATICA: ICD-10-CM

## 2024-11-08 DIAGNOSIS — M43.10 ACQUIRED SPONDYLOLISTHESIS: ICD-10-CM

## 2024-11-08 DIAGNOSIS — G47.00 INSOMNIA, UNSPECIFIED TYPE: ICD-10-CM

## 2024-11-08 DIAGNOSIS — I10 ESSENTIAL HYPERTENSION: ICD-10-CM

## 2024-11-08 DIAGNOSIS — R41.3 MEMORY LOSS: ICD-10-CM

## 2024-11-08 DIAGNOSIS — E55.9 VITAMIN D DEFICIENCY: ICD-10-CM

## 2024-11-08 DIAGNOSIS — M48.061 SPINAL STENOSIS, LUMBAR REGION, WITHOUT NEUROGENIC CLAUDICATION: ICD-10-CM

## 2024-11-08 LAB
25(OH)D3+25(OH)D2 SERPL-MCNC: 35 NG/ML (ref 30–96)
ALBUMIN SERPL BCP-MCNC: 3.7 G/DL (ref 3.5–5.2)
ALP SERPL-CCNC: 51 U/L (ref 40–150)
ALT SERPL W/O P-5'-P-CCNC: 12 U/L (ref 10–44)
ANION GAP SERPL CALC-SCNC: 11 MMOL/L (ref 8–16)
AST SERPL-CCNC: 20 U/L (ref 10–40)
BASOPHILS # BLD AUTO: 0.07 K/UL (ref 0–0.2)
BASOPHILS NFR BLD: 0.8 % (ref 0–1.9)
BILIRUB SERPL-MCNC: 0.3 MG/DL (ref 0.1–1)
BUN SERPL-MCNC: 24 MG/DL (ref 8–23)
CALCIUM SERPL-MCNC: 9.9 MG/DL (ref 8.7–10.5)
CHLORIDE SERPL-SCNC: 109 MMOL/L (ref 95–110)
CHOLEST SERPL-MCNC: 223 MG/DL (ref 120–199)
CHOLEST/HDLC SERPL: 3.5 {RATIO} (ref 2–5)
CO2 SERPL-SCNC: 21 MMOL/L (ref 23–29)
CREAT SERPL-MCNC: 1.2 MG/DL (ref 0.5–1.4)
DIFFERENTIAL METHOD BLD: ABNORMAL
EOSINOPHIL # BLD AUTO: 0.2 K/UL (ref 0–0.5)
EOSINOPHIL NFR BLD: 1.6 % (ref 0–8)
ERYTHROCYTE [DISTWIDTH] IN BLOOD BY AUTOMATED COUNT: 15.2 % (ref 11.5–14.5)
EST. GFR  (NO RACE VARIABLE): 44.1 ML/MIN/1.73 M^2
FERRITIN SERPL-MCNC: 21 NG/ML (ref 20–300)
FOLATE SERPL-MCNC: 17.2 NG/ML (ref 4–24)
GLUCOSE SERPL-MCNC: 104 MG/DL (ref 70–110)
HCT VFR BLD AUTO: 34.4 % (ref 37–48.5)
HDLC SERPL-MCNC: 63 MG/DL (ref 40–75)
HDLC SERPL: 28.3 % (ref 20–50)
HGB BLD-MCNC: 10 G/DL (ref 12–16)
IMM GRANULOCYTES # BLD AUTO: 0.03 K/UL (ref 0–0.04)
IMM GRANULOCYTES NFR BLD AUTO: 0.3 % (ref 0–0.5)
IRON SERPL-MCNC: 29 UG/DL (ref 30–160)
LDLC SERPL CALC-MCNC: 136.8 MG/DL (ref 63–159)
LYMPHOCYTES # BLD AUTO: 1.8 K/UL (ref 1–4.8)
LYMPHOCYTES NFR BLD: 19.6 % (ref 18–48)
MCH RBC QN AUTO: 25.8 PG (ref 27–31)
MCHC RBC AUTO-ENTMCNC: 29.1 G/DL (ref 32–36)
MCV RBC AUTO: 89 FL (ref 82–98)
MONOCYTES # BLD AUTO: 0.8 K/UL (ref 0.3–1)
MONOCYTES NFR BLD: 8.8 % (ref 4–15)
NEUTROPHILS # BLD AUTO: 6.3 K/UL (ref 1.8–7.7)
NEUTROPHILS NFR BLD: 68.9 % (ref 38–73)
NONHDLC SERPL-MCNC: 160 MG/DL
NRBC BLD-RTO: 0 /100 WBC
PLATELET # BLD AUTO: 323 K/UL (ref 150–450)
PMV BLD AUTO: 10.7 FL (ref 9.2–12.9)
POTASSIUM SERPL-SCNC: 4.4 MMOL/L (ref 3.5–5.1)
PROT SERPL-MCNC: 7.2 G/DL (ref 6–8.4)
RBC # BLD AUTO: 3.87 M/UL (ref 4–5.4)
SATURATED IRON: 6 % (ref 20–50)
SODIUM SERPL-SCNC: 141 MMOL/L (ref 136–145)
TOTAL IRON BINDING CAPACITY: 459 UG/DL (ref 250–450)
TRANSFERRIN SERPL-MCNC: 310 MG/DL (ref 200–375)
TRANSFERRIN SERPL-MCNC: 310 MG/DL (ref 200–375)
TRIGL SERPL-MCNC: 116 MG/DL (ref 30–150)
TSH SERPL DL<=0.005 MIU/L-ACNC: 1.8 UIU/ML (ref 0.4–4)
VIT B12 SERPL-MCNC: 1034 PG/ML (ref 210–950)
WBC # BLD AUTO: 9.19 K/UL (ref 3.9–12.7)

## 2024-11-08 PROCEDURE — 36415 COLL VENOUS BLD VENIPUNCTURE: CPT | Mod: HCNC,PO | Performed by: HOSPITALIST

## 2024-11-08 PROCEDURE — 84443 ASSAY THYROID STIM HORMONE: CPT | Mod: HCNC | Performed by: HOSPITALIST

## 2024-11-08 PROCEDURE — 82607 VITAMIN B-12: CPT | Mod: HCNC | Performed by: HOSPITALIST

## 2024-11-08 PROCEDURE — 82746 ASSAY OF FOLIC ACID SERUM: CPT | Mod: HCNC | Performed by: HOSPITALIST

## 2024-11-08 PROCEDURE — 82306 VITAMIN D 25 HYDROXY: CPT | Mod: HCNC | Performed by: HOSPITALIST

## 2024-11-08 PROCEDURE — 80061 LIPID PANEL: CPT | Mod: HCNC | Performed by: HOSPITALIST

## 2024-11-08 PROCEDURE — 80053 COMPREHEN METABOLIC PANEL: CPT | Mod: HCNC | Performed by: HOSPITALIST

## 2024-11-08 PROCEDURE — 85025 COMPLETE CBC W/AUTO DIFF WBC: CPT | Mod: HCNC | Performed by: HOSPITALIST

## 2024-11-08 PROCEDURE — 84466 ASSAY OF TRANSFERRIN: CPT | Mod: HCNC | Performed by: HOSPITALIST

## 2024-11-08 PROCEDURE — 82728 ASSAY OF FERRITIN: CPT | Mod: HCNC | Performed by: HOSPITALIST

## 2024-11-11 ENCOUNTER — OFFICE VISIT (OUTPATIENT)
Dept: PAIN MEDICINE | Facility: CLINIC | Age: 84
End: 2024-11-11
Payer: MEDICARE

## 2024-11-11 VITALS
DIASTOLIC BLOOD PRESSURE: 71 MMHG | BODY MASS INDEX: 24.53 KG/M2 | HEIGHT: 65 IN | HEART RATE: 82 BPM | SYSTOLIC BLOOD PRESSURE: 120 MMHG | WEIGHT: 147.25 LBS

## 2024-11-11 DIAGNOSIS — G89.4 CHRONIC PAIN SYNDROME: ICD-10-CM

## 2024-11-11 DIAGNOSIS — M47.819 ARTHROPATHY OF FACET JOINTS AT MULTIPLE LEVELS: ICD-10-CM

## 2024-11-11 DIAGNOSIS — M25.50 DIFFUSE ARTHRALGIA: ICD-10-CM

## 2024-11-11 DIAGNOSIS — M54.50 CHRONIC BILATERAL LOW BACK PAIN WITHOUT SCIATICA: ICD-10-CM

## 2024-11-11 DIAGNOSIS — M47.816 LUMBAR SPONDYLOSIS: Primary | ICD-10-CM

## 2024-11-11 DIAGNOSIS — G89.29 CHRONIC BILATERAL LOW BACK PAIN WITHOUT SCIATICA: ICD-10-CM

## 2024-11-11 PROCEDURE — 99999 PR PBB SHADOW E&M-EST. PATIENT-LVL IV: CPT | Mod: PBBFAC,HCNC,, | Performed by: NURSE PRACTITIONER

## 2024-11-11 PROCEDURE — 99214 OFFICE O/P EST MOD 30 MIN: CPT | Mod: HCNC,S$GLB,, | Performed by: NURSE PRACTITIONER

## 2024-11-11 PROCEDURE — 1160F RVW MEDS BY RX/DR IN RCRD: CPT | Mod: HCNC,CPTII,S$GLB, | Performed by: NURSE PRACTITIONER

## 2024-11-11 PROCEDURE — 1159F MED LIST DOCD IN RCRD: CPT | Mod: HCNC,CPTII,S$GLB, | Performed by: NURSE PRACTITIONER

## 2024-11-11 PROCEDURE — 1125F AMNT PAIN NOTED PAIN PRSNT: CPT | Mod: HCNC,CPTII,S$GLB, | Performed by: NURSE PRACTITIONER

## 2024-11-11 PROCEDURE — 1101F PT FALLS ASSESS-DOCD LE1/YR: CPT | Mod: HCNC,CPTII,S$GLB, | Performed by: NURSE PRACTITIONER

## 2024-11-11 PROCEDURE — 3288F FALL RISK ASSESSMENT DOCD: CPT | Mod: HCNC,CPTII,S$GLB, | Performed by: NURSE PRACTITIONER

## 2024-11-11 NOTE — PROGRESS NOTES
[facility]  Patient Evaluation    Referred by: Dr Rosy Santos  Reason for referral: Lumbar Region    CC:   Chief Complaint   Patient presents with    Low-back Pain           11/11/2024     2:07 PM 10/21/2024     1:21 PM 5/20/2024     2:17 PM   Last 3 PDI Scores   Pain Disability Index (PDI) 30 5 50       Interval history 11/11/2024:  84-year-old female that presents today for continued lower back pain.  Patient suffers from diffuse arthralgia she was recently provided a bilateral lumbar RFA targeting L3, L4 and L5 on 10/02/2024 that did provide relief however she continues to complain of axial low back pain.  She denies any radicular symptoms denies any profound weakness denies any recent incident or trauma.  Her pain is worse when she wakes in the morning gets better throughout the course of the day but then she reports living a sedentary lifestyle where she sits quite often.  I am questioning her 90% relief of her most recent procedure as her pain continues to disrupt her quality of life prohibiting her from performing her ADLs.    Interval history 10/21/2024:  84-year-old female that presents today for a follow-up appointment she is status post a bilateral lumbar RFA targeting L3, L4 and L5 on 10/02/2024 reporting 90% relief of her axial low back pain.  Patient reports her functionality has significantly improved since the lumbar RFA she is able to walk long distances without having to sit down and rest due to her low back pain.  She is still reporting fibromyalgia pain I discussed with her that an interventional pain we would typically do not treat this condition but I will refer her to Rheumatology for further evaluation.  Today she denies any new pain denies any profound weakness denies any bowel bladder dysfunction at this time she reports fibromyalgia pain as pain all over her body        Interval History 5/20/2024:    84-year-old female that presents today for a follow-up  appointment she is a former patient of Dr. Siddiqui she reports ongoing chronic low back pain for years.  Pain begins in the low back just above her belt line with mild radiation into her buttocks.  Pain is constant pain is worse when she stands and performs ADLs rest and sitting mitigate her symptoms.  She denies any radicular symptoms denies any paresthesia like symptoms denies any shooting pain in either leg.  She was previously provided facet lumbar injections 2 years ago however she can not recall if she received relief from these injections she states today that she did not but she is unsure.   Upon review of her recent x-ray she has disc space narrowing noted throughout the lumbar spine. There are marginal osteophytes. There is facet arthropathy within the lower lumbar spine and lumbosacral junction.  She also has a history of fibromyalgia she would like to be considered for potential pain intervention injections.  She denies any recent incident or trauma denies profound weakness denies any bowel bladder dysfunction at this time.      Interval Update:  10/5/2022 - Ms. Velasco is following up for low back pain.   She reports the following medication side effects: none.  Pain is currently rate 2/10 with a weekly range 3-4/10.  It is described as Aching.   Pain is worsened by standing and walking and improved by resting and sitting.     HPI:   Tere Velasco is a 84 y.o. female with a reported history of diffuse arthritis status post bilateral knee replacement, fibromyalgia, scoliosis who presents complaining of chronic low back pain has become severe.  Patient reports limited ability to stand or walk due to low back pain.    Location: low back   Onset: several years  Current Pain Score: 4/10  Daily Pain of Range: 3-9/10  Quality: Aching  Radiation: None  Worsened by: standing and walking for more than a few minutes  Improved by: rest and sitting    Previous Therapies:  PT/OT: Yes, 3 yrs ago  HEP: Yes, 3  yrs ago but stopped performing it      Pain Interventions:  -10/02/2024 bilateral RFA targeting L3, L4 and L5 90% relief of axial low back pain.  -04/27/2021 Bilateral L4-5 and L5-S1 Lumbar Facet Injection-  Dr. Siddiqui  - Bilateral Lumbar facet injection 2013  Surgery: Bilateral TKA.  No back surgery.  Medications:   - NSAIDS: unable to use due to CKD  - MSK Relaxants:   - TCAs:   - SNRIs:   - Topicals:   - Anticonvulsants:  - Opioids:     Current Pain Medications:  duloxtine  Gabapentin (Horizant)    Review of Systems:  Review of Systems   Constitutional:  Negative for chills and fever.   HENT:  Negative for nosebleeds.    Eyes:  Negative for blurred vision and pain.   Respiratory:  Negative for hemoptysis.    Cardiovascular:  Negative for chest pain and palpitations.   Gastrointestinal:  Negative for heartburn, nausea and vomiting.   Genitourinary:  Negative for dysuria and hematuria.   Musculoskeletal:  Positive for back pain, joint pain and myalgias.   Skin:  Negative for rash.   Neurological:  Negative for seizures and loss of consciousness.   Endo/Heme/Allergies:  Does not bruise/bleed easily.   Psychiatric/Behavioral:  Negative for hallucinations.        History:    Current Outpatient Medications:     buPROPion (WELLBUTRIN SR) 150 MG TBSR 12 hr tablet, TAKE ONE TABLET BY MOUTH TWICE A DAY, Disp: 180 tablet, Rfl: 3    DULoxetine (CYMBALTA) 20 MG capsule, TAKE ONE CAPSULE BY MOUTH TWICE A DAY, Disp: 180 capsule, Rfl: 3    eszopiclone (LUNESTA) 2 MG Tab, TAKE ONE TABLET BY MOUTH EVERY EVENING AT BEDTIME AS NEEDED, Disp: 90 tablet, Rfl: 1    famotidine (PEPCID) 20 MG tablet, Take 1 tablet (20 mg total) by mouth once daily., Disp: 90 tablet, Rfl: 3    ferrous gluconate (FERGON) 324 MG tablet, , Disp: , Rfl:     ferrous sulfate (FEOSOL) 325 mg (65 mg iron) Tab tablet, Take 1 tablet (325 mg total) by mouth every other day., Disp: 45 tablet, Rfl: 4    gabapentin enacarbil (HORIZANT) 600 mg TbSR, Take 1 tablet by  mouth every evening., Disp: 90 tablet, Rfl: 2    hydroCHLOROthiazide (HYDRODIURIL) 12.5 MG Tab, Take 1 tablet (12.5 mg total) by mouth once daily., Disp: 90 tablet, Rfl: 3    losartan (COZAAR) 50 MG tablet, TAKE ONE TABLET BY MOUTH EVERY DAY BEFORE BREAKFAST, Disp: 90 tablet, Rfl: 3    pravastatin (PRAVACHOL) 80 MG tablet, Take 1 tablet (80 mg total) by mouth every evening., Disp: 90 tablet, Rfl: 3    traMADoL (ULTRAM) 50 mg tablet, Take 1 tablet (50 mg total) by mouth 2 (two) times daily as needed for Pain., Disp: 28 tablet, Rfl: 0    vit A/vit C/vit E/zinc/copper (ICAPS AREDS ORAL), Take by mouth., Disp: , Rfl:     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)    Cataract     removed from both eyes    Chronic kidney disease, unspecified     CKD (chronic kidney disease) stage 3, GFR 30-59 ml/min     Clotting disorder     when knee was replaced    Colitis     Degenerative disc disease     Depression     Fibromyalgia     GERD (gastroesophageal reflux disease)     Hyperlipidemia     Hypertension     Irritable bowel syndrome     TRU (obstructive sleep apnea)     RLS (restless legs syndrome)     S/P knee replacement 01/13/2014       Past Surgical History:   Procedure Laterality Date    APPENDECTOMY      BREAST BIOPSY      BREAST LUMPECTOMY      right side    BREAST SURGERY      CHOLECYSTECTOMY      COLONOSCOPY N/A 12/2/2016    Procedure: COLONOSCOPY;  Surgeon: Ori Cope MD;  Location: [address]);  Service: Endoscopy;  Laterality: N/A;  PM prep    EYE SURGERY Bilateral     cataract    HYSTERECTOMY  1977    uterine pain    INJECTION OF ANESTHETIC AGENT AROUND MEDIAL BRANCH NERVES INNERVATING LUMBAR FACET JOINT Bilateral 6/12/2024    Procedure: Medial Branch Block #1 B/L  L3,4,5;  Surgeon: Siri Aguilera DO;  Location: [facility];  Service: Pain Management;  Laterality: Bilateral;  oral sed no  ac    INJECTION OF ANESTHETIC AGENT AROUND MEDIAL BRANCH NERVES INNERVATING LUMBAR FACET JOINT Bilateral 7/9/2024    Procedure: #2 MBB L3, L4, L5 B/L;  Surgeon: Siri Aguilera DO;  Location: [facility];  Service: Pain Management;  Laterality: Bilateral;  oral    INJECTION OF FACET JOINT Bilateral 4/27/2021    Procedure: Facet joint injection-lumbar--Bilateral L4-5, L5-S1;  Surgeon: Kuldip Siddiqui Jr., MD;  Location: [facility];  Service: Pain Management;  Laterality: Bilateral;    JOINT REPLACEMENT      knees replaced bilaterally    KNEE SURGERY Bilateral     RADIOFREQUENCY ABLATION OF LUMBAR MEDIAL BRANCH NERVE AT SINGLE LEVEL Bilateral 10/2/2024    Procedure: B/L L3,L4,L5 RFA;  Surgeon: Siri Aguilera DO;  Location: [facility];  Service: Pain Management;  Laterality: Bilateral;  40 mins    RECTAL SURGERY      rectocele    TONSILLECTOMY         Family History   Problem Relation Name Age of Onset    Dementia Mother          age 92    Heart disease Father          age 58    Restless legs syndrome Daughter Jovi     Thyroid disease Daughter Jovi     Hypertension Son Moshe     Alcohol abuse Son Moshe     Ovarian cancer Paternal Grandmother      Breast cancer Paternal Grandmother      Cancer Paternal Grandmother          ovarian    Ovarian cancer Cousin      Cancer Cousin  40        colon ca    Breast cancer Paternal Aunt      Skin cancer Neg Hx      Melanoma Neg Hx      Colon cancer Neg Hx      Esophageal cancer Neg Hx      Stomach cancer Neg Hx      Irritable bowel syndrome Neg Hx      Crohn's disease Neg Hx      Ulcerative colitis Neg Hx      Celiac disease Neg Hx         Social History     Socioeconomic History    Marital status:    Tobacco Use    Smoking status: Never     Passive exposure: Never    Smokeless tobacco: Never   Substance and Sexual Activity    Alcohol use: Not Currently    Drug use: No    Sexual activity: Not Currently     Partners: Male     Birth  "control/protection: Post-menopausal     Social Drivers of Health     Financial Resource Strain: Low Risk  (1/19/2023)    Overall Financial Resource Strain (CARDIA)     Difficulty of Paying Living Expenses: Not very hard   Food Insecurity: No Food Insecurity (4/26/2021)    Hunger Vital Sign     Worried About Running Out of Food in the Last Year: Never true     Ran Out of Food in the Last Year: Never true   Transportation Needs: No Transportation Needs (1/19/2023)    PRAPARE - Transportation     Lack of Transportation (Medical): No     Lack of Transportation (Non-Medical): No   Physical Activity: Inactive (4/26/2021)    Exercise Vital Sign     Days of Exercise per Week: 0 days     Minutes of Exercise per Session: 0 min   Stress: Stress Concern Present (1/19/2023)    [employer] [place] [employer] - Occupational Stress Questionnaire     Feeling of Stress : Rather much   Housing Stability: Unknown (1/19/2023)    Housing Stability Vital Sign     Unable to Pay for Housing in the Last Year: No     Unstable Housing in the Last Year: No       Review of patient's allergies indicates:   Allergen Reactions    Demerol [meperidine]        Physical Exam:  Vitals:    11/11/24 1406   BP: 120/71   Pulse: 82   Weight: 66.8 kg (147 lb 4.3 oz)   Height: 5' 5" (1.651 m)   PainSc:   5   PainLoc: Back     General    Nursing note and vitals reviewed.  Constitutional: She is oriented to person, place, and time. She appears well-developed and well-nourished. No distress.   HENT:   Head: Normocephalic and atraumatic.   Nose: Nose normal.   Eyes: Conjunctivae and EOM are normal. Pupils are equal, round, and reactive to light. Right eye exhibits no discharge. Left eye exhibits no discharge. No scleral icterus.   Neck: No JVD present.   Cardiovascular:  Intact distal pulses.            Pulmonary/Chest: Effort normal. No respiratory distress.   Abdominal: She exhibits no distension.   Neurological: She is alert and oriented to person, " place, and time. Coordination normal.   Psychiatric: She has a normal mood and affect. Her behavior is normal. Judgment and thought content normal.     General Musculoskeletal Exam   Gait: normal     Back (L-Spine & T-Spine) / Neck (C-Spine) Exam     Tenderness Right paramedian tenderness of the Lower L-Spine. Left paramedian tenderness of the Lower L-Spine.     Back (L-Spine & T-Spine) Range of Motion   Back extension: facet loading is positive and exacerabtes/reproduces the patient's typical low back pain    Back flexion: limited ROM but partial relief of low back pain noted.     Spinal Sensation   Right Side Sensation  L-Spine Level: normal  Left Side Sensation  L-Spine Level: normal    Other   She has scoliosis .      Muscle Strength   Right Lower Extremity   Hip Flexion: 5/5   Hip Extensors: 5/5  Quadriceps:  5/5   Hamstrin/5   Gastrocsoleus:  5/5   Left Lower Extremity   Hip Flexion: 5/5   Hip Extensors: 5/5  Quadriceps:  5/5   Hamstrin/5   Gastrocsoleus:  5/5     Reflexes     Left Side  Achilles:  2+  Quadriceps:  2+    Right Side   Achilles:  2+  Quadriceps:  2+      Imaging:  X-ray lumbar spine from  shows substantial thoracolumbar scoliosis.  There is multilevel degenerative disc disease and facet arthropathy most notably at L4-5 and L5-S1, bilaterally.    Labs:  BMP  Lab Results   Component Value Date     2024    K 4.4 2024     2024    CO2 21 (L) 2024    BUN 24 (H) 2024    CREATININE 1.2 2024    CALCIUM 9.9 2024    ANIONGAP 11 2024    ESTGFRAFRICA 39.8 (A) 2022    ESTGFRAFRICA 39.8 (A) 2022    EGFRNONAA 34.5 (A) 2022    EGFRNONAA 34.5 (A) 2022     Lab Results   Component Value Date    ALT 12 2024    AST 20 2024    ALKPHOS 51 2024    BILITOT 0.3 2024       Assessment:  Problem List Items Addressed This Visit    None        3/10/21 - Tere Vizcarra Rod is a 84 y.o. female with chronic  low back pain due to degenerative joint disease with contribution from scoliosis and core muscle weakness.  I recommended a combination of therapies including physical therapy and facet joint injections for the low back.  Due to the severity of her scoliosis, patient is unlikely to ever be pain free; however, I believe that through a combination of facet injections of physical therapy a realistic target is 50-75% reduction in pain.  I recommend continuation of the current medications with the addition of Tylenol.  Patient appears to understand the necessity for avoiding NSAIDs, due to her chronic kidney disease.    10/5/22 - Tere Velasco is a 84 y.o. female with chronic lower back pain and scoliosis. She failed to experience relief from facet injections that were well placed.  She states that she does not do home exercises because she wasn't sure how it would help her back pain.  She completed physical therapy, however her benefits were short lived.  At this point, she would benefit form home exercise program with core strengthening.  She reports pain of 2/10 today which is representative of her daily experience of pain.  At age 82 yrs with substantial scoliosis, advanced DDD, and a sedentary lifestyle, she was counseled that her level of pain (2/10) should be viewed as reasonable; however, if she would like further redcutions in pain she must commit to daily exercise.  Her remain pain is most likely fibromyalgia.      05/20/2024-Tere Velasco is a 84 y.o. female who  has a past medical history of Allergy, Anemia, Anxiety, Arthritis, Back pain, Breast disorder, Cataract, Chronic kidney disease, unspecified, CKD (chronic kidney disease) stage 3, GFR 30-59 ml/min, Clotting disorder, Colitis, Degenerative disc disease, Depression, Fibromyalgia, GERD (gastroesophageal reflux disease), Hyperlipidemia, Hypertension, Irritable bowel syndrome, TRU (obstructive sleep apnea), RLS (restless legs syndrome), and  S/P knee replacement (01/13/2014).  By history and examination this patient has chronic low back pain without radiculopathy.  The underlying cause is  scoliosis, facet arthritis, degenerative disc disease, and foraminal stenosis.  Pathology is confirmed by imaging.  We discussed the underlying diagnoses and multiple treatment options including non-opioid medications, interventional procedures, physical therapy, home exercise, core muscle enhancement, and activity modification.  The risks and benefits of each treatment option were discussed and all questions were answered.        10/21/2024Toni Velasco is a 84 y.o. female who  has a past medical history of Allergy, Anemia, Anxiety, Arthritis, Back pain, Breast disorder, Cataract, Chronic kidney disease, unspecified, CKD (chronic kidney disease) stage 3, GFR 30-59 ml/min, Clotting disorder, Colitis, Degenerative disc disease, Depression, Fibromyalgia, GERD (gastroesophageal reflux disease), Hyperlipidemia, Hypertension, Irritable bowel syndrome, TRU (obstructive sleep apnea), RLS (restless legs syndrome), and S/P knee replacement (01/13/2014).  By history and examination this patient has chronic low back pain without radiculopathy.  The underlying cause cause is facet arthritis, degenerative disc disease, foraminal stenosis, central canal stenosis, muscle dysfunction, and muscles strain.  Pathology is confirmed by imaging.  We discussed the underlying diagnoses and multiple treatment options including non-opioid medications, interventional procedures, physical therapy, home exercise, core muscle enhancement, and activity modification.  The risks and benefits of each treatment option were discussed and all questions were answered.        11/11/2024Toni Velasco is a 84 y.o. female who  has a past medical history of Allergy, Anemia, Anxiety, Arthritis, Back pain, Breast disorder, Cataract, Chronic kidney disease, unspecified, CKD (chronic kidney  disease) stage 3, GFR 30-59 ml/min, Clotting disorder, Colitis, Degenerative disc disease, Depression, Fibromyalgia, GERD (gastroesophageal reflux disease), Hyperlipidemia, Hypertension, Irritable bowel syndrome, TRU (obstructive sleep apnea), RLS (restless legs syndrome), and S/P knee replacement (01/13/2014).  By history and examination this patient has chronic low back pain without radiculopathy.  The underlying cause cause is facet arthritis, degenerative disc disease, muscle dysfunction, muscles strain, and deconditioning.  Pathology is confirmed by imaging.  We discussed the underlying diagnoses and multiple treatment options including non-opioid medications, interventional procedures, physical therapy, home exercise, core muscle enhancement, and activity modification.  The risks and benefits of each treatment option were discussed and all questions were answered.        : NA    Treatment Plan:   Procedures:  none at this time.  Patient recently received a bilateral lumbar RFA  PT/OT/HEP:  Encouraged her to Start HE plan that was provided per PT. consider returning to physical therapy.  We counseled her on the importance of a quality HEP for the long term management of her lower back pain.  Medications:  Continue Tylenol extra-strength a 1000 t.I.d. not to exceed 3000 mg in a 24 hour.                           Patient has an active prescription of tramadol 50 mg to take p.r.n. for subacute pain at her pharmacy.  Labs: reviewed and medications are appropriately dosed for current hepatorenal function.  Imaging:  Update lumbar MRI today  Referral- rheumatology appointment in February of 2025    Follow Up:  After lumbar MRI    CODY Gill  Interventional Pain Management    Disclaimer: This note was partly generated using dictation software which may occasionally result in transcription errors.

## 2024-11-12 RX ORDER — TRAMADOL HYDROCHLORIDE 50 MG/1
50 TABLET ORAL 2 TIMES DAILY PRN
Qty: 28 TABLET | Refills: 0 | Status: SHIPPED | OUTPATIENT
Start: 2024-11-12 | End: 2024-11-26

## 2024-11-12 NOTE — TELEPHONE ENCOUNTER
----- Message from CONSTANTINE Gill sent at 11/12/2024  3:27 PM CST -----  Contact: pt  Send her in a short term prescription of tramadol 50 mg b.i.d. p.r.n. 28 tablets  ----- Message -----  From: Elliott Whiteside MA  Sent: 11/12/2024  10:05 AM CST  To: CONSTANTINE Acosta    Patient request pain medication.  ----- Message -----  From: Vernell Martinez  Sent: 11/12/2024  10:01 AM CST  To: Michael STEWART Staff    Type:  RX Request    Who Called: pt   Refill or New Rx:New  RX Name and Strength:Pain Medication   Preferred Pharmacy with phone number:[facility] - [address] [facility]  [address] [place] [address]  Phone: [phone] Fax: [phone]  Local or Mail Order:local  Ordering Provider:Michael   Would the patient rather a call back or a response via [facility]? call  Best Call Back Number:[phone]  Additional Information:

## 2024-11-15 ENCOUNTER — OFFICE VISIT (OUTPATIENT)
Dept: INTERNAL MEDICINE | Facility: CLINIC | Age: 84
End: 2024-11-15
Payer: MEDICARE

## 2024-11-15 VITALS
WEIGHT: 146.19 LBS | HEIGHT: 65 IN | SYSTOLIC BLOOD PRESSURE: 120 MMHG | BODY MASS INDEX: 24.36 KG/M2 | TEMPERATURE: 99 F | DIASTOLIC BLOOD PRESSURE: 70 MMHG | OXYGEN SATURATION: 98 % | RESPIRATION RATE: 16 BRPM | HEART RATE: 85 BPM

## 2024-11-15 DIAGNOSIS — G25.81 RLS (RESTLESS LEGS SYNDROME): ICD-10-CM

## 2024-11-15 DIAGNOSIS — R31.9 HEMATURIA, UNSPECIFIED TYPE: ICD-10-CM

## 2024-11-15 DIAGNOSIS — I10 ESSENTIAL HYPERTENSION: ICD-10-CM

## 2024-11-15 DIAGNOSIS — D50.8 OTHER IRON DEFICIENCY ANEMIA: ICD-10-CM

## 2024-11-15 DIAGNOSIS — I49.9 IRREGULAR HEART BEAT: ICD-10-CM

## 2024-11-15 DIAGNOSIS — N18.32 STAGE 3B CHRONIC KIDNEY DISEASE: ICD-10-CM

## 2024-11-15 DIAGNOSIS — Z00.00 ENCOUNTER FOR PREVENTIVE HEALTH EXAMINATION: Primary | ICD-10-CM

## 2024-11-15 DIAGNOSIS — F33.9 MAJOR DEPRESSION, RECURRENT, CHRONIC: ICD-10-CM

## 2024-11-15 DIAGNOSIS — E78.5 HYPERLIPIDEMIA, UNSPECIFIED HYPERLIPIDEMIA TYPE: ICD-10-CM

## 2024-11-15 DIAGNOSIS — Z23 NEED FOR VACCINATION: ICD-10-CM

## 2024-11-15 DIAGNOSIS — G47.33 OSA (OBSTRUCTIVE SLEEP APNEA): ICD-10-CM

## 2024-11-15 DIAGNOSIS — M79.7 FIBROMYALGIA: ICD-10-CM

## 2024-11-15 LAB
BACTERIA #/AREA URNS AUTO: ABNORMAL /HPF
BILIRUB UR QL STRIP: NEGATIVE
CLARITY UR REFRACT.AUTO: CLEAR
COLOR UR AUTO: YELLOW
GLUCOSE UR QL STRIP: NEGATIVE
HGB UR QL STRIP: NEGATIVE
KETONES UR QL STRIP: NEGATIVE
LEUKOCYTE ESTERASE UR QL STRIP: ABNORMAL
MICROSCOPIC COMMENT: ABNORMAL
NITRITE UR QL STRIP: NEGATIVE
PH UR STRIP: 7 [PH] (ref 5–8)
PROT UR QL STRIP: NEGATIVE
RBC #/AREA URNS AUTO: 1 /HPF (ref 0–4)
SP GR UR STRIP: 1.01 (ref 1–1.03)
SQUAMOUS #/AREA URNS AUTO: 4 /HPF
URN SPEC COLLECT METH UR: ABNORMAL
WBC #/AREA URNS AUTO: 9 /HPF (ref 0–5)

## 2024-11-15 PROCEDURE — 87086 URINE CULTURE/COLONY COUNT: CPT | Mod: HCNC | Performed by: HOSPITALIST

## 2024-11-15 PROCEDURE — 99999 PR PBB SHADOW E&M-EST. PATIENT-LVL IV: CPT | Mod: PBBFAC,HCNC,, | Performed by: HOSPITALIST

## 2024-11-15 PROCEDURE — 81001 URINALYSIS AUTO W/SCOPE: CPT | Mod: HCNC | Performed by: HOSPITALIST

## 2024-11-15 RX ORDER — GABAPENTIN ENACARBIL 600 MG/1
1 TABLET, EXTENDED RELEASE ORAL NIGHTLY
Qty: 90 TABLET | Refills: 2 | Status: SHIPPED | OUTPATIENT
Start: 2024-11-15

## 2024-11-15 RX ORDER — PRAVASTATIN SODIUM 80 MG/1
80 TABLET ORAL NIGHTLY
Qty: 90 TABLET | Refills: 3 | Status: SHIPPED | OUTPATIENT
Start: 2024-11-15

## 2024-11-15 NOTE — PROGRESS NOTES
Subjective:     @Patient ID: Tere Velasco is a 84 y.o. female.    Chief Complaint: Follow-up (6mo) and Annual Exam    HPI     83 y.o. female here for annual:       1. HTN: hctz 12.5 mg qday and losartan 50 mg qday  2. HLD: pravastatin 40 mg qday  3. Anxiety and depression: on wellbutrin 150 mg, cymbalta 20 mg. Reports her   2020. Since then has been lonely but has support from friends. Her 2 biological kids (daughter in [place], son in [facility]) and 2 stepchildren ([facility] and [place]) that live out of state.  Has a dog that she loves.    4. Insomnia: on Lunesta to 2 mg qhs.   5. RLS:  on Horizant 600 mg qhs. Only Rx that has helped her RLS  6. GERD: on pepcid   7. TRU: reports has not used cpap in years as does not like the fit of the mask          Lipid disorders/ASCVD risk (ages >/= 45 or >/= 20 if increased risk ): ordered  Eye exam:       Vaccines:   Influenza (yearly)  due   Tetanus (every 10 yrs - 1st tdap) done  PPSV23(>66yo or <65 w/ lung dz, smoking, DM) done  PCV13 (> 65 or <65 w/ immunocompromised) done  Zoster (>61yo): utd    covid19: declines      Exercise: walking  Diet: reg         HPI:  Ms. Velasco reports difficulty initiating urination at times, denying typical urinary tract infection symptoms such as odor, dark color, burning, or frequent urination. She has sleeping difficulties and restless legs syndrome, which have been particularly severe in the past 2 nights, affecting her sleep quality. Ms. Velasco reports a sensation of irregular heartbeat and pain. A pain management specialist is planning an MRI of her back. Ms. Velasco's best friend suffered a massive stroke 2 weeks ago, resulting in an inability to speak or swallow and necessitating feeding tube placement. She denies any history of heart attack or stroke.    MEDICATIONS:  Ms. Velasco is on cholesterol medication but reports non-adherence recently. She is taking Horizant (gabapentin) for restless legs syndrome. Ms. Velasco is  "also on an iron supplement but reports irregular adherence.    MEDICAL HISTORY:  Ms. Velasco has a history of stage 3 kidney disease. She also has anemia related to iron deficiency and chronic kidney disease.      TEST RESULTS:  Recent urinalysis showed mild proteinuria, slightly increased compared to previous results, and hematuria. Recent labs revealed vitamin D level within normal range, normal folate, slightly elevated B12, and low iron. Thyroid test was recent and within normal range. Cholesterol panel was recent and mildly elevated. Recent kidney function test showed improvement compared to previous results. Liver function test and electrolytes were recent and within normal range or stable. Ms. Velasco had an EKG in 2014.    IMAGING:  An MRI of the back is scheduled.    SOCIAL HISTORY:  Ms. Velasco is .      ROS:  Cardiovascular: -palpitations  Genitourinary: -urine changes  Psychiatric: +sleep difficulty       Review of Systems  Past medical history, surgical history, and family medical history reviewed and updated as appropriate.    Medications and allergies reviewed.     Objective:     Vitals:    11/15/24 1502   BP: 120/70   BP Location: Right arm   Patient Position: Sitting   Pulse: 85   Resp: 16   Temp: 98.5 °F (36.9 °C)   TempSrc: Temporal   SpO2: 98%   Weight: 66.3 kg (146 lb 2.6 oz)   Height: 5' 5" (1.651 m)     Body mass index is 24.32 kg/m².  Physical Exam  Vitals reviewed.   Constitutional:       General: She is not in acute distress.     Appearance: She is well-developed.   HENT:      Head: Normocephalic and atraumatic.      Right Ear: Tympanic membrane normal.      Left Ear: Tympanic membrane normal.      Mouth/Throat:      Mouth: Mucous membranes are moist.      Pharynx: No oropharyngeal exudate.   Eyes:      General:         Right eye: No discharge.         Left eye: No discharge.      Conjunctiva/sclera: Conjunctivae normal.   Cardiovascular:      Rate and Rhythm: Normal rate. Rhythm " irregular.      Heart sounds: No murmur heard.     No friction rub.   Pulmonary:      Effort: Pulmonary effort is normal.      Breath sounds: Normal breath sounds.   Abdominal:      General: Bowel sounds are normal. There is no distension.      Palpations: Abdomen is soft.      Tenderness: There is no abdominal tenderness. There is no guarding.   Musculoskeletal:      Cervical back: Normal range of motion and neck supple.      Right lower leg: No edema.      Left lower leg: No edema.   Lymphadenopathy:      Cervical: No cervical adenopathy.   Skin:     General: Skin is warm and dry.   Neurological:      Mental Status: She is alert and oriented to person, place, and time.   Psychiatric:         Mood and Affect: Mood normal.         Behavior: Behavior normal.         Lab Results   Component Value Date    WBC 9.19 11/08/2024    HGB 10.0 (L) 11/08/2024    HCT 34.4 (L) 11/08/2024     11/08/2024    CHOL 223 (H) 11/08/2024    TRIG 116 11/08/2024    HDL 63 11/08/2024    ALT 12 11/08/2024    AST 20 11/08/2024     11/08/2024    K 4.4 11/08/2024     11/08/2024    CREATININE 1.2 11/08/2024    BUN 24 (H) 11/08/2024    CO2 21 (L) 11/08/2024    TSH 1.804 11/08/2024    INR 1.8 (A) 01/30/2014    HGBA1C 5.5 08/21/2014       Assessment:     1. Encounter for preventive health examination    2. Hematuria, unspecified type    3. Essential hypertension    4. Hyperlipidemia, unspecified hyperlipidemia type    5. Stage 3b chronic kidney disease    6. Other iron deficiency anemia    7. Major depression, recurrent, chronic    8. Fibromyalgia    9. TRU (obstructive sleep apnea)    10. RLS (restless legs syndrome)    11. Irregular heart beat    12. Need for vaccination      Plan:   Tere was seen today for follow-up and annual exam.    Diagnoses and all orders for this visit:    Encounter for preventive health examination    Hematuria, unspecified type  - new. See below   -     Cancel: Urine culture; Future  -     Cancel:  Urinalysis; Future  -     Urinalysis  -     Urine culture  -     Comprehensive Metabolic Panel; Future  -     CBC Auto Differential; Future  -     Lipid Panel; Future  -     TSH; Future    Essential hypertension  - Stable. Continue home meds     -     Comprehensive Metabolic Panel; Future  -     CBC Auto Differential; Future  -     Lipid Panel; Future  -     TSH; Future    Hyperlipidemia, unspecified hyperlipidemia type  - Stable. Continue home meds     -     pravastatin (PRAVACHOL) 80 MG tablet; Take 1 tablet (80 mg total) by mouth every evening.  -     Comprehensive Metabolic Panel; Future  -     CBC Auto Differential; Future  -     Lipid Panel; Future  -     TSH; Future    Stage 3b chronic kidney disease  - stable. Continue to monitor and f/u with nephro  -     Comprehensive Metabolic Panel; Future  -     CBC Auto Differential; Future  -     Lipid Panel; Future  -     TSH; Future    Other iron deficiency anemia  - chronic. Counseled on adherence with iron supplements     Major depression, recurrent, chronic  Fibromyalgia  - Stable. Continue home meds     TRU (obstructive sleep apnea)  - stable. Continue to monitor     RLS (restless legs syndrome)  - chronic. See below   -     gabapentin enacarbil (HORIZANT) 600 mg TbSR; Take 1 tablet by mouth every evening.    Irregular heart beat  - new. Check ekg  -     IN OFFICE EKG 12-LEAD (to [place])    Need for vaccination  -     Influenza - High Dose (65+) (PF) (IM)          Hematuria :   Explained typical symptoms of urinary tract infections.   Ordered urine culture to check for infection.   Ordered repeat urinalysis to confirm presence of blood.    ANEMIA AND IRON DEFICIENCY:   Discussed sources of dietary B12 including meat, poultry, and eggs.   Ms. Velasco to take iron supplement daily.    HYPERLIPIDEMIA:   Continued cholesterol medication, to be taken at night.   Refilled cholesterol medication.    RLS:   Sent prescription for Horizant (gabapentin) to [facility] for  prior authorization.   Await contact from [facility] regarding Horizant (gabapentin) approval, likely by early next week.   Ms. Velasco to  Horizant from [facility] on [facility], 1st floor of the [facility], once approved.    CARDIOVASCULAR HEALTH:   Clarified rationale for not recommending aspirin therapy in absence of cardiovascular risk factors.   Ordered EKG.    IMMUNIZATION:   Ordered flu vaccine.    GENERAL MEDICAL EXAMINATION:  - RTC in 6 months with labs    Follow up with Dr. Marsh in approximately 12 months (around August next year) with labs, unless changes occur sooner.         Rosy Santos MD  Internal Medicine    11/15/2024    This note was generated with the assistance of ambient listening technology. Verbal consent was obtained by the patient and accompanying visitor(s) for the recording of patient appointment to facilitate this note. I attest to having reviewed and edited the generated note for accuracy, though some syntax or spelling errors may persist. Please contact the author of this note for any clarification.

## 2024-11-16 LAB
BACTERIA UR CULT: NORMAL
BACTERIA UR CULT: NORMAL
OHS QRS DURATION: 78 MS
OHS QTC CALCULATION: 427 MS

## 2024-11-18 ENCOUNTER — TELEPHONE (OUTPATIENT)
Dept: INTERNAL MEDICINE | Facility: CLINIC | Age: 84
End: 2024-11-18
Payer: MEDICARE

## 2024-11-18 ENCOUNTER — HOSPITAL ENCOUNTER (OUTPATIENT)
Dept: RADIOLOGY | Facility: HOSPITAL | Age: 84
Discharge: HOME | End: 2024-11-18
Attending: NURSE PRACTITIONER
Payer: MEDICARE

## 2024-11-18 DIAGNOSIS — M54.50 CHRONIC BILATERAL LOW BACK PAIN WITHOUT SCIATICA: ICD-10-CM

## 2024-11-18 DIAGNOSIS — G89.29 CHRONIC BILATERAL LOW BACK PAIN WITHOUT SCIATICA: ICD-10-CM

## 2024-11-18 DIAGNOSIS — M25.50 DIFFUSE ARTHRALGIA: ICD-10-CM

## 2024-11-18 DIAGNOSIS — M47.816 LUMBAR SPONDYLOSIS: ICD-10-CM

## 2024-11-18 PROCEDURE — 72148 MRI LUMBAR SPINE W/O DYE: CPT | Mod: 26,HCNC,, | Performed by: RADIOLOGY

## 2024-11-18 PROCEDURE — 72148 MRI LUMBAR SPINE W/O DYE: CPT | Mod: TC,HCNC

## 2024-11-18 NOTE — TELEPHONE ENCOUNTER
----- Message from Kaia sent at 11/18/2024  3:24 PM CST -----  Contact: [phone] Patient  Patient would like to get medical advice.  Symptoms (please be specific):  Pt states she was advised by Mari that her Rx for  gabapentin enacarbil (HORIZANT) 600 mg TbSR needs a PA. Pt also states she wants her Rx sent to   [facility] - [place] - [address]  [address] [facility] [address]  [place] [address]  Phone: [phone] Fax: [phone]   Pt states she does not want to go to the [facility] pharmacy it is too much walking.  Pt states she has not slept in 2 days.     How long have you had these symptoms: N/A  Would you like a call back, or a response through your [facility] portal?:   call back   Pharmacy name and phone # (copy from chart):   N/A  Comments:

## 2024-11-18 NOTE — TELEPHONE ENCOUNTER
Spoke to patient and told her rx was approved and it is [facility]  .  Patient was not happy that she had to go to [facility]. Patient  requested the  number to call before going to pickup RX .  Patient was given the number

## 2024-11-18 NOTE — TELEPHONE ENCOUNTER
----- Message from Grupo sent at 11/18/2024 11:35 AM CST -----  Contact: [phone]@[email or web address]  Good morning patient would like to request a PA for her med gabapentin enacarbil (HORIZANT) 600 mg TbSR. Patient would like this done today she says she was not able to sleep for the last two nights. Please call patient to advise [phone]

## 2024-11-20 ENCOUNTER — OFFICE VISIT (OUTPATIENT)
Dept: PAIN MEDICINE | Facility: CLINIC | Age: 84
End: 2024-11-20
Payer: MEDICARE

## 2024-11-20 VITALS
WEIGHT: 147.25 LBS | DIASTOLIC BLOOD PRESSURE: 75 MMHG | HEIGHT: 65 IN | HEART RATE: 94 BPM | SYSTOLIC BLOOD PRESSURE: 144 MMHG | BODY MASS INDEX: 24.53 KG/M2

## 2024-11-20 DIAGNOSIS — M47.816 LUMBAR FACET ARTHROPATHY: ICD-10-CM

## 2024-11-20 DIAGNOSIS — M43.10 ACQUIRED SPONDYLOLISTHESIS: ICD-10-CM

## 2024-11-20 DIAGNOSIS — M48.061 SPINAL STENOSIS, LUMBAR REGION, WITHOUT NEUROGENIC CLAUDICATION: ICD-10-CM

## 2024-11-20 DIAGNOSIS — M41.9 SEVERE SCOLIOSIS: Primary | ICD-10-CM

## 2024-11-20 DIAGNOSIS — G89.4 CHRONIC PAIN SYNDROME: ICD-10-CM

## 2024-11-20 PROCEDURE — 1159F MED LIST DOCD IN RCRD: CPT | Mod: HCNC,CPTII,S$GLB, | Performed by: NURSE PRACTITIONER

## 2024-11-20 PROCEDURE — 1160F RVW MEDS BY RX/DR IN RCRD: CPT | Mod: HCNC,CPTII,S$GLB, | Performed by: NURSE PRACTITIONER

## 2024-11-20 PROCEDURE — 1125F AMNT PAIN NOTED PAIN PRSNT: CPT | Mod: HCNC,CPTII,S$GLB, | Performed by: NURSE PRACTITIONER

## 2024-11-20 PROCEDURE — 99999 PR PBB SHADOW E&M-EST. PATIENT-LVL III: CPT | Mod: PBBFAC,HCNC,, | Performed by: NURSE PRACTITIONER

## 2024-11-20 PROCEDURE — 99214 OFFICE O/P EST MOD 30 MIN: CPT | Mod: HCNC,S$GLB,, | Performed by: NURSE PRACTITIONER

## 2024-11-20 NOTE — PROGRESS NOTES
[facility]  Patient Evaluation    Referred by: Dr Rosy Santos  Reason for referral: Lumbar Region    CC:   Chief Complaint   Patient presents with    Follow-up     MRI result     Low-back Pain           11/20/2024     1:19 PM 11/11/2024     2:07 PM 10/21/2024     1:21 PM   Last 3 PDI Scores   Pain Disability Index (PDI) 36 30 5     Interval Update 11/20/2024: Patient return to clinic for follow up on MRI results.  Patient continues to complain of pain across her lower lumbar spine previously provided a lumbar RFA with minimal to no relief of her low back pain.  She denies any radicular symptoms denies any paresthesia like symptoms denies any profound weakness denies any new pain at this time.    Interval history 11/11/2024:  84-year-old female that presents today for continued lower back pain.  Patient suffers from diffuse arthralgia she was recently provided a bilateral lumbar RFA targeting L3, L4 and L5 on 10/02/2024 that did provide relief however she continues to complain of axial low back pain.  She denies any radicular symptoms denies any profound weakness denies any recent incident or trauma.  Her pain is worse when she wakes in the morning gets better throughout the course of the day but then she reports living a sedentary lifestyle where she sits quite often.  I am questioning her 90% relief of her most recent procedure as her pain continues to disrupt her quality of life prohibiting her from performing her ADLs.    Interval history 10/21/2024:  84-year-old female that presents today for a follow-up appointment she is status post a bilateral lumbar RFA targeting L3, L4 and L5 on 10/02/2024 reporting 90% relief of her axial low back pain.  Patient reports her functionality has significantly improved since the lumbar RFA she is able to walk long distances without having to sit down and rest due to her low back pain.  She is still reporting fibromyalgia pain I discussed with  her that an interventional pain we would typically do not treat this condition but I will refer her to Rheumatology for further evaluation.  Today she denies any new pain denies any profound weakness denies any bowel bladder dysfunction at this time she reports fibromyalgia pain as pain all over her body        Interval History 5/20/2024:    84-year-old female that presents today for a follow-up appointment she is a former patient of Dr. Siddiqui she reports ongoing chronic low back pain for years.  Pain begins in the low back just above her belt line with mild radiation into her buttocks.  Pain is constant pain is worse when she stands and performs ADLs rest and sitting mitigate her symptoms.  She denies any radicular symptoms denies any paresthesia like symptoms denies any shooting pain in either leg.  She was previously provided facet lumbar injections 2 years ago however she can not recall if she received relief from these injections she states today that she did not but she is unsure.   Upon review of her recent x-ray she has disc space narrowing noted throughout the lumbar spine. There are marginal osteophytes. There is facet arthropathy within the lower lumbar spine and lumbosacral junction.  She also has a history of fibromyalgia she would like to be considered for potential pain intervention injections.  She denies any recent incident or trauma denies profound weakness denies any bowel bladder dysfunction at this time.      Interval Update:  10/5/2022 - Ms. Velasco is following up for low back pain.   She reports the following medication side effects: none.  Pain is currently rate 2/10 with a weekly range 3-4/10.  It is described as Aching.   Pain is worsened by standing and walking and improved by resting and sitting.     HPI:   Tere Velasco is a 84 y.o. female with a reported history of diffuse arthritis status post bilateral knee replacement, fibromyalgia, scoliosis who presents complaining of  chronic low back pain has become severe.  Patient reports limited ability to stand or walk due to low back pain.    Location: low back   Onset: several years  Current Pain Score: 4/10  Daily Pain of Range: 3-9/10  Quality: Aching  Radiation: None  Worsened by: standing and walking for more than a few minutes  Improved by: rest and sitting    Previous Therapies:  PT/OT: Yes, 3 yrs ago  HEP: Yes, 3 yrs ago but stopped performing it      Pain Interventions:  -10/02/2024 bilateral RFA targeting L3, L4 and L5 90% relief of axial low back pain.  -04/27/2021 Bilateral L4-5 and L5-S1 Lumbar Facet Injection-  Dr. Siddiqui  - Bilateral Lumbar facet injection 2013  Surgery: Bilateral TKA.  No back surgery.  Medications:   - NSAIDS: unable to use due to CKD  - MSK Relaxants:   - TCAs:   - SNRIs:   - Topicals:   - Anticonvulsants:  - Opioids:     Current Pain Medications:  duloxtine  Gabapentin (Horizant)    Review of Systems:  Review of Systems   Constitutional:  Negative for chills and fever.   HENT:  Negative for nosebleeds.    Eyes:  Negative for blurred vision and pain.   Respiratory:  Negative for hemoptysis.    Cardiovascular:  Negative for chest pain and palpitations.   Gastrointestinal:  Negative for heartburn, nausea and vomiting.   Genitourinary:  Negative for dysuria and hematuria.   Musculoskeletal:  Positive for back pain, joint pain and myalgias.   Skin:  Negative for rash.   Neurological:  Negative for seizures and loss of consciousness.   Endo/Heme/Allergies:  Does not bruise/bleed easily.   Psychiatric/Behavioral:  Negative for hallucinations.        History:    Current Outpatient Medications:     buPROPion (WELLBUTRIN SR) 150 MG TBSR 12 hr tablet, TAKE ONE TABLET BY MOUTH TWICE A DAY, Disp: 180 tablet, Rfl: 3    DULoxetine (CYMBALTA) 20 MG capsule, TAKE ONE CAPSULE BY MOUTH TWICE A DAY, Disp: 180 capsule, Rfl: 3    eszopiclone (LUNESTA) 2 MG Tab, TAKE ONE TABLET BY MOUTH EVERY EVENING AT BEDTIME AS NEEDED,  Disp: 90 tablet, Rfl: 1    famotidine (PEPCID) 20 MG tablet, Take 1 tablet (20 mg total) by mouth once daily., Disp: 90 tablet, Rfl: 3    ferrous sulfate (FEOSOL) 325 mg (65 mg iron) Tab tablet, Take 1 tablet (325 mg total) by mouth every other day., Disp: 45 tablet, Rfl: 4    gabapentin enacarbil (HORIZANT) 600 mg TbSR, Take 1 tablet by mouth every evening., Disp: 90 tablet, Rfl: 2    hydroCHLOROthiazide (HYDRODIURIL) 12.5 MG Tab, Take 1 tablet (12.5 mg total) by mouth once daily., Disp: 90 tablet, Rfl: 3    losartan (COZAAR) 50 MG tablet, TAKE ONE TABLET BY MOUTH EVERY DAY BEFORE BREAKFAST, Disp: 90 tablet, Rfl: 3    pravastatin (PRAVACHOL) 80 MG tablet, Take 1 tablet (80 mg total) by mouth every evening., Disp: 90 tablet, Rfl: 3    traMADoL (ULTRAM) 50 mg tablet, Take 1 tablet (50 mg total) by mouth 2 (two) times daily as needed for Pain. (Patient not taking: Reported on 11/15/2024), Disp: 28 tablet, Rfl: 0    vit A/vit C/vit E/zinc/copper (ICAPS AREDS ORAL), Take by mouth., Disp: , Rfl:     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)    Cataract     removed from both eyes    Chronic kidney disease, unspecified     CKD (chronic kidney disease) stage 3, GFR 30-59 ml/min     Clotting disorder     when knee was replaced    Colitis     Degenerative disc disease     Depression     Fibromyalgia     GERD (gastroesophageal reflux disease)     Hyperlipidemia     Hypertension     Irritable bowel syndrome     TRU (obstructive sleep apnea)     RLS (restless legs syndrome)     S/P knee replacement 01/13/2014       Past Surgical History:   Procedure Laterality Date    APPENDECTOMY      BREAST BIOPSY      BREAST LUMPECTOMY      right side    BREAST SURGERY      CHOLECYSTECTOMY      COLONOSCOPY N/A 12/2/2016    Procedure: COLONOSCOPY;  Surgeon: Ori Cope MD;  Location: [facility] ([address] [place] [address]);  Service: Endoscopy;   Laterality: N/A;  PM prep    EYE SURGERY Bilateral     cataract    HYSTERECTOMY  1977    uterine pain    INJECTION OF ANESTHETIC AGENT AROUND MEDIAL BRANCH NERVES INNERVATING LUMBAR FACET JOINT Bilateral 6/12/2024    Procedure: Medial Branch Block #1 B/L  L3,4,5;  Surgeon: Siri Aguilera DO;  Location: [facility];  Service: Pain Management;  Laterality: Bilateral;  oral sed no ac    INJECTION OF ANESTHETIC AGENT AROUND MEDIAL BRANCH NERVES INNERVATING LUMBAR FACET JOINT Bilateral 7/9/2024    Procedure: #2 MBB L3, L4, L5 B/L;  Surgeon: Siri Aguilera DO;  Location: [facility];  Service: Pain Management;  Laterality: Bilateral;  oral    INJECTION OF FACET JOINT Bilateral 4/27/2021    Procedure: Facet joint injection-lumbar--Bilateral L4-5, L5-S1;  Surgeon: Kuldip Siddiqui Jr., MD;  Location: [facility];  Service: Pain Management;  Laterality: Bilateral;    JOINT REPLACEMENT      knees replaced bilaterally    KNEE SURGERY Bilateral     RADIOFREQUENCY ABLATION OF LUMBAR MEDIAL BRANCH NERVE AT SINGLE LEVEL Bilateral 10/2/2024    Procedure: B/L L3,L4,L5 RFA;  Surgeon: Siri Aguilera DO;  Location: [facility];  Service: Pain Management;  Laterality: Bilateral;  40 mins    RECTAL SURGERY      rectocele    TONSILLECTOMY         Family History   Problem Relation Name Age of Onset    Dementia Mother          age 92    Heart disease Father          age 58    Restless legs syndrome Daughter Jovi     Thyroid disease Daughter Jovi     Hypertension Son Moshe     Alcohol abuse Son Moshe     Ovarian cancer Paternal Grandmother      Breast cancer Paternal Grandmother      Cancer Paternal Grandmother          ovarian    Ovarian cancer Cousin      Cancer Cousin  40        colon ca    Breast cancer Paternal Aunt      Skin cancer Neg Hx      Melanoma Neg Hx      Colon cancer Neg Hx      Esophageal cancer Neg Hx      Stomach cancer Neg Hx      Irritable bowel syndrome Neg Hx       Crohn's disease Neg Hx      Ulcerative colitis Neg Hx      Celiac disease Neg Hx         Social History     Socioeconomic History    Marital status:    Tobacco Use    Smoking status: Never     Passive exposure: Never    Smokeless tobacco: Never   Substance and Sexual Activity    Alcohol use: Not Currently    Drug use: No    Sexual activity: Not Currently     Partners: Male     Birth control/protection: Post-menopausal     Social Drivers of Health     Financial Resource Strain: Low Risk  (11/12/2024)    Overall Financial Resource Strain (CARDIA)     Difficulty of Paying Living Expenses: Not very hard   Food Insecurity: No Food Insecurity (11/12/2024)    Hunger Vital Sign     Worried About Running Out of Food in the Last Year: Never true     Ran Out of Food in the Last Year: Never true   Transportation Needs: No Transportation Needs (1/19/2023)    PRAPARE - Transportation     Lack of Transportation (Medical): No     Lack of Transportation (Non-Medical): No   Physical Activity: Unknown (11/12/2024)    Exercise Vital Sign     Days of Exercise per Week: 0 days   Stress: Stress Concern Present (1/19/2023)    [facility] - Occupational Stress Questionnaire     Feeling of Stress : Rather much   Housing Stability: Unknown (1/19/2023)    Housing Stability Vital Sign     Unable to Pay for Housing in the Last Year: No     Unstable Housing in the Last Year: No       Review of patient's allergies indicates:   Allergen Reactions    Demerol [meperidine]    Lumbar MRI 2024  EXAMINATION:  MRI LUMBAR SPINE WITHOUT CONTRAST     CLINICAL HISTORY:  chronic low back without radiculopathy; Pain in unspecified joint     TECHNIQUE:  Multiplanar, multisequence MR images were acquired from the thoracolumbar junction to the sacrum without the administration of contrast.     COMPARISON:  Lumbar spine radiograph dated 05/15/2024     FINDINGS:  Thoracolumbar dextrocurvature with mid-lower lumbar levocurvature.   No evidence for acute fracture.  Scattered T1 marrow heterogeneity with predominant Modic type II endplate changes.  No discrete infiltrative process.  Incidental hemangiomas at L3 and L4.  Multilevel disc desiccation and disc space narrowing.  Minimal grade 1 anterolisthesis at T12-L1 and L1-L2 with minimal grade 1 retrolisthesis at L2-L3 and L3-L4.  Spinal cord terminus lies near L1.  Remaining visualized prevertebral and paraspinal soft tissues demonstrate no acute abnormality.  Few T2 hyperintense renal cysts bilaterally.     T12-L1: Circumferential bulge with partial uncovering of the posterior disc and facet DJD.  Moderate left without significant right neural foraminal narrowing.     L1-L2: Circumferential bulge and facet DJD/buckling contributing to mild spinal canal stenosis.  No significant neural foraminal encroachment.     L2-L3: Circumferential bulge with facet DJD and flavum thickening contributing to right greater than left lateral recess stenosis and moderate spinal canal stenosis.  Mild neural foraminal narrowing.     L3-L4: Circumferential bulge with facet DJD and flavum thickening contributing to right greater than left lateral recess stenosis and moderate spinal canal stenosis.  Neural foraminal narrowing, more-so on the right.     L4-L5: Circumferential bulge with facet DJD and flavum thickening contributing to severe spinal canal stenosis and neural foraminal encroachment, more severe on the right.     L5-S1: Mild circumferential bulge with left greater than right facet DJD and trace right facet fluid.  Mild-moderate left neural foraminal narrowing.  No significant spinal canal stenosis.     Impression:     Spine scoliosis and lumbar spondylosis with multilevel spinal canal stenosis and variable neural foraminal as detailed.  Findings most pronounced at L4-L5 with severe spinal canal stenosis and right greater than left neural foraminal encroachment.         Physical Exam:  Vitals:    11/20/24  "1319   BP: (!) 144/75   Pulse: 94   Weight: 66.8 kg (147 lb 4.3 oz)   Height: 5' 5" (1.651 m)   PainSc:   6       General    Nursing note and vitals reviewed.  Constitutional: She is oriented to person, place, and time. She appears well-developed and well-nourished. No distress.   HENT:   Head: Normocephalic and atraumatic.   Nose: Nose normal.   Eyes: Conjunctivae and EOM are normal. Pupils are equal, round, and reactive to light. Right eye exhibits no discharge. Left eye exhibits no discharge. No scleral icterus.   Neck: No JVD present.   Cardiovascular:  Intact distal pulses.            Pulmonary/Chest: Effort normal. No respiratory distress.   Abdominal: She exhibits no distension.   Neurological: She is alert and oriented to person, place, and time. Coordination normal.   Psychiatric: She has a normal mood and affect. Her behavior is normal. Judgment and thought content normal.     General Musculoskeletal Exam   Gait: normal     Back (L-Spine & T-Spine) / Neck (C-Spine) Exam     Tenderness Right paramedian tenderness of the Lower L-Spine. Left paramedian tenderness of the Lower L-Spine.     Back (L-Spine & T-Spine) Range of Motion   Back extension: facet loading is positive and exacerabtes/reproduces the patient's typical low back pain    Back flexion: limited ROM but partial relief of low back pain noted.     Spinal Sensation   Right Side Sensation  L-Spine Level: normal  Left Side Sensation  L-Spine Level: normal    Other   She has scoliosis .      Muscle Strength   Right Lower Extremity   Hip Flexion: 5/5   Hip Extensors: 5/5  Quadriceps:  5/5   Hamstrin/5   Gastrocsoleus:  5/5   Left Lower Extremity   Hip Flexion: 5/5   Hip Extensors: 5/5  Quadriceps:  5/5   Hamstrin/5   Gastrocsoleus:  5/5     Reflexes     Left Side  Achilles:  2+  Quadriceps:  2+    Right Side   Achilles:  2+  Quadriceps:  2+      Imaging:  X-ray lumbar spine from 2015 shows substantial thoracolumbar scoliosis.  There is " multilevel degenerative disc disease and facet arthropathy most notably at L4-5 and L5-S1, bilaterally.    Labs:  BMP  Lab Results   Component Value Date     11/08/2024    K 4.4 11/08/2024     11/08/2024    CO2 21 (L) 11/08/2024    BUN 24 (H) 11/08/2024    CREATININE 1.2 11/08/2024    CALCIUM 9.9 11/08/2024    ANIONGAP 11 11/08/2024    ESTGFRAFRICA 39.8 (A) 03/09/2022    ESTGFRAFRICA 39.8 (A) 03/09/2022    EGFRNONAA 34.5 (A) 03/09/2022    EGFRNONAA 34.5 (A) 03/09/2022     Lab Results   Component Value Date    ALT 12 11/08/2024    AST 20 11/08/2024    ALKPHOS 51 11/08/2024    BILITOT 0.3 11/08/2024       Assessment:  Problem List Items Addressed This Visit    None        3/10/21 - Tere Velasco is a 84 y.o. female with chronic low back pain due to degenerative joint disease with contribution from scoliosis and core muscle weakness.  I recommended a combination of therapies including physical therapy and facet joint injections for the low back.  Due to the severity of her scoliosis, patient is unlikely to ever be pain free; however, I believe that through a combination of facet injections of physical therapy a realistic target is 50-75% reduction in pain.  I recommend continuation of the current medications with the addition of Tylenol.  Patient appears to understand the necessity for avoiding NSAIDs, due to her chronic kidney disease.    10/5/22 - Tere Velasco is a 84 y.o. female with chronic lower back pain and scoliosis. She failed to experience relief from facet injections that were well placed.  She states that she does not do home exercises because she wasn't sure how it would help her back pain.  She completed physical therapy, however her benefits were short lived.  At this point, she would benefit form home exercise program with core strengthening.  She reports pain of 2/10 today which is representative of her daily experience of pain.  At age 82 yrs with substantial scoliosis,  advanced DDD, and a sedentary lifestyle, she was counseled that her level of pain (2/10) should be viewed as reasonable; however, if she would like further redcutions in pain she must commit to daily exercise.  Her remain pain is most likely fibromyalgia.      05/20/2024-Tere Velasco is a 84 y.o. female who  has a past medical history of Allergy, Anemia, Anxiety, Arthritis, Back pain, Breast disorder, Cataract, Chronic kidney disease, unspecified, CKD (chronic kidney disease) stage 3, GFR 30-59 ml/min, Clotting disorder, Colitis, Degenerative disc disease, Depression, Fibromyalgia, GERD (gastroesophageal reflux disease), Hyperlipidemia, Hypertension, Irritable bowel syndrome, TRU (obstructive sleep apnea), RLS (restless legs syndrome), and S/P knee replacement (01/13/2014).  By history and examination this patient has chronic low back pain without radiculopathy.  The underlying cause is  scoliosis, facet arthritis, degenerative disc disease, and foraminal stenosis.  Pathology is confirmed by imaging.  We discussed the underlying diagnoses and multiple treatment options including non-opioid medications, interventional procedures, physical therapy, home exercise, core muscle enhancement, and activity modification.  The risks and benefits of each treatment option were discussed and all questions were answered.        10/21/2024-Tere Velasco is a 84 y.o. female who  has a past medical history of Allergy, Anemia, Anxiety, Arthritis, Back pain, Breast disorder, Cataract, Chronic kidney disease, unspecified, CKD (chronic kidney disease) stage 3, GFR 30-59 ml/min, Clotting disorder, Colitis, Degenerative disc disease, Depression, Fibromyalgia, GERD (gastroesophageal reflux disease), Hyperlipidemia, Hypertension, Irritable bowel syndrome, TRU (obstructive sleep apnea), RLS (restless legs syndrome), and S/P knee replacement (01/13/2014).  By history and examination this patient has chronic low back pain  without radiculopathy.  The underlying cause cause is facet arthritis, degenerative disc disease, foraminal stenosis, central canal stenosis, muscle dysfunction, and muscles strain.  Pathology is confirmed by imaging.  We discussed the underlying diagnoses and multiple treatment options including non-opioid medications, interventional procedures, physical therapy, home exercise, core muscle enhancement, and activity modification.  The risks and benefits of each treatment option were discussed and all questions were answered.        11/11/2024Toni Velasco is a 84 y.o. female who  has a past medical history of Allergy, Anemia, Anxiety, Arthritis, Back pain, Breast disorder, Cataract, Chronic kidney disease, unspecified, CKD (chronic kidney disease) stage 3, GFR 30-59 ml/min, Clotting disorder, Colitis, Degenerative disc disease, Depression, Fibromyalgia, GERD (gastroesophageal reflux disease), Hyperlipidemia, Hypertension, Irritable bowel syndrome, TRU (obstructive sleep apnea), RLS (restless legs syndrome), and S/P knee replacement (01/13/2014).  By history and examination this patient has chronic low back pain without radiculopathy.  The underlying cause cause is facet arthritis, degenerative disc disease, muscle dysfunction, muscles strain, and deconditioning.  Pathology is confirmed by imaging.  We discussed the underlying diagnoses and multiple treatment options including non-opioid medications, interventional procedures, physical therapy, home exercise, core muscle enhancement, and activity modification.  The risks and benefits of each treatment option were discussed and all questions were answered.        11/20/2024Toni Velasco is a 84 y.o. female who  has a past medical history of Allergy, Anemia, Anxiety, Arthritis, Back pain, Breast disorder, Cataract, Chronic kidney disease, unspecified, CKD (chronic kidney disease) stage 3, GFR 30-59 ml/min, Clotting disorder, Colitis, Degenerative disc  disease, Depression, Fibromyalgia, GERD (gastroesophageal reflux disease), Hyperlipidemia, Hypertension, Irritable bowel syndrome, TRU (obstructive sleep apnea), RLS (restless legs syndrome), and S/P knee replacement (01/13/2014).  By history and examination this patient has chronic low back pain without radiculopathy.  The underlying cause severe scoliosis is facet arthritis, degenerative disc disease, foraminal stenosis, and central canal stenosis.  Pathology is confirmed by imaging.  We discussed the underlying diagnoses and multiple treatment options including non-opioid medications, interventional procedures, physical therapy, home exercise, core muscle enhancement, and activity modification.  The risks and benefits of each treatment option were discussed and all questions were answered.      Patient's pain has been refractory to multiple injections recent lumbar MRI shows spine scoliosis and lumbar spondylosis with multilevel spinal canal stenosis and variable neural foraminal as detailed.  Findings most pronounced at L4-L5 with severe spinal canal stenosis and right greater than left neural foraminal encroachment.  In the future we can consider a lumbar KWABENA if epidural spaces are amenable to injections.  For now I provided her a short term prescription of tramadol 50 mg 30 tablets to utilize as needed over the next 4 weeks patient will then follow up with me to discuss this medication.      : NA    Treatment Plan:   Procedures:  none at this time.  Patient recently received a bilateral lumbar RFA  PT/OT/HEP:  Encouraged her to Start HE plan that was provided per PT. consider returning to physical therapy.  We counseled her on the importance of a quality HEP for the long term management of her lower back pain.  Medications:   Short term prescription of tramadol 50 mg 30 tablets to use p.r.n. for continued low back pain.                           Continue Tylenol extra-strength a 1000 t.I.d. not to exceed  3000 mg in a 24 hour.                           Patient has an active prescription of tramadol 50 mg to take p.r.n. for subacute pain at her pharmacy.  Labs: reviewed and medications are appropriately dosed for current hepatorenal function.  Imaging:  Reviewed and discussed in detail using spine model and images from patient's chart.    Follow Up:  5 weeks    CODY Gill  Interventional Pain Management    Disclaimer: This note was partly generated using dictation software which may occasionally result in transcription errors.

## 2024-11-25 DIAGNOSIS — M79.7 FIBROMYALGIA: ICD-10-CM

## 2024-11-25 NOTE — TELEPHONE ENCOUNTER
No care due was identified.  [facility] Embedded Care Due Messages. Reference number: [id number].   11/25/2024 1:33:18 PM CST

## 2024-11-26 ENCOUNTER — TELEPHONE (OUTPATIENT)
Dept: INTERNAL MEDICINE | Facility: CLINIC | Age: 84
End: 2024-11-26
Payer: MEDICARE

## 2024-11-26 RX ORDER — DULOXETIN HYDROCHLORIDE 20 MG/1
20 CAPSULE, DELAYED RELEASE ORAL 2 TIMES DAILY
Qty: 180 CAPSULE | Refills: 3 | Status: SHIPPED | OUTPATIENT
Start: 2024-11-26

## 2024-11-26 NOTE — TELEPHONE ENCOUNTER
Refill Routing Note   Medication(s) are not appropriate for processing by [facility] for the following reason(s):        Required vitals abnormal    [facility] action(s):  Defer             Appointments  past 12m or future 3m with PCP    Date Provider   Last Visit   11/15/2024 Rosy Santos MD   Next Visit   5/15/2025 Rosy Santos MD   ED visits in past 90 days: 0        Note composed:6:52 PM 11/25/2024

## 2024-12-02 DIAGNOSIS — M79.7 FIBROMYALGIA: ICD-10-CM

## 2024-12-02 DIAGNOSIS — I15.0 RENOVASCULAR HYPERTENSION: ICD-10-CM

## 2024-12-02 NOTE — TELEPHONE ENCOUNTER
No care due was identified.  [facility] Embedded Care Due Messages. Reference number: [id number].   12/02/2024 3:32:33 PM CST

## 2024-12-02 NOTE — TELEPHONE ENCOUNTER
----- Message from Misty sent at 12/2/2024  3:00 PM CST -----  Contact: Mobile  [phone]  Patient said that she put in an order for some of her medications to be refilled, and she would like for you to send them in as soon as possible. Patient said that she's going out of town on December fourth.

## 2024-12-02 NOTE — TELEPHONE ENCOUNTER
Last seen on 11/15/24 - patient is going out of town and needs refill please and thank you   Needs losartan, hydrochlorathiazide, duloxetine

## 2024-12-04 ENCOUNTER — TELEPHONE (OUTPATIENT)
Dept: ADMINISTRATIVE | Facility: CLINIC | Age: 84
End: 2024-12-04
Payer: MEDICARE

## 2024-12-05 RX ORDER — DULOXETIN HYDROCHLORIDE 20 MG/1
20 CAPSULE, DELAYED RELEASE ORAL 2 TIMES DAILY
Qty: 180 CAPSULE | Refills: 3 | OUTPATIENT
Start: 2024-12-05

## 2024-12-05 RX ORDER — LOSARTAN POTASSIUM 50 MG/1
50 TABLET ORAL DAILY
Qty: 90 TABLET | Refills: 3 | Status: SHIPPED | OUTPATIENT
Start: 2024-12-05

## 2024-12-05 RX ORDER — HYDROCHLOROTHIAZIDE 12.5 MG/1
12.5 TABLET ORAL DAILY
Qty: 90 TABLET | Refills: 3 | Status: SHIPPED | OUTPATIENT
Start: 2024-12-05

## 2024-12-18 ENCOUNTER — HOSPITAL ENCOUNTER (EMERGENCY)
Facility: HOSPITAL | Age: 84
Discharge: HOME | End: 2024-12-18
Attending: EMERGENCY MEDICINE
Payer: MEDICARE

## 2024-12-18 ENCOUNTER — APPOINTMENT (OUTPATIENT)
Dept: CT IMAGING | Facility: HOSPITAL | Age: 84
End: 2024-12-18
Attending: EMERGENCY MEDICINE
Payer: MEDICARE

## 2024-12-18 VITALS
WEIGHT: 145 LBS | HEART RATE: 85 BPM | DIASTOLIC BLOOD PRESSURE: 60 MMHG | HEIGHT: 65 IN | OXYGEN SATURATION: 89 % | SYSTOLIC BLOOD PRESSURE: 127 MMHG | RESPIRATION RATE: 20 BRPM | BODY MASS INDEX: 24.16 KG/M2 | TEMPERATURE: 98.5 F

## 2024-12-18 DIAGNOSIS — S09.90XA CLOSED HEAD INJURY, INITIAL ENCOUNTER: Primary | ICD-10-CM

## 2024-12-18 PROCEDURE — 99284 EMERGENCY DEPT VISIT MOD MDM: CPT

## 2024-12-18 PROCEDURE — 70450 CT HEAD/BRAIN W/O DYE: CPT

## 2024-12-18 ASSESSMENT — LIFESTYLE VARIABLES
HOW OFTEN DO YOU HAVE A DRINK CONTAINING ALCOHOL: NEVER
HOW MANY STANDARD DRINKS CONTAINING ALCOHOL DO YOU HAVE ON A TYPICAL DAY: PATIENT DOES NOT DRINK

## 2024-12-18 ASSESSMENT — PAIN SCALES - GENERAL: PAINLEVEL_OUTOF10: 0

## 2024-12-18 NOTE — ED TRIAGE NOTES
Pt arrives to ED accompanied by family after falling from bed. Pt denies pain at this time, Pt also denies use of blood thinners.     Pt denies LOC.     Pt had a fall 3 weeks ago vs concrete, Pt also fell about 6 months ago. Pt and family have concerns due to frequent falls.

## 2024-12-18 NOTE — ED PROVIDER NOTES
.        [facility]  EMERGENCY DEPARTMENT ENCOUNTER        Pt Name: Dorita Ba  MRN: [id number]  Birthdate 1940  Date of evaluation: 12/18/2024  Provider: Jennifer Larson MD  PCP: No primary care provider on file.  Note Started: 5:06 AM EST 12/18/24    CHIEF COMPLAINT       Chief Complaint   Patient presents with    Fall    Head Injury       HISTORY OF PRESENT ILLNESS: 1 or more Elements     History from : Patient    Limitations to history : None    Dorita Ba is a 84 y.o. female who presents to planing of having fallen out of bed hitting the back of her head on the floor this happened about 45 minutes ago she did not lose consciousness she denies any headache or really any soreness of her head she has not had any nausea or vomiting since this happened she had to be helped up once she was helped up she was ambulatory as usual she is not on any anticoagulants.    Nursing Notes were all reviewed and agreed with or any disagreements were addressed in the HPI.    REVIEW OF SYSTEMS :      Review of Systems     systems reviewed and negative except as in HPI/MDM    SURGICAL HISTORY     Past Surgical History:   Procedure Laterality Date    APPENDECTOMY      BREAST BIOPSY      HYSTERECTOMY (CERVIX STATUS UNKNOWN)      TONSILLECTOMY         CURRENTMEDICATIONS       Previous Medications    No medications on file       ALLERGIES     Demerol hcl [meperidine]    FAMILYHISTORY     No family history on file.     SOCIAL HISTORY          SCREENINGS        [place] Coma Scale  Eye Opening: Spontaneous  Best Verbal Response: Oriented  Best Motor Response: Obeys commands  [place] Coma Scale Score: 15                CIWA Assessment  BP: 133/73  Pulse: 85           PHYSICAL EXAM  1 or more Elements     ED Triage Vitals   BP Systolic BP Percentile Diastolic BP Percentile Temp Temp Source Pulse Respirations SpO2   12/18/24 0459 -- -- 12/18/24 0459 12/18/24 0459 12/18/24 0459 12/18/24 0503 12/18/24 0459

## 2025-01-06 ENCOUNTER — TELEPHONE (OUTPATIENT)
Dept: PAIN MEDICINE | Facility: CLINIC | Age: 85
End: 2025-01-06
Payer: MEDICARE

## 2025-05-09 NOTE — TELEPHONE ENCOUNTER
----- Message from Julissa Yin MA sent at 7/30/2024  9:44 AM CDT -----  Good morning, please assist.  ----- Message -----  From: Ritu Tay  Sent: 7/30/2024   9:12 AM CDT  To: [facility]    Type:  Needs Medical Advice    Who Called: pt  Would the patient rather a call back or a response via [facility]? call  Best Call Back Number:  [phone]  Additional Information: pt states she had directions but they went away, she is needing instructions on fasting for procedure 7.31   Yes-Patient/Caregiver accepts free interpretation services...